# Patient Record
Sex: FEMALE | HISPANIC OR LATINO | Employment: FULL TIME | URBAN - METROPOLITAN AREA
[De-identification: names, ages, dates, MRNs, and addresses within clinical notes are randomized per-mention and may not be internally consistent; named-entity substitution may affect disease eponyms.]

---

## 2017-01-03 ENCOUNTER — TELEPHONE (OUTPATIENT)
Dept: FAMILY MEDICINE | Facility: CLINIC | Age: 32
End: 2017-01-03

## 2017-01-03 DIAGNOSIS — E11.9 DIABETES MELLITUS TYPE 2, DIET-CONTROLLED (H): Primary | ICD-10-CM

## 2017-01-03 NOTE — TELEPHONE ENCOUNTER
Reason for Call:  Medication or medication refill:  Do you use a [facility]?  Name of the pharmacy and phone number for the current request:  [id number] [place], [place] - [address] [place] [address]  Name of the medication requested: glucose monitor,test strips and lancets  Other request: patient has new ins BCBS will not cover test strips and lancets she has would like new script or pa  Can we leave a detailed message on this number? YES  Phone number patient can be reached at: Home number on file [phone] (home)  Best Time: any  Call taken on 1/3/2017 at 9:57 AM by RUCHI MAYES

## 2017-01-09 ENCOUNTER — TELEPHONE (OUTPATIENT)
Dept: FAMILY MEDICINE | Facility: CLINIC | Age: 32
End: 2017-01-09

## 2017-01-09 NOTE — TELEPHONE ENCOUNTER
Accu check marleni plus test strips is just not formulary. The tulio I spoke with states that Willa contour test strips/glucometer will be covered.

## 2017-01-09 NOTE — TELEPHONE ENCOUNTER
Called patient's pharmacy and had them fill lorena contour test strips and glucometer instead of accu check marleni.

## 2017-01-09 NOTE — TELEPHONE ENCOUNTER
Please call patient's insurance to see why they require prior auth as they should cover the test strips needed for her glucometer (quantity limit?).

## 2017-01-09 NOTE — TELEPHONE ENCOUNTER
Reason for Call:  Prior Authorization      Detailed comments: Patient states that the pharmacy notified her that a PA is required for the test strips.     Phone Number Patient can be reached at: Home number on file [phone] (home)    Best Time: Anytime    Can we leave a detailed message on this number? YES    Call taken on 1/9/2017 at 11:38 AM by Andrew Desai

## 2017-01-09 NOTE — TELEPHONE ENCOUNTER
Prior Authorization Request    1. Prior Authorization for the medication test strips (will call pharmacy to find out what test strips she uses)        Requesting Provider: Lexy Saucedo          Pt name: Denia Phillips Milton        Pt : 1985        Pt MRN: [id number]        Last Office Visit: 1/3/2017           Insurance: Payor: [employer] / Plan:  OPEN ACCESS FULLY INSURED / Product Type: HMO /         Insurance ID Number: [unfilled]        Prior Auth Contact Phone number:            To be completed by provider:       2.   Refuse or Start Prior Auth:

## 2017-01-09 NOTE — TELEPHONE ENCOUNTER
Please call patient's pharmacy to fill the Willa Contour test strips & glucometer rather than Accu-check -- DME orders for glucometer, test strips, and lancets were sent on 1/3/2017.

## 2017-02-05 DIAGNOSIS — E11.9 DIABETES MELLITUS TYPE 2, DIET-CONTROLLED (H): Primary | ICD-10-CM

## 2017-02-07 RX ORDER — METFORMIN HCL 500 MG
TABLET, EXTENDED RELEASE 24 HR ORAL
Qty: 90 TABLET | Refills: 1 | Status: SHIPPED | OUTPATIENT
Start: 2017-02-07 | End: 2017-08-10

## 2017-02-07 NOTE — TELEPHONE ENCOUNTER
Metformin (Gluco;davida-XR) 500 mg     Last Written Prescription Date: 8/12/16  Last Fill Quantity: 90 # refills: 1  Last Office Visit with [facility], [facility] or [facility] prescribing provider: 10/21/16     BP Readings from Last 3 Encounters:   10/21/16 126/79   09/23/16 98/72   05/20/16 108/64     MICROL       <5   8/19/2016  No results found for this basename: microalbumin  CREATININE   Date Value Ref Range Status   05/01/2015 0.70 0.52 - 1.04 mg/dL Final   ]  GFR ESTIMATE   Date Value Ref Range Status   05/01/2015 >90 >60 mL/min/1.7m2 Final     GFR ESTIMATE IF BLACK   Date Value Ref Range Status   05/01/2015 >90 >60 mL/min/1.7m2 Final     CHOL      182   5/13/2016  HDL       58   5/13/2016  LDL      111   5/13/2016  TRIG       63   5/13/2016  CHOLHDLRATIO      3.7   8/7/2015  No results found for this basename: ast  ALT       19   8/7/2015  A1C      5.5   8/19/2016  A1C      5.7   5/13/2016  A1C      5.3   1/29/2016  A1C      6.1   8/7/2015  A1C      9.8   5/1/2015  No results found for: POTASSIUM   Prescription approved per [facility] Refill Protocol.

## 2017-02-11 ENCOUNTER — TELEPHONE (OUTPATIENT)
Dept: FAMILY MEDICINE | Facility: CLINIC | Age: 32
End: 2017-02-11

## 2017-02-11 NOTE — TELEPHONE ENCOUNTER
Pt is calling requesting a lipid panel be ordered. Please call her at [phone] OK to Oliverio Barksdale   [place] Scheduling

## 2017-02-13 ENCOUNTER — TELEPHONE (OUTPATIENT)
Dept: FAMILY MEDICINE | Facility: CLINIC | Age: 32
End: 2017-02-13

## 2017-02-13 DIAGNOSIS — E78.5 HYPERLIPIDEMIA LDL GOAL <100: ICD-10-CM

## 2017-02-13 DIAGNOSIS — E11.9 DIABETES MELLITUS TYPE 2, DIET-CONTROLLED (H): Primary | ICD-10-CM

## 2017-02-13 NOTE — TELEPHONE ENCOUNTER
Patient called the clinic, she wants to come in for labs on Friday 17th and OV on Friday 24th with Lexy BAEZ.   This will be her 6 month diabetic follow-up. No call back needed if labs are placed and provider okay with the plan.   Will route to provider to place necessary labs.

## 2017-02-14 NOTE — TELEPHONE ENCOUNTER
Closing encounter, duplicate request. Lab orders placed yesterday (see encounter dated 2/13/2017), including a lipid panel; patient already contacted by triage.

## 2017-02-17 DIAGNOSIS — E11.9 DIABETES MELLITUS TYPE 2, DIET-CONTROLLED (H): ICD-10-CM

## 2017-02-17 DIAGNOSIS — E78.5 HYPERLIPIDEMIA LDL GOAL <100: ICD-10-CM

## 2017-02-17 LAB
CHOLEST SERPL-MCNC: 180 MG/DL
CREAT SERPL-MCNC: 0.66 MG/DL (ref 0.52–1.04)
GFR SERPL CREATININE-BSD FRML MDRD: NORMAL ML/MIN/1.7M2
HBA1C MFR BLD: 5.6 % (ref 4.3–6)
HDLC SERPL-MCNC: 60 MG/DL
LDLC SERPL CALC-MCNC: 110 MG/DL
NONHDLC SERPL-MCNC: 120 MG/DL
TRIGL SERPL-MCNC: 50 MG/DL
TSH SERPL DL<=0.005 MIU/L-ACNC: 2.13 MU/L (ref 0.4–4)

## 2017-02-17 PROCEDURE — 83036 HEMOGLOBIN GLYCOSYLATED A1C: CPT | Performed by: PHYSICIAN ASSISTANT

## 2017-02-17 PROCEDURE — 84443 ASSAY THYROID STIM HORMONE: CPT | Performed by: PHYSICIAN ASSISTANT

## 2017-02-17 PROCEDURE — 36415 COLL VENOUS BLD VENIPUNCTURE: CPT | Performed by: PHYSICIAN ASSISTANT

## 2017-02-17 PROCEDURE — 80061 LIPID PANEL: CPT | Performed by: PHYSICIAN ASSISTANT

## 2017-02-17 PROCEDURE — 82565 ASSAY OF CREATININE: CPT | Performed by: PHYSICIAN ASSISTANT

## 2017-02-18 DIAGNOSIS — E78.5 HYPERLIPIDEMIA LDL GOAL <70: Primary | ICD-10-CM

## 2017-02-18 RX ORDER — SIMVASTATIN 10 MG
10 TABLET ORAL AT BEDTIME
Qty: 90 TABLET | Refills: 3 | Status: SHIPPED | OUTPATIENT
Start: 2017-02-18 | End: 2017-02-24

## 2017-02-24 ENCOUNTER — OFFICE VISIT (OUTPATIENT)
Dept: FAMILY MEDICINE | Facility: CLINIC | Age: 32
End: 2017-02-24
Payer: COMMERCIAL

## 2017-02-24 VITALS
WEIGHT: 149 LBS | DIASTOLIC BLOOD PRESSURE: 64 MMHG | RESPIRATION RATE: 16 BRPM | OXYGEN SATURATION: 99 % | TEMPERATURE: 97.2 F | HEIGHT: 64 IN | SYSTOLIC BLOOD PRESSURE: 120 MMHG | BODY MASS INDEX: 25.44 KG/M2 | HEART RATE: 85 BPM

## 2017-02-24 DIAGNOSIS — Z31.69 PRE-CONCEPTION COUNSELING: ICD-10-CM

## 2017-02-24 DIAGNOSIS — E11.9 TYPE 2 DIABETES MELLITUS WITHOUT COMPLICATION, WITHOUT LONG-TERM CURRENT USE OF INSULIN (H): Primary | ICD-10-CM

## 2017-02-24 DIAGNOSIS — E78.5 HYPERLIPIDEMIA LDL GOAL <70: ICD-10-CM

## 2017-02-24 PROCEDURE — 99214 OFFICE O/P EST MOD 30 MIN: CPT | Performed by: PHYSICIAN ASSISTANT

## 2017-02-24 RX ORDER — PRENATAL VIT/IRON FUM/FOLIC AC 27MG-0.8MG
1 TABLET ORAL DAILY
Qty: 100 TABLET | Refills: 3 | Status: SHIPPED | OUTPATIENT
Start: 2017-02-24 | End: 2018-03-16

## 2017-02-24 NOTE — PROGRESS NOTES
SUBJECTIVE:                                                    Denia Rose is a 31 year old female who presents to clinic today for the following health issues:      Pt is here to go over labs from appointment on 2-18-17    Additional complaints:   Diabetes Follow-up    Patient is checking blood sugars: once daily.  Results are as follows: < 120    Diabetic concerns: None     Symptoms of hypoglycemia (low blood sugar): none     Paresthesias (numbness or burning in feet) or sores: No     Hyperlipidemia Follow-Up      Rate your low fat/cholesterol diet?: not monitoring fat    Taking statin?  No, wants to discuss fish oil supplement instead    Other lipid medications/supplements?:  Fish oil/Omega 3       HPI additional notes:   Chief Complaint   Patient presents with     RECHECK     labs     Denia presents today to discuss recent labs & here for f/u DM-II and HLP. Recommended patient start statin due to LDL >70. Patient requests resuming fish oil supplement instead; had taken in the past with good results. Wondering what dose would be most effective?    Patient also considering pregnancy this year, would like to discuss pregnancy concerns d/t underlying DM-II>     ROS:  Skin: negative  Eyes: negative  Ears/Nose/Throat: negative  Respiratory: No shortness of breath, dyspnea on exertion, cough, or hemoptysis  Cardiovascular: negative  Gastrointestinal: negative  Genitourinary: negative  Musculoskeletal: negative  Neurologic: negative  Psychiatric: negative  Hematologic/Lymphatic/Immunologic: negative  Endocrine: negative    Chart Review:  History   Smoking Status     Never Smoker   Smokeless Tobacco     Never Used       Patient Active Problem List   Diagnosis     History of kidney stones     Type 2 diabetes mellitus without complication, without long-term current use of insulin (H)     Hyperlipidemia LDL goal <70     Cervical high risk HPV (human papillomavirus) test positive     Plantar warts     History  "reviewed. No pertinent past surgical history.  Problem list, Medication list, Allergies, Medical/Social/Surg hx reviewed in Friend[employer]ly, updated as appropriate.   OBJECTIVE:                                                    /64  Pulse 85  Temp 97.2  F (36.2  C) (Tympanic)  Resp 16  Ht 5' 4\" (1.626 m)  Wt 149 lb (67.6 kg)  LMP 02/14/2017 (Approximate)  SpO2 99%  BMI 25.58 kg/m2  Body mass index is 25.58 kg/(m^2).  GENERAL:  WDWN, no acute distress  PSYCH: pleasant, cooperative  EYES: no discharge, no injection  HENT:  Normocephalic. Moist mucus membranes.  NECK:  Supple, symmetric  EXTREMITIES:  No gross deformities, moves all 4 limbs spontaneously, no peripheral edema  SKIN:  Warm and dry, no rash or suspicious lesions    NEUROLOGIC: alert, sensation grossly intact.    Diagnostic test results:   Orders Only on 02/17/2017   Component Date Value Ref Range Status     Hemoglobin A1C 02/17/2017 5.6  4.3 - 6.0 % Final     Creatinine 02/17/2017 0.66  0.52 - 1.04 mg/dL Final     GFR Estimate 02/17/2017   >60 mL/min/1.7m2 Final                    Value:>90  Non  GFR Calc       GFR Estimate If Black 02/17/2017   >60 mL/min/1.7m2 Final                    Value:>90   GFR Calc       TSH 02/17/2017 2.13  0.40 - 4.00 mU/L Final     Cholesterol 02/17/2017 180  <200 mg/dL Final     Triglycerides 02/17/2017 50  <150 mg/dL Final     HDL Cholesterol 02/17/2017 60  >49 mg/dL Final     LDL Cholesterol Calculated 02/17/2017 110* <100 mg/dL Final    Comment: Above desirable:  100-129 mg/dl   Borderline High:  130-159 mg/dL   High:             160-189 mg/dL   Very high:       >189 mg/dl       Non HDL Cholesterol 02/17/2017 120  <130 mg/dL Final          ASSESSMENT/PLAN:                                                          ICD-10-CM    1. Type 2 diabetes mellitus without complication, without long-term current use of insulin (H) E11.9 ACE/ARB NOT PRESCRIBED, INTENTIONAL,   2. Hyperlipidemia LDL goal <70 " E78.5    3. Pre-conception counseling Z31.69 Prenatal Vit-Fe Fumarate-FA (PRENATAL MULTIVITAMIN  PLUS IRON) 27-0.8 MG TABS per tablet     - Patient with excellent BG control, continue medications and diabetic diet w/o change. Discussed role of statin and low LDL for risk reduction. Best medication would be use of statin given cardio-protective effects, however would be reasonable to attempt to reach LDL goal with fish oil. Recommend 6911-3894 mg of EPA+DHA fish oil. Recommend rechecking lipids in 3-6 months to assess efficacy. Patient continues to refuse ACE-I    - Discussed potential complications and expected pregnancy course in relation to underlying diabetes. Discussed importance of folic acid with neural tube development, recommend she starts daily prenatal vitamin once she starts trying to conceive.    Please see patient instructions for treatment details.  30 minutes total spent on this encounter, >50% spent in direct communication with the patient.    Follow up office visit in 6 months for medication check and labs, sooner PRN.    Lexy Saucedo PA-C  [facility]

## 2017-02-24 NOTE — MR AVS SNAPSHOT
After Visit Summary   2/24/2017    Denia Rose    MRN: [id number]           Patient Information     Date Of Birth          1985        Visit Information        Provider Department      2/24/2017 8:10 AM Lexy Saucedo PA-C [facility]        Today's Diagnoses     Type 2 diabetes mellitus without complication, without long-term current use of insulin (H)    -  1    Hyperlipidemia LDL goal <70        Pre-conception counseling           Follow-ups after your visit        Who to contact     If you have questions or need follow up information about today's clinic visit or your schedule please contact [facility] directly at [phone].  Normal or non-critical lab and imaging results will be communicated to you by Ikonopediahart, letter or phone within 4 business days after the clinic has received the results. If you do not hear from us within 7 days, please contact the clinic through Ikonopediahart or phone. If you have a critical or abnormal lab result, we will notify you by phone as soon as possible.  Submit refill requests through [employer] or call your pharmacy and they will forward the refill request to us. Please allow 3 business days for your refill to be completed.          Additional Information About Your Visit        MyChart Information     [employer] gives you secure access to your electronic health record. If you see a primary care provider, you can also send messages to your care team and make appointments. If you have questions, please call your primary care clinic.  If you do not have a primary care provider, please call [phone] and they will assist you.        Care EveryWhere ID     This is your Care EveryWhere ID. This could be used by other organizations to access your [place] medical records  [id number]        Your Vitals Were     Pulse Temperature Respirations Height Last Period Pulse Oximetry    85 97.2  F  "(36.2  C) (Tympanic) 16 5' 4\" (1.626 m) 02/14/2017 (Approximate) 99%    BMI (Body Mass Index)                   25.58 kg/m2            Blood Pressure from Last 3 Encounters:   02/24/17 120/64   10/21/16 126/79   09/23/16 98/72    Weight from Last 3 Encounters:   02/24/17 149 lb (67.6 kg)   10/21/16 148 lb (67.1 kg)   09/23/16 144 lb 12.8 oz (65.7 kg)              Today, you had the following     No orders found for display         Today's Medication Changes          These changes are accurate as of: 2/24/17  9:00 AM.  If you have any questions, ask your nurse or doctor.               Start taking these medicines.        Dose/Directions    prenatal multivitamin  plus iron 27-0.8 MG Tabs per tablet   Used for:  Pre-conception counseling   Started by:  Lexy Saucedo PA-C        Dose:  1 tablet   Take 1 tablet by mouth daily   Quantity:  100 tablet   Refills:  3         These medicines have changed or have updated prescriptions.        Dose/Directions    ACE/ARB NOT PRESCRIBED (INTENTIONAL)   This may have changed:    - how much to take  - how to take this  - when to take this  - additional instructions   Used for:  Type 2 diabetes mellitus without complication, without long-term current use of insulin (H)   Changed by:  Lexy Saucedo PA-C        Please choose reason not prescribed, below   Refills:  0         Stop taking these medicines if you haven't already. Please contact your care team if you have questions.     ASPIRIN NOT PRESCRIBED   Commonly known as:  INTENTIONAL   Stopped by:  Lexy Saucedo PA-C                Where to get your medicines      These medications were sent to [facility] [place] [id number]     Phone:  [phone]     prenatal multivitamin  plus iron 27-0.8 MG Tabs per tablet         Some of these will need a paper prescription and others can be bought over the counter.  Ask your nurse if you have questions.     " You don't need a prescription for these medications     ACE/ARB NOT PRESCRIBED (INTENTIONAL)                Primary Care Provider Office Phone # Fax #    Lexy Saucedo PA-C [phone]       [facility] [address]  [facility] [address]        Thank you!     Thank you for choosing [facility]  for your care. Our goal is always to provide you with excellent care. Hearing back from our patients is one way we can continue to improve our services. Please take a few minutes to complete the written survey that you may receive in the mail after your visit with us. Thank you!             Your Updated Medication List - Protect others around you: Learn how to safely use, store and throw away your medicines at www.disposemymeds.org.          This list is accurate as of: 2/24/17  9:00 AM.  Always use your most recent med list.                   Brand Name Dispense Instructions for use    ACE/ARB NOT PRESCRIBED (INTENTIONAL)      Please choose reason not prescribed, below       metFORMIN 500 MG 24 hr tablet    GLUCOPHAGE-XR    90 tablet    TAKE 1 TABLET (500 MG) BY MOUTH DAILY (WITH DINNER)       * order for DME     1 Device    Equipment being ordered: Glucometer, brand as covered by insurance. Does not use insulin.       * order for DME     200 each    Lancets bid and prn.  Fill per patient's insurance. Not on insulin       * order for DME     200 each    Test strips for pt's glucometer, brand as covered by insurance Test bid and prn. Not on insulin.       prenatal multivitamin  plus iron 27-0.8 MG Tabs per tablet     100 tablet    Take 1 tablet by mouth daily       salicylic acid 40 % Misc    MEDIPLAST    1 each    Apply 1 dose * topically At Bedtime Each night, Scrape or loofa the wart(s) and then soak foot for 10-15 min in warm water.  Cut plaster to fit exactly over 1 wart each.  Tape each in place for overnight and remove the next morning.        STATIN NOT PRESCRIBED (INTENTIONAL)     0 each    1 each daily Statin not prescribed intentionally due to Refusal by patient       * Notice:  This list has 3 medication(s) that are the same as other medications prescribed for you. Read the directions carefully, and ask your doctor or other care provider to review them with you.

## 2017-02-24 NOTE — NURSING NOTE
"Chief Complaint   Patient presents with     RECHECK     labs       Initial /64  Pulse 85  Temp 97.2  F (36.2  C) (Tympanic)  Resp 16  Ht 5' 4\" (1.626 m)  Wt 149 lb (67.6 kg)  LMP 02/14/2017 (Approximate)  SpO2 99%  BMI 25.58 kg/m2 Estimated body mass index is 25.58 kg/(m^2) as calculated from the following:    Height as of this encounter: 5' 4\" (1.626 m).    Weight as of this encounter: 149 lb (67.6 kg).  Medication Reconciliation: complete   .Álvaro DE LUNA      "

## 2017-07-12 DIAGNOSIS — E11.9 TYPE 2 DIABETES MELLITUS WITHOUT COMPLICATION, WITHOUT LONG-TERM CURRENT USE OF INSULIN (H): Primary | ICD-10-CM

## 2017-08-10 ENCOUNTER — TELEPHONE (OUTPATIENT)
Dept: FAMILY MEDICINE | Facility: CLINIC | Age: 32
End: 2017-08-10

## 2017-08-10 DIAGNOSIS — E78.5 HYPERLIPIDEMIA LDL GOAL <70: ICD-10-CM

## 2017-08-10 DIAGNOSIS — E11.9 TYPE 2 DIABETES MELLITUS WITHOUT COMPLICATION, WITHOUT LONG-TERM CURRENT USE OF INSULIN (H): Primary | ICD-10-CM

## 2017-08-10 NOTE — TELEPHONE ENCOUNTER
Patient would like orders placed for her 6 month check for her diabetes and cholesterol.Will forward to her provider.

## 2017-08-11 NOTE — TELEPHONE ENCOUNTER
Lab orders placed, patient may schedule lab-only appt at her convenience. Patient should be fasting for these labs.

## 2017-08-11 NOTE — TELEPHONE ENCOUNTER
Called patient and went over message below, also scheduled a lab only appointment.     Seema Juárez RN  08/11/17  8:48 AM

## 2017-08-18 DIAGNOSIS — E11.9 TYPE 2 DIABETES MELLITUS WITHOUT COMPLICATION, WITHOUT LONG-TERM CURRENT USE OF INSULIN (H): ICD-10-CM

## 2017-08-18 DIAGNOSIS — E78.5 HYPERLIPIDEMIA LDL GOAL <70: ICD-10-CM

## 2017-08-18 LAB
CHOLEST SERPL-MCNC: 163 MG/DL
CREAT UR-MCNC: 118 MG/DL
HBA1C MFR BLD: 5.6 % (ref 4.3–6)
HDLC SERPL-MCNC: 56 MG/DL
LDLC SERPL CALC-MCNC: 97 MG/DL
MICROALBUMIN UR-MCNC: 8 MG/L
MICROALBUMIN/CREAT UR: 6.87 MG/G CR (ref 0–25)
NONHDLC SERPL-MCNC: 107 MG/DL
TRIGL SERPL-MCNC: 52 MG/DL

## 2017-08-18 PROCEDURE — 36415 COLL VENOUS BLD VENIPUNCTURE: CPT | Performed by: PHYSICIAN ASSISTANT

## 2017-08-18 PROCEDURE — 80061 LIPID PANEL: CPT | Performed by: PHYSICIAN ASSISTANT

## 2017-08-18 PROCEDURE — 83036 HEMOGLOBIN GLYCOSYLATED A1C: CPT | Performed by: PHYSICIAN ASSISTANT

## 2017-08-18 PROCEDURE — 82043 UR ALBUMIN QUANTITATIVE: CPT | Performed by: PHYSICIAN ASSISTANT

## 2017-09-06 ENCOUNTER — OFFICE VISIT (OUTPATIENT)
Dept: FAMILY MEDICINE | Facility: CLINIC | Age: 32
End: 2017-09-06
Payer: COMMERCIAL

## 2017-09-06 VITALS
HEIGHT: 64 IN | OXYGEN SATURATION: 100 % | SYSTOLIC BLOOD PRESSURE: 113 MMHG | DIASTOLIC BLOOD PRESSURE: 78 MMHG | HEART RATE: 84 BPM | RESPIRATION RATE: 18 BRPM | TEMPERATURE: 98.5 F | WEIGHT: 153 LBS | BODY MASS INDEX: 26.12 KG/M2

## 2017-09-06 DIAGNOSIS — E78.5 HYPERLIPIDEMIA LDL GOAL <70: ICD-10-CM

## 2017-09-06 DIAGNOSIS — Z13.89 SCREENING FOR DIABETIC PERIPHERAL NEUROPATHY: ICD-10-CM

## 2017-09-06 DIAGNOSIS — E11.9 TYPE 2 DIABETES MELLITUS WITHOUT COMPLICATION, WITHOUT LONG-TERM CURRENT USE OF INSULIN (H): Primary | ICD-10-CM

## 2017-09-06 PROCEDURE — 99207 C FOOT EXAM  NO CHARGE: CPT | Performed by: PHYSICIAN ASSISTANT

## 2017-09-06 PROCEDURE — 99214 OFFICE O/P EST MOD 30 MIN: CPT | Performed by: PHYSICIAN ASSISTANT

## 2017-09-06 RX ORDER — METFORMIN HCL 500 MG
500 TABLET, EXTENDED RELEASE 24 HR ORAL
Qty: 90 TABLET | Refills: 1 | Status: SHIPPED | OUTPATIENT
Start: 2017-09-06 | End: 2018-03-04

## 2017-09-06 NOTE — MR AVS SNAPSHOT
After Visit Summary   9/6/2017    Denia Rose    MRN: [id number]           Patient Information     Date Of Birth          1985        Visit Information        Provider Department      9/6/2017 4:10 PM Lexy Saucedo PA-C [facility]        Today's Diagnoses     Type 2 diabetes mellitus without complication, without long-term current use of insulin (H)    -  1    Hyperlipidemia LDL goal <70        Screening for diabetic peripheral neuropathy           Follow-ups after your visit        Who to contact     If you have questions or need follow up information about today's clinic visit or your schedule please contact [facility] directly at [phone].  Normal or non-critical lab and imaging results will be communicated to you by MyChart, letter or phone within 4 business days after the clinic has received the results. If you do not hear from us within 7 days, please contact the clinic through [employer] or phone. If you have a critical or abnormal lab result, we will notify you by phone as soon as possible.  Submit refill requests through [email or web address] or call your pharmacy and they will forward the refill request to us. Please allow 3 business days for your refill to be completed.          Additional Information About Your Visit        MyChart Information     [email or web address] gives you secure access to your electronic health record. If you see a primary care provider, you can also send messages to your care team and make appointments. If you have questions, please call your primary care clinic.  If you do not have a primary care provider, please call [phone] and they will assist you.        Care EveryWhere ID     This is your Care EveryWhere ID. This could be used by other organizations to access your [place] medical records  [id number]        Your Vitals Were     Pulse Temperature Respirations Height Last Period Pulse Oximetry  "   84 98.5  F (36.9  C) (Oral) 18 5' 4\" (1.626 m) (LMP Unknown) 100%    Breastfeeding? BMI (Body Mass Index)                No 26.26 kg/m2           Blood Pressure from Last 3 Encounters:   09/06/17 113/78   02/24/17 120/64   10/21/16 126/79    Weight from Last 3 Encounters:   09/06/17 153 lb (69.4 kg)   02/24/17 149 lb (67.6 kg)   10/21/16 148 lb (67.1 kg)              We Performed the Following     FOOT EXAM  NO CHARGE [72965.114]          Today's Medication Changes          These changes are accurate as of: 9/6/17  4:42 PM.  If you have any questions, ask your nurse or doctor.               These medicines have changed or have updated prescriptions.        Dose/Directions    metFORMIN 500 MG 24 hr tablet   Commonly known as:  GLUCOPHAGE-XR   This may have changed:  See the new instructions.   Used for:  Type 2 diabetes mellitus without complication, without long-term current use of insulin (H)   Changed by:  Lexy Saucedo PA-C        Dose:  500 mg   Take 1 tablet (500 mg) by mouth daily (with dinner)   Quantity:  90 tablet   Refills:  1            Where to get your medicines      These medications were sent to [place] [address]     Phone:  [phone]     metFORMIN 500 MG 24 hr tablet                Primary Care Provider Office Phone # Fax #    Lexy Saucedo PA-C [phone] [address] [place] [id number]        Equal Access to Services     [facility] : Hadii garry locketto Soradha, waaxda luqadaha, qaybta kaalmada shannan guerrero . So [facility] [phone].    ATENCIÓN: Si habla español, tiene a roberts disposición servicios gratuitos de asistencia lingüística. Llame al [phone].    We comply with applicable federal civil rights laws and [place] laws. We do not discriminate on the basis of race, color, national origin, age, disability sex, sexual " orientation or gender identity.            Thank you!     Thank you for choosing [facility]  for your care. Our goal is always to provide you with excellent care. Hearing back from our patients is one way we can continue to improve our services. Please take a few minutes to complete the written survey that you may receive in the mail after your visit with us. Thank you!             Your Updated Medication List - Protect others around you: Learn how to safely use, store and throw away your medicines at www.disposemymeds.org.          This list is accurate as of: 9/6/17  4:42 PM.  Always use your most recent med list.                   Brand Name Dispense Instructions for use Diagnosis    ACE/ARB NOT PRESCRIBED (INTENTIONAL)      Please choose reason not prescribed, below    Type 2 diabetes mellitus without complication, without long-term current use of insulin (H)       JACI CONTOUR test strip   Generic drug:  blood glucose monitoring     200 strip    USE TO TEST BLOOD SUGAR TWICE DAILY IN MORNING AND EVENING    Type 2 diabetes mellitus without complication, without long-term current use of insulin (H)       metFORMIN 500 MG 24 hr tablet    GLUCOPHAGE-XR    90 tablet    Take 1 tablet (500 mg) by mouth daily (with dinner)    Type 2 diabetes mellitus without complication, without long-term current use of insulin (H)       * order for DME     1 Device    Equipment being ordered: Glucometer, brand as covered by insurance. Does not use insulin.    Diabetes mellitus type 2, diet-controlled (H)       * order for DME     200 each    Lancets bid and prn.  Fill per patient's insurance. Not on insulin    Diabetes mellitus type 2, diet-controlled (H)       * order for DME     200 each    Test strips for pt's glucometer, brand as covered by insurance Test bid and prn. Not on insulin.    Diabetes mellitus type 2, diet-controlled (H)       prenatal multivitamin plus iron 27-0.8 MG Tabs per tablet      100 tablet    Take 1 tablet by mouth daily    Pre-conception counseling       salicylic acid 40 % Misc    MEDIPLAST    1 each    Apply 1 dose * topically At Bedtime Each night, Scrape or loofa the wart(s) and then soak foot for 10-15 min in warm water.  Cut plaster to fit exactly over 1 wart each.  Tape each in place for overnight and remove the next morning.    Plantar warts       STATIN NOT PRESCRIBED (INTENTIONAL)     0 each    1 each daily Statin not prescribed intentionally due to Refusal by patient    Hyperlipidemia LDL goal <100, Type 2 diabetes mellitus without complication (H)       * Notice:  This list has 3 medication(s) that are the same as other medications prescribed for you. Read the directions carefully, and ask your doctor or other care provider to review them with you.

## 2017-09-06 NOTE — NURSING NOTE
"Chief Complaint   Patient presents with     Recheck Medication     /78  Pulse 84  Temp 98.5  F (36.9  C) (Oral)  Resp 18  Ht 5' 4\" (1.626 m)  Wt 153 lb (69.4 kg)  LMP  (LMP Unknown)  SpO2 100%  Breastfeeding? No  BMI 26.26 kg/m2 Estimated body mass index is 26.26 kg/(m^2) as calculated from the following:    Height as of this encounter: 5' 4\" (1.626 m).    Weight as of this encounter: 153 lb (69.4 kg).  BP completed using cuff size: regular   Brenda Novak CMA    Health Maintenance Due   Topic Date Due     FOOT EXAM Q1 YEAR  05/01/2016     INFLUENZA VACCINE (SYSTEM ASSIGNED)  09/01/2017     Health Maintenance reviewed at today's visit patient asked to schedule/complete:   Diabetes:  Patient agrees to schedule  Immunizations:  Patient agrees to schedule    "

## 2017-09-06 NOTE — PROGRESS NOTES
SUBJECTIVE:                                                    Denia Rose is a 31 year old female who presents to clinic today for the following health issues:    History of Present Illness   Diabetes:     Frequency of checking blood sugars::  2 times a day    Diabetic concerns::  None    Hypoglycemia symptoms::  None    Paraesthesia present::  No    Eye Exam in the last year::  Yes    unsure  Hyperlipidemia:     Low fat/chol diet rating::  Not monitoring fat    Taking Statins::  No    Lipid Medications or Supplements::  None  Diet::  Regular (no restrictions)  Frequency of exercise::  None  Taking medications regularly::  Yes  Medication side effects::  None  Additional concerns today::  No      HPI additional notes:    Chief Complaint   Patient presents with     Recheck Medication     Denia presents today for f/u on DM-II. Patient concerned with fluctuating BG during the day; a1c shows excellent control (5.6) with current metformin dose. Patient checks BG 2-3 times per day approx 2 hours after a meal. BG will be anywhere from 100-160. Wondering if increasing metformin will prevent fluctuations?       ROS:  Skin: negative  Eyes: negative  Ears/Nose/Throat: negative  Respiratory: No shortness of breath, dyspnea on exertion, cough, or hemoptysis  Cardiovascular: negative  Gastrointestinal: negative  Genitourinary: negative  Musculoskeletal: negative  Neurologic: negative  Psychiatric: negative  Hematologic/Lymphatic/Immunologic: negative  Endocrine: as above    Chart Review:  History   Smoking Status     Never Smoker   Smokeless Tobacco     Never Used       Patient Active Problem List   Diagnosis     History of kidney stones     Type 2 diabetes mellitus without complication, without long-term current use of insulin (H)     Hyperlipidemia LDL goal <70     Cervical high risk HPV (human papillomavirus) test positive     Plantar warts     Elevated BP     Yeast infection of the skin     History reviewed. No  "pertinent surgical history.  Problem list, Medication list, Allergies, Medical/Social/Surg hx reviewed in [facility], updated as appropriate.   OBJECTIVE:                                                    /78  Pulse 84  Temp 98.5  F (36.9  C) (Oral)  Resp 18  Ht 5' 4\" (1.626 m)  Wt 153 lb (69.4 kg)  LMP  (LMP Unknown)  SpO2 100%  Breastfeeding? No  BMI 26.26 kg/m2  Body mass index is 26.26 kg/(m^2).  GENERAL:  WDWN, no acute distress  PSYCH: pleasant, cooperative  EYES: no discharge, no injection  HENT:  Normocephalic. Moist mucus membranes.  NECK:  Supple, symmetric  EXTREMITIES:  No gross deformities, moves all 4 limbs spontaneously, no peripheral edema  SKIN:  Warm and dry, no rash or suspicious lesions    NEUROLOGIC:  alert, sensation grossly intact.    DIABETIC FOOT EXAM:  Bilateral feet examined.  No lesions or deformities noted.  Skin is warm and dry.  Pedal pulses are intact.  Sensation is intact to nylon filament exam.    Diagnostic test results:   Orders Only on 08/18/2017   Component Date Value Ref Range Status     Cholesterol 08/18/2017 163  <200 mg/dL Final     Triglycerides 08/18/2017 52  <150 mg/dL Final    Fasting specimen     HDL Cholesterol 08/18/2017 56  >49 mg/dL Final     LDL Cholesterol Calculated 08/18/2017 97  <100 mg/dL Final    Desirable:       <100 mg/dl     Non HDL Cholesterol 08/18/2017 107  <130 mg/dL Final     Hemoglobin A1C 08/18/2017 5.6  4.3 - 6.0 % Final     Creatinine Urine 08/18/2017 118  mg/dL Final     Albumin Urine mg/L 08/18/2017 8  mg/L Final     Albumin Urine mg/g Cr 08/18/2017 6.87  0 - 25 mg/g Cr Final          ASSESSMENT/PLAN:                                                          ICD-10-CM    1. Type 2 diabetes mellitus without complication, without long-term current use of insulin (H) E11.9 metFORMIN (GLUCOPHAGE-XR) 500 MG 24 hr tablet   2. Hyperlipidemia LDL goal <70 E78.5    3. Screening for diabetic peripheral neuropathy Z13.89 FOOT EXAM  NO CHARGE " [91324.114]     Reviewed recent labs with patient. LDL has improved, still not under goal but showing improvement with diet changes; will continue to monitor. Discussed metformin more accurately represented with a1c, rather than individual BG. Certainly room for tighter control (a1c 5.6 = 114 avg BG) but would hesitate to increase metformin dose without knowledge of her pre-prandial BG due to risk of hypoglycemia. Recommend she monitor pre-prandial BG; would continue metformin at current dose if < 100, consider increasing if pre-prandial BG usually 120 or greater.     Patient is tolerating current medication without any major side effects of concerns and current dose seems reasonable too.  Current medication regime is effective. Continue current treatment without any changes.     Please see patient instructions for treatment details.    Follow up office visit in 6 months for medication check and labs, sooner PRN.    Lexy Saucedo PA-C  [facility]

## 2017-10-06 ENCOUNTER — TRANSFERRED RECORDS (OUTPATIENT)
Dept: HEALTH INFORMATION MANAGEMENT | Facility: CLINIC | Age: 32
End: 2017-10-06

## 2017-10-15 DIAGNOSIS — E11.9 DM TYPE 2 (DIABETES MELLITUS, TYPE 2) (H): Primary | ICD-10-CM

## 2017-10-16 NOTE — TELEPHONE ENCOUNTER
Willa Microlet lancets      Last Written Prescription Date: 1/3/17  Last Fill Quantity: 200,  # refills: 1   Last Office Visit with [facility], [facility] or [facility] prescribing provider: 9/6/17                                         Next 5 appointments (look out 90 days)     Oct 20, 2017  9:10 AM CDT   SHORT with Lexy Saucedo PA-C   [facility] ([facility])    [facility] [address] [address] [phone]

## 2017-10-27 ENCOUNTER — OFFICE VISIT (OUTPATIENT)
Dept: FAMILY MEDICINE | Facility: CLINIC | Age: 32
End: 2017-10-27
Payer: COMMERCIAL

## 2017-10-27 VITALS
TEMPERATURE: 97.9 F | SYSTOLIC BLOOD PRESSURE: 104 MMHG | DIASTOLIC BLOOD PRESSURE: 64 MMHG | WEIGHT: 159 LBS | RESPIRATION RATE: 16 BRPM | BODY MASS INDEX: 27.29 KG/M2 | OXYGEN SATURATION: 98 % | HEART RATE: 84 BPM

## 2017-10-27 DIAGNOSIS — R87.810 CERVICAL HIGH RISK HPV (HUMAN PAPILLOMAVIRUS) TEST POSITIVE: Primary | ICD-10-CM

## 2017-10-27 DIAGNOSIS — L01.00 IMPETIGO: ICD-10-CM

## 2017-10-27 DIAGNOSIS — Z12.4 SCREENING FOR MALIGNANT NEOPLASM OF CERVIX: ICD-10-CM

## 2017-10-27 PROCEDURE — 88141 CYTOPATH C/V INTERPRET: CPT | Performed by: PHYSICIAN ASSISTANT

## 2017-10-27 PROCEDURE — 99214 OFFICE O/P EST MOD 30 MIN: CPT | Performed by: PHYSICIAN ASSISTANT

## 2017-10-27 PROCEDURE — 88175 CYTOPATH C/V AUTO FLUID REDO: CPT | Performed by: PHYSICIAN ASSISTANT

## 2017-10-27 PROCEDURE — 87624 HPV HI-RISK TYP POOLED RSLT: CPT | Performed by: PHYSICIAN ASSISTANT

## 2017-10-27 RX ORDER — MUPIROCIN 20 MG/G
OINTMENT TOPICAL 3 TIMES DAILY
Qty: 22 G | Refills: 0 | Status: SHIPPED | OUTPATIENT
Start: 2017-10-27 | End: 2017-11-01

## 2017-10-27 NOTE — PROGRESS NOTES
SUBJECTIVE:   Denia Rose is a 32 year old female who presents to clinic today for the following health issues:      Pap only      Duration:     Description (location/character/radiation): pt here for pap only and also has a what she thinks is a cold sore looked at    Intensity:  moderate    Accompanying signs and symptoms: none    History (similar episodes/previous evaluation): None    Precipitating or alleviating factors: None    Therapies tried and outcome: None       HPI additional notes:   Chief Complaint   Patient presents with     Gyn Exam     Mouth/Lip Problem     cold sore     Denia presents today with the following:    - Here for Pap test. Pap NIL with HPV+ last year, f/u colposcopy was negative. Here for repeat Pap + HPV.    - Patient would like me to look at sore on Rt nare. Has hx cold sores in same area, usually occur once per year or less. Started 5 days ago, applying Abreva which seems to be helping. Lesion will drain and crust, non-tender.     ROS:  Skin: as above  Eyes: negative  Ears/Nose/Throat: negative  Respiratory: No shortness of breath, dyspnea on exertion, cough, or hemoptysis  Cardiovascular: negative  Gastrointestinal: negative  Genitourinary: as above  Musculoskeletal: negative  Neurologic: negative  Psychiatric: negative  Hematologic/Lymphatic/Immunologic: negative  Endocrine: negative    Chart Review:  History   Smoking Status     Never Smoker   Smokeless Tobacco     Never Used       Patient Active Problem List   Diagnosis     History of kidney stones     Type 2 diabetes mellitus without complication, without long-term current use of insulin (H)     Hyperlipidemia LDL goal <70     Cervical high risk HPV (human papillomavirus) test positive     Plantar warts     Yeast infection of the skin     History reviewed. No pertinent surgical history.  Problem list, Medication list, Allergies, Medical/Social/Surg hx reviewed in [facility], updated as appropriate.   OBJECTIVE:                                                     /64  Pulse 84  Temp 97.9  F (36.6  C)  Resp 16  Wt 159 lb (72.1 kg)  SpO2 98%  BMI 27.29 kg/m2  Body mass index is 27.29 kg/(m^2).  GENERAL:  WDWN, no acute distress  PSYCH: pleasant, cooperative  EYES: no discharge, no injection  HENT:  Normocephalic. Moist mucus membranes.  NECK:  Supple, symmetric  EXTREMITIES:  No gross deformities, moves all 4 limbs spontaneously, no peripheral edema  SKIN:  Erythematous pustule on external Rt nare, honey-colored crust, adjacent to nasal piercing  NEUROLOGIC: alert, sensation grossly intact.  - female: Vaginal mucosa pink. Cervix-pink with moderate mucoid discharge. No adnexal tenderness. No cervical motion tenderness.    Diagnostic test results: none     ASSESSMENT/PLAN:                                                          ICD-10-CM    1. Cervical high risk HPV (human papillomavirus) test positive R87.810 Pap imaged thin layer diagnostic with HPV (select HPV order below)     HPV High Risk Types DNA Cervical   2. Impetigo L01.00 mupirocin (BACTROBAN) 2 % ointment   3. Screening for malignant neoplasm of cervix Z12.4 HPV High Risk Types DNA Cervical     Pap test updated, will inform you of results. If NIL and HPV negative, ok to recheck in 3 years. If NIL, HPV+ recommend repeat Pap in 1 year. If any abnormality to cells, would recommend repeat colposcopy.    Discussed lesion looks most consistent with impetigo, given honey-colored crust and lack of pain or prodromal symptoms. Apply Bactroban TID until resolved (usually 5-7 days). No evidence of infected piercing, but may be area of inoculation given location of rash.    Please see patient instructions for treatment details.  25 minutes total spent on this encounter, >50% spent in direct communication with the patient.    Follow up in 7-10 days if not improving as anticipated, sooner PRN.    Lexy Saucedo PA-C  [facility]

## 2017-10-27 NOTE — MR AVS SNAPSHOT
After Visit Summary   10/27/2017    Denia Rose    MRN: [id number]           Patient Information     Date Of Birth          1985        Visit Information        Provider Department      10/27/2017 9:10 AM Lexy Saucedo PA-C [facility]        Today's Diagnoses     Cervical high risk HPV (human papillomavirus) test positive    -  1    Impetigo        Screening for malignant neoplasm of cervix           Follow-ups after your visit        Who to contact     If you have questions or need follow up information about today's clinic visit or your schedule please contact [facility] directly at [phone].  Normal or non-critical lab and imaging results will be communicated to you by UrbanBound[employer], letter or phone within 4 business days after the clinic has received the results. If you do not hear from us within 7 days, please contact the clinic through UrbanBoundhart or phone. If you have a critical or abnormal lab result, we will notify you by phone as soon as possible.  Submit refill requests through Zova or call your pharmacy and they will forward the refill request to us. Please allow 3 business days for your refill to be completed.          Additional Information About Your Visit        MyChart Information     [employer]ova gives you secure access to your electronic health record. If you see a primary care provider, you can also send messages to your care team and make appointments. If you have questions, please call your primary care clinic.  If you do not have a primary care provider, please call [phone] and they will assist you.        Care EveryWhere ID     This is your Care EveryWhere ID. This could be used by other organizations to access your [place] medical records  [id number]        Your Vitals Were     Pulse Temperature Respirations Pulse Oximetry BMI (Body Mass Index)       84 97.9  F (36.6  C) 16 98% 27.29 kg/m2         Blood Pressure from Last 3 Encounters:   10/27/17 104/64   09/06/17 113/78   02/24/17 120/64    Weight from Last 3 Encounters:   10/27/17 159 lb (72.1 kg)   09/06/17 153 lb (69.4 kg)   02/24/17 149 lb (67.6 kg)              We Performed the Following     HPV High Risk Types DNA Cervical     Pap imaged thin layer diagnostic with HPV (select HPV order below)          Today's Medication Changes          These changes are accurate as of: 10/27/17  2:07 PM.  If you have any questions, ask your nurse or doctor.               Start taking these medicines.        Dose/Directions    mupirocin 2 % ointment   Commonly known as:  BACTROBAN   Used for:  Impetigo   Started by:  Lexy Saucedo PA-C        Apply topically 3 times daily for 5 days   Quantity:  22 g   Refills:  0            Where to get your medicines      These medications were sent to [place] [address] [place] [address] [address]     Phone:  [phone]     mupirocin 2 % ointment                Primary Care Provider Office Phone # Fax #    Lexy Saucedo PA-C [phone] [address] [facility] [address]  [facility] [address]        Equal Access to Services     MELODY MURPHY AH: Hadii garry ku hadasho Soomaali, waaxda luqadaha, qaybta kaalmada adeegyada, waxay idiin hayromanan brenda young. So [facility] [phone].    ATENCIÓN: Si habla español, tiene a roberts disposición servicios gratuitos de asistencia lingüística. Llame al [phone].    We comply with applicable federal civil rights laws and [place] laws. We do not discriminate on the basis of race, color, national origin, age, disability, sex, sexual orientation, or gender identity.            Thank you!     Thank you for choosing [facility]  for your care. Our goal is always to provide you with excellent care. Hearing back from our patients is one way we can continue to improve our services.  Please take a few minutes to complete the written survey that you may receive in the mail after your visit with us. Thank you!             Your Updated Medication List - Protect others around you: Learn how to safely use, store and throw away your medicines at www.disposemymeds.org.          This list is accurate as of: 10/27/17  2:07 PM.  Always use your most recent med list.                   Brand Name Dispense Instructions for use Diagnosis    ACE/ARB/ARNI NOT PRESCRIBED (INTENTIONAL)      Please choose reason not prescribed, below    Type 2 diabetes mellitus without complication, without long-term current use of insulin (H)       JACI CONTOUR test strip   Generic drug:  blood glucose monitoring     200 strip    USE TO TEST BLOOD SUGAR TWICE DAILY IN MORNING AND EVENING    Type 2 diabetes mellitus without complication, without long-term current use of insulin (H)       blood glucose monitoring lancets     200 each    USE TO TEST BLOOD SUGAR TWICE DAILY    DM type 2 (diabetes mellitus, type 2) (H)       metFORMIN 500 MG 24 hr tablet    GLUCOPHAGE-XR    90 tablet    Take 1 tablet (500 mg) by mouth daily (with dinner)    Type 2 diabetes mellitus without complication, without long-term current use of insulin (H)       mupirocin 2 % ointment    BACTROBAN    22 g    Apply topically 3 times daily for 5 days    Impetigo       * order for DME     1 Device    Equipment being ordered: Glucometer, brand as covered by insurance. Does not use insulin.    Diabetes mellitus type 2, diet-controlled (H)       * order for DME     200 each    Lancets bid and prn.  Fill per patient's insurance. Not on insulin    Diabetes mellitus type 2, diet-controlled (H)       * order for DME     200 each    Test strips for pt's glucometer, brand as covered by insurance Test bid and prn. Not on insulin.    Diabetes mellitus type 2, diet-controlled (H)       prenatal multivitamin plus iron 27-0.8 MG Tabs per tablet     100 tablet    Take 1 tablet  by mouth daily    Pre-conception counseling       salicylic acid 40 % Misc    MEDIPLAST    1 each    Apply 1 dose * topically At Bedtime Each night, Scrape or loofa the wart(s) and then soak foot for 10-15 min in warm water.  Cut plaster to fit exactly over 1 wart each.  Tape each in place for overnight and remove the next morning.    Plantar warts       STATIN NOT PRESCRIBED (INTENTIONAL)     0 each    1 each daily Statin not prescribed intentionally due to Refusal by patient    Hyperlipidemia LDL goal <100, Type 2 diabetes mellitus without complication (H)       * Notice:  This list has 3 medication(s) that are the same as other medications prescribed for you. Read the directions carefully, and ask your doctor or other care provider to review them with you.

## 2017-10-27 NOTE — NURSING NOTE
"Chief Complaint   Patient presents with     Gyn Exam     Mouth/Lip Problem     cold sore       Initial /64  Pulse 84  Temp 97.9  F (36.6  C)  Resp 16  Wt 159 lb (72.1 kg)  SpO2 98%  BMI 27.29 kg/m2 Estimated body mass index is 27.29 kg/(m^2) as calculated from the following:    Height as of 9/6/17: 5' 4\" (1.626 m).    Weight as of this encounter: 159 lb (72.1 kg).  Medication Reconciliation: complete   S Su CMA      "

## 2017-11-02 LAB
COPATH REPORT: ABNORMAL
PAP: ABNORMAL

## 2017-11-03 LAB
FINAL DIAGNOSIS: ABNORMAL
HPV HR 12 DNA CVX QL NAA+PROBE: POSITIVE
HPV16 DNA SPEC QL NAA+PROBE: NEGATIVE
HPV18 DNA SPEC QL NAA+PROBE: NEGATIVE
SPECIMEN DESCRIPTION: ABNORMAL

## 2017-12-15 ENCOUNTER — OFFICE VISIT (OUTPATIENT)
Dept: OBGYN | Facility: CLINIC | Age: 32
End: 2017-12-15
Payer: COMMERCIAL

## 2017-12-15 VITALS
TEMPERATURE: 98 F | DIASTOLIC BLOOD PRESSURE: 79 MMHG | BODY MASS INDEX: 27.26 KG/M2 | SYSTOLIC BLOOD PRESSURE: 130 MMHG | HEIGHT: 64 IN | WEIGHT: 159.7 LBS | HEART RATE: 69 BPM

## 2017-12-15 DIAGNOSIS — R87.810 CERVICAL HIGH RISK HPV (HUMAN PAPILLOMAVIRUS) TEST POSITIVE: Primary | ICD-10-CM

## 2017-12-15 LAB — BETA HCG QUAL IFA URINE: NEGATIVE

## 2017-12-15 PROCEDURE — 84703 CHORIONIC GONADOTROPIN ASSAY: CPT | Performed by: OBSTETRICS & GYNECOLOGY

## 2017-12-15 PROCEDURE — 57454 BX/CURETT OF CERVIX W/SCOPE: CPT | Performed by: OBSTETRICS & GYNECOLOGY

## 2017-12-15 PROCEDURE — 88305 TISSUE EXAM BY PATHOLOGIST: CPT | Performed by: OBSTETRICS & GYNECOLOGY

## 2017-12-15 NOTE — PATIENT INSTRUCTIONS

## 2017-12-15 NOTE — MR AVS SNAPSHOT
After Visit Summary   12/15/2017    Denia Rose    MRN: [id number]           Patient Information     Date Of Birth          1985        Visit Information        Provider Department      12/15/2017 2:30 PM Nguyen Herndon MD; RD PROC [facility]        Today's Diagnoses     Cervical high risk HPV (human papillomavirus) test positive    -  1      Care Instructions    Colposcopy Post-Procedure Patient Instructions    You may experience any of the following for a couple of days following colposcopy:    Mild cramping    Vaginal bleeding     Vaginal discharge that looks black and clumpy    Please call your healthcare provider if you have any of the following symptoms:    Fever--Temperature greater than 100 degrees    Cramping after 48 hours    Bleeding heavier than a normal period in the first 24-48 hours    If you are bleeding and soaking more than one pad an hour    Or any other worrisome problems.    We recommend that:    You use pads, not tampons for the bleeding.    You may resume sexual activity in 2-3 days, or after bleeding stops.    You may use Tylenol or ibuprofen (Motrin or Advil) for any discomfort.      [facility] - OB/GYN : [phone]              Follow-ups after your visit        Who to contact     If you have questions or need follow up information about today's clinic visit or your schedule please contact [facility] directly at [phone].  Normal or non-critical lab and imaging results will be communicated to you by MyChart, letter or phone within 4 business days after the clinic has received the results. If you do not hear from us within 7 days, please contact the clinic through MyChart or phone. If you have a critical or abnormal lab result, we will notify you by phone as soon as possible.  Submit refill requests through Teler[employer]et or call your pharmacy and they will forward the refill request to us. Please allow 3  "business days for your refill to be completed.          Additional Information About Your Visit        MyChart Information     [employer] gives you secure access to your electronic health record. If you see a primary care provider, you can also send messages to your care team and make appointments. If you have questions, please call your primary care clinic.  If you do not have a primary care provider, please call [phone] and they will assist you.        Care EveryWhere ID     This is your Care EveryWhere ID. This could be used by other organizations to access your [place] medical records  [id number]        Your Vitals Were     Pulse Temperature Height Last Period BMI (Body Mass Index)       69 98  F (36.7  C) (Oral) 5' 4\" (1.626 m) 12/10/2017 27.41 kg/m2        Blood Pressure from Last 3 Encounters:   12/15/17 130/79   10/27/17 104/64   09/06/17 113/78    Weight from Last 3 Encounters:   12/15/17 159 lb 11.2 oz (72.4 kg)   10/27/17 159 lb (72.1 kg)   09/06/17 153 lb (69.4 kg)              We Performed the Following     Beta HCG qual IFA urine        Primary Care Provider Office Phone # Fax #    Lexy Saucedo PA-C [phone] [address] [facility] [address]  [facility] [address]        Equal Access to Services     MELODY MURPHY : Hadii aad ku hadasho Soomaali, waaxda luqadaha, qaybta kaalmada adeegyada, shannan bhagat . So [facility] [phone].    ATENCIÓN: Si habla español, tiene a roberts disposición servicios gratuitos de asistencia lingüística. Llame al [phone].    We comply with applicable federal civil rights laws and [place] laws. We do not discriminate on the basis of race, color, national origin, age, disability, sex, sexual orientation, or gender identity.            Thank you!     Thank you for choosing [facility]  for your care. Our goal is always to provide you with excellent care. Hearing back from our patients is one way we can continue to " improve our services. Please take a few minutes to complete the written survey that you may receive in the mail after your visit with us. Thank you!             Your Updated Medication List - Protect others around you: Learn how to safely use, store and throw away your medicines at www.disposemymeds.org.          This list is accurate as of: 12/15/17  2:54 PM.  Always use your most recent med list.                   Brand Name Dispense Instructions for use Diagnosis    ACE/ARB/ARNI NOT PRESCRIBED (INTENTIONAL)      Please choose reason not prescribed, below    Type 2 diabetes mellitus without complication, without long-term current use of insulin (H)       JACI CONTOUR test strip   Generic drug:  blood glucose monitoring     200 strip    USE TO TEST BLOOD SUGAR TWICE DAILY IN MORNING AND EVENING    Type 2 diabetes mellitus without complication, without long-term current use of insulin (H)       blood glucose monitoring lancets     200 each    USE TO TEST BLOOD SUGAR TWICE DAILY    DM type 2 (diabetes mellitus, type 2) (H)       metFORMIN 500 MG 24 hr tablet    GLUCOPHAGE-XR    90 tablet    Take 1 tablet (500 mg) by mouth daily (with dinner)    Type 2 diabetes mellitus without complication, without long-term current use of insulin (H)       * order for DME     1 Device    Equipment being ordered: Glucometer, brand as covered by insurance. Does not use insulin.    Diabetes mellitus type 2, diet-controlled (H)       * order for DME     200 each    Lancets bid and prn.  Fill per patient's insurance. Not on insulin    Diabetes mellitus type 2, diet-controlled (H)       * order for DME     200 each    Test strips for pt's glucometer, brand as covered by insurance Test bid and prn. Not on insulin.    Diabetes mellitus type 2, diet-controlled (H)       prenatal multivitamin plus iron 27-0.8 MG Tabs per tablet     100 tablet    Take 1 tablet by mouth daily    Pre-conception counseling       salicylic acid 40 % Misc     MEDIPLAST    1 each    Apply 1 dose * topically At Bedtime Each night, Scrape or loofa the wart(s) and then soak foot for 10-15 min in warm water.  Cut plaster to fit exactly over 1 wart each.  Tape each in place for overnight and remove the next morning.    Plantar warts       STATIN NOT PRESCRIBED (INTENTIONAL)     0 each    1 each daily Statin not prescribed intentionally due to Refusal by patient    Hyperlipidemia LDL goal <100, Type 2 diabetes mellitus without complication (H)       * Notice:  This list has 3 medication(s) that are the same as other medications prescribed for you. Read the directions carefully, and ask your doctor or other care provider to review them with you.

## 2017-12-15 NOTE — PROGRESS NOTES
Denia Rose is a 32 year old female  who presents for repeat colposcopy[facility]. Pap smear on 10/27/17 showed: ASCUS and with high risk HPV present: not 16/18. The prior pap showed normal and with high risk HPV present: not 16/18.       Patient's last menstrual period was 12/10/2017.  UPT today is negative  Patient does not smoke  Type of contraception: none  Age at first sexual intercourse: 16  Number of sexual partners (lifetime): >10  Past GYN history: HPV  Prior cervical/vaginal disease: HPV related changes.  Prior cervical treatment: no treatment.      PROCEDURE:      Before the procedure, it was ensured that the patient was educated regarding the nature of her findings to date, the implications, and what was to be done. She has been made aware of the role of HPV, the natural history of infection, ways to minimize her future risk, the effect of HPV on the cervix, and treatment options available should they be indicated. The details of the colposcopic procedure were reviewed. All questions were answered before proceeding, and informed consent was therefore obtained.      Speculum placed in vagina and excellent visualization of cervix acheived, cervix swabbed x 3 with acetic acid solution.      FINDINGS:  Cervix: acetowhitening noted 3 and 9:00, biopsies taken  Please refer to images section for details.  SCJ seen?: yes   ECC done?: Yes   Satisfactory examination?: yes      ASSESSMENT: HPV related changes and ONELIA 1.  PLAN: specimens labelled and sent to Pathology, will base further treatment on Pathology findings, post biopsy instructions given to patient, call to discuss Pathology results and anticipate repeat pap smear in 12 months      Nguyen Herndon MD

## 2017-12-20 LAB — COPATH REPORT: NORMAL

## 2018-03-02 ENCOUNTER — TELEPHONE (OUTPATIENT)
Dept: FAMILY MEDICINE | Facility: CLINIC | Age: 33
End: 2018-03-02

## 2018-03-02 DIAGNOSIS — E11.9 TYPE 2 DIABETES MELLITUS WITHOUT COMPLICATION, WITHOUT LONG-TERM CURRENT USE OF INSULIN (H): Primary | ICD-10-CM

## 2018-03-02 NOTE — TELEPHONE ENCOUNTER
Call to patient without answer left message that an order for labs has been placed no fasting required and to call lab [phone] to schedule an appointment.

## 2018-03-02 NOTE — TELEPHONE ENCOUNTER
Order placed, patient may schedule lab appt at her earliest convenience. Does not need to fast for these labs.

## 2018-03-02 NOTE — TELEPHONE ENCOUNTER
Reason for Call: Request for an order or referral:  Order or referral being requested: lab order  Date needed: as soon as possible  Has the patient been seen by the PCP for this problem? YES  Additional comments: please call patient when order is in  Phone number Patient can be reached at:  Home number on file [phone] (home)  Best Time:  any  Can we leave a detailed message on this number?  YES  Call taken on 3/2/2018 at 12:00 PM by RUCHI MAYES

## 2018-03-09 DIAGNOSIS — E11.9 TYPE 2 DIABETES MELLITUS WITHOUT COMPLICATION, WITHOUT LONG-TERM CURRENT USE OF INSULIN (H): ICD-10-CM

## 2018-03-09 LAB — HBA1C MFR BLD: 6.6 % (ref 4.3–6)

## 2018-03-09 PROCEDURE — 83036 HEMOGLOBIN GLYCOSYLATED A1C: CPT | Performed by: PHYSICIAN ASSISTANT

## 2018-03-09 PROCEDURE — 36415 COLL VENOUS BLD VENIPUNCTURE: CPT | Performed by: PHYSICIAN ASSISTANT

## 2018-03-16 ENCOUNTER — OFFICE VISIT (OUTPATIENT)
Dept: FAMILY MEDICINE | Facility: CLINIC | Age: 33
End: 2018-03-16
Payer: COMMERCIAL

## 2018-03-16 VITALS
RESPIRATION RATE: 16 BRPM | SYSTOLIC BLOOD PRESSURE: 104 MMHG | WEIGHT: 160 LBS | HEIGHT: 64 IN | BODY MASS INDEX: 27.31 KG/M2 | DIASTOLIC BLOOD PRESSURE: 70 MMHG | OXYGEN SATURATION: 98 % | HEART RATE: 95 BPM

## 2018-03-16 DIAGNOSIS — E11.9 TYPE 2 DIABETES MELLITUS WITHOUT COMPLICATION, WITHOUT LONG-TERM CURRENT USE OF INSULIN (H): ICD-10-CM

## 2018-03-16 PROCEDURE — 99214 OFFICE O/P EST MOD 30 MIN: CPT | Performed by: PHYSICIAN ASSISTANT

## 2018-03-16 RX ORDER — METFORMIN HYDROCHLORIDE 750 MG/1
750 TABLET, EXTENDED RELEASE ORAL
Qty: 90 TABLET | Refills: 1 | Status: SHIPPED | OUTPATIENT
Start: 2018-03-16 | End: 2018-04-26

## 2018-03-16 NOTE — MR AVS SNAPSHOT
After Visit Summary   3/16/2018    Denia Montes    MRN: [id number]           Patient Information     Date Of Birth          1985        Visit Information        Provider Department      3/16/2018 9:10 AM Lexy Saucedo PA-C [facility]        Today's Diagnoses     Type 2 diabetes mellitus without complication, without long-term current use of insulin (H)           Follow-ups after your visit        Follow-up notes from your care team     Return in about 3 months (around 6/16/2018) for Diabetes check, Lab Work.      Your next 10 appointments already scheduled     Flash 15, 2018  8:30 AM CDT   LAB with BM LAB   [facility] ([facility])    [address] [address] [phone]           Please do not eat 10-12 hours before your appointment if you are coming in fasting for labs on lipids, cholesterol, or glucose (sugar). This does not apply to pregnant women. Water, hot tea and black coffee (with nothing added) are okay. Do not drink other fluids, diet soda or chew gum.              Future tests that were ordered for you today     Open Future Orders        Priority Expected Expires Ordered    Creatinine Routine 6/14/2018 7/17/2018 3/16/2018    **A1C FUTURE 3mo Routine 6/14/2018 7/14/2018 3/16/2018            Who to contact     If you have questions or need follow up information about today's clinic visit or your schedule please contact [facility] directly at [phone].  Normal or non-critical lab and imaging results will be communicated to you by MyChart, letter or phone within 4 business days after the clinic has received the results. If you do not hear from us within 7 days, please contact the clinic through MyChart or phone. If you have a critical or abnormal lab result, we will notify you by phone as soon as  "possible.  Submit refill requests through [employer] or call your pharmacy and they will forward the refill request to us. Please allow 3 business days for your refill to be completed.          Additional Information About Your Visit        [employer] Information     [employer] gives you secure access to your electronic health record. If you see a primary care provider, you can also send messages to your care team and make appointments. If you have questions, please call your primary care clinic.  If you do not have a primary care provider, please call [phone] and they will assist you.        Care EveryWhere ID     This is your Care EveryWhere ID. This could be used by other organizations to access your [facility] medical records  [id number]        Your Vitals Were     Pulse Respirations Height Pulse Oximetry BMI (Body Mass Index)       95 16 5' 4\" (1.626 m) 98% 27.46 kg/m2        Blood Pressure from Last 3 Encounters:   03/16/18 104/70   12/15/17 130/79   10/27/17 104/64    Weight from Last 3 Encounters:   03/16/18 160 lb (72.6 kg)   12/15/17 159 lb 11.2 oz (72.4 kg)   10/27/17 159 lb (72.1 kg)                 Today's Medication Changes          These changes are accurate as of 3/16/18  9:43 AM.  If you have any questions, ask your nurse or doctor.               These medicines have changed or have updated prescriptions.        Dose/Directions    metFORMIN 750 MG 24 hr tablet   Commonly known as:  GLUCOPHAGE-XR   This may have changed:  See the new instructions.   Used for:  Type 2 diabetes mellitus without complication, without long-term current use of insulin (H)   Changed by:  Lexy Saucedo PA-C        Dose:  750 mg   Take 1 tablet (750 mg) by mouth daily (with dinner)   Quantity:  90 tablet   Refills:  1         Stop taking these medicines if you haven't already. Please contact your care team if you have questions.     prenatal multivitamin plus iron 27-0.8 MG Tabs per tablet   Stopped by:  Lexy Saucedo, " NELLY           salicylic acid 40 % Misc   Commonly known as:  MEDIPLAST   Stopped by:  Lexy Saucedo PA-C           STATIN NOT PRESCRIBED (INTENTIONAL)   Stopped by:  Lexy Saucedo PA-C                Where to get your medicines      These medications were sent to [facility] [address] [address] [address]     Phone:  [phone]     metFORMIN 750 MG 24 hr tablet                Primary Care Provider Office Phone # Fax #    Lexy Saucedo PA-C [phone]       [address]  [facility] [address]        Equal Access to Services     MELODY MURPHY : Hadii garry locketto Soradha, waaxda luqadaha, qaybta kaalmada adeegyada, shannan young. So [facility] [phone].    ATENCIÓN: Si habla español, tiene a roberts disposición servicios gratuitos de asistencia lingüística. Llame al [phone].    We comply with applicable federal civil rights laws and [place] laws. We do not discriminate on the basis of race, color, national origin, age, disability, sex, sexual orientation, or gender identity.            Thank you!     Thank you for choosing [facility]  for your care. Our goal is always to provide you with excellent care. Hearing back from our patients is one way we can continue to improve our services. Please take a few minutes to complete the written survey that you may receive in the mail after your visit with us. Thank you!             Your Updated Medication List - Protect others around you: Learn how to safely use, store and throw away your medicines at www.disposemymeds.org.          This list is accurate as of 3/16/18  9:43 AM.  Always use your most recent med list.                   Brand Name Dispense Instructions for use Diagnosis    ACE/ARB/ARNI NOT PRESCRIBED (INTENTIONAL)      Please choose reason not prescribed, below    Type 2  diabetes mellitus without complication, without long-term current use of insulin (H)       JACI CONTOUR test strip   Generic drug:  blood glucose monitoring     200 strip    USE TO TEST BLOOD SUGAR TWICE DAILY IN MORNING AND EVENING    Type 2 diabetes mellitus without complication, without long-term current use of insulin (H)       blood glucose monitoring lancets     200 each    USE TO TEST BLOOD SUGAR TWICE DAILY    DM type 2 (diabetes mellitus, type 2) (H)       metFORMIN 750 MG 24 hr tablet    GLUCOPHAGE-XR    90 tablet    Take 1 tablet (750 mg) by mouth daily (with dinner)    Type 2 diabetes mellitus without complication, without long-term current use of insulin (H)       * order for DME     1 Device    Equipment being ordered: Glucometer, brand as covered by insurance. Does not use insulin.    Diabetes mellitus type 2, diet-controlled (H)       * order for DME     200 each    Lancets bid and prn.  Fill per patient's insurance. Not on insulin    Diabetes mellitus type 2, diet-controlled (H)       * order for DME     200 each    Test strips for pt's glucometer, brand as covered by insurance Test bid and prn. Not on insulin.    Diabetes mellitus type 2, diet-controlled (H)       * Notice:  This list has 3 medication(s) that are the same as other medications prescribed for you. Read the directions carefully, and ask your doctor or other care provider to review them with you.

## 2018-03-16 NOTE — PROGRESS NOTES
SUBJECTIVE:   Denia Montes is a 32 year old female who presents to clinic today for the following health issues:      Diabetes Follow-up    Patient is checking blood sugars: twice daily.    Blood sugar testing frequency justification: Uncontrolled diabetes  Results are as follows:         Varies- blood sugars in the am have been higher    Diabetic concerns: None     Symptoms of hypoglycemia (low blood sugar): none     Paresthesias (numbness or burning in feet) or sores: No     Date of last diabetic eye exam: 9/2017    BP Readings from Last 2 Encounters:   12/15/17 130/79   10/27/17 104/64     Hemoglobin A1C (%)   Date Value   03/09/2018 6.6 (H)   08/18/2017 5.6     LDL Cholesterol Calculated (mg/dL)   Date Value   08/18/2017 97   02/17/2017 110 (H)       Amount of exercise or physical activity: 2-3 days/week for an average of 15-30 minutes    Problems taking medications regularly: No    Medication side effects: none    Diet: diabetic        HPI additional notes:   Chief Complaint   Patient presents with     Diabetes     discuss lab results     Denia presents today for DM f/u. Most recent a1c shows increasing FBG (see above). Patient checks BG BID, has noted increase in FBG in the AM (runs around 120). FBG in PM generally runs around 100. No sx with higher BG, continues to follow diabetic diet, no recent weight gain or decrease in activity.     ROS:    A Comprehensive greater than 10 system review of systems was carried out.    Pertinent positives and negatives are noted above.  Otherwise negative for contributory information.      Chart Review:  History   Smoking Status     Never Smoker   Smokeless Tobacco     Never Used       Patient Active Problem List   Diagnosis     History of kidney stones     Type 2 diabetes mellitus without complication, without long-term current use of insulin (H)     Hyperlipidemia LDL goal <70     Cervical high risk HPV (human papillomavirus) test positive     Plantar warts      "Yeast infection of the skin     History reviewed. No pertinent surgical history.  Problem list, Medication list, Allergies, Medical/Social/Surg hx reviewed in [facility], updated as appropriate.   OBJECTIVE:                                                    /70  Pulse 95  Resp 16  Ht 5' 4\" (1.626 m)  Wt 160 lb (72.6 kg)  SpO2 98%  BMI 27.46 kg/m2  Body mass index is 27.46 kg/(m^2).  GENERAL: WDWN, no acute distress  PSYCH: pleasant, cooperative  EYES: no discharge, no injection  HENT:  Normocephalic. Moist mucus membranes.  NECK:  Supple, symmetric  LUNGS:  Clear to auscultation bilaterally without rhonchi, rales, or wheeze.  HEART:  Regular rate & rhythm. No murmur, gallop, or rub.  EXTREMITIES:  No gross deformities, moves all 4 limbs spontaneously  SKIN:  Warm and dry, no rash or suspicious lesions    NEUROLOGIC: alert, sensation grossly intact.    Diagnostic test results: none     ASSESSMENT/PLAN:                                                          ICD-10-CM    1. Type 2 diabetes mellitus without complication, without long-term current use of insulin (H) E11.9 metFORMIN (GLUCOPHAGE-XR) 750 MG 24 hr tablet     Creatinine     **A1C FUTURE 3mo     Patient with DM-II, now with increasing BG, likely due to disease progression as patient still following lifestyle recommendations and compliant with medications. Recommend we increase metformin to 750 mg every evening due to increasing a1c, rx sent to patient's pharmacy. Recheck a1c in 3 months to monitor response (also due for creatinine check at same time, order placed), lab only visit ok. Continue to check BG BID; if she isn't noting decrease in BG within 1 month, contact me and we will increase metformin to 1000 mg qd.     Please see patient instructions for treatment details.    Follow up in 3 months for medication check and labs, sooner PRN.    Lexy Saucedo PA-C  [facility]     "

## 2018-03-16 NOTE — NURSING NOTE
"Chief Complaint   Patient presents with     Diabetes     discuss lab results       Initial /70  Pulse 95  Resp 16  Ht 5' 4\" (1.626 m)  Wt 160 lb (72.6 kg)  SpO2 98%  BMI 27.46 kg/m2 Estimated body mass index is 27.46 kg/(m^2) as calculated from the following:    Height as of this encounter: 5' 4\" (1.626 m).    Weight as of this encounter: 160 lb (72.6 kg).  Medication Reconciliation: mickie Blue CMA      "

## 2018-04-17 ENCOUNTER — MYC MEDICAL ADVICE (OUTPATIENT)
Dept: FAMILY MEDICINE | Facility: CLINIC | Age: 33
End: 2018-04-17

## 2018-04-25 ENCOUNTER — MYC MEDICAL ADVICE (OUTPATIENT)
Dept: FAMILY MEDICINE | Facility: CLINIC | Age: 33
End: 2018-04-25

## 2018-04-25 DIAGNOSIS — E11.9 TYPE 2 DIABETES MELLITUS WITHOUT COMPLICATION, WITHOUT LONG-TERM CURRENT USE OF INSULIN (H): ICD-10-CM

## 2018-04-26 RX ORDER — METFORMIN HCL 500 MG
1000 TABLET, EXTENDED RELEASE 24 HR ORAL
Qty: 180 TABLET | Refills: 1 | Status: SHIPPED | OUTPATIENT
Start: 2018-04-26 | End: 2018-06-12

## 2018-06-12 DIAGNOSIS — E11.9 TYPE 2 DIABETES MELLITUS WITHOUT COMPLICATION, WITHOUT LONG-TERM CURRENT USE OF INSULIN (H): ICD-10-CM

## 2018-06-12 DIAGNOSIS — E11.9 DM TYPE 2 (DIABETES MELLITUS, TYPE 2) (H): ICD-10-CM

## 2018-06-12 NOTE — TELEPHONE ENCOUNTER
"blood glucose monitoring (JACI CONTOUR) test strip  Last Written Prescription Date:  01/19/18  Last Fill Quantity: 200STRIPS,  # refills: 0   Last office visit: 3/16/2018 with prescribing provider:  03/16/18   Future Office Visit:   Next 5 appointments (look out 90 days)     Jun 22, 2018  8:10 AM CDT   [facility] Lexy [facility]   [facility]    [address] [place] [address] [phone]                 Requested Prescriptions   Pending Prescriptions Disp Refills     blood glucose monitoring (JACI CONTOUR) test strip 200 strip 0     Sig: Use to test blood sugar 2 times daily or as directed.    Diabetic Supplies Protocol Passed    6/12/2018  2:36 PM       Passed - Patient is 18 years of age or older       Passed - Recent (6 mo) or future (30 days) visit within the authorizing provider's specialty    Patient had office visit in the last 6 months or has a visit in the next 30 days with authorizing provider.  See \"Patient Info\" tab in inbasket, or \"Choose Columns\" in Meds & Orders section of the refill encounter.              "

## 2018-06-12 NOTE — TELEPHONE ENCOUNTER
Requested Prescriptions   Pending Prescriptions Disp Refills     metFORMIN (GLUCOPHAGE-XR) 500 MG 24 hr tablet  Last Written Prescription Date:  4/26/2018  Last Fill Quantity: 180 tablet,  # refills: 1   Last Office Visit  3/16/2018        with  [facility], [facility] or [facility] prescribing provider:     Future Office Visit:    Next 5 appointments (look out 90 days)     Jun 22, 2018  8:10 AM CDT   SHORT with Lexy Saucedo PA-C   [facility]    [address] [address] [phone]                180 tablet 1     Sig: Take 2 tablets (1,000 mg) by mouth daily (with dinner)    Biguanide Agents Passed    6/12/2018  2:28 PM       Passed - Blood pressure less than 140/90 in past 6 months    BP Readings from Last 3 Encounters:   03/16/18 104/70   12/15/17 130/79   10/27/17 104/64          Passed - Patient has documented LDL within the past 12 mos.    Recent Labs   Lab Test  08/18/17   0829   LDL  97          Passed - Patient has had a Microalbumin in the past 12 mos.    Recent Labs   Lab Test  08/18/17   0829   MICROL  8   UMALCR  6.87          Passed - Patient is age 10 or older       Passed - Patient has documented A1c within the specified period of time.    If HgbA1C is 8 or greater, it needs to be on file within the past 3 months.  If less than 8, must be on file within the past 6 months.     Recent Labs   Lab Test  03/09/18   0805   A1C  6.6*          Passed - Patient's CR is NOT>1.4 OR Patient's EGFR is NOT<45 within past 12 mos.    Recent Labs   Lab Test  02/17/17   0818   GFRESTIMATED  >90  Non  GFR Calc     GFRESTBLACK  >90   GFR Calc       Recent Labs   Lab Test  02/17/17   0818   CR  0.66          Passed - Patient does NOT have a diagnosis of CHF.       Passed - Patient is not pregnant       Passed - Patient has not had a positive pregnancy test within the past 12 mos.         "Passed - Recent (6 mo) or future (30 days) visit within the authorizing provider's specialty    Patient had office visit in the last 6 months or has a visit in the next 30 days with authorizing provider or within the authorizing provider's specialty.  See \"Patient Info\" tab in inbasket, or \"Choose Columns\" in Meds & Orders section of the refill encounter.              "

## 2018-06-12 NOTE — TELEPHONE ENCOUNTER
"Requested Prescriptions   Pending Prescriptions Disp Refills     MICROLET LANCETS MISC [Pharmacy Med Name: MICROLET COLORED LANCETS 100'S]  Last Written Prescription Date:  10/16/17  Last Fill Quantity: 200,  # refills: 1   Last office visit: 3/16/2018 with prescribing provider:  Sheryl   Future Office Visit:   Next 5 appointments (look out 90 days)     Jun 22, 2018  8:10 AM CDT   [facility] with Lexy [facility]-C   [facility]    [address] [place] [address]   [phone]                  300 each 0     Sig: TEST BLOOD SUGAR TWICE DAILY    Diabetic Supplies Protocol Passed    6/12/2018  4:05 AM       Passed - Patient is 18 years of age or older       Passed - Recent (6 mo) or future (30 days) visit within the authorizing provider's specialty    Patient had office visit in the last 6 months or has a visit in the next 30 days with authorizing provider.  See \"Patient Info\" tab in inbasket, or \"Choose Columns\" in Meds & Orders section of the refill encounter.              "

## 2018-06-13 RX ORDER — METFORMIN HCL 500 MG
1000 TABLET, EXTENDED RELEASE 24 HR ORAL
Qty: 180 TABLET | Refills: 0 | Status: SHIPPED | OUTPATIENT
Start: 2018-06-13 | End: 2018-06-22

## 2018-06-13 RX ORDER — LANCETS
EACH MISCELLANEOUS
Qty: 300 EACH | Refills: 1 | Status: SHIPPED | OUTPATIENT
Start: 2018-06-13 | End: 2018-11-12

## 2018-06-15 DIAGNOSIS — E11.9 TYPE 2 DIABETES MELLITUS WITHOUT COMPLICATION, WITHOUT LONG-TERM CURRENT USE OF INSULIN (H): ICD-10-CM

## 2018-06-15 LAB
CREAT SERPL-MCNC: 0.62 MG/DL (ref 0.52–1.04)
GFR SERPL CREATININE-BSD FRML MDRD: >90 ML/MIN/1.7M2
HBA1C MFR BLD: 6.6 % (ref 0–5.6)

## 2018-06-15 PROCEDURE — 83036 HEMOGLOBIN GLYCOSYLATED A1C: CPT | Performed by: PHYSICIAN ASSISTANT

## 2018-06-15 PROCEDURE — 36415 COLL VENOUS BLD VENIPUNCTURE: CPT | Performed by: PHYSICIAN ASSISTANT

## 2018-06-15 PROCEDURE — 82565 ASSAY OF CREATININE: CPT | Performed by: PHYSICIAN ASSISTANT

## 2018-06-22 ENCOUNTER — OFFICE VISIT (OUTPATIENT)
Dept: FAMILY MEDICINE | Facility: CLINIC | Age: 33
End: 2018-06-22
Payer: COMMERCIAL

## 2018-06-22 VITALS
OXYGEN SATURATION: 98 % | RESPIRATION RATE: 16 BRPM | DIASTOLIC BLOOD PRESSURE: 80 MMHG | HEART RATE: 64 BPM | SYSTOLIC BLOOD PRESSURE: 112 MMHG | BODY MASS INDEX: 26.95 KG/M2 | WEIGHT: 157 LBS

## 2018-06-22 DIAGNOSIS — E11.9 TYPE 2 DIABETES MELLITUS WITHOUT COMPLICATION, WITHOUT LONG-TERM CURRENT USE OF INSULIN (H): Primary | ICD-10-CM

## 2018-06-22 DIAGNOSIS — E78.5 HYPERLIPIDEMIA LDL GOAL <70: ICD-10-CM

## 2018-06-22 PROCEDURE — 99213 OFFICE O/P EST LOW 20 MIN: CPT | Performed by: PHYSICIAN ASSISTANT

## 2018-06-22 NOTE — MR AVS SNAPSHOT
After Visit Summary   6/22/2018    Denia Montes    MRN: [id number]           Patient Information     Date Of Birth          1985        Visit Information        Provider Department      6/22/2018 8:10 AM Lexy Saucedo PA-C [facility]        Today's Diagnoses     Type 2 diabetes mellitus without complication, without long-term current use of insulin (H)    -  1       Follow-ups after your visit        Follow-up notes from your care team     Return in about 3 months (around 9/22/2018) for Lab Work.      Who to contact     If you have questions or need follow up information about today's clinic visit or your schedule please contact [facility] directly at [phone].  Normal or non-critical lab and imaging results will be communicated to you by Sc[employer], letter or phone within 4 business days after the clinic has received the results. If you do not hear from us within 7 days, please contact the clinic through Sc[employer] or phone. If you have a critical or abnormal lab result, we will notify you by phone as soon as possible.  Submit refill requests through [facility] or call your pharmacy and they will forward the refill request to us. Please allow 3 business days for your refill to be completed.          Additional Information About Your Visit        MyChart Information     [facility] gives you secure access to your electronic health record. If you see a primary care provider, you can also send messages to your care team and make appointments. If you have questions, please call your primary care clinic.  If you do not have a primary care provider, please call [phone] and they will assist you.        Care EveryWhere ID     This is your Care EveryWhere ID. This could be used by other organizations to access your [facility] medical records  [id number]        Your Vitals Were     Pulse Respirations Pulse Oximetry BMI (Body Mass  Index)          64 16 98% 26.95 kg/m2         Blood Pressure from Last 3 Encounters:   06/22/18 112/80   03/16/18 104/70   12/15/17 130/79    Weight from Last 3 Encounters:   06/22/18 157 lb (71.2 kg)   03/16/18 160 lb (72.6 kg)   12/15/17 159 lb 11.2 oz (72.4 kg)              Today, you had the following     No orders found for display         Today's Medication Changes          These changes are accurate as of 6/22/18  8:25 AM.  If you have any questions, ask your nurse or doctor.               Start taking these medicines.        Dose/Directions    metFORMIN 500 MG tablet   Commonly known as:  GLUCOPHAGE   Used for:  Type 2 diabetes mellitus without complication, without long-term current use of insulin (H)   Replaces:  metFORMIN 500 MG 24 hr tablet   Started by:  Lexy Saucedo PA-C        Dose:  500 mg   Take 1 tablet (500 mg) by mouth 2 times daily (with meals)   Quantity:  180 tablet   Refills:  0         Stop taking these medicines if you haven't already. Please contact your care team if you have questions.     metFORMIN 500 MG 24 hr tablet   Commonly known as:  GLUCOPHAGE-XR   Replaced by:  metFORMIN 500 MG tablet   Stopped by:  Lexy Saucedo PA-C                Where to get your medicines      These medications were sent to [facility] - [place], [place] - [address] AT [facility]  [address], [place] [address]     Phone:  [phone]     metFORMIN 500 MG tablet                Primary Care Provider Office Phone # Fax #    Lexy Saucedo PA-C [phone]       [address] [address]        Equal Access to Services     JUAN MURPHY : Ambrosio Monson, leila catherine, vin szymanskialshannan mccauley. So [facility] [phone].    ATENCIÓN: Si habla español, tiene a roberts disposición servicios gratuitos de asistencia lingüística. Llame al [phone].    We comply  with applicable federal civil rights laws and [place] laws. We do not discriminate on the basis of race, color, national origin, age, disability, sex, sexual orientation, or gender identity.            Thank you!     Thank you for choosing [facility]  for your care. Our goal is always to provide you with excellent care. Hearing back from our patients is one way we can continue to improve our services. Please take a few minutes to complete the written survey that you may receive in the mail after your visit with us. Thank you!             Your Updated Medication List - Protect others around you: Learn how to safely use, store and throw away your medicines at www.disposemymeds.org.          This list is accurate as of 6/22/18  8:25 AM.  Always use your most recent med list.                   Brand Name Dispense Instructions for use Diagnosis    ACE/ARB/ARNI NOT PRESCRIBED (INTENTIONAL)      Please choose reason not prescribed, below    Type 2 diabetes mellitus without complication, without long-term current use of insulin (H)       blood glucose monitoring test strip    JACI CONTOUR    200 strip    USE TO TEST BLOOD SUGAR TWICE DAILY IN MORNING AND EVENING    Type 2 diabetes mellitus without complication, without long-term current use of insulin (H)       metFORMIN 500 MG tablet    GLUCOPHAGE    180 tablet    Take 1 tablet (500 mg) by mouth 2 times daily (with meals)    Type 2 diabetes mellitus without complication, without long-term current use of insulin (H)       MICROLET LANCETS Misc     300 each    TEST BLOOD SUGAR TWICE DAILY    DM type 2 (diabetes mellitus, type 2) (H)       * order for DME     1 Device    Equipment being ordered: Glucometer, brand as covered by insurance. Does not use insulin.    Diabetes mellitus type 2, diet-controlled (H)       * order for DME     200 each    Lancets bid and prn.  Fill per patient's insurance. Not on insulin    Diabetes mellitus type 2,  diet-controlled (H)       * order for DME     200 each    Test strips for pt's glucometer, brand as covered by insurance Test bid and prn. Not on insulin.    Diabetes mellitus type 2, diet-controlled (H)       * Notice:  This list has 3 medication(s) that are the same as other medications prescribed for you. Read the directions carefully, and ask your doctor or other care provider to review them with you.

## 2018-06-22 NOTE — PROGRESS NOTES
SUBJECTIVE:   Denia Montes is a 32 year old female who presents to clinic today for the following health issues:      Results      Duration: 6/15/2018    Description (location/character/radiation): pt is here to discuss lab results    Intensity:  moderate    Accompanying signs and symptoms: none    History (similar episodes/previous evaluation): None    Precipitating or alleviating factors: None    Therapies tried and outcome: None       HPI additional notes:   Chief Complaint   Patient presents with     Results     Denia presents today for f/u DM-II. Patient with diet-controlled DM-II, however noted increasing blood sugars and a1c over past several months (6.6 on 3/9/2018, historically 5.5 with diet control). Started on metformin 500 mg with dinner, then increased to 1000 mg with dinner. Had recent recheck and here to f/u on labs. a1c remains elevated at 6.6, no change with increased dose. Reports BG remains 130s in the mornings. Also reports her insurance will no longer cover XR, can she switch to IR?     ROS:    A Comprehensive greater than 10 system review of systems was carried out.    Pertinent positives and negatives are noted above.  Otherwise negative for contributory information.      Chart Review:  History   Smoking Status     Never Smoker   Smokeless Tobacco     Never Used       Patient Active Problem List   Diagnosis     History of kidney stones     Type 2 diabetes mellitus without complication, without long-term current use of insulin (H)     Hyperlipidemia LDL goal <70     Cervical high risk HPV (human papillomavirus) test positive     Plantar warts     Yeast infection of the skin     History reviewed. No pertinent surgical history.  Problem list, Medication list, Allergies, Medical/Social/Surg hx reviewed in [facility], updated as appropriate.   OBJECTIVE:                                                    /80  Pulse 64  Resp 16  Wt 157 lb (71.2 kg)  SpO2 98%  BMI 26.95 kg/m2  Body mass  index is 26.95 kg/(m^2).  GENERAL:  WDWN, no acute distress  PSYCH: pleasant, cooperative  EYES: no discharge, no injection  HENT:  Normocephalic. Moist mucus membranes.  NECK:  Supple, symmetric  EXTREMITIES:  No gross deformities, moves all 4 limbs spontaneously  SKIN:  Warm and dry, no rash or suspicious lesions    NEUROLOGIC: alert, sensation grossly intact.    Diagnostic test results:   Orders Only on 06/15/2018   Component Date Value Ref Range Status     Creatinine 06/15/2018 0.62  0.52 - 1.04 mg/dL Final     GFR Estimate 06/15/2018 >90  >60 mL/min/1.7m2 Final    Non  GFR Calc     GFR Estimate If Black 06/15/2018 >90  >60 mL/min/1.7m2 Final    African American GFR Calc     Hemoglobin A1C 06/15/2018 6.6* 0 - 5.6 % Final    Comment: Normal <5.7% Prediabetes 5.7-6.4%  Diabetes 6.5% or higher - adopted from ADA   consensus guidelines.            ASSESSMENT/PLAN:                                                          ICD-10-CM    1. Type 2 diabetes mellitus without complication, without long-term current use of insulin (H) E11.9 metFORMIN (GLUCOPHAGE) 500 MG tablet     **A1C FUTURE 3mo     Albumin Random Urine Quantitative with Creat Ratio   2. Hyperlipidemia LDL goal <70 E78.5 Lipid panel reflex to direct LDL Fasting     Ok to switch to IR due to formulary change. Given BG trends, recommend splitting 1000 mg dose to 500 mg BID with meals. Continue to monitor BG every morning. Will recheck a1c in 3 months, ok for lab-only appointment; also due for fasting lipids and microalbumin at that time.     Please see patient instructions for treatment details.    Follow up in 3 months for fasting labs, sooner PRN.    Lexy Saucedo PA-C  [facility]

## 2018-08-16 ENCOUNTER — MYC REFILL (OUTPATIENT)
Dept: FAMILY MEDICINE | Facility: CLINIC | Age: 33
End: 2018-08-16

## 2018-08-16 DIAGNOSIS — E11.9 TYPE 2 DIABETES MELLITUS WITHOUT COMPLICATION, WITHOUT LONG-TERM CURRENT USE OF INSULIN (H): ICD-10-CM

## 2018-08-16 NOTE — TELEPHONE ENCOUNTER
"Requested Prescriptions   Pending Prescriptions Disp Refills     metFORMIN (GLUCOPHAGE) 500 MG tablet  Last Written Prescription Date:  6/22/18  Last Fill Quantity: 180,  # refills: 0   Last office visit: 6/22/2018 with prescribing provider:  Sheryl   Future Office Visit:     180 tablet 0     Sig: Take 1 tablet (500 mg) by mouth 2 times daily (with meals)    Biguanide Agents Passed    8/16/2018  9:05 AM       Passed - Blood pressure less than 140/90 in past 6 months    BP Readings from Last 3 Encounters:   06/22/18 112/80   03/16/18 104/70   12/15/17 130/79                Passed - Patient has documented LDL within the past 12 mos.    Recent Labs   Lab Test  08/18/17   0829   LDL  97            Passed - Patient has had a Microalbumin in the past 12 mos.    Recent Labs   Lab Test  08/18/17   0829   MICROL  8   UMALCR  6.87            Passed - Patient is age 10 or older       Passed - Patient has documented A1c within the specified period of time.    If HgbA1C is 8 or greater, it needs to be on file within the past 3 months.  If less than 8, must be on file within the past 6 months.     Recent Labs   Lab Test  06/15/18   0839   A1C  6.6*            Passed - Patient's CR is NOT>1.4 OR Patient's EGFR is NOT<45 within past 12 mos.    Recent Labs   Lab Test  06/15/18   0839   GFRESTIMATED  >90   GFRESTBLACK  >90       Recent Labs   Lab Test  06/15/18   0839   CR  0.62            Passed - Patient does NOT have a diagnosis of CHF.       Passed - Patient is not pregnant       Passed - Patient has not had a positive pregnancy test within the past 12 mos.        Passed - Recent (6 mo) or future (30 days) visit within the authorizing provider's specialty    Patient had office visit in the last 6 months or has a visit in the next 30 days with authorizing provider or within the authorizing provider's specialty.  See \"Patient Info\" tab in inbasket, or \"Choose Columns\" in Meds & Orders section of the refill encounter.              "

## 2018-08-16 NOTE — TELEPHONE ENCOUNTER
Message from [employer]:  Original authorizing provider: Lexy Saucedo PA-C    Denia Montes would like a refill of the following medications:  metFORMIN (GLUCOPHAGE) 500 MG tablet [Lexy Saucedo PA-C]    Preferred [facility]: [facility] - [place] [place] - [address] AT [facility]    Comment:  I am taking 2000mg/day rather than 1000mg/day as discussed with the , so I need a refill as previous prescription is for lower dosage and is running out. Thanks!

## 2018-08-18 DIAGNOSIS — E11.9 TYPE 2 DIABETES MELLITUS WITHOUT COMPLICATION, WITHOUT LONG-TERM CURRENT USE OF INSULIN (H): ICD-10-CM

## 2018-08-20 NOTE — TELEPHONE ENCOUNTER
"Requested Prescriptions   Pending Prescriptions Disp Refills     metFORMIN (GLUCOPHAGE) 500 MG tablet [Pharmacy Med Name: METFORMIN 500MG TABLETS]  Last Written Prescription Date:  8/17/2018  Last Fill Quantity: 180,  # refills: 0   Last office visit: 6/22/2018 with prescribing provider:  Lexy   Future Office Visit:     180 tablet 0     Sig: TAKE 1 TABLET BY MOUTH TWICE DAILY WITH MEALS    Biguanide Agents Passed    8/18/2018  5:34 PM       Passed - Blood pressure less than 140/90 in past 6 months    BP Readings from Last 3 Encounters:   06/22/18 112/80   03/16/18 104/70   12/15/17 130/79                Passed - Patient has documented LDL within the past 12 mos.    Recent Labs   Lab Test  08/18/17   0829   LDL  97            Passed - Patient has had a Microalbumin in the past 12 mos.    Recent Labs   Lab Test  08/18/17   0829   MICROL  8   UMALCR  6.87            Passed - Patient is age 10 or older       Passed - Patient has documented A1c within the specified period of time.    If HgbA1C is 8 or greater, it needs to be on file within the past 3 months.  If less than 8, must be on file within the past 6 months.     Recent Labs   Lab Test  06/15/18   0839   A1C  6.6*            Passed - Patient's CR is NOT>1.4 OR Patient's EGFR is NOT<45 within past 12 mos.    Recent Labs   Lab Test  06/15/18   0839   GFRESTIMATED  >90   GFRESTBLACK  >90       Recent Labs   Lab Test  06/15/18   0839   CR  0.62            Passed - Patient does NOT have a diagnosis of CHF.       Passed - Patient is not pregnant       Passed - Patient has not had a positive pregnancy test within the past 12 mos.        Passed - Recent (6 mo) or future (30 days) visit within the authorizing provider's specialty    Patient had office visit in the last 6 months or has a visit in the next 30 days with authorizing provider or within the authorizing provider's specialty.  See \"Patient Info\" tab in inbasket, or \"Choose Columns\" in Meds & Orders section of " the refill encounter.

## 2018-09-28 DIAGNOSIS — E78.5 HYPERLIPIDEMIA LDL GOAL <70: ICD-10-CM

## 2018-09-28 DIAGNOSIS — E11.9 TYPE 2 DIABETES MELLITUS WITHOUT COMPLICATION, WITHOUT LONG-TERM CURRENT USE OF INSULIN (H): ICD-10-CM

## 2018-09-28 LAB
CHOLEST SERPL-MCNC: 191 MG/DL
CREAT UR-MCNC: 105 MG/DL
HBA1C MFR BLD: 6.3 % (ref 0–5.6)
HDLC SERPL-MCNC: 57 MG/DL
LDLC SERPL CALC-MCNC: 120 MG/DL
MICROALBUMIN UR-MCNC: 8 MG/L
MICROALBUMIN/CREAT UR: 7.39 MG/G CR (ref 0–25)
NONHDLC SERPL-MCNC: 134 MG/DL
TRIGL SERPL-MCNC: 69 MG/DL

## 2018-09-28 PROCEDURE — 82043 UR ALBUMIN QUANTITATIVE: CPT | Performed by: PHYSICIAN ASSISTANT

## 2018-09-28 PROCEDURE — 80061 LIPID PANEL: CPT | Performed by: PHYSICIAN ASSISTANT

## 2018-09-28 PROCEDURE — 36415 COLL VENOUS BLD VENIPUNCTURE: CPT | Performed by: PHYSICIAN ASSISTANT

## 2018-09-28 PROCEDURE — 83036 HEMOGLOBIN GLYCOSYLATED A1C: CPT | Performed by: PHYSICIAN ASSISTANT

## 2018-09-29 DIAGNOSIS — E11.9 TYPE 2 DIABETES MELLITUS WITHOUT COMPLICATION, WITHOUT LONG-TERM CURRENT USE OF INSULIN (H): Primary | ICD-10-CM

## 2018-09-30 ENCOUNTER — MYC REFILL (OUTPATIENT)
Dept: FAMILY MEDICINE | Facility: CLINIC | Age: 33
End: 2018-09-30

## 2018-09-30 DIAGNOSIS — E11.9 TYPE 2 DIABETES MELLITUS WITHOUT COMPLICATION, WITHOUT LONG-TERM CURRENT USE OF INSULIN (H): ICD-10-CM

## 2018-10-01 NOTE — TELEPHONE ENCOUNTER
"Message from [employer]:  Original authorizing provider: NELLY Forte Jacqueline Montes would like a refill of the following medications:  metFORMIN (GLUCOPHAGE) 500 MG tablet [Lexy Saucedo PA-C]    Preferred pharmacy: [facility] - [place], [place] - [address] AT [facility]    Comment:  As recommended, i have actually been taking 2000mg of metformin daily, not 1000mg, so my prescription keeps running out before a refill is \"due.\" Can you send a new prescription for the correct amount? thanks, luce  "

## 2018-10-01 NOTE — TELEPHONE ENCOUNTER
"Requested Prescriptions   Pending Prescriptions Disp Refills     metFORMIN (GLUCOPHAGE-XR) 500 MG 24 hr tablet [Pharmacy Med Name: METFORMIN ER 500MG 24HR TABS]  Last Written Prescription Date:  08/21/2018  Last Fill Quantity: 180,  # refills: 0   Last office visit: 6/22/2018 with prescribing provider: ISAIAH     Future Office Visit:     180 tablet 0     Sig: TAKE 2 TABLETS BY MOUTH DAILY WITH DINNER    Biguanide Agents Passed    9/29/2018  1:19 PM       Passed - Blood pressure less than 140/90 in past 6 months    BP Readings from Last 3 Encounters:   06/22/18 112/80   03/16/18 104/70   12/15/17 130/79                Passed - Patient has documented LDL within the past 12 mos.    Recent Labs   Lab Test  09/28/18   0813   LDL  120*            Passed - Patient has had a Microalbumin in the past 15 mos.    Recent Labs   Lab Test  09/28/18   0820   MICROL  8   UMALCR  7.39            Passed - Patient is age 10 or older       Passed - Patient has documented A1c within the specified period of time.    If HgbA1C is 8 or greater, it needs to be on file within the past 3 months.  If less than 8, must be on file within the past 6 months.     Recent Labs   Lab Test  09/28/18   0813   A1C  6.3*            Passed - Patient's CR is NOT>1.4 OR Patient's EGFR is NOT<45 within past 12 mos.    Recent Labs   Lab Test  06/15/18   0839   GFRESTIMATED  >90   GFRESTBLACK  >90       Recent Labs   Lab Test  06/15/18   0839   CR  0.62            Passed - Patient does NOT have a diagnosis of CHF.       Passed - Patient is not pregnant       Passed - Patient has not had a positive pregnancy test within the past 12 mos.        Passed - Recent (6 mo) or future (30 days) visit within the authorizing provider's specialty    Patient had office visit in the last 6 months or has a visit in the next 30 days with authorizing provider or within the authorizing provider's specialty.  See \"Patient Info\" tab in inbasket, or \"Choose Columns\" in Meds & Orders " section of the refill encounter.

## 2018-10-01 NOTE — TELEPHONE ENCOUNTER
"Requested Prescriptions   Pending Prescriptions Disp Refills     metFORMIN (GLUCOPHAGE) 500 MG tablet  Last Written Prescription Date:  08/21/2018  Last Fill Quantity: 180,  # refills: 0   Last office visit: 6/22/2018 with prescribing provider:   ISAIAH   Future Office Visit:     180 tablet 0    Biguanide Agents Passed    10/1/2018  7:12 AM       Passed - Blood pressure less than 140/90 in past 6 months    BP Readings from Last 3 Encounters:   06/22/18 112/80   03/16/18 104/70   12/15/17 130/79                Passed - Patient has documented LDL within the past 12 mos.    Recent Labs   Lab Test  09/28/18   0813   LDL  120*            Passed - Patient has had a Microalbumin in the past 15 mos.    Recent Labs   Lab Test  09/28/18   0820   MICROL  8   UMALCR  7.39            Passed - Patient is age 10 or older       Passed - Patient has documented A1c within the specified period of time.    If HgbA1C is 8 or greater, it needs to be on file within the past 3 months.  If less than 8, must be on file within the past 6 months.     Recent Labs   Lab Test  09/28/18   0813   A1C  6.3*            Passed - Patient's CR is NOT>1.4 OR Patient's EGFR is NOT<45 within past 12 mos.    Recent Labs   Lab Test  06/15/18   0839   GFRESTIMATED  >90   GFRESTBLACK  >90       Recent Labs   Lab Test  06/15/18   0839   CR  0.62            Passed - Patient does NOT have a diagnosis of CHF.       Passed - Patient is not pregnant       Passed - Patient has not had a positive pregnancy test within the past 12 mos.        Passed - Recent (6 mo) or future (30 days) visit within the authorizing provider's specialty    Patient had office visit in the last 6 months or has a visit in the next 30 days with authorizing provider or within the authorizing provider's specialty.  See \"Patient Info\" tab in inbasket, or \"Choose Columns\" in Meds & Orders section of the refill encounter.              "

## 2018-10-02 RX ORDER — METFORMIN HCL 500 MG
TABLET, EXTENDED RELEASE 24 HR ORAL
Qty: 180 TABLET | Refills: 0 | Status: SHIPPED | OUTPATIENT
Start: 2018-10-02 | End: 2018-10-12

## 2018-10-08 DIAGNOSIS — E11.9 TYPE 2 DIABETES MELLITUS WITHOUT COMPLICATION, WITHOUT LONG-TERM CURRENT USE OF INSULIN (H): ICD-10-CM

## 2018-10-08 NOTE — TELEPHONE ENCOUNTER
"Refill too soon.     Last Written Prescription Date: 10/2/18  Last Fill Quantity: 180,  # refills: 0   Last office visit: 6/22/2018 with prescribing provider:  SASHA Cedillo    Future Office Visit:      Requested Prescriptions   Refused Prescriptions Disp Refills     metFORMIN (GLUCOPHAGE) 500 MG tablet 180 tablet 0     Sig: TAKE 1 TABLET BY MOUTH TWICE DAILY WITH MEALS    Biguanide Agents Passed    10/8/2018  4:38 PM       Passed - Blood pressure less than 140/90 in past 6 months    BP Readings from Last 3 Encounters:   06/22/18 112/80   03/16/18 104/70   12/15/17 130/79                Passed - Patient has documented LDL within the past 12 mos.    Recent Labs   Lab Test  09/28/18   0813   LDL  120*            Passed - Patient has had a Microalbumin in the past 15 mos.    Recent Labs   Lab Test  09/28/18   0820   MICROL  8   UMALCR  7.39            Passed - Patient is age 10 or older       Passed - Patient has documented A1c within the specified period of time.    If HgbA1C is 8 or greater, it needs to be on file within the past 3 months.  If less than 8, must be on file within the past 6 months.     Recent Labs   Lab Test  09/28/18   0813   A1C  6.3*            Passed - Patient's CR is NOT>1.4 OR Patient's EGFR is NOT<45 within past 12 mos.    Recent Labs   Lab Test  06/15/18   0839   GFRESTIMATED  >90   GFRESTBLACK  >90       Recent Labs   Lab Test  06/15/18   0839   CR  0.62            Passed - Patient does NOT have a diagnosis of CHF.       Passed - Patient is not pregnant       Passed - Patient has not had a positive pregnancy test within the past 12 mos.        Passed - Recent (6 mo) or future (30 days) visit within the authorizing provider's specialty    Patient had office visit in the last 6 months or has a visit in the next 30 days with authorizing provider or within the authorizing provider's specialty.  See \"Patient Info\" tab in inbasket, or \"Choose Columns\" in Meds & Orders section of the refill " encounter.

## 2018-10-08 NOTE — TELEPHONE ENCOUNTER
Reason for Call:  Medication or medication refill:    Do you use a [facility]?  Name of the pharmacy and phone number for the current request:  [facility]    Name of the medication requested: metFORMIN (GLUCOPHAGE) 500 MG tablet    Other request: This is not a duplicate request.  Pt use to be on Metformin XR but was changed to regular metformin 500 mg.  Pt takes 4 500 mg tabs a day.    Can we leave a detailed message on this number? YES    Phone number patient can be reached at: Cell number on file:    Telephone Information:   Mobile [phone]       Best Time: none    Call taken on 10/8/2018 at 4:35 PM by ESTELITA GROVES

## 2018-10-12 NOTE — TELEPHONE ENCOUNTER
Rx updated to reflect current dosage; ok to continue splitting over 3 meals, rather than 2 if this has been working well for patient. Medication refills given.

## 2018-10-12 NOTE — TELEPHONE ENCOUNTER
metFORMIN (GLUCOPHAGE) 500 MG tablet 180 tablet 0 8/21/2018  No      Sig: TAKE 1 TABLET BY MOUTH TWICE DAILY WITH MEALS     Class: E-Prescribe     Order: [id number]     E-Prescribing Status: Receipt confirmed by pharmacy (8/21/2018 10:36 AM CDT)         Patient take 1 at breakfast, 1 at lunch and 2 at dinner a day.     Patient is out.  Updated prescription pended.

## 2018-10-12 NOTE — TELEPHONE ENCOUNTER
Per intake message Patients rx was switched, she takes 4 500 mg tabs a day. Please fill for patient or call patient back at [phone] to discuss.

## 2018-10-12 NOTE — TELEPHONE ENCOUNTER
Patient Contact    Attempt # 1    Was call answered?  No.  Left message on voicemail with information that the prescription was sent to her pharmacy.

## 2018-10-19 ENCOUNTER — TRANSFERRED RECORDS (OUTPATIENT)
Dept: HEALTH INFORMATION MANAGEMENT | Facility: CLINIC | Age: 33
End: 2018-10-19

## 2018-11-12 DIAGNOSIS — E11.9 TYPE 2 DIABETES MELLITUS WITHOUT COMPLICATION, WITHOUT LONG-TERM CURRENT USE OF INSULIN (H): Primary | ICD-10-CM

## 2018-11-12 NOTE — TELEPHONE ENCOUNTER
Requested Prescriptions   Pending Prescriptions Disp Refills     MICROLET LANCETS MISC  Last Written Prescription Date:  6/13/2018  Last Fill Quantity: 300 each,  # refills: 1   Last office visit: 6/22/2018 with prescribing provider:  CHROMY   Future Office Visit:     300 each 1    There is no refill protocol information for this order

## 2018-11-13 RX ORDER — LANCETS
EACH MISCELLANEOUS
Qty: 200 EACH | Refills: 11 | Status: SHIPPED | OUTPATIENT
Start: 2018-11-13 | End: 2023-03-10

## 2018-11-13 NOTE — TELEPHONE ENCOUNTER
"Requested Prescriptions   Pending Prescriptions Disp Refills     MICROLET LANCETS MISC 300 each 1    Diabetic Supplies Protocol Passed    11/12/2018 10:33 AM       Passed - Patient is 18 years of age or older       Passed - Recent (6 mo) or future (30 days) visit within the authorizing provider's specialty    Patient had office visit in the last 6 months or has a visit in the next 30 days with authorizing provider.  See \"Patient Info\" tab in inbasket, or \"Choose Columns\" in Meds & Orders section of the refill encounter.            Last Written Prescription Date: 06/13/2018  Last Fill Quantity: 300,  # refills: 1   Last office visit: 6/22/2018 with prescribing provider:  Sheryl   Future Office Visit:      Routing refill request to provider for review/approval because:  Won't let me sign the orders. Says diagnosis isn't specific enough for billing          "

## 2018-12-14 ENCOUNTER — OFFICE VISIT (OUTPATIENT)
Dept: FAMILY MEDICINE | Facility: CLINIC | Age: 33
End: 2018-12-14
Payer: COMMERCIAL

## 2018-12-14 VITALS
RESPIRATION RATE: 16 BRPM | WEIGHT: 153 LBS | HEART RATE: 78 BPM | BODY MASS INDEX: 26.26 KG/M2 | OXYGEN SATURATION: 99 % | DIASTOLIC BLOOD PRESSURE: 80 MMHG | SYSTOLIC BLOOD PRESSURE: 120 MMHG

## 2018-12-14 DIAGNOSIS — E11.9 TYPE 2 DIABETES MELLITUS WITHOUT COMPLICATION, WITHOUT LONG-TERM CURRENT USE OF INSULIN (H): ICD-10-CM

## 2018-12-14 DIAGNOSIS — R87.810 CERVICAL HIGH RISK HPV (HUMAN PAPILLOMAVIRUS) TEST POSITIVE: ICD-10-CM

## 2018-12-14 DIAGNOSIS — Z00.00 ROUTINE HISTORY AND PHYSICAL EXAMINATION OF ADULT: Primary | ICD-10-CM

## 2018-12-14 PROCEDURE — 87624 HPV HI-RISK TYP POOLED RSLT: CPT | Performed by: PHYSICIAN ASSISTANT

## 2018-12-14 PROCEDURE — 99395 PREV VISIT EST AGE 18-39: CPT | Performed by: PHYSICIAN ASSISTANT

## 2018-12-14 PROCEDURE — 88175 CYTOPATH C/V AUTO FLUID REDO: CPT | Performed by: PHYSICIAN ASSISTANT

## 2018-12-14 NOTE — PROGRESS NOTES
SUBJECTIVE:   CC: Denia Montes is an 33 year old woman who presents for preventive health visit.     Physical   Annual:     Getting at least 3 servings of Calcium per day:  Yes    Bi-annual eye exam:  Yes    Dental care twice a year:  Yes    Sleep apnea or symptoms of sleep apnea:  None    Diet:  Diabetic and Vegetarian/vegan    Frequency of exercise:  4-5 days/week    Duration of exercise:  15-30 minutes    Taking medications regularly:  Yes    Medication side effects:  None    Additional concerns today:  No    PHQ-2 Total Score: 0    - Patient reports BG have improved with increased metformin dose (also reflected in most recent a1c) but continues to have intermittent high BG in the morning (150s). Also notes BG goes higher during menses, got really high during last cycle (200s).     Today's PHQ-2 Score:   PHQ-2 ( 1999 [employer]) 12/12/2018   Q1: Little interest or pleasure in doing things 0   Q2: Feeling down, depressed or hopeless 0   PHQ-2 Score 0   Q1: Little interest or pleasure in doing things Not at all   Q2: Feeling down, depressed or hopeless Not at all   PHQ-2 Score 0     Abuse: Current or Past(Physical, Sexual or Emotional)- No  Do you feel safe in your environment? Yes    Social History     Tobacco Use     Smoking status: Never Smoker     Smokeless tobacco: Never Used   Substance Use Topics     Alcohol use: No     Alcohol Use 12/12/2018   If you drink alcohol do you typically have greater than 3 drinks per day OR greater than 7 drinks per week? Not Applicable       Reviewed orders with patient.  Reviewed health maintenance and updated orders accordingly - Yes      Mammogram not appropriate for this patient based on age.    Pertinent mammograms are reviewed under the imaging tab.  History of abnormal Pap smear: YES - updated in Problem List and Health Maintenance accordingly  PAP / HPV Latest Ref Rng & Units 10/27/2017 9/23/2016 8/14/2015   PAP - ASC-US(A) NIL NIL   HPV 16 DNA NEG:Negative  Negative Negative Negative   HPV 18 DNA NEG:Negative Negative Negative Negative   OTHER HR HPV NEG:Negative Positive(A) Positive(A) Positive(A)     Reviewed and updated as needed this visit by clinical staff  Tobacco  Allergies  Meds  Med Hx  Surg Hx  Fam Hx  Soc Hx        Reviewed and updated as needed this visit by Provider            Review of Systems  CONSTITUTIONAL: NEGATIVE for fever, chills, change in weight  INTEGUMENTARU/SKIN: NEGATIVE for worrisome rashes, moles or lesions  EYES: NEGATIVE for vision changes or irritation  ENT: NEGATIVE for ear, mouth and throat problems  RESP: NEGATIVE for significant cough or SOB  BREAST: NEGATIVE for masses, tenderness or discharge  CV: NEGATIVE for chest pain, palpitations or peripheral edema  GI: NEGATIVE for nausea, abdominal pain, heartburn, or change in bowel habits  : NEGATIVE for unusual urinary or vaginal symptoms. Periods are regular.  MUSCULOSKELETAL: NEGATIVE for significant arthralgias or myalgia  NEURO: NEGATIVE for weakness, dizziness or paresthesias  PSYCHIATRIC: NEGATIVE for changes in mood or affect     OBJECTIVE:   /80   Pulse 78   Resp 16   Wt 69.4 kg (153 lb)   SpO2 99%   BMI 26.26 kg/m    Physical Exam  GENERAL: healthy, alert and no distress  EYES: Eyes grossly normal to inspection, PERRL and conjunctivae and sclerae normal  HENT: ear canals and TM's normal, nose and mouth without ulcers or lesions  NECK: no adenopathy, no asymmetry, masses, or scars and thyroid normal to palpation  RESP: lungs clear to auscultation - no rales, rhonchi or wheezes  BREAST: normal without masses, tenderness or nipple discharge and no palpable axillary masses or adenopathy  CV: regular rate and rhythm, normal S1 S2, no S3 or S4, no murmur, click or rub, no peripheral edema and peripheral pulses strong  ABDOMEN: soft, nontender, no hepatosplenomegaly, no masses and bowel sounds normal   (female): normal female external genitalia, normal urethral  "meatus, vaginal mucosa pink, moist, well rugated, and normal cervix/adnexa/uterus without masses or discharge  MS: no gross musculoskeletal defects noted, no edema  SKIN: no suspicious lesions or rashes  NEURO: Normal strength and tone, mentation intact and speech normal  PSYCH: mentation appears normal, affect normal/bright    Diagnostic Test Results:  Lab Results   Component Value Date    A1C 6.3 09/28/2018    A1C 6.6 06/15/2018    A1C 6.6 03/09/2018    A1C 5.6 08/18/2017    A1C 5.6 02/17/2017         ASSESSMENT/PLAN:       ICD-10-CM    1. Routine history and physical examination of adult Z00.00    2. Cervical high risk HPV (human papillomavirus) test positive R87.810 Pap imaged thin layer diagnostic with HPV (select HPV order below)     HPV High Risk Types DNA Cervical   3. Type 2 diabetes mellitus without complication, without long-term current use of insulin (H) E11.9      - Pap test results pending. If HPV positive, NIL recommend recheck in 1 year. If HPV negative, NIL can return to q3 year with cotest.  - Discussed common for BG to increase during menses, but generally not significantly higher as reported. Recommend keeping log for next 2-3 cycles; if remains grossly elevated during menses, recommend referral to endocrinology for further evaluation and management.    COUNSELING:  Reviewed preventive health counseling, as reflected in patient instructions    BP Readings from Last 1 Encounters:   12/14/18 120/80     Estimated body mass index is 26.26 kg/m  as calculated from the following:    Height as of 3/16/18: 1.626 m (5' 4\").    Weight as of this encounter: 69.4 kg (153 lb).      Weight management plan: Discussed healthy diet and exercise guidelines     reports that  has never smoked. she has never used smokeless tobacco.      Counseling Resources:  ATP IV Guidelines  Pooled Cohorts Equation Calculator  Breast Cancer Risk Calculator  FRAX Risk Assessment  ICSI Preventive Guidelines  Dietary Guidelines for " Americans, 2010  USDA's MyPlate  ASA Prophylaxis  Lung CA Screening    Lexy Saucedo PA-C  [facility]

## 2018-12-19 LAB
COPATH REPORT: NORMAL
PAP: NORMAL

## 2018-12-20 LAB
FINAL DIAGNOSIS: ABNORMAL
HPV HR 12 DNA CVX QL NAA+PROBE: POSITIVE
HPV16 DNA SPEC QL NAA+PROBE: NEGATIVE
HPV18 DNA SPEC QL NAA+PROBE: NEGATIVE
SPECIMEN DESCRIPTION: ABNORMAL
SPECIMEN SOURCE CVX/VAG CYTO: ABNORMAL

## 2019-01-11 DIAGNOSIS — E11.9 TYPE 2 DIABETES MELLITUS WITHOUT COMPLICATION, WITHOUT LONG-TERM CURRENT USE OF INSULIN (H): Primary | ICD-10-CM

## 2019-01-11 LAB — HBA1C MFR BLD: 7.4 % (ref 0–5.6)

## 2019-01-11 PROCEDURE — 36415 COLL VENOUS BLD VENIPUNCTURE: CPT | Performed by: PHYSICIAN ASSISTANT

## 2019-01-11 PROCEDURE — 83036 HEMOGLOBIN GLYCOSYLATED A1C: CPT | Performed by: PHYSICIAN ASSISTANT

## 2019-01-14 ENCOUNTER — MYC MEDICAL ADVICE (OUTPATIENT)
Dept: FAMILY MEDICINE | Facility: CLINIC | Age: 34
End: 2019-01-14

## 2019-01-14 DIAGNOSIS — E11.9 TYPE 2 DIABETES MELLITUS WITHOUT COMPLICATION, WITHOUT LONG-TERM CURRENT USE OF INSULIN (H): Primary | ICD-10-CM

## 2019-01-14 NOTE — TELEPHONE ENCOUNTER
Denia called back.     She tests up to two times daily(AM and a couple hours after a meal).  -130 menstruating 180-200, over 150     1/13/19 PM ~160 2 hours after lunch   1/14/19 AM before breakfast at waking 150    Diet has not changed substantially.     Advised patient take blood sugars twice daily until her appointment Friday. Write them down and bring them with her to discuss with her provider.

## 2019-01-14 NOTE — TELEPHONE ENCOUNTER
Asked pt to call and let us know how often she is testing,, What her BS's have been running and any change in diet because of the holidays. She is taking metformin 500 mg 2 tabs bid with meals

## 2019-01-14 NOTE — TELEPHONE ENCOUNTER
Responded to patient via My Chart. Triage - please cancel patient's appointment with me on Friday, she was referred to endocrinology.

## 2019-01-15 NOTE — TELEPHONE ENCOUNTER
Lexy,  See mychart encounter and let us know how we can help.    Lucia Rodríguez RN- Triage [employer]

## 2019-01-30 ENCOUNTER — OFFICE VISIT (OUTPATIENT)
Dept: ENDOCRINOLOGY | Facility: CLINIC | Age: 34
End: 2019-01-30
Payer: COMMERCIAL

## 2019-01-30 VITALS
WEIGHT: 156.2 LBS | DIASTOLIC BLOOD PRESSURE: 79 MMHG | SYSTOLIC BLOOD PRESSURE: 122 MMHG | HEIGHT: 64 IN | BODY MASS INDEX: 26.67 KG/M2 | HEART RATE: 81 BPM

## 2019-01-30 DIAGNOSIS — E11.9 TYPE 2 DIABETES MELLITUS WITHOUT COMPLICATION, WITHOUT LONG-TERM CURRENT USE OF INSULIN (H): ICD-10-CM

## 2019-01-30 DIAGNOSIS — E11.9 TYPE 2 DIABETES MELLITUS WITHOUT COMPLICATION, WITHOUT LONG-TERM CURRENT USE OF INSULIN (H): Primary | ICD-10-CM

## 2019-01-30 LAB
BUN SERPL-MCNC: 9 MG/DL (ref 7–30)
C PEPTIDE SERPL-MCNC: 2.3 NG/ML (ref 0.9–6.9)
CREAT SERPL-MCNC: 0.55 MG/DL (ref 0.52–1.04)
GFR SERPL CREATININE-BSD FRML MDRD: >90 ML/MIN/{1.73_M2}
GLUCOSE SERPL-MCNC: 177 MG/DL (ref 70–99)
TSH SERPL DL<=0.005 MIU/L-ACNC: 1.45 MU/L (ref 0.4–4)

## 2019-01-30 RX ORDER — OMEGA-3/DHA/EPA/FISH OIL 60 MG-90MG
CAPSULE ORAL
COMMUNITY

## 2019-01-30 ASSESSMENT — MIFFLIN-ST. JEOR: SCORE: 1393.52

## 2019-01-30 ASSESSMENT — PAIN SCALES - GENERAL: PAINLEVEL: NO PAIN (0)

## 2019-01-30 NOTE — LETTER
1/30/2019       RE: Denia Montes  [address]  [place] [address]     Dear Colleague,    Thank you for referring your patient, Denia Montes, to the [facility] ENDOCRINOLOGY at [facility]. Please see a copy of my visit note below.    Endocrinology Clinic Visit    Chief Complaint: Consult (type 2 diabetes )     Information obtained from:Patient    Subjective:         HPI: Denia Montes is a 33 year old female with history of Type 2 diabetes who is seen in consultation at Lexy Saucedo's request for the same.     33-year-old female with past medical history of type 2 diabetes diagnosed 4 years ago who is here for a concern regarding what type of diabetes she has.    Presentation of diabetes 4 years ago was increased urination or polyuria, nocturia, polydipsia and weight loss.  Reports she lost about 50pounds at the time.  At the time of diagnosis A1c was 9.8.  Patient was started on metformin 2000 mg daily and 3 months later her A1c came to 6.1.  In 2016 and  in 2017 her A1c was below 6; in the range of 5.5-5.7.  She was off of metformin during this time.  In March 2018 her A1c 6.6 and she was restarted on metformin at that time.  At 500 mg daily.  Dose of metformin was increased per report to 2000 mg daily in September 2018 and that is what she is currently taking.  Her A1c in 1/11/2019 was 7.4.  Denies polyuria, polydipsia or nocturia at this point in time.  No weight loss over the last 3 years.  In fact she has gained about 10 pounds over the last couple of years.  Denies fatigue, change in her skin, heat or cold intolerance.    Her mom has type 2 diabetes.  Grandmother on her dad's side had type 2 diabetes.  And has a cousin on her dad's side has type 1 diabetes.  Her mom has thyroid disease.  No significant past medical history other than kidney reflux as a child.  Her current medication is only metformin.    She gets her menses on a regular basis.   Not currently actively trying to be pregnant.  She is planning to get pregnant in a year or so.  When the weather is better her main means of transportation is biking from places to places.  During the winter months she tries to go to the [facility] at least 2 times per week.  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     Allergies   Allergen Reactions     Sulfa Drugs Hives       Current Outpatient Medications   Medication Sig Dispense Refill     ACE/ARB NOT PRESCRIBED, INTENTIONAL, Please choose reason not prescribed, below       blood glucose monitoring (JACI CONTOUR) test strip USE TO TEST BLOOD SUGAR TWICE DAILY IN MORNING AND EVENING 200 strip 1     metFORMIN (GLUCOPHAGE) 500 MG tablet Take 2 tablets (1,000 mg) by mouth 2 times daily (with meals) 360 tablet 1     MICROLET LANCETS MISC TEST BLOOD SUGAR TWICE DAILY 200 each 11     Omega-3 Fatty Acids (FISH OIL) 500 MG CAPS        order for DME Equipment being ordered: Glucometer, brand as covered by insurance. Does not use insulin. 1 Device 0     order for DME Lancets bid and prn.  Fill per patient's insurance. Not on insulin 200 each 1     order for DME Test strips for pt's glucometer, brand as covered by insurance Test bid and prn. Not on insulin. 200 each 1     vitamin B-12 (CYANOCOBALAMIN) 100 MCG tablet Take 1,000 mcg by mouth daily         Review of Systems     11 point review system (Constitutional, HENT, Eyes, Respiratory, Cardiovascular, Gastrointestinal, Genitourinary, Musculoskeletal,Neurological, Psychiatric/Behavioural, Endocrine) is negative or is as per HPI above    Past Medical History:   Diagnosis Date     Cervical high risk HPV (human papillomavirus) test positive 8/2015, 09/23/16, 12/14/18    see problem list      Diabetes mellitus (H)      Hx of colposcopy with cervical biopsy 10/21/16    [place] Bx: Neg NIL, ECC: Neg.       History reviewed. No pertinent surgical history.    Family History   Problem Relation Age of Onset     Diabetes Mother      Hyperlipidemia  "Mother      Kidney Disease Mother      Thyroid Disease Mother      Hyperlipidemia Father      Hypertension Father      Kidney Disease Father      Coronary Artery Disease No family hx of      Cerebrovascular Disease No family hx of      Breast Cancer No family hx of      Colon Cancer No family hx of      Prostate Cancer No family hx of      Other Cancer No family hx of      Depression No family hx of      Anxiety Disorder No family hx of      Mental Illness No family hx of      Substance Abuse No family hx of      Anesthesia Reaction No family hx of      Asthma No family hx of      Osteoporosis No family hx of      Genetic Disorder No family hx of      Obesity No family hx of      Unknown/Adopted No family hx of        Social History     Socioeconomic History     Marital status: Single     Spouse name: None     Number of children: None     Years of education: None     Highest education level: None   Social Needs     Financial resource strain: None     Food insecurity - worry: None     Food insecurity - inability: None     Transportation needs - medical: None     Transportation needs - non-medical: None   Occupational History     None   Tobacco Use     Smoking status: Never Smoker     Smokeless tobacco: Never Used   Substance and Sexual Activity     Alcohol use: No     Drug use: No     Sexual activity: Yes     Partners: Male     Birth control/protection: None   Other Topics Concern     Parent/sibling w/ CABG, MI or angioplasty before 65F 55M? No   Social History Narrative     None       Objective:   /79   Pulse 81   Ht 1.626 m (5' 4\")   Wt 70.9 kg (156 lb 3.2 oz)   BMI 26.81 kg/m     Constitutional: Pleasant no acute cardiopulmonary distress.   EYES: anicteric, normal extra-ocular movements, no lid lag or retraction.   HEENT: Mouth/Throat: Mucous membrane is moist. Oropharynx is clear. No adenopathy.  Thyroid exam normal without any nodules.  Cardiovascular: RRR, S1, S2 normal. No m/g/r   Pulmonary/Chest: " CTAB. No wheezing or rales   Abdominal: +BS. Non tender to palpation. No organomegaly present.  Neurological: Alert and oriented.  No tremor and reflexes are symmetrical bilaterally. Muscle strength 5/5.   Extremities: No edema.     Skin: normal texture, color  Lymphatic: no cervical lymphadenopathy.  Psychological: appropriate mood and affect     In House Labs:   Lab Results   Component Value Date    A1C 7.4 01/11/2019    A1C 6.3 09/28/2018    A1C 6.6 06/15/2018    A1C 6.6 03/09/2018    A1C 5.6 08/18/2017       TSH   Date Value Ref Range Status   02/17/2017 2.13 0.40 - 4.00 mU/L Final   05/01/2015 1.72 0.40 - 5.00 mU/L Final       Creatinine   Date Value Ref Range Status   06/15/2018 0.62 0.52 - 1.04 mg/dL Final       Assessment/Treatment Plan:      With a diagnosis of Type 2 diabetes without any complications; current A1c above 7: Main reason patient is here is for definitive diagnosis of what type of diabetes she has.  She is concerned that she might have type 1 diabetes based on her initial presentation.  Given the resolution of hyperglycemia; A1c normalized after losing significant weight makes type 1 diabetes less likely.  Checked C-peptide and anti-alex antibodies to rule out type 1 diabetes in the making. Results are pending.  If the results indicates Type 2 diabetes (anticipating) ;currently her A1c is high on metformin 2000 mg daily.  Discussed measures that can bring the A1c down particularly lifestyle changes including losing 5-10% of body weight and regular exercise 4-5 times per week for 45 minutes.  if A1c or hyperglycemia does not improve with lifestyle changes then would be reasonable to add a second line medication in 2-3 months.  Patient is agreeable with plan.    C-Peptide results which was 2.3 for BG of 177 noted.  This indicates she has endogenous reserve for insulin production and can be managed as Type 2 diabetes for now (ALEX antibody pending if positive will indicates future risk for  developing Type 1 diabetes). Management of Type 2 diabetes as documented in the above paragraph.     Patient Instructions   Blood work today and we will contact you with the results.           I will contact the patient with the test results.  Return to clinic in 3 months.    Test and/or medications prescribed today:  Orders Placed This Encounter   Procedures     Creatinine     Urea nitrogen     C-peptide     Glucose     Glutamic acid decarboxylase antibody     TSH with free T4 reflex         Yuri Hansen MD  Staff Endocrinologist    [phone]  Division of Endocrinology and Diabetes

## 2019-01-30 NOTE — PROGRESS NOTES
Endocrinology Clinic Visit    Chief Complaint: Consult (type 2 diabetes )     Information obtained from:Patient    Subjective:         HPI: Denia Montes is a 33 year old female with history of Type 2 diabetes who is seen in consultation at Lexy Saucedo's request for the same.     33-year-old female with past medical history of type 2 diabetes diagnosed 4 years ago who is here for a concern regarding what type of diabetes she has.    Presentation of diabetes 4 years ago was increased urination or polyuria, nocturia, polydipsia and weight loss.  Reports she lost about 50pounds at the time.  At the time of diagnosis A1c was 9.8.  Patient was started on metformin 2000 mg daily and 3 months later her A1c came to 6.1.  In 2016 and  in 2017 her A1c was below 6; in the range of 5.5-5.7.  She was off of metformin during this time.  In March 2018 her A1c 6.6 and she was restarted on metformin at that time.  At 500 mg daily.  Dose of metformin was increased per report to 2000 mg daily in September 2018 and that is what she is currently taking.  Her A1c in 1/11/2019 was 7.4.  Denies polyuria, polydipsia or nocturia at this point in time.  No weight loss over the last 3 years.  In fact she has gained about 10 pounds over the last couple of years.  Denies fatigue, change in her skin, heat or cold intolerance.    Her mom has type 2 diabetes.  Grandmother on her dad's side had type 2 diabetes.  And has a cousin on her dad's side has type 1 diabetes.  Her mom has thyroid disease.  No significant past medical history other than kidney reflux as a child.  Her current medication is only metformin.    She gets her menses on a regular basis.  Not currently actively trying to be pregnant.  She is planning to get pregnant in a year or so.  When the weather is better her main means of transportation is biking from places to places.  During the winter months she tries to go to the [facility] at least 2 times per week.  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     Allergies   Allergen Reactions     Sulfa Drugs Hives       Current Outpatient Medications   Medication Sig Dispense Refill     ACE/ARB NOT PRESCRIBED, INTENTIONAL, Please choose reason not prescribed, below       blood glucose monitoring (JACI CONTOUR) test strip USE TO TEST BLOOD SUGAR TWICE DAILY IN MORNING AND EVENING 200 strip 1     metFORMIN (GLUCOPHAGE) 500 MG tablet Take 2 tablets (1,000 mg) by mouth 2 times daily (with meals) 360 tablet 1     MICROLET LANCETS MISC TEST BLOOD SUGAR TWICE DAILY 200 each 11     Omega-3 Fatty Acids (FISH OIL) 500 MG CAPS        order for DME Equipment being ordered: Glucometer, brand as covered by insurance. Does not use insulin. 1 Device 0     order for DME Lancets bid and prn.  Fill per patient's insurance. Not on insulin 200 each 1     order for DME Test strips for pt's glucometer, brand as covered by insurance Test bid and prn. Not on insulin. 200 each 1     vitamin B-12 (CYANOCOBALAMIN) 100 MCG tablet Take 1,000 mcg by mouth daily         Review of Systems     11 point review system (Constitutional, HENT, Eyes, Respiratory, Cardiovascular, Gastrointestinal, Genitourinary, Musculoskeletal,Neurological, Psychiatric/Behavioural, Endocrine) is negative or is as per HPI above    Past Medical History:   Diagnosis Date     Cervical high risk HPV (human papillomavirus) test positive 8/2015, 09/23/16, 12/14/18    see problem list      Diabetes mellitus (H)      Hx of colposcopy with cervical biopsy 10/21/16    [place] Bx: Neg NIL, ECC: Neg.       History reviewed. No pertinent surgical history.    Family History   Problem Relation Age of Onset     Diabetes Mother      Hyperlipidemia Mother      Kidney Disease Mother      Thyroid Disease Mother      Hyperlipidemia Father      Hypertension Father      Kidney Disease Father      Coronary Artery Disease No family hx of      Cerebrovascular Disease No family hx of      Breast Cancer No family hx of      Colon Cancer No  "family hx of      Prostate Cancer No family hx of      Other Cancer No family hx of      Depression No family hx of      Anxiety Disorder No family hx of      Mental Illness No family hx of      Substance Abuse No family hx of      Anesthesia Reaction No family hx of      Asthma No family hx of      Osteoporosis No family hx of      Genetic Disorder No family hx of      Obesity No family hx of      Unknown/Adopted No family hx of        Social History     Socioeconomic History     Marital status: Single     Spouse name: None     Number of children: None     Years of education: None     Highest education level: None   Social Needs     Financial resource strain: None     Food insecurity - worry: None     Food insecurity - inability: None     Transportation needs - medical: None     Transportation needs - non-medical: None   Occupational History     None   Tobacco Use     Smoking status: Never Smoker     Smokeless tobacco: Never Used   Substance and Sexual Activity     Alcohol use: No     Drug use: No     Sexual activity: Yes     Partners: Male     Birth control/protection: None   Other Topics Concern     Parent/sibling w/ CABG, MI or angioplasty before 65F 55M? No   Social History Narrative     None       Objective:   /79   Pulse 81   Ht 1.626 m (5' 4\")   Wt 70.9 kg (156 lb 3.2 oz)   BMI 26.81 kg/m    Constitutional: Pleasant no acute cardiopulmonary distress.   EYES: anicteric, normal extra-ocular movements, no lid lag or retraction.   HEENT: Mouth/Throat: Mucous membrane is moist. Oropharynx is clear. No adenopathy.  Thyroid exam normal without any nodules.  Cardiovascular: RRR, S1, S2 normal. No m/g/r   Pulmonary/Chest: CTAB. No wheezing or rales   Abdominal: +BS. Non tender to palpation. No organomegaly present.  Neurological: Alert and oriented.  No tremor and reflexes are symmetrical bilaterally. Muscle strength 5/5.   Extremities: No edema.     Skin: normal texture, color  Lymphatic: no cervical " lymphadenopathy.  Psychological: appropriate mood and affect     In House Labs:   Lab Results   Component Value Date    A1C 7.4 01/11/2019    A1C 6.3 09/28/2018    A1C 6.6 06/15/2018    A1C 6.6 03/09/2018    A1C 5.6 08/18/2017       TSH   Date Value Ref Range Status   02/17/2017 2.13 0.40 - 4.00 mU/L Final   05/01/2015 1.72 0.40 - 5.00 mU/L Final       Creatinine   Date Value Ref Range Status   06/15/2018 0.62 0.52 - 1.04 mg/dL Final       Assessment/Treatment Plan:      With a diagnosis of Type 2 diabetes without any complications; current A1c above 7: Main reason patient is here is for definitive diagnosis of what type of diabetes she has.  She is concerned that she might have type 1 diabetes based on her initial presentation.  Given the resolution of hyperglycemia; A1c normalized after losing significant weight makes type 1 diabetes less likely.  Checked C-peptide and anti-alex antibodies to rule out type 1 diabetes in the making. Results are pending.  If the results indicates Type 2 diabetes (anticipating) ;currently her A1c is high on metformin 2000 mg daily.  Discussed measures that can bring the A1c down particularly lifestyle changes including losing 5-10% of body weight and regular exercise 4-5 times per week for 45 minutes.  if A1c or hyperglycemia does not improve with lifestyle changes then would be reasonable to add a second line medication in 2-3 months.  Patient is agreeable with plan.    C-Peptide results which was 2.3 for BG of 177 noted.  This indicates she has endogenous reserve for insulin production and can be managed as Type 2 diabetes for now (ALEX antibody pending if positive will indicates future risk for developing Type 1 diabetes). Management of Type 2 diabetes as documented in the above paragraph.     Patient Instructions   Blood work today and we will contact you with the results.           I will contact the patient with the test results.  Return to clinic in 3 months.    Test and/or  medications prescribed today:  Orders Placed This Encounter   Procedures     Creatinine     Urea nitrogen     C-peptide     Glucose     Glutamic acid decarboxylase antibody     TSH with free T4 reflex         Yuri Hansen MD  Staff Endocrinologist    [phone]  Division of Endocrinology and Diabetes          Answers for HPI/ROS submitted by the patient on 1/28/2019   General Symptoms: No  Skin Symptoms: No  HENT Symptoms: No  EYE SYMPTOMS: No  HEART SYMPTOMS: No  LUNG SYMPTOMS: No  INTESTINAL SYMPTOMS: No  URINARY SYMPTOMS: No  GYNECOLOGIC SYMPTOMS: No  BREAST SYMPTOMS: No  SKELETAL SYMPTOMS: No  BLOOD SYMPTOMS: No  NERVOUS SYSTEM SYMPTOMS: No  MENTAL HEALTH SYMPTOMS: No

## 2019-02-02 LAB — GAD65 AB SER IA-ACNC: >250 IU/ML (ref 0–5)

## 2019-02-04 ENCOUNTER — MYC MEDICAL ADVICE (OUTPATIENT)
Dept: ENDOCRINOLOGY | Facility: CLINIC | Age: 34
End: 2019-02-04

## 2019-02-04 NOTE — RESULT ENCOUNTER NOTE
Denia,    Glutamic acid decarboxylase antibody(antibody ANNA)  has come back positive or high.  These antibody elevation indicates that you are at risk of developing type 1 diabetes.  The C-peptide level which was within the normal limits; indicates that your body or pancreas is still able to produce some insulin.    When the C-peptide level is low and the anna antibody is high; that establishes the diagnosis of type 1 diabetes.  Type 1 diabetes is diagnosed when your body stops producing insulin (indicated by low C-peptide level).     We sometimes describe your current status as type 1 diabetes in the making.  The metformin you are taking currently should be okay for now.  However, I recommend following up in the clinic earlier than planned to discuss next steps in light of these new laboratory results.    Please schedule an appointment for end of Feb so that we can further discuss discuss next steps. I will have also our team reach out to you to schedule an earlier appointment.     Please let me know if you have any questions in the meantime,     Yuri Hansen

## 2019-02-27 ENCOUNTER — TELEPHONE (OUTPATIENT)
Dept: ENDOCRINOLOGY | Facility: CLINIC | Age: 34
End: 2019-02-27

## 2019-02-27 ENCOUNTER — OFFICE VISIT (OUTPATIENT)
Dept: ENDOCRINOLOGY | Facility: CLINIC | Age: 34
End: 2019-02-27
Payer: COMMERCIAL

## 2019-02-27 VITALS
BODY MASS INDEX: 26.12 KG/M2 | HEIGHT: 64 IN | SYSTOLIC BLOOD PRESSURE: 122 MMHG | HEART RATE: 80 BPM | WEIGHT: 153 LBS | DIASTOLIC BLOOD PRESSURE: 73 MMHG

## 2019-02-27 DIAGNOSIS — R76.8 POSITIVE GAD ANTIBODY: ICD-10-CM

## 2019-02-27 DIAGNOSIS — E11.9 TYPE 2 DIABETES MELLITUS WITHOUT COMPLICATION, WITHOUT LONG-TERM CURRENT USE OF INSULIN (H): Primary | ICD-10-CM

## 2019-02-27 DIAGNOSIS — E11.9 TYPE 2 DIABETES MELLITUS WITHOUT COMPLICATION, WITHOUT LONG-TERM CURRENT USE OF INSULIN (H): ICD-10-CM

## 2019-02-27 ASSESSMENT — MIFFLIN-ST. JEOR: SCORE: 1379

## 2019-02-27 NOTE — LETTER
2/27/2019       RE: Denia Montes  [address]  [facility] [address]     Dear Colleague,    Thank you for referring your patient, Denia Montes, to the [facility]. Please see a copy of my visit note below.    Endocrinology Clinic Visit    Chief Complaint: RECHECK (diabetes type 2)     Information obtained from:Patient    Subjective:         HPI: Denia Montes is a 33 year old female with history of Type 2 diabetes who is here for routine follow up.     33-year-old female with past medical history of type 2 diabetes diagnosed 4 years ago who recently established care here at the neurology clinic.    The main question she had was what type of diabetes she has.    Presentation of diabetes 4 years ago was increased urination or polyuria, nocturia, polydipsia and weight loss.  Reports she lost about 50pounds at the time.  At the time of diagnosis A1c was 9.8.  Patient was started on metformin 2000 mg daily and 3 months later her A1c came to 6.1.  In 2016 and  in 2017 her A1c was below 6; in the range of 5.5-5.7.  She was off of metformin during this time.  In March 2018 her A1c 6.6 and she was restarted on metformin at that time.  At 500 mg daily.  Dose of metformin was increased per report to 2000 mg daily in September 2018 and that is what she is currently taking.  Her A1c in 1/11/2019 was 7.4.  Denies polyuria, polydipsia or nocturia at this point in time.  No weight loss over the last 3 years.  In fact she has gained about 10 pounds over the last couple of years.  Denies fatigue, change in her skin, heat or cold intolerance.    To determine what type of diabetes she has which got antibody at her last visit which came back high at 250.  However, her C-peptide was within the normal limits at 2.3 for a blood sugar of 177.   Ref. Range 1/30/2019 09:43   C-Peptide Latest Ref Range: 0.9 - 6.9 ng/mL 2.3   Glutamic Acid Decarboxylase  Antibody Latest Ref Range: 0.0 - 5.0 IU/mL >250.0 (H)     Her mom has type 2 diabetes.  Grandmother on her dad's side had type 2 diabetes.  And has a cousin on her dad's side has type 1 diabetes.  Her mom has thyroid disease.  No significant past medical history other than kidney reflux as a child.  Her current medication is only metformin.  Fasting blood sugar between 150-170.  Did not bring her meter with her today.  She gets her menses on a regular basis.  Not currently actively trying to be pregnant.  She is planning to get pregnant in a year or so.  When the weather is better her main means of transportation is biking from places to places.  During the winter months she tries to go to the [facility] at least 2 times per week.  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     Allergies   Allergen Reactions     Sulfa Drugs Hives       Current Outpatient Medications   Medication Sig Dispense Refill     blood glucose monitoring ([employer] CONTOUR) test strip USE TO TEST BLOOD SUGAR TWICE DAILY IN MORNING AND EVENING 200 strip 1     insulin detemir (LEVEMIR PEN) 100 UNIT/ML pen Inject 14 Units Subcutaneous At Bedtime 9 mL 1     metFORMIN (GLUCOPHAGE) 500 MG tablet Take 2 tablets (1,000 mg) by mouth 2 times daily (with meals) 360 tablet 1     MICROLET LANCETS MISC TEST BLOOD SUGAR TWICE DAILY 200 each 11     Omega-3 Fatty Acids (FISH OIL) 500 MG CAPS        order for DME Equipment being ordered: Glucometer, brand as covered by insurance. Does not use insulin. 1 Device 0     order for DME Lancets bid and prn.  Fill per patient's insurance. Not on insulin 200 each 1     order for DME Test strips for pt's glucometer, brand as covered by insurance Test bid and prn. Not on insulin. 200 each 1     vitamin B-12 (CYANOCOBALAMIN) 100 MCG tablet Take 1,000 mcg by mouth daily       ACE/ARB NOT PRESCRIBED, INTENTIONAL, Please choose reason not prescribed, below (Patient not taking: Reported on 2/27/2019)         Review of Systems     11 point review  system (Constitutional, HENT, Eyes, Respiratory, Cardiovascular, Gastrointestinal, Genitourinary, Musculoskeletal,Neurological, Psychiatric/Behavioural, Endocrine) is negative or is as per HPI above    Past Medical History:   Diagnosis Date     Cervical high risk HPV (human papillomavirus) test positive 8/2015, 09/23/16, 12/14/18    see problem list      Diabetes mellitus (H)      Hx of colposcopy with cervical biopsy 10/21/16    [place] Bx: Neg NIL, ECC: Neg.       History reviewed. No pertinent surgical history.    Family History   Problem Relation Age of Onset     Diabetes Mother      Hyperlipidemia Mother      Kidney Disease Mother      Thyroid Disease Mother      Hyperlipidemia Father      Hypertension Father      Kidney Disease Father      Coronary Artery Disease No family hx of      Cerebrovascular Disease No family hx of      Breast Cancer No family hx of      Colon Cancer No family hx of      Prostate Cancer No family hx of      Other Cancer No family hx of      Depression No family hx of      Anxiety Disorder No family hx of      Mental Illness No family hx of      Substance Abuse No family hx of      Anesthesia Reaction No family hx of      Asthma No family hx of      Osteoporosis No family hx of      Genetic Disorder No family hx of      Obesity No family hx of      Unknown/Adopted No family hx of        Social History     Socioeconomic History     Marital status: Single     Spouse name: None     Number of children: None     Years of education: None     Highest education level: None   Social Needs     Financial resource strain: None     Food insecurity - worry: None     Food insecurity - inability: None     Transportation needs - medical: None     Transportation needs - non-medical: None   Occupational History     None   Tobacco Use     Smoking status: Never Smoker     Smokeless tobacco: Never Used   Substance and Sexual Activity     Alcohol use: No     Drug use: No     Sexual activity: Yes     Partners:  "Male     Birth control/protection: None   Other Topics Concern     Parent/sibling w/ CABG, MI or angioplasty before 65F 55M? No   Social History Narrative     None       Objective:   /73 (BP Location: Right arm, Patient Position: Chair, Cuff Size: Adult Regular)   Pulse 80   Ht 1.626 m (5' 4\")   Wt 69.4 kg (153 lb)   BMI 26.26 kg/m     Constitutional: Pleasant no acute cardiopulmonary distress.   Psychological: appropriate mood and affect     In House Labs:   Lab Results   Component Value Date    A1C 7.4 01/11/2019    A1C 6.3 09/28/2018    A1C 6.6 06/15/2018    A1C 6.6 03/09/2018    A1C 5.6 08/18/2017       TSH   Date Value Ref Range Status   01/30/2019 1.45 0.40 - 4.00 mU/L Final   02/17/2017 2.13 0.40 - 4.00 mU/L Final   05/01/2015 1.72 0.40 - 5.00 mU/L Final       Creatinine   Date Value Ref Range Status   01/30/2019 0.55 0.52 - 1.04 mg/dL Final       Assessment/Treatment Plan:      Diabetes currently on metformin; A1c 7.4; ANNA antibody high at more than 250: Indicating type 1 diabetes in the making: Discussed in detail the lab results findings and interpretation with the patient.  The fact that the C-peptide is within the normal limits indicates that she still has endogenous insulin production; however she is at risk of developing type 1 diabetes.  Likely in the honeymoon phase; after diagnosis 4 years ago.   Considerations for the management of her current diabetes:  #1 currently planning to get pregnant in the next 1 year or so; therefore, we will plan on using pregnancy safe antidiabetic medications at this point in time.  2.  ANNA antibody positive; we do not know when she would develop a full-blown type 1 diabetes; could be in few months or as long as 10 years and I have explained to the patient in detail.  3.  Since C-peptide is within the normal limits; we can continue to use metformin.  Plan:    Start Levemir 14 units at bedtime.    Continue metformin at the same dose    Check blood sugars at " least twice per day; fasting and bedtime and contact us if blood sugars persistently below 70.    Send us my chart message in 3-4 weeks with blood sugar readings so that we can adjust antidiabetic medications.    Discussed the risk of medication and written information provided to the patient.      Patient Instructions     Start Levemir insulin 14 units daily; at bedtime.   Continue metformin at the same dose.     Continue to check blood glucose twice per day: fasting and at bedtime.  Please call us if blood glucose persistently below 70.    Fasting blood glucose goals:  mg/dl    Please send blood glucose readings using [employer] in 3-4 weeks for further adjustment of medications.            Patient Education   Long-acting Insulin Detemir (DE-te-emmanuel)  Brand names: Levemir   What does it do?  Provides the insulin the body needs to maintain normal levels of blood glucose.  This is a basal (long-acting) insulin. It does not cover meals and snacks. It starts working in one to two hours. It may be given one or two times each day.  How do I take it?    This insulin is given as a shot under the skin of your upper arms, upper legs or stomach (abdomen). Use it every day at the same time.    Rotate where you take the shot within your chosen area. For example, if you always inject in your stomach, inject a different spot in your stomach each time. Your doctor will tell you the dose and how often to inject it.    It comes in vials or pens. Do not shake them. Do not use if insulin is a different color than normal.    If you miss a dose: Take it as soon as you can. If it is more than six hours past your dose time, call your diabetes care team. Never take two doses at the same time.    Never mix this with any other type of insulin in the same syringe.    Never use a syringe to take insulin out of an insulin pen.  What are the possible side effects?  You may have redness, swelling, pain, itching, burning or a lump at the  injection site (where the shot was given).  You may have changes at the injection spot. The fatty tissue under the skin may shrink or thicken. Rotating the spot where you inject can help prevent this. Do not inject into skin that is like this.   You may also have low blood glucose (less than 70). Signs of low blood glucose include:    Sweating    Feeling shaky    Sudden hunger    Fast heartbeat    Feeling dizzy, confused or weak    Headache or problems seeing    Feeling irritable  How should I store my insulin?    Store opened insulin at room temperature. Keep it away from sunlight and heat.    Store unopened insulin in the refrigerator. Do not freeze it.    Keep medicine out of the reach of children and pets.  Levemir (insulin detemir) 42 days   When should I call my diabetes care team?  You should call the doctor right away if you notice any of the following:    Frequent high or low blood glucose    Nausea (feeling sick to your stomach) or vomiting (throwing up)    Flu-like symptoms (fever, chills)    Any sign of an allergic reaction, such as:  ? Hives or itchy skin  ? Swelling in the face, hands, mouth or throat  ? Tingling in the mouth or throat  ? A tight feeling in the chest  ? Trouble breathing, wheezing    Stinging at the site where you inject the insulin           I will contact the patient with the test results.  Return to clinic in 3 months.    Test and/or medications prescribed today:  No orders of the defined types were placed in this encounter.        Yuri Hansen MD  Staff Endocrinologist    [phone]  Division of Endocrinology and Diabetes

## 2019-02-27 NOTE — NURSING NOTE
Trippnick Montes comes into clinic today at the request of Chromy  Ordering Provider for Diabetes    This service provided today was under the supervising provider of the day Dr Hansen , who was available if needed.    Yen Cordova

## 2019-02-27 NOTE — NURSING NOTE
Teaching Flowsheet   Relevant Diagnosis: Diabetes  Teaching Topic: Levemir  flexpen teach ( insulin)    Person(s) involved in teaching:   Denia     Motivation Level:  Asks Questions:YES   Eager to Learn:YES   Cooperative:YES  Receptive (willing/able to accept information):YES  Any cultural factors/Protestant beliefs that may influence understanding or compliance? NO open to starting insulin injections      Patient demonstrates understanding of the following:  Reason for the appointment, diagnosis and treatment plan:YES  Knowledge of proper use of medications and conditions for which they are ordered (with special attention to potential side effects or drug interactions): YES  Which situations necessitate calling provider and whom to contact: Yes  Low BS below 80 .     Teaching Concerns Addressed:   No concerns      Proper use and care of  Insulin  pens  Nutritional needs and diet plan:NA  Pain management techniques : Rotate sitesWound Care: Hand washing  How and/when to access community resources: NA     Instructional Materials Used/Given:YES     Time spent with patient: 5 minutes

## 2019-02-27 NOTE — PATIENT INSTRUCTIONS
Start Levemir insulin 14 units daily; at bedtime.   Continue metformin at the same dose.     Continue to check blood glucose twice per day: fasting and at bedtime.  Please call us if blood glucose persistently below 70.    Fasting blood glucose goals:  mg/dl    Please send blood glucose readings using [employer] in 3-4 weeks for further adjustment of medications.            Patient Education   Long-acting Insulin Detemir (DE-te-emmanuel)  Brand names: Levemir   What does it do?  Provides the insulin the body needs to maintain normal levels of blood glucose.  This is a basal (long-acting) insulin. It does not cover meals and snacks. It starts working in one to two hours. It may be given one or two times each day.  How do I take it?    This insulin is given as a shot under the skin of your upper arms, upper legs or stomach (abdomen). Use it every day at the same time.    Rotate where you take the shot within your chosen area. For example, if you always inject in your stomach, inject a different spot in your stomach each time. Your doctor will tell you the dose and how often to inject it.    It comes in vials or pens. Do not shake them. Do not use if insulin is a different color than normal.    If you miss a dose: Take it as soon as you can. If it is more than six hours past your dose time, call your diabetes care team. Never take two doses at the same time.    Never mix this with any other type of insulin in the same syringe.    Never use a syringe to take insulin out of an insulin pen.  What are the possible side effects?  You may have redness, swelling, pain, itching, burning or a lump at the injection site (where the shot was given).  You may have changes at the injection spot. The fatty tissue under the skin may shrink or thicken. Rotating the spot where you inject can help prevent this. Do not inject into skin that is like this.   You may also have low blood glucose (less than 70). Signs of low blood glucose  include:    Sweating    Feeling shaky    Sudden hunger    Fast heartbeat    Feeling dizzy, confused or weak    Headache or problems seeing    Feeling irritable  How should I store my insulin?    Store opened insulin at room temperature. Keep it away from sunlight and heat.    Store unopened insulin in the refrigerator. Do not freeze it.    Keep medicine out of the reach of children and pets.  Levemir (insulin detemir) 42 days   When should I call my diabetes care team?  You should call the doctor right away if you notice any of the following:    Frequent high or low blood glucose    Nausea (feeling sick to your stomach) or vomiting (throwing up)    Flu-like symptoms (fever, chills)    Any sign of an allergic reaction, such as:  ? Hives or itchy skin  ? Swelling in the face, hands, mouth or throat  ? Tingling in the mouth or throat  ? A tight feeling in the chest  ? Trouble breathing, wheezing    Stinging at the site where you inject the insulin

## 2019-02-27 NOTE — PROGRESS NOTES
Endocrinology Clinic Visit    Chief Complaint: RECHECK (diabetes type 2)     Information obtained from:Patient    Subjective:         HPI: Denia Montes is a 33 year old female with history of Type 2 diabetes who is here for routine follow up.     33-year-old female with past medical history of type 2 diabetes diagnosed 4 years ago who recently established care here at the neurology clinic.    The main question she had was what type of diabetes she has.    Presentation of diabetes 4 years ago was increased urination or polyuria, nocturia, polydipsia and weight loss.  Reports she lost about 50pounds at the time.  At the time of diagnosis A1c was 9.8.  Patient was started on metformin 2000 mg daily and 3 months later her A1c came to 6.1.  In 2016 and  in 2017 her A1c was below 6; in the range of 5.5-5.7.  She was off of metformin during this time.  In March 2018 her A1c 6.6 and she was restarted on metformin at that time.  At 500 mg daily.  Dose of metformin was increased per report to 2000 mg daily in September 2018 and that is what she is currently taking.  Her A1c in 1/11/2019 was 7.4.  Denies polyuria, polydipsia or nocturia at this point in time.  No weight loss over the last 3 years.  In fact she has gained about 10 pounds over the last couple of years.  Denies fatigue, change in her skin, heat or cold intolerance.    To determine what type of diabetes she has which got antibody at her last visit which came back high at 250.  However, her C-peptide was within the normal limits at 2.3 for a blood sugar of 177.   Ref. Range 1/30/2019 09:43   C-Peptide Latest Ref Range: 0.9 - 6.9 ng/mL 2.3   Glutamic Acid Decarboxylase Antibody Latest Ref Range: 0.0 - 5.0 IU/mL >250.0 (H)     Her mom has type 2 diabetes.  Grandmother on her dad's side had type 2 diabetes.  And has a cousin on her dad's side has type 1 diabetes.  Her mom has thyroid disease.  No significant past medical history other than kidney reflux as a  child.  Her current medication is only metformin.  Fasting blood sugar between 150-170.  Did not bring her meter with her today.  She gets her menses on a regular basis.  Not currently actively trying to be pregnant.  She is planning to get pregnant in a year or so.  When the weather is better her main means of transportation is biking from places to places.  During the winter months she tries to go to the [facility] at least 2 times per week.  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     Allergies   Allergen Reactions     Sulfa Drugs Hives       Current Outpatient Medications   Medication Sig Dispense Refill     blood glucose monitoring ([employer] CONTOUR) test strip USE TO TEST BLOOD SUGAR TWICE DAILY IN MORNING AND EVENING 200 strip 1     insulin detemir (LEVEMIR PEN) 100 UNIT/ML pen Inject 14 Units Subcutaneous At Bedtime 9 mL 1     metFORMIN (GLUCOPHAGE) 500 MG tablet Take 2 tablets (1,000 mg) by mouth 2 times daily (with meals) 360 tablet 1     MICROLET LANCETS MISC TEST BLOOD SUGAR TWICE DAILY 200 each 11     Omega-3 Fatty Acids (FISH OIL) 500 MG CAPS        order for DME Equipment being ordered: Glucometer, brand as covered by insurance. Does not use insulin. 1 Device 0     order for DME Lancets bid and prn.  Fill per patient's insurance. Not on insulin 200 each 1     order for DME Test strips for pt's glucometer, brand as covered by insurance Test bid and prn. Not on insulin. 200 each 1     vitamin B-12 (CYANOCOBALAMIN) 100 MCG tablet Take 1,000 mcg by mouth daily       ACE/ARB NOT PRESCRIBED, INTENTIONAL, Please choose reason not prescribed, below (Patient not taking: Reported on 2/27/2019)         Review of Systems     11 point review system (Constitutional, HENT, Eyes, Respiratory, Cardiovascular, Gastrointestinal, Genitourinary, Musculoskeletal,Neurological, Psychiatric/Behavioural, Endocrine) is negative or is as per HPI above    Past Medical History:   Diagnosis Date     Cervical high risk HPV (human papillomavirus) test  positive 8/2015, 09/23/16, 12/14/18    see problem list      Diabetes mellitus (H)      Hx of colposcopy with cervical biopsy 10/21/16    [place] Bx: Neg NIL, ECC: Neg.       History reviewed. No pertinent surgical history.    Family History   Problem Relation Age of Onset     Diabetes Mother      Hyperlipidemia Mother      Kidney Disease Mother      Thyroid Disease Mother      Hyperlipidemia Father      Hypertension Father      Kidney Disease Father      Coronary Artery Disease No family hx of      Cerebrovascular Disease No family hx of      Breast Cancer No family hx of      Colon Cancer No family hx of      Prostate Cancer No family hx of      Other Cancer No family hx of      Depression No family hx of      Anxiety Disorder No family hx of      Mental Illness No family hx of      Substance Abuse No family hx of      Anesthesia Reaction No family hx of      Asthma No family hx of      Osteoporosis No family hx of      Genetic Disorder No family hx of      Obesity No family hx of      Unknown/Adopted No family hx of        Social History     Socioeconomic History     Marital status: Single     Spouse name: None     Number of children: None     Years of education: None     Highest education level: None   Social Needs     Financial resource strain: None     Food insecurity - worry: None     Food insecurity - inability: None     Transportation needs - medical: None     Transportation needs - non-medical: None   Occupational History     None   Tobacco Use     Smoking status: Never Smoker     Smokeless tobacco: Never Used   Substance and Sexual Activity     Alcohol use: No     Drug use: No     Sexual activity: Yes     Partners: Male     Birth control/protection: None   Other Topics Concern     Parent/sibling w/ CABG, MI or angioplasty before 65F 55M? No   Social History Narrative     None       Objective:   /73 (BP Location: Right arm, Patient Position: Chair, Cuff Size: Adult Regular)   Pulse 80   Ht 1.626 m (5'  "4\")   Wt 69.4 kg (153 lb)   BMI 26.26 kg/m    Constitutional: Pleasant no acute cardiopulmonary distress.   Psychological: appropriate mood and affect     In House Labs:   Lab Results   Component Value Date    A1C 7.4 01/11/2019    A1C 6.3 09/28/2018    A1C 6.6 06/15/2018    A1C 6.6 03/09/2018    A1C 5.6 08/18/2017       TSH   Date Value Ref Range Status   01/30/2019 1.45 0.40 - 4.00 mU/L Final   02/17/2017 2.13 0.40 - 4.00 mU/L Final   05/01/2015 1.72 0.40 - 5.00 mU/L Final       Creatinine   Date Value Ref Range Status   01/30/2019 0.55 0.52 - 1.04 mg/dL Final       Assessment/Treatment Plan:      Diabetes currently on metformin; A1c 7.4; ANNA antibody high at more than 250: Indicating type 1 diabetes in the making: Discussed in detail the lab results findings and interpretation with the patient.  The fact that the C-peptide is within the normal limits indicates that she still has endogenous insulin production; however she is at risk of developing type 1 diabetes.  Likely in the honeymoon phase; after diagnosis 4 years ago.   Considerations for the management of her current diabetes:  #1 currently planning to get pregnant in the next 1 year or so; therefore, we will plan on using pregnancy safe antidiabetic medications at this point in time.  2.  ANNA antibody positive; we do not know when she would develop a full-blown type 1 diabetes; could be in few months or as long as 10 years and I have explained to the patient in detail.  3.  Since C-peptide is within the normal limits; we can continue to use metformin.  Plan:    Start Levemir 14 units at bedtime.    Continue metformin at the same dose    Check blood sugars at least twice per day; fasting and bedtime and contact us if blood sugars persistently below 70.    Send us my chart message in 3-4 weeks with blood sugar readings so that we can adjust antidiabetic medications.    Discussed the risk of medication and written information provided to the " patient.      Patient Instructions     Start Levemir insulin 14 units daily; at bedtime.   Continue metformin at the same dose.     Continue to check blood glucose twice per day: fasting and at bedtime.  Please call us if blood glucose persistently below 70.    Fasting blood glucose goals:  mg/dl    Please send blood glucose readings using [employer] in 3-4 weeks for further adjustment of medications.            Patient Education   Long-acting Insulin Detemir (DE-te-emmanuel)  Brand names: Levemir   What does it do?  Provides the insulin the body needs to maintain normal levels of blood glucose.  This is a basal (long-acting) insulin. It does not cover meals and snacks. It starts working in one to two hours. It may be given one or two times each day.  How do I take it?    This insulin is given as a shot under the skin of your upper arms, upper legs or stomach (abdomen). Use it every day at the same time.    Rotate where you take the shot within your chosen area. For example, if you always inject in your stomach, inject a different spot in your stomach each time. Your doctor will tell you the dose and how often to inject it.    It comes in vials or pens. Do not shake them. Do not use if insulin is a different color than normal.    If you miss a dose: Take it as soon as you can. If it is more than six hours past your dose time, call your diabetes care team. Never take two doses at the same time.    Never mix this with any other type of insulin in the same syringe.    Never use a syringe to take insulin out of an insulin pen.  What are the possible side effects?  You may have redness, swelling, pain, itching, burning or a lump at the injection site (where the shot was given).  You may have changes at the injection spot. The fatty tissue under the skin may shrink or thicken. Rotating the spot where you inject can help prevent this. Do not inject into skin that is like this.   You may also have low blood glucose (less than  70). Signs of low blood glucose include:    Sweating    Feeling shaky    Sudden hunger    Fast heartbeat    Feeling dizzy, confused or weak    Headache or problems seeing    Feeling irritable  How should I store my insulin?    Store opened insulin at room temperature. Keep it away from sunlight and heat.    Store unopened insulin in the refrigerator. Do not freeze it.    Keep medicine out of the reach of children and pets.  Levemir (insulin detemir) 42 days   When should I call my diabetes care team?  You should call the doctor right away if you notice any of the following:    Frequent high or low blood glucose    Nausea (feeling sick to your stomach) or vomiting (throwing up)    Flu-like symptoms (fever, chills)    Any sign of an allergic reaction, such as:  ? Hives or itchy skin  ? Swelling in the face, hands, mouth or throat  ? Tingling in the mouth or throat  ? A tight feeling in the chest  ? Trouble breathing, wheezing    Stinging at the site where you inject the insulin           I will contact the patient with the test results.  Return to clinic in 3 months.    Test and/or medications prescribed today:  No orders of the defined types were placed in this encounter.        Yuri Hansen MD  Staff Endocrinologist    [phone]  Division of Endocrinology and Diabetes        Answers for HPI/ROS submitted by the patient on 2/26/2019   General Symptoms: No  Skin Symptoms: No  HENT Symptoms: No  EYE SYMPTOMS: No  HEART SYMPTOMS: No  LUNG SYMPTOMS: No  INTESTINAL SYMPTOMS: No  URINARY SYMPTOMS: No  GYNECOLOGIC SYMPTOMS: No  BREAST SYMPTOMS: No  SKELETAL SYMPTOMS: No  BLOOD SYMPTOMS: No  NERVOUS SYSTEM SYMPTOMS: No  MENTAL HEALTH SYMPTOMS: No

## 2019-02-28 NOTE — TELEPHONE ENCOUNTER
[facility] Call Center    Phone Message    May a detailed message be left on voicemail: yes    Reason for Call: Other: PT had insulin detemir (LEVEMIR PEN) 100 UNIT/ML pen prescribed to her, but the needles for said Rx were not prescribed. Please get Rx sent to Pharmacy as soon as possible.     Action Taken: Message routed to:  [facility] ([facility]): [place]

## 2019-03-22 ENCOUNTER — MYC MEDICAL ADVICE (OUTPATIENT)
Dept: ENDOCRINOLOGY | Facility: CLINIC | Age: 34
End: 2019-03-22

## 2019-03-25 DIAGNOSIS — E11.9 TYPE 2 DIABETES MELLITUS WITHOUT COMPLICATION, WITHOUT LONG-TERM CURRENT USE OF INSULIN (H): ICD-10-CM

## 2019-03-25 DIAGNOSIS — R76.8 POSITIVE GAD ANTIBODY: ICD-10-CM

## 2019-03-30 DIAGNOSIS — E11.9 TYPE 2 DIABETES MELLITUS WITHOUT COMPLICATION, WITHOUT LONG-TERM CURRENT USE OF INSULIN (H): ICD-10-CM

## 2019-04-01 NOTE — TELEPHONE ENCOUNTER
Requested Prescriptions   Pending Prescriptions Disp Refills     metFORMIN (GLUCOPHAGE) 500 MG tablet [Pharmacy Med Name: METFORMIN 500MG TABLETS]  Last Written Prescription Date:  10/12/2018  Last Fill Quantity: 360,  # refills: 1   Last office visit: 12/14/2018 with prescribing provider:  Lexy   Future Office Visit:   Next 5 appointments (look out 90 days)    Jun 14, 2019  8:50 AM CDT  SHORT with Lexy Saucedo PA-C  [facility] [address] [id number] [phone]          360 tablet 0     Sig: TAKE 2 TABLETS BY MOUTH TWICE DAILY WITH MEALS    Biguanide Agents Passed - 3/30/2019  8:08 AM       Passed - Blood pressure less than 140/90 in past 6 months    BP Readings from Last 3 Encounters:   02/27/19 122/73   01/30/19 122/79   12/14/18 120/80                Passed - Patient has documented LDL within the past 12 mos.    Recent Labs   Lab Test 09/28/18  0813   *            Passed - Patient has had a Microalbumin in the past 15 mos.    Recent Labs   Lab Test 09/28/18  0820   MICROL 8   UMALCR 7.39            Passed - Patient is age 10 or older       Passed - Patient has documented A1c within the specified period of time.    If HgbA1C is 8 or greater, it needs to be on file within the past 3 months.  If less than 8, must be on file within the past 6 months.     Recent Labs   Lab Test 01/11/19  1608   A1C 7.4*            Passed - Patient's CR is NOT>1.4 OR Patient's EGFR is NOT<45 within past 12 mos.    Recent Labs   Lab Test 01/30/19  0943   GFRESTIMATED >90   GFRESTBLACK >90       Recent Labs   Lab Test 01/30/19  0943   CR 0.55            Passed - Patient does NOT have a diagnosis of CHF.       Passed - Medication is active on med list       Passed - Patient is not pregnant       Passed - Patient has not had a positive pregnancy test within the past 12 mos.        Passed - Recent (6 mo) or  "future (30 days) visit within the authorizing provider's specialty    Patient had office visit in the last 6 months or has a visit in the next 30 days with authorizing provider or within the authorizing provider's specialty.  See \"Patient Info\" tab in inbasket, or \"Choose Columns\" in Meds & Orders section of the refill encounter.              "

## 2019-05-29 ENCOUNTER — OFFICE VISIT (OUTPATIENT)
Dept: ENDOCRINOLOGY | Facility: CLINIC | Age: 34
End: 2019-05-29
Payer: COMMERCIAL

## 2019-05-29 VITALS
DIASTOLIC BLOOD PRESSURE: 83 MMHG | HEART RATE: 80 BPM | SYSTOLIC BLOOD PRESSURE: 135 MMHG | WEIGHT: 153.3 LBS | BODY MASS INDEX: 26.31 KG/M2

## 2019-05-29 DIAGNOSIS — E11.9 TYPE 2 DIABETES MELLITUS WITHOUT COMPLICATION, WITHOUT LONG-TERM CURRENT USE OF INSULIN (H): Primary | ICD-10-CM

## 2019-05-29 DIAGNOSIS — R76.8 POSITIVE GAD ANTIBODY: ICD-10-CM

## 2019-05-29 LAB — HBA1C MFR BLD: 6.1 % (ref 4.3–6)

## 2019-05-29 ASSESSMENT — PAIN SCALES - GENERAL: PAINLEVEL: NO PAIN (0)

## 2019-05-29 NOTE — LETTER
5/29/2019       RE: Denia Montes  [address]  [facility] [address]     Dear Colleague,    Thank you for referring your patient, Denia Montes, to the [facility] ENDOCRINOLOGY at [facility]. Please see a copy of my visit note below.    Endocrinology Clinic Visit    Chief Complaint: RECHECK (dm type 2 )     Information obtained from:Patient    Subjective:         HPI: Denia Montes is a 33 year old female with history of diabetes/glutamic acid decarboxylase antibody positive/currently trying to get pregnant with her first baby; who is here for routine follow up.     33-year-old female with past medical history of type 2 diabetes diagnosed 4 years ago, who recently established care with us.  She was initially diagnosed with type 2 diabetes however further investigation of the time of her last visit showed that she has glutamic acid decarboxylase antibody which was very had more than 250.  Indicating that this patient is at risk of developing type 1 diabetes or usually determined as type 1 diabetes in the making.  The fact that she has not diagnosed as type 1 diabetes at this point in time is due to C-peptide which is within the normal limit for glucose of 177 as documented below with the results.     Ref. Range 1/30/2019 09:43   C-Peptide Latest Ref Range: 0.9 - 6.9 ng/mL 2.3   Glucose Latest Ref Range: 70 - 99 mg/dL 177 (H)   Glutamic Acid Decarboxylase Antibody Latest Ref Range: 0.0 - 5.0 IU/mL >250.0 (H)     In any case, Denia is currently trying to get pregnant with her first baby.  Therefore her diabetes is being managed with the pregnancy possibly happening anytime in the near future.  Her current diabetic medications are metformin 2000 mg daily and she was also initiated on insulin detemir at 6 units twice daily.  She was started particularly on his insulin due to its being pregnancy category B class medication.    She reports that her blood sugars are  well controlled on the current antidiabetic regimen.  She has also lost some weight since I saw her last time.  She reports that her fasting blood sugars are mostly between 80-90.  And her bedtime blood sugars are between 110 250.  Her A1c today is 6.1    She does not have any new concerns today.  Review of systems negative.    Allergies   Allergen Reactions     Sulfa Drugs Hives       Current Outpatient Medications   Medication Sig Dispense Refill     blood glucose monitoring (JACI CONTOUR) test strip USE TO TEST BLOOD SUGAR TWICE DAILY IN MORNING AND EVENING 200 strip 1     insulin detemir (LEVEMIR PEN) 100 UNIT/ML pen INJECT 10 UNITS IN THE MORNING AND 6 UNITS AT BEDTIME. 15 mL 1     insulin pen needle (32G X 4 MM) 32G X 4 MM miscellaneous Use 2 pen needles daily. 100 each 3     metFORMIN (GLUCOPHAGE) 500 MG tablet TAKE 2 TABLETS BY MOUTH TWICE DAILY WITH MEALS 360 tablet 3     MICROLET LANCETS MISC TEST BLOOD SUGAR TWICE DAILY 200 each 11     Omega-3 Fatty Acids (FISH OIL) 500 MG CAPS        order for DME Equipment being ordered: Glucometer, brand as covered by insurance. Does not use insulin. 1 Device 0     order for DME Lancets bid and prn.  Fill per patient's insurance. Not on insulin 200 each 1     order for DME Test strips for pt's glucometer, brand as covered by insurance Test bid and prn. Not on insulin. 200 each 1     vitamin B-12 (CYANOCOBALAMIN) 100 MCG tablet Take 1,000 mcg by mouth daily       ACE/ARB NOT PRESCRIBED, INTENTIONAL, Please choose reason not prescribed, below (Patient not taking: Reported on 2/27/2019)         Review of Systems     8 point review system (Constitutional, HENT, Eyes, Respiratory, Cardiovascular, Gastrointestinal, Genitourinary, Musculoskeletal,Neurological, Psychiatric/Behavioural, Endocrine) is negative or is as per HPI above    Past Medical History:   Diagnosis Date     Cervical high risk HPV (human papillomavirus) test positive 8/2015, 09/23/16, 12/14/18    see problem  list      Diabetes mellitus (H)      Hx of colposcopy with cervical biopsy 10/21/16    [place] Bx: Neg NIL, ECC: Neg.       No past surgical history on file.    Family History   Problem Relation Age of Onset     Diabetes Mother      Hyperlipidemia Mother      Kidney Disease Mother      Thyroid Disease Mother      Hyperlipidemia Father      Hypertension Father      Kidney Disease Father      Coronary Artery Disease No family hx of      Cerebrovascular Disease No family hx of      Breast Cancer No family hx of      Colon Cancer No family hx of      Prostate Cancer No family hx of      Other Cancer No family hx of      Depression No family hx of      Anxiety Disorder No family hx of      Mental Illness No family hx of      Substance Abuse No family hx of      Anesthesia Reaction No family hx of      Asthma No family hx of      Osteoporosis No family hx of      Genetic Disorder No family hx of      Obesity No family hx of      Unknown/Adopted No family hx of        Social History     Socioeconomic History     Marital status: Single     Spouse name: None     Number of children: None     Years of education: None     Highest education level: None   Social Needs     Financial resource strain: None     Food insecurity - worry: None     Food insecurity - inability: None     Transportation needs - medical: None     Transportation needs - non-medical: None   Occupational History     None   Tobacco Use     Smoking status: Never Smoker     Smokeless tobacco: Never Used   Substance and Sexual Activity     Alcohol use: No     Drug use: No     Sexual activity: Yes     Partners: Male     Birth control/protection: None   Other Topics Concern     Parent/sibling w/ CABG, MI or angioplasty before 65F 55M? No   Social History Narrative     None       Objective:   /83   Pulse 80   Wt 69.5 kg (153 lb 4.8 oz)   BMI 26.31 kg/m     Constitutional: Pleasant no acute cardiopulmonary distress.   Psychological: appropriate mood and affect      In House Labs:   Lab Results   Component Value Date    A1C 7.4 01/11/2019    A1C 6.3 09/28/2018    A1C 6.6 06/15/2018    A1C 6.6 03/09/2018    A1C 5.6 08/18/2017       TSH   Date Value Ref Range Status   01/30/2019 1.45 0.40 - 4.00 mU/L Final   02/17/2017 2.13 0.40 - 4.00 mU/L Final   05/01/2015 1.72 0.40 - 5.00 mU/L Final       Creatinine   Date Value Ref Range Status   01/30/2019 0.55 0.52 - 1.04 mg/dL Final       Assessment/Treatment Plan:      Diabetes/ANNA antibody positive/currently trying to get pregnant:    33-year-old female with past medical history of type 2 diabetes diagnosed 4 years ago, who recently established care with us.  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  Indicating that this patient is at risk of developing type 1 diabetes or type 1 diabetes in the making.  The fact that she is not diagnosed as type 1 diabetes at this point in time is due to C-peptide which is within the normal limit for glucose of 177 as documented in HPI.    Considerations for the management of her current diabetes:  #1 currently planning to get pregnant in the next few months; therefore, we are currently using pregnancy category B class medications.  2.  ANNA antibody positive; we do not know when she would develop a full-blown type 1 diabetes; could be in few months or as long as 10 years and I have explained to the patient in detail.  3.  Since C-peptide is within the normal limits; we can continue to use metformin.    Plan:    Increase Levemir in the morning to 10 units and continue Levemir 6 units at bedtime.     Continue metformin at the same dose    Check blood sugars at least twice per day; fasting and bedtime and contact us if blood sugars persistently below 70.    Send us my chart message in 3-4 weeks with blood sugar readings so that we can adjust antidiabetic medications.    She will follow-up  with us when she is pregnant or 3 to 4 months.      Patient Instructions                   INSULIN DETEMIR- INJECT 10 UNITS IN THE MORNING AND 6 UNITS AT BEDTIME  CONTINUE METFORMIN AT THE SAME DOSE.     PLEASE SEND BG READINGS VIA [facility] EVERY MONTH.     Blood glucose targets DURING PREGNANCY.   ?Fasting blood glucose concentration ?95 mg/dL   ?One-hour postprandial blood glucose concentration ?140 mg/dL   ?Two-hour postprandial glucose concentration ?120 mg/dL         I will contact the patient with the test results.  Return to clinic in 3 months.        Yuri Hansen MD  Staff Endocrinologist    [phone]  Division of Endocrinology and Diabetes

## 2019-05-29 NOTE — PATIENT INSTRUCTIONS
INSULIN DETEMIR- INJECT 10 UNITS IN THE MORNING AND 6 UNITS AT BEDTIME  CONTINUE METFORMIN AT THE SAME DOSE.     PLEASE SEND BG READINGS VIA [employer] EVERY MONTH.     Blood glucose targets DURING PREGNANCY.   ?Fasting blood glucose concentration ?95 mg/dL   ?One-hour postprandial blood glucose concentration ?140 mg/dL   ?Two-hour postprandial glucose concentration ?120 mg/dL

## 2019-05-30 NOTE — PROGRESS NOTES
Endocrinology Clinic Visit    Chief Complaint: RECHECK (dm type 2 )     Information obtained from:Patient    Subjective:         HPI: Denia Montes is a 33 year old female with history of diabetes/glutamic acid decarboxylase antibody positive/currently trying to get pregnant with her first baby; who is here for routine follow up.     33-year-old female with past medical history of type 2 diabetes diagnosed 4 years ago, who recently established care with us.  She was initially diagnosed with type 2 diabetes however further investigation of the time of her last visit showed that she has glutamic acid decarboxylase antibody which was very had more than 250.  Indicating that this patient is at risk of developing type 1 diabetes or usually determined as type 1 diabetes in the making.  The fact that she has not diagnosed as type 1 diabetes at this point in time is due to C-peptide which is within the normal limit for glucose of 177 as documented below with the results.     Ref. Range 1/30/2019 09:43   C-Peptide Latest Ref Range: 0.9 - 6.9 ng/mL 2.3   Glucose Latest Ref Range: 70 - 99 mg/dL 177 (H)   Glutamic Acid Decarboxylase Antibody Latest Ref Range: 0.0 - 5.0 IU/mL >250.0 (H)     In any case, Denia is currently trying to get pregnant with her first baby.  Therefore her diabetes is being managed with the pregnancy possibly happening anytime in the near future.  Her current diabetic medications are metformin 2000 mg daily and she was also initiated on insulin detemir at 6 units twice daily.  She was started particularly on his insulin due to its being pregnancy category B class medication.    She reports that her blood sugars are well controlled on the current antidiabetic regimen.  She has also lost some weight since I saw her last time.  She reports that her fasting blood sugars are mostly between 80-90.  And her bedtime blood sugars are between 110 250.  Her A1c today is 6.1    She does not have any new concerns  today.  Review of systems negative.    Allergies   Allergen Reactions     Sulfa Drugs Hives       Current Outpatient Medications   Medication Sig Dispense Refill     blood glucose monitoring (JACI CONTOUR) test strip USE TO TEST BLOOD SUGAR TWICE DAILY IN MORNING AND EVENING 200 strip 1     insulin detemir (LEVEMIR PEN) 100 UNIT/ML pen INJECT 10 UNITS IN THE MORNING AND 6 UNITS AT BEDTIME. 15 mL 1     insulin pen needle (32G X 4 MM) 32G X 4 MM miscellaneous Use 2 pen needles daily. 100 each 3     metFORMIN (GLUCOPHAGE) 500 MG tablet TAKE 2 TABLETS BY MOUTH TWICE DAILY WITH MEALS 360 tablet 3     MICROLET LANCETS MISC TEST BLOOD SUGAR TWICE DAILY 200 each 11     Omega-3 Fatty Acids (FISH OIL) 500 MG CAPS        order for DME Equipment being ordered: Glucometer, brand as covered by insurance. Does not use insulin. 1 Device 0     order for DME Lancets bid and prn.  Fill per patient's insurance. Not on insulin 200 each 1     order for DME Test strips for pt's glucometer, brand as covered by insurance Test bid and prn. Not on insulin. 200 each 1     vitamin B-12 (CYANOCOBALAMIN) 100 MCG tablet Take 1,000 mcg by mouth daily       ACE/ARB NOT PRESCRIBED, INTENTIONAL, Please choose reason not prescribed, below (Patient not taking: Reported on 2/27/2019)         Review of Systems     8 point review system (Constitutional, HENT, Eyes, Respiratory, Cardiovascular, Gastrointestinal, Genitourinary, Musculoskeletal,Neurological, Psychiatric/Behavioural, Endocrine) is negative or is as per HPI above    Past Medical History:   Diagnosis Date     Cervical high risk HPV (human papillomavirus) test positive 8/2015, 09/23/16, 12/14/18    see problem list      Diabetes mellitus (H)      Hx of colposcopy with cervical biopsy 10/21/16    [place] Bx: Neg NIL, ECC: Neg.       No past surgical history on file.    Family History   Problem Relation Age of Onset     Diabetes Mother      Hyperlipidemia Mother      Kidney Disease Mother       Thyroid Disease Mother      Hyperlipidemia Father      Hypertension Father      Kidney Disease Father      Coronary Artery Disease No family hx of      Cerebrovascular Disease No family hx of      Breast Cancer No family hx of      Colon Cancer No family hx of      Prostate Cancer No family hx of      Other Cancer No family hx of      Depression No family hx of      Anxiety Disorder No family hx of      Mental Illness No family hx of      Substance Abuse No family hx of      Anesthesia Reaction No family hx of      Asthma No family hx of      Osteoporosis No family hx of      Genetic Disorder No family hx of      Obesity No family hx of      Unknown/Adopted No family hx of        Social History     Socioeconomic History     Marital status: Single     Spouse name: None     Number of children: None     Years of education: None     Highest education level: None   Social Needs     Financial resource strain: None     Food insecurity - worry: None     Food insecurity - inability: None     Transportation needs - medical: None     Transportation needs - non-medical: None   Occupational History     None   Tobacco Use     Smoking status: Never Smoker     Smokeless tobacco: Never Used   Substance and Sexual Activity     Alcohol use: No     Drug use: No     Sexual activity: Yes     Partners: Male     Birth control/protection: None   Other Topics Concern     Parent/sibling w/ CABG, MI or angioplasty before 65F 55M? No   Social History Narrative     None       Objective:   /83   Pulse 80   Wt 69.5 kg (153 lb 4.8 oz)   BMI 26.31 kg/m    Constitutional: Pleasant no acute cardiopulmonary distress.   Psychological: appropriate mood and affect     In House Labs:   Lab Results   Component Value Date    A1C 7.4 01/11/2019    A1C 6.3 09/28/2018    A1C 6.6 06/15/2018    A1C 6.6 03/09/2018    A1C 5.6 08/18/2017       TSH   Date Value Ref Range Status   01/30/2019 1.45 0.40 - 4.00 mU/L Final   02/17/2017 2.13 0.40 - 4.00 mU/L Final    05/01/2015 1.72 0.40 - 5.00 mU/L Final       Creatinine   Date Value Ref Range Status   01/30/2019 0.55 0.52 - 1.04 mg/dL Final       Assessment/Treatment Plan:      Diabetes/ANNA antibody positive/currently trying to get pregnant:    33-year-old female with past medical history of type 2 diabetes diagnosed 4 years ago, who recently established care with us.  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  Indicating that this patient is at risk of developing type 1 diabetes or type 1 diabetes in the making.  The fact that she is not diagnosed as type 1 diabetes at this point in time is due to C-peptide which is within the normal limit for glucose of 177 as documented in HPI.    Considerations for the management of her current diabetes:  #1 currently planning to get pregnant in the next few months; therefore, we are currently using pregnancy category B class medications.  2.  ANNA antibody positive; we do not know when she would develop a full-blown type 1 diabetes; could be in few months or as long as 10 years and I have explained to the patient in detail.  3.  Since C-peptide is within the normal limits; we can continue to use metformin.    Plan:    Increase Levemir in the morning to 10 units and continue Levemir 6 units at bedtime.     Continue metformin at the same dose    Check blood sugars at least twice per day; fasting and bedtime and contact us if blood sugars persistently below 70.    Send us my chart message in 3-4 weeks with blood sugar readings so that we can adjust antidiabetic medications.    She will follow-up with us when she is pregnant or 3 to 4 months.      Patient Instructions                   INSULIN DETEMIR- INJECT 10 UNITS IN THE MORNING AND 6 UNITS AT BEDTIME  CONTINUE METFORMIN AT THE SAME DOSE.     PLEASE SEND BG READINGS VIA [employer] EVERY MONTH.     Blood glucose targets DURING  PREGNANCY.   ?Fasting blood glucose concentration ?95 mg/dL   ?One-hour postprandial blood glucose concentration ?140 mg/dL   ?Two-hour postprandial glucose concentration ?120 mg/dL         I will contact the patient with the test results.  Return to clinic in 3 months.        Yuri Hansen MD  Staff Endocrinologist    [phone]  Division of Endocrinology and Diabetes          Answers for HPI/ROS submitted by the patient on 5/26/2019   General Symptoms: No  Skin Symptoms: No  HENT Symptoms: No  EYE SYMPTOMS: No  HEART SYMPTOMS: No  LUNG SYMPTOMS: No  INTESTINAL SYMPTOMS: No  URINARY SYMPTOMS: No  GYNECOLOGIC SYMPTOMS: No  BREAST SYMPTOMS: No  SKELETAL SYMPTOMS: No  BLOOD SYMPTOMS: No  NERVOUS SYSTEM SYMPTOMS: No  MENTAL HEALTH SYMPTOMS: No

## 2019-06-14 ENCOUNTER — OFFICE VISIT (OUTPATIENT)
Dept: FAMILY MEDICINE | Facility: CLINIC | Age: 34
End: 2019-06-14
Payer: COMMERCIAL

## 2019-06-14 VITALS
HEART RATE: 77 BPM | SYSTOLIC BLOOD PRESSURE: 104 MMHG | WEIGHT: 152 LBS | OXYGEN SATURATION: 99 % | RESPIRATION RATE: 16 BRPM | BODY MASS INDEX: 26.09 KG/M2 | DIASTOLIC BLOOD PRESSURE: 78 MMHG

## 2019-06-14 DIAGNOSIS — S29.019A THORACIC MYOFASCIAL STRAIN, INITIAL ENCOUNTER: ICD-10-CM

## 2019-06-14 DIAGNOSIS — M67.432 GANGLION CYST OF WRIST, LEFT: ICD-10-CM

## 2019-06-14 DIAGNOSIS — E11.9 TYPE 2 DIABETES MELLITUS WITHOUT COMPLICATION, WITHOUT LONG-TERM CURRENT USE OF INSULIN (H): Primary | ICD-10-CM

## 2019-06-14 PROCEDURE — 99214 OFFICE O/P EST MOD 30 MIN: CPT | Performed by: PHYSICIAN ASSISTANT

## 2019-06-14 NOTE — PROGRESS NOTES
Subjective     Denia Montes is a 33 year old female who presents to clinic today for the following health issues:    HPI   Diabetes Follow-up      How often are you checking your blood sugar? Two times daily    What time of day are you checking your blood sugars (select all that apply)?  Before meals and At bedtime    Have you had any blood sugars above 200?  No    Have you had any blood sugars below 70?  Yes a couple times    What symptoms do you notice when your blood sugar is low?  Shaky    What concerns do you have today about your diabetes? None     Do you have any of these symptoms? (Select all that apply)  No numbness or tingling in feet.  No redness, sores or blisters on feet.  No complaints of excessive thirst.  No reports of blurry vision.  No significant changes to weight.     Have you had a diabetic eye exam in the last 12 months? Yes- Date of last eye exam: 9/2018    BP Readings from Last 2 Encounters:   06/14/19 104/78   05/29/19 135/83     Hemoglobin A1C (%)   Date Value   01/11/2019 7.4 (H)   09/28/2018 6.3 (H)     LDL Cholesterol Calculated (mg/dL)   Date Value   09/28/2018 120 (H)   08/18/2017 97       Diabetes Management Resources    Amount of exercise or physical activity: bikes to work    Problems taking medications regularly: No    Medication side effects: none    Diet: diabetic      Back Pain       Duration: a few months        Specific cause: pt thinks it may be posture from biking    Description:   Location of pain: upper back left  Character of pain: dull ache sometimes more intense and throbbing  Pain radiation:into left arm  New numbness or weakness in legs, not attributed to pain:  no     Intensity: moderate    History:   Pain interferes with job: Not applicable  History of back problems: no prior back problems  Any previous MRI or X-rays: None  Sees a specialist for back pain:  No  Therapies tried without relief: none    Alleviating factors:   Improved by: none      Precipitating  factors:  Worsened by: biking      Concern - lump in Lt wrist  Onset: years    Description:   Lump on anterior lateral Lt wrist    Intensity: mild    Progression of Symptoms:  Growing in size    Accompanying Signs & Symptoms:  none    Previous history of similar problem:   no    Precipitating factors:   Worsened by: none    Alleviating factors:  Improved by: none      Patient Active Problem List   Diagnosis     History of kidney stones     Type 2 diabetes mellitus without complication, without long-term current use of insulin (H)     Hyperlipidemia LDL goal <70     Cervical high risk HPV (human papillomavirus) test positive     Plantar warts     Yeast infection of the skin     History reviewed. No pertinent surgical history.    Social History     Tobacco Use     Smoking status: Never Smoker     Smokeless tobacco: Never Used   Substance Use Topics     Alcohol use: No     Family History   Problem Relation Age of Onset     Diabetes Mother      Hyperlipidemia Mother      Kidney Disease Mother      Thyroid Disease Mother      Hyperlipidemia Father      Hypertension Father      Kidney Disease Father      Coronary Artery Disease No family hx of      Cerebrovascular Disease No family hx of      Breast Cancer No family hx of      Colon Cancer No family hx of      Prostate Cancer No family hx of      Other Cancer No family hx of      Depression No family hx of      Anxiety Disorder No family hx of      Mental Illness No family hx of      Substance Abuse No family hx of      Anesthesia Reaction No family hx of      Asthma No family hx of      Osteoporosis No family hx of      Genetic Disorder No family hx of      Obesity No family hx of      Unknown/Adopted No family hx of          Current Outpatient Medications   Medication Sig Dispense Refill     ACE/ARB NOT PRESCRIBED, INTENTIONAL, Please choose reason not prescribed, below       blood glucose monitoring (JACI CONTOUR) test strip USE TO TEST BLOOD SUGAR TWICE DAILY IN  MORNING AND EVENING 200 strip 1     insulin detemir (LEVEMIR PEN) 100 UNIT/ML pen INJECT 10 UNITS IN THE MORNING AND 6 UNITS AT BEDTIME. 15 mL 1     insulin pen needle (32G X 4 MM) 32G X 4 MM miscellaneous Use 2 pen needles daily. 100 each 3     metFORMIN (GLUCOPHAGE) 500 MG tablet TAKE 2 TABLETS BY MOUTH TWICE DAILY WITH MEALS 360 tablet 3     MICROLET LANCETS MISC TEST BLOOD SUGAR TWICE DAILY 200 each 11     Omega-3 Fatty Acids (FISH OIL) 500 MG CAPS        order for DME Equipment being ordered: Glucometer, brand as covered by insurance. Does not use insulin. 1 Device 0     order for DME Lancets bid and prn.  Fill per patient's insurance. Not on insulin 200 each 1     order for DME Test strips for pt's glucometer, brand as covered by insurance Test bid and prn. Not on insulin. 200 each 1     vitamin B-12 (CYANOCOBALAMIN) 100 MCG tablet Take 1,000 mcg by mouth daily           Reviewed and updated as needed this visit by Provider         Review of Systems   ROS COMP: Constitutional, HEENT, cardiovascular, pulmonary, GI, , musculoskeletal, neuro, skin, endocrine and psych systems are negative, except as otherwise noted.      Objective    /78   Pulse 77   Resp 16   Wt 68.9 kg (152 lb)   SpO2 99%   BMI 26.09 kg/m    Body mass index is 26.09 kg/m .  Physical Exam   GENERAL:  WDWN, no acute distress  PSYCH: pleasant, cooperative  EYES: no discharge, no injection  HENT:  Normocephalic. Moist mucus membranes.  NECK:  Supple, symmetric  EXTREMITIES: Moves all 4 limbs spontaneously. Lt wrist - fixed, discrete cystic lesion on lateral anterior wrist  SKIN:  Warm and dry, no rash or suspicious lesions    NEUROLOGIC: alert, sensation grossly intact.  BACK: no spinal or CVA tenderness. No muscular TTP; mild hypertonicity noted in Lt lower thoracic paraspinal muscles.    Diagnostic Test Results:  Labs reviewed in Epic        Assessment & Plan       ICD-10-CM    1. Type 2 diabetes mellitus without complication,  without long-term current use of insulin (H) E11.9    2. Thoracic myofascial strain, initial encounter S29.019A    3. Ganglion cyst of wrist, left M67.432      - Reviewed BG goals with patient per Dr Benavides' recommendations. Discussed management of hypoglycemia sx and effects of potential pregnancy. F/u with endocrinology Sept 2019 as scheduled.  - Mild MSK strain in thoracic spine. Discussed postural adjustment when riding bike and performing desk work. For sx, apply heat multiple times per day, followed by massage and gentle stretches  - Discussed pathophys and clinical course of ganglion cyst. Discussed referral to hand surgeon for elective removal, patient declines at this time.      Return in about 6 months (around 12/14/2019) for Annual Exam.    Lexy Saucedo PA-C  [facility]

## 2019-06-24 DIAGNOSIS — R76.8 POSITIVE GAD ANTIBODY: ICD-10-CM

## 2019-06-24 DIAGNOSIS — E11.9 TYPE 2 DIABETES MELLITUS WITHOUT COMPLICATION, WITHOUT LONG-TERM CURRENT USE OF INSULIN (H): ICD-10-CM

## 2019-09-11 ENCOUNTER — OFFICE VISIT (OUTPATIENT)
Dept: ENDOCRINOLOGY | Facility: CLINIC | Age: 34
End: 2019-09-11
Payer: COMMERCIAL

## 2019-09-11 VITALS
HEIGHT: 64 IN | BODY MASS INDEX: 26.02 KG/M2 | SYSTOLIC BLOOD PRESSURE: 131 MMHG | HEART RATE: 89 BPM | WEIGHT: 152.4 LBS | DIASTOLIC BLOOD PRESSURE: 85 MMHG

## 2019-09-11 DIAGNOSIS — E11.9 TYPE 2 DIABETES MELLITUS WITHOUT COMPLICATION, WITH LONG-TERM CURRENT USE OF INSULIN (H): Primary | ICD-10-CM

## 2019-09-11 DIAGNOSIS — R76.8 POSITIVE GAD ANTIBODY: ICD-10-CM

## 2019-09-11 DIAGNOSIS — Z79.4 TYPE 2 DIABETES MELLITUS WITHOUT COMPLICATION, WITH LONG-TERM CURRENT USE OF INSULIN (H): Primary | ICD-10-CM

## 2019-09-11 LAB — HBA1C MFR BLD: 6.1 % (ref 4.3–6)

## 2019-09-11 ASSESSMENT — MIFFLIN-ST. JEOR: SCORE: 1376.28

## 2019-09-11 ASSESSMENT — PAIN SCALES - GENERAL: PAINLEVEL: NO PAIN (0)

## 2019-09-11 NOTE — PROGRESS NOTES
Endocrinology Clinic Visit    Chief Complaint: RECHECK (type 2 )     Information obtained from:Patient    Subjective:         HPI: Denia Montes is a 33 year old female with history of diabetes/glutamic acid decarboxylase antibody positive/currently trying to get pregnant with her first baby; who is here for routine follow up.     33-year-old female with past medical history of diabetes diagnosed 4 years ago.  She was initially diagnosed with type 2 diabetes however further investigation of the time of her last visit showed that she has glutamic acid decarboxylase antibody which was very had more than 250.  Indicating that this patient is at risk of developing type 1 diabetes or usually determined as type 1 diabetes in the making.  The fact that she has not diagnosed as type 1 diabetes at this point in time is due to C-peptide which is within the normal limit for glucose of 177 as documented below with the results.     Ref. Range 1/30/2019 09:43   C-Peptide Latest Ref Range: 0.9 - 6.9 ng/mL 2.3   Glucose Latest Ref Range: 70 - 99 mg/dL 177 (H)   Glutamic Acid Decarboxylase Antibody Latest Ref Range: 0.0 - 5.0 IU/mL >250.0 (H)     In any case, Denia is currently trying to get pregnant with her first baby.  Therefore her diabetes is being managed with the pregnancy possibly happening anytime in the near future.  Her current diabetic medications are metformin 2000 mg daily and she was also initiated on insulin detemir at 10 in the morning and 6 units in the evening. She was started particularly on his insulin due to its being pregnancy category B class medication.    Glucometer was downloaded and her readings between 8/29/2019 to 9/11/2019 were reviewed.  Fasting blood sugar has been between ; mostly her blood sugars are less than 100.  Average blood sugar for the.  Was 118  Bedtime blood sugars are between 108-145 most of the time.    Hemoglobin A1C   Date Value Ref Range Status   09/11/2019 6.1 (A) 4.3  - 6 % Final   05/29/2019 6.1 (A) 4.3 - 6 % Final   01/11/2019 7.4 (H) 0 - 5.6 % Final     Comment:     Normal <5.7% Prediabetes 5.7-6.4%  Diabetes 6.5% or higher - adopted from ADA   consensus guidelines.     09/28/2018 6.3 (H) 0 - 5.6 % Final     Comment:     Normal <5.7% Prediabetes 5.7-6.4%  Diabetes 6.5% or higher - adopted from ADA   consensus guidelines.     06/15/2018 6.6 (H) 0 - 5.6 % Final     Comment:     Normal <5.7% Prediabetes 5.7-6.4%  Diabetes 6.5% or higher - adopted from ADA   consensus guidelines.        A1c today is 6.1  Which was  No hypoglycemia symptoms.  LMP was on August 20.    She does not have any new concerns today.  Review of systems negative.    Allergies   Allergen Reactions     Sulfa Drugs Hives       Current Outpatient Medications   Medication Sig Dispense Refill     ACE/ARB NOT PRESCRIBED, INTENTIONAL, Please choose reason not prescribed, below       blood glucose monitoring (JACI CONTOUR) test strip USE TO TEST BLOOD SUGAR TWICE DAILY IN MORNING AND EVENING 200 strip 1     insulin detemir (LEVEMIR PEN) 100 UNIT/ML pen INJECT 10 UNITS IN THE MORNING AND 6 UNITS AT BEDTIME. 15 mL 1     insulin pen needle (32G X 4 MM) 32G X 4 MM miscellaneous Use 2 pen needles daily. 100 each 3     metFORMIN (GLUCOPHAGE) 500 MG tablet TAKE 2 TABLETS BY MOUTH TWICE DAILY WITH MEALS 360 tablet 3     MICROLET LANCETS MISC TEST BLOOD SUGAR TWICE DAILY 200 each 11     Omega-3 Fatty Acids (FISH OIL) 500 MG CAPS        order for DME Equipment being ordered: Glucometer, brand as covered by insurance. Does not use insulin. 1 Device 0     order for DME Lancets bid and prn.  Fill per patient's insurance. Not on insulin 200 each 1     order for DME Test strips for pt's glucometer, brand as covered by insurance Test bid and prn. Not on insulin. 200 each 1     vitamin B-12 (CYANOCOBALAMIN) 100 MCG tablet Take 1,000 mcg by mouth daily         Review of Systems     10 point review system (Constitutional, HENT, Eyes,  Respiratory, Cardiovascular, Gastrointestinal, Genitourinary, Musculoskeletal,Neurological, Psychiatric/Behavioural, Endocrine) is negative or is as per HPI above  Answers for HPI/ROS submitted by the patient on 9/11/2019   General Symptoms: No  Skin Symptoms: No  HENT Symptoms: No  EYE SYMPTOMS: No  HEART SYMPTOMS: No  LUNG SYMPTOMS: No  INTESTINAL SYMPTOMS: No  URINARY SYMPTOMS: No  GYNECOLOGIC SYMPTOMS: No  BREAST SYMPTOMS: No  SKELETAL SYMPTOMS: No  BLOOD SYMPTOMS: No  NERVOUS SYSTEM SYMPTOMS: No  MENTAL HEALTH SYMPTOMS: No      Past Medical History:   Diagnosis Date     Cervical high risk HPV (human papillomavirus) test positive 8/2015, 09/23/16, 12/14/18    see problem list      Diabetes mellitus (H)      Hx of colposcopy with cervical biopsy 10/21/16    [place] Bx: Neg NIL, ECC: Neg.       History reviewed. No pertinent surgical history.    Family History   Problem Relation Age of Onset     Diabetes Mother      Hyperlipidemia Mother      Kidney Disease Mother      Thyroid Disease Mother      Hyperlipidemia Father      Hypertension Father      Kidney Disease Father      Coronary Artery Disease No family hx of      Cerebrovascular Disease No family hx of      Breast Cancer No family hx of      Colon Cancer No family hx of      Prostate Cancer No family hx of      Other Cancer No family hx of      Depression No family hx of      Anxiety Disorder No family hx of      Mental Illness No family hx of      Substance Abuse No family hx of      Anesthesia Reaction No family hx of      Asthma No family hx of      Osteoporosis No family hx of      Genetic Disorder No family hx of      Obesity No family hx of      Unknown/Adopted No family hx of        Social History     Socioeconomic History     Marital status: Single     Spouse name: None     Number of children: None     Years of education: None     Highest education level: None   Social Needs     Financial resource strain: None     Food insecurity - worry: None     Food  "insecurity - inability: None     Transportation needs - medical: None     Transportation needs - non-medical: None   Occupational History     None   Tobacco Use     Smoking status: Never Smoker     Smokeless tobacco: Never Used   Substance and Sexual Activity     Alcohol use: No     Drug use: No     Sexual activity: Yes     Partners: Male     Birth control/protection: None   Other Topics Concern     Parent/sibling w/ CABG, MI or angioplasty before 65F 55M? No   Social History Narrative     None       Objective:   /85   Pulse 89   Ht 1.626 m (5' 4\")   Wt 69.1 kg (152 lb 6.4 oz)   BMI 26.16 kg/m      Constitutional: Pleasant no acute cardiopulmonary distress.   EYES: anicteric, normal extra-ocular movements, no lid lag or retraction.   HEENT: Mouth/Throat: Mucous membrane is moist. Oropharynx is clear. No adenopathy.  Thyroid exam normal without any nodules.  Cardiovascular: RRR, S1, S2 normal. No m/g/r   Pulmonary/Chest: CTAB. No wheezing or rales   Abdominal: +BS. Non tender to palpation. No organomegaly present.  Neurological: Alert and oriented.  No tremor and reflexes are symmetrical bilaterally. Muscle strength 5/5.   Extremities: No edema.     Skin: normal texture, color  Lymphatic: no cervical lymphadenopathy.  Psychological: appropriate mood and affect     In House Labs:   Lab Results   Component Value Date    A1C 7.4 01/11/2019    A1C 6.3 09/28/2018    A1C 6.6 06/15/2018    A1C 6.6 03/09/2018    A1C 5.6 08/18/2017       TSH   Date Value Ref Range Status   01/30/2019 1.45 0.40 - 4.00 mU/L Final   02/17/2017 2.13 0.40 - 4.00 mU/L Final   05/01/2015 1.72 0.40 - 5.00 mU/L Final       Creatinine   Date Value Ref Range Status   01/30/2019 0.55 0.52 - 1.04 mg/dL Final       Assessment/Treatment Plan:      Diabetes/ANNA antibody positive/currently trying to get pregnant:    33-year-old female with past medical history of type 2 diabetes diagnosed 4 years ago, who recently established care with us.  She was " initially diagnosed with type 2 diabetes however further work-up showed that she has glutamic acid decarboxylase antibody which was more than 250.  Indicating that this patient is at risk of developing type 1 diabetes or type 1 diabetes in the making.  The fact that she is not diagnosed as type 1 diabetes at this point in time is due to C-peptide which is within the normal limit for glucose of 177 as documented in HPI.    Considerations for the management of her current diabetes:  #1 currently planning to get pregnant in the next few months; therefore, we are currently using pregnancy category B class medications.  2.  ANNA antibody positive; we do not know when she would develop a full-blown type 1 diabetes; could be in few months or as long as 10 years and I have explained to the patient in detail.  3.  Since C-peptide is within the normal limits; we can continue to use metformin.  Discussed data available regarding metformin in the setting of planning for pregnancy or its use during pregnancy.  Patient is more comfortable to continue metformin at this point in time.    Plan:    Increase Levemir in the morning to 10 units and Levemir 6 units at bedtime.     Continue metformin at the same dose    Check blood sugars at least twice per day; fasting and bedtime and contact us if blood sugars persistently below 70.    Send us my chart message in 3-4 weeks with blood sugar readings so that we can adjust antidiabetic medications.    She will follow-up with us when she is pregnant or if she was pregnant at 6 months.    Offered to order basic metabolic panel, lipid panel however patient wants to check - her primary care office.    Advised follow-up eye examination.      Patient Instructions    preconception A1C goal for all women with diabetes is <6.5 percent, but we attempt to achieve A1C <6.   - Fasting capillary blood glucose concentration 80 to 110 mg/dL   - Two hour postprandial glucose concentration <155 mg/dL      Please inform us as soon as your are pregnant. Clinic visit needs to be scheduled at that time.       INSULIN DETEMIR- INJECT 10 UNITS IN THE MORNING AND 6 UNITS AT BEDTIME.    Please schedule eye exam.       I will contact the patient with the test results.  Return to clinic in 6 months.        Yuri Hansen MD  Staff Endocrinologist    [phone]  Division of Endocrinology and Diabetes      Answers for HPI/ROS submitted by the patient on 9/11/2019   General Symptoms: No  Skin Symptoms: No  HENT Symptoms: No  EYE SYMPTOMS: No  HEART SYMPTOMS: No  LUNG SYMPTOMS: No  INTESTINAL SYMPTOMS: No  URINARY SYMPTOMS: No  GYNECOLOGIC SYMPTOMS: No  BREAST SYMPTOMS: No  SKELETAL SYMPTOMS: No  BLOOD SYMPTOMS: No  NERVOUS SYSTEM SYMPTOMS: No  MENTAL HEALTH SYMPTOMS: No

## 2019-09-11 NOTE — PATIENT INSTRUCTIONS
preconception A1C goal for all women with diabetes is <6.5 percent, but we attempt to achieve A1C <6.   - Fasting capillary blood glucose concentration 80 to 110 mg/dL   - Two hour postprandial glucose concentration <155 mg/dL     Please inform us as soon as your are pregnant. Clinic visit needs to be scheduled at that time.       INSULIN DETEMIR- INJECT 10 UNITS IN THE MORNING AND 6 UNITS AT BEDTIME.    Please schedule eye exam.

## 2019-09-11 NOTE — LETTER
9/11/2019       RE: Denia Montes  [address]  [facility] [address]     Dear Colleague,    Thank you for referring your patient, Denia Montes, to the [facility] ENDOCRINOLOGY at [facility]. Please see a copy of my visit note below.        Endocrinology Clinic Visit    Chief Complaint: RECHECK (type 2 )     Information obtained from:Patient    Subjective:         HPI: Denia Montes is a 33 year old female with history of diabetes/glutamic acid decarboxylase antibody positive/currently trying to get pregnant with her first baby; who is here for routine follow up.     33-year-old female with past medical history of diabetes diagnosed 4 years ago.  She was initially diagnosed with type 2 diabetes however further investigation of the time of her last visit showed that she has glutamic acid decarboxylase antibody which was very had more than 250.  Indicating that this patient is at risk of developing type 1 diabetes or usually determined as type 1 diabetes in the making.  The fact that she has not diagnosed as type 1 diabetes at this point in time is due to C-peptide which is within the normal limit for glucose of 177 as documented below with the results.     Ref. Range 1/30/2019 09:43   C-Peptide Latest Ref Range: 0.9 - 6.9 ng/mL 2.3   Glucose Latest Ref Range: 70 - 99 mg/dL 177 (H)   Glutamic Acid Decarboxylase Antibody Latest Ref Range: 0.0 - 5.0 IU/mL >250.0 (H)     In any case, Deina is currently trying to get pregnant with her first baby.  Therefore her diabetes is being managed with the pregnancy possibly happening anytime in the near future.  Her current diabetic medications are metformin 2000 mg daily and she was also initiated on insulin detemir at 10 in the morning and 6 units in the evening. She was started particularly on his insulin due to its being pregnancy category B class medication.    Glucometer was downloaded and her readings between 8/29/2019  to 9/11/2019 were reviewed.  Fasting blood sugar has been between ; mostly her blood sugars are less than 100.  Average blood sugar for the.  Was 118  Bedtime blood sugars are between 108-145 most of the time.    Hemoglobin A1C   Date Value Ref Range Status   09/11/2019 6.1 (A) 4.3 - 6 % Final   05/29/2019 6.1 (A) 4.3 - 6 % Final   01/11/2019 7.4 (H) 0 - 5.6 % Final     Comment:     Normal <5.7% Prediabetes 5.7-6.4%  Diabetes 6.5% or higher - adopted from ADA   consensus guidelines.     09/28/2018 6.3 (H) 0 - 5.6 % Final     Comment:     Normal <5.7% Prediabetes 5.7-6.4%  Diabetes 6.5% or higher - adopted from ADA   consensus guidelines.     06/15/2018 6.6 (H) 0 - 5.6 % Final     Comment:     Normal <5.7% Prediabetes 5.7-6.4%  Diabetes 6.5% or higher - adopted from ADA   consensus guidelines.        A1c today is 6.1  Which was  No hypoglycemia symptoms.  LMP was on August 20.    She does not have any new concerns today.  Review of systems negative.    Allergies   Allergen Reactions     Sulfa Drugs Hives       Current Outpatient Medications   Medication Sig Dispense Refill     ACE/ARB NOT PRESCRIBED, INTENTIONAL, Please choose reason not prescribed, below       blood glucose monitoring (JACI CONTOUR) test strip USE TO TEST BLOOD SUGAR TWICE DAILY IN MORNING AND EVENING 200 strip 1     insulin detemir (LEVEMIR PEN) 100 UNIT/ML pen INJECT 10 UNITS IN THE MORNING AND 6 UNITS AT BEDTIME. 15 mL 1     insulin pen needle (32G X 4 MM) 32G X 4 MM miscellaneous Use 2 pen needles daily. 100 each 3     metFORMIN (GLUCOPHAGE) 500 MG tablet TAKE 2 TABLETS BY MOUTH TWICE DAILY WITH MEALS 360 tablet 3     MICROLET LANCETS MISC TEST BLOOD SUGAR TWICE DAILY 200 each 11     Omega-3 Fatty Acids (FISH OIL) 500 MG CAPS        order for DME Equipment being ordered: Glucometer, brand as covered by insurance. Does not use insulin. 1 Device 0     order for DME Lancets bid and prn.  Fill per patient's insurance. Not on insulin 200 each  1     order for DME Test strips for pt's glucometer, brand as covered by insurance Test bid and prn. Not on insulin. 200 each 1     vitamin B-12 (CYANOCOBALAMIN) 100 MCG tablet Take 1,000 mcg by mouth daily         Review of Systems     10 point review system (Constitutional, HENT, Eyes, Respiratory, Cardiovascular, Gastrointestinal, Genitourinary, Musculoskeletal,Neurological, Psychiatric/Behavioural, Endocrine) is negative or is as per HPI above  Answers for HPI/ROS submitted by the patient on 9/11/2019   General Symptoms: No  Skin Symptoms: No  HENT Symptoms: No  EYE SYMPTOMS: No  HEART SYMPTOMS: No  LUNG SYMPTOMS: No  INTESTINAL SYMPTOMS: No  URINARY SYMPTOMS: No  GYNECOLOGIC SYMPTOMS: No  BREAST SYMPTOMS: No  SKELETAL SYMPTOMS: No  BLOOD SYMPTOMS: No  NERVOUS SYSTEM SYMPTOMS: No  MENTAL HEALTH SYMPTOMS: No      Past Medical History:   Diagnosis Date     Cervical high risk HPV (human papillomavirus) test positive 8/2015, 09/23/16, 12/14/18    see problem list      Diabetes mellitus (H)      Hx of colposcopy with cervical biopsy 10/21/16    [place] Bx: Neg NIL, ECC: Neg.       History reviewed. No pertinent surgical history.    Family History   Problem Relation Age of Onset     Diabetes Mother      Hyperlipidemia Mother      Kidney Disease Mother      Thyroid Disease Mother      Hyperlipidemia Father      Hypertension Father      Kidney Disease Father      Coronary Artery Disease No family hx of      Cerebrovascular Disease No family hx of      Breast Cancer No family hx of      Colon Cancer No family hx of      Prostate Cancer No family hx of      Other Cancer No family hx of      Depression No family hx of      Anxiety Disorder No family hx of      Mental Illness No family hx of      Substance Abuse No family hx of      Anesthesia Reaction No family hx of      Asthma No family hx of      Osteoporosis No family hx of      Genetic Disorder No family hx of      Obesity No family hx of      Unknown/Adopted No family  "hx of        Social History     Socioeconomic History     Marital status: Single     Spouse name: None     Number of children: None     Years of education: None     Highest education level: None   Social Needs     Financial resource strain: None     Food insecurity - worry: None     Food insecurity - inability: None     Transportation needs - medical: None     Transportation needs - non-medical: None   Occupational History     None   Tobacco Use     Smoking status: Never Smoker     Smokeless tobacco: Never Used   Substance and Sexual Activity     Alcohol use: No     Drug use: No     Sexual activity: Yes     Partners: Male     Birth control/protection: None   Other Topics Concern     Parent/sibling w/ CABG, MI or angioplasty before 65F 55M? No   Social History Narrative     None       Objective:   /85   Pulse 89   Ht 1.626 m (5' 4\")   Wt 69.1 kg (152 lb 6.4 oz)   BMI 26.16 kg/m       Constitutional: Pleasant no acute cardiopulmonary distress.   EYES: anicteric, normal extra-ocular movements, no lid lag or retraction.   HEENT: Mouth/Throat: Mucous membrane is moist. Oropharynx is clear. No adenopathy.  Thyroid exam normal without any nodules.  Cardiovascular: RRR, S1, S2 normal. No m/g/r   Pulmonary/Chest: CTAB. No wheezing or rales   Abdominal: +BS. Non tender to palpation. No organomegaly present.  Neurological: Alert and oriented.  No tremor and reflexes are symmetrical bilaterally. Muscle strength 5/5.   Extremities: No edema.     Skin: normal texture, color  Lymphatic: no cervical lymphadenopathy.  Psychological: appropriate mood and affect     In House Labs:   Lab Results   Component Value Date    A1C 7.4 01/11/2019    A1C 6.3 09/28/2018    A1C 6.6 06/15/2018    A1C 6.6 03/09/2018    A1C 5.6 08/18/2017       TSH   Date Value Ref Range Status   01/30/2019 1.45 0.40 - 4.00 mU/L Final   02/17/2017 2.13 0.40 - 4.00 mU/L Final   05/01/2015 1.72 0.40 - 5.00 mU/L Final       Creatinine   Date Value Ref Range " Status   01/30/2019 0.55 0.52 - 1.04 mg/dL Final       Assessment/Treatment Plan:      Diabetes/ANNA antibody positive/currently trying to get pregnant:    33-year-old female with past medical history of type 2 diabetes diagnosed 4 years ago, who recently established care with us.  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  Indicating that this patient is at risk of developing type 1 diabetes or type 1 diabetes in the making.  The fact that she is not diagnosed as type 1 diabetes at this point in time is due to C-peptide which is within the normal limit for glucose of 177 as documented in HPI.    Considerations for the management of her current diabetes:  #1 currently planning to get pregnant in the next few months; therefore, we are currently using pregnancy category B class medications.  2.  ANNA antibody positive; we do not know when she would develop a full-blown type 1 diabetes; could be in few months or as long as 10 years and I have explained to the patient in detail.  3.  Since C-peptide is within the normal limits; we can continue to use metformin.  Discussed data available regarding metformin in the setting of planning for pregnancy or its use during pregnancy.  Patient is more comfortable to continue metformin at this point in time.    Plan:    Increase Levemir in the morning to 10 units and Levemir 6 units at bedtime.     Continue metformin at the same dose    Check blood sugars at least twice per day; fasting and bedtime and contact us if blood sugars persistently below 70.    Send us my chart message in 3-4 weeks with blood sugar readings so that we can adjust antidiabetic medications.    She will follow-up with us when she is pregnant or if she was pregnant at 6 months.    Offered to order basic metabolic panel, lipid panel however patient wants to check - her primary care office.    Advised  follow-up eye examination.      Patient Instructions    preconception A1C goal for all women with diabetes is <6.5 percent, but we attempt to achieve A1C <6.   - Fasting capillary blood glucose concentration 80 to 110 mg/dL   - Two hour postprandial glucose concentration <155 mg/dL     Please inform us as soon as your are pregnant. Clinic visit needs to be scheduled at that time.       INSULIN DETEMIR- INJECT 10 UNITS IN THE MORNING AND 6 UNITS AT BEDTIME.    Please schedule eye exam.       I will contact the patient with the test results.  Return to clinic in 6 months.        Yuri Hansen MD  Staff Endocrinologist    [phone]  Division of Endocrinology and Diabetes      Answers for HPI/ROS submitted by the patient on 9/11/2019   General Symptoms: No  Skin Symptoms: No  HENT Symptoms: No  EYE SYMPTOMS: No  HEART SYMPTOMS: No  LUNG SYMPTOMS: No  INTESTINAL SYMPTOMS: No  URINARY SYMPTOMS: No  GYNECOLOGIC SYMPTOMS: No  BREAST SYMPTOMS: No  SKELETAL SYMPTOMS: No  BLOOD SYMPTOMS: No  NERVOUS SYSTEM SYMPTOMS: No  MENTAL HEALTH SYMPTOMS: No      Again, thank you for allowing me to participate in the care of your patient.      Sincerely,    Yuri Hansen MD

## 2019-09-23 DIAGNOSIS — R76.8 POSITIVE GAD ANTIBODY: ICD-10-CM

## 2019-09-23 DIAGNOSIS — E11.9 TYPE 2 DIABETES MELLITUS WITHOUT COMPLICATION, WITHOUT LONG-TERM CURRENT USE OF INSULIN (H): ICD-10-CM

## 2019-09-24 ENCOUNTER — MYC REFILL (OUTPATIENT)
Dept: ENDOCRINOLOGY | Facility: CLINIC | Age: 34
End: 2019-09-24

## 2019-09-24 DIAGNOSIS — R76.8 POSITIVE GAD ANTIBODY: ICD-10-CM

## 2019-09-24 DIAGNOSIS — E11.9 TYPE 2 DIABETES MELLITUS WITHOUT COMPLICATION, WITHOUT LONG-TERM CURRENT USE OF INSULIN (H): ICD-10-CM

## 2019-09-27 ENCOUNTER — IMMUNIZATION (OUTPATIENT)
Dept: NURSING | Facility: CLINIC | Age: 34
End: 2019-09-27
Payer: COMMERCIAL

## 2019-09-27 PROCEDURE — 90471 IMMUNIZATION ADMIN: CPT

## 2019-09-27 PROCEDURE — 90686 IIV4 VACC NO PRSV 0.5 ML IM: CPT

## 2019-11-15 ENCOUNTER — TRANSFERRED RECORDS (OUTPATIENT)
Dept: HEALTH INFORMATION MANAGEMENT | Facility: CLINIC | Age: 34
End: 2019-11-15

## 2019-11-21 ENCOUNTER — TRANSFERRED RECORDS (OUTPATIENT)
Dept: HEALTH INFORMATION MANAGEMENT | Facility: CLINIC | Age: 34
End: 2019-11-21

## 2019-11-21 LAB — RETINOPATHY: NEGATIVE

## 2019-12-16 ENCOUNTER — OFFICE VISIT (OUTPATIENT)
Dept: FAMILY MEDICINE | Facility: CLINIC | Age: 34
End: 2019-12-16
Payer: COMMERCIAL

## 2019-12-16 VITALS
RESPIRATION RATE: 16 BRPM | BODY MASS INDEX: 26.43 KG/M2 | OXYGEN SATURATION: 98 % | SYSTOLIC BLOOD PRESSURE: 106 MMHG | HEART RATE: 68 BPM | DIASTOLIC BLOOD PRESSURE: 80 MMHG | WEIGHT: 154 LBS

## 2019-12-16 DIAGNOSIS — E11.9 TYPE 2 DIABETES MELLITUS WITHOUT COMPLICATION, WITHOUT LONG-TERM CURRENT USE OF INSULIN (H): ICD-10-CM

## 2019-12-16 DIAGNOSIS — E78.5 HYPERLIPIDEMIA LDL GOAL <70: ICD-10-CM

## 2019-12-16 DIAGNOSIS — Z23 NEED FOR VACCINATION: ICD-10-CM

## 2019-12-16 DIAGNOSIS — Z12.4 SCREENING FOR CERVICAL CANCER: ICD-10-CM

## 2019-12-16 DIAGNOSIS — E11.9 ENCOUNTER FOR DIABETIC FOOT EXAM (H): ICD-10-CM

## 2019-12-16 DIAGNOSIS — Z00.00 ROUTINE GENERAL MEDICAL EXAMINATION AT A HEALTH CARE FACILITY: Primary | ICD-10-CM

## 2019-12-16 DIAGNOSIS — R87.810 CERVICAL HIGH RISK HPV (HUMAN PAPILLOMAVIRUS) TEST POSITIVE: ICD-10-CM

## 2019-12-16 PROCEDURE — 80048 BASIC METABOLIC PNL TOTAL CA: CPT | Performed by: PHYSICIAN ASSISTANT

## 2019-12-16 PROCEDURE — 87624 HPV HI-RISK TYP POOLED RSLT: CPT | Performed by: PHYSICIAN ASSISTANT

## 2019-12-16 PROCEDURE — 99207 C FOOT EXAM  NO CHARGE: CPT | Mod: 25 | Performed by: PHYSICIAN ASSISTANT

## 2019-12-16 PROCEDURE — G0145 SCR C/V CYTO,THINLAYER,RESCR: HCPCS | Performed by: PHYSICIAN ASSISTANT

## 2019-12-16 PROCEDURE — 36415 COLL VENOUS BLD VENIPUNCTURE: CPT | Performed by: PHYSICIAN ASSISTANT

## 2019-12-16 PROCEDURE — 90636 HEP A/HEP B VACC ADULT IM: CPT | Performed by: PHYSICIAN ASSISTANT

## 2019-12-16 PROCEDURE — 80061 LIPID PANEL: CPT | Performed by: PHYSICIAN ASSISTANT

## 2019-12-16 PROCEDURE — 82043 UR ALBUMIN QUANTITATIVE: CPT | Performed by: PHYSICIAN ASSISTANT

## 2019-12-16 PROCEDURE — 99395 PREV VISIT EST AGE 18-39: CPT | Mod: 25 | Performed by: PHYSICIAN ASSISTANT

## 2019-12-16 PROCEDURE — 90471 IMMUNIZATION ADMIN: CPT | Performed by: PHYSICIAN ASSISTANT

## 2019-12-16 NOTE — PROGRESS NOTES
SUBJECTIVE:   CC: Denia Montes is an 34 year old woman who presents for preventive health visit.     Healthy Habits:     Getting at least 3 servings of Calcium per day:  Yes    Bi-annual eye exam:  Yes    Dental care twice a year:  Yes    Sleep apnea or symptoms of sleep apnea:  None    Diet:  Diabetic    Duration of exercise:  15-30 minutes    Taking medications regularly:  Yes    Barriers to taking medications:  None    Medication side effects:  None    PHQ-2 Total Score: 0    Additional concerns today:  No    - Requests hepatitis A vaccine. Volunteers at needle-exchange program, works with homeless population. Current hep A outbreak, vaccine recommended.    Today's PHQ-2 Score:   PHQ-2 ( 1999 Pfizer) 12/16/2019   Q1: Little interest or pleasure in doing things 0   Q2: Feeling down, depressed or hopeless 0   PHQ-2 Score 0   Q1: Little interest or pleasure in doing things Not at all   Q2: Feeling down, depressed or hopeless Not at all   PHQ-2 Score 0     Abuse: Current or Past(Physical, Sexual or Emotional)- No  Do you feel safe in your environment? Yes    Social History     Tobacco Use     Smoking status: Never Smoker     Smokeless tobacco: Never Used   Substance Use Topics     Alcohol use: No     If you drink alcohol do you typically have >3 drinks per day or >7 drinks per week? No    Alcohol Use 12/16/2019   Prescreen: >3 drinks/day or >7 drinks/week? Not Applicable   Prescreen: >3 drinks/day or >7 drinks/week? -     Reviewed orders with patient.  Reviewed health maintenance and updated orders accordingly - Yes  BP Readings from Last 3 Encounters:   12/16/19 106/80   09/11/19 131/85   06/14/19 104/78    Wt Readings from Last 3 Encounters:   12/16/19 69.9 kg (154 lb)   09/11/19 69.1 kg (152 lb 6.4 oz)   06/14/19 68.9 kg (152 lb)          Mammogram not appropriate for this patient based on age.    Pertinent mammograms are reviewed under the imaging tab.  History of abnormal Pap smear: YES - updated in  Problem List and Health Maintenance accordingly  PAP / HPV Latest Ref Rng & Units 12/14/2018 10/27/2017 9/23/2016   PAP - NIL ASC-US(A) NIL   HPV 16 DNA NEG:Negative Negative Negative Negative   HPV 18 DNA NEG:Negative Negative Negative Negative   OTHER HR HPV NEG:Negative Positive(A) Positive(A) Positive(A)     Reviewed and updated as needed this visit by clinical staff  Tobacco  Allergies  Meds  Problems  Med Hx  Surg Hx  Fam Hx  Soc Hx          Reviewed and updated as needed this visit by Provider  Tobacco  Allergies  Meds  Problems  Med Hx  Surg Hx  Fam Hx            Review of Systems  CONSTITUTIONAL: NEGATIVE for fever, chills, change in weight  INTEGUMENTARU/SKIN: NEGATIVE for worrisome rashes, moles or lesions  EYES: NEGATIVE for vision changes or irritation  ENT: NEGATIVE for ear, mouth and throat problems  RESP: NEGATIVE for significant cough or SOB  BREAST: NEGATIVE for masses, tenderness or discharge  CV: NEGATIVE for chest pain, palpitations or peripheral edema  GI: NEGATIVE for nausea, abdominal pain, heartburn, or change in bowel habits  : NEGATIVE for unusual urinary or vaginal symptoms. Periods are regular.  MUSCULOSKELETAL: NEGATIVE for significant arthralgias or myalgia  NEURO: NEGATIVE for weakness, dizziness or paresthesias  PSYCHIATRIC: NEGATIVE for changes in mood or affect     OBJECTIVE:   /80   Pulse 68   Resp 16   Wt 69.9 kg (154 lb)   SpO2 98%   BMI 26.43 kg/m    Physical Exam  GENERAL: healthy, alert and no distress  EYES: Eyes grossly normal to inspection, PERRL and conjunctivae and sclerae normal  HENT: ear canals and TM's normal, nose and mouth without ulcers or lesions  NECK: no adenopathy, no asymmetry, masses, or scars and thyroid normal to palpation  RESP: lungs clear to auscultation - no rales, rhonchi or wheezes  BREAST: normal without masses, tenderness or nipple discharge and no palpable axillary masses or adenopathy  CV: regular rate and rhythm,  "normal S1 S2, no S3 or S4, no murmur, click or rub, no peripheral edema and peripheral pulses strong   (female): normal female external genitalia, normal urethral meatus, vaginal mucosa pink, moist, well rugated, and normal cervix/adnexa/uterus without masses or discharge  MS: no gross musculoskeletal defects noted, no edema  SKIN: no suspicious lesions or rashes  NEURO: Normal strength and tone, mentation intact and speech normal  PSYCH: mentation appears normal, affect normal/bright  DIABETIC FOOT EXAM:  Bilateral feet examined.  No lesions or deformities noted.  Skin is warm and dry.  Pedal pulses are intact.  Sensation is intact to nylon filament exam.    Diagnostic Test Results:  none     ASSESSMENT/PLAN:       ICD-10-CM    1. Routine general medical examination at a health care facility Z00.00    2. Type 2 diabetes mellitus without complication, without long-term current use of insulin (H) E11.9 Basic metabolic panel     Lipid panel reflex to direct LDL Fasting     Albumin Random Urine Quantitative with Creat Ratio     FOOT EXAM   3. Encounter for diabetic foot exam (H) E11.9 FOOT EXAM   4. Cervical high risk HPV (human papillomavirus) test positive R87.810 HPV High Risk Types DNA Cervical     Pap imaged thin layer screen with HPV - recommended age 30 - 65 years (select HPV order below)   5. Hyperlipidemia LDL goal <70 E78.5 Lipid panel reflex to direct LDL Fasting   6. Screening for cervical cancer Z12.4 HPV High Risk Types DNA Cervical     Pap imaged thin layer screen with HPV - recommended age 30 - 65 years (select HPV order below)   7. Need for vaccination Z23        COUNSELING:  Reviewed preventive health counseling, as reflected in patient instructions       Immunizations    Vaccinated for: Hepatitis A and Hepatitis B          Estimated body mass index is 26.43 kg/m  as calculated from the following:    Height as of 9/11/19: 1.626 m (5' 4\").    Weight as of this encounter: 69.9 kg (154 lb).    Weight " management plan: Discussed healthy diet and exercise guidelines     reports that she has never smoked. She has never used smokeless tobacco.      Counseling Resources:  ATP IV Guidelines  Pooled Cohorts Equation Calculator  Breast Cancer Risk Calculator  FRAX Risk Assessment  ICSI Preventive Guidelines  Dietary Guidelines for Americans, 2010  USDA's MyPlate  ASA Prophylaxis  Lung CA Screening    Lexy Saucedo PA-C  [facility]

## 2019-12-17 LAB
ANION GAP SERPL CALCULATED.3IONS-SCNC: 7 MMOL/L (ref 3–14)
BUN SERPL-MCNC: 13 MG/DL (ref 7–30)
CALCIUM SERPL-MCNC: 9 MG/DL (ref 8.5–10.1)
CHLORIDE SERPL-SCNC: 106 MMOL/L (ref 94–109)
CHOLEST SERPL-MCNC: 195 MG/DL
CO2 SERPL-SCNC: 24 MMOL/L (ref 20–32)
CREAT SERPL-MCNC: 0.57 MG/DL (ref 0.52–1.04)
CREAT UR-MCNC: 48 MG/DL
GFR SERPL CREATININE-BSD FRML MDRD: >90 ML/MIN/{1.73_M2}
GLUCOSE SERPL-MCNC: 112 MG/DL (ref 70–99)
HDLC SERPL-MCNC: 54 MG/DL
LDLC SERPL CALC-MCNC: 126 MG/DL
MICROALBUMIN UR-MCNC: 6 MG/L
MICROALBUMIN/CREAT UR: 11.45 MG/G CR (ref 0–25)
NONHDLC SERPL-MCNC: 141 MG/DL
POTASSIUM SERPL-SCNC: 3.7 MMOL/L (ref 3.4–5.3)
SODIUM SERPL-SCNC: 137 MMOL/L (ref 133–144)
TRIGL SERPL-MCNC: 75 MG/DL

## 2019-12-18 NOTE — RESULT ENCOUNTER NOTE
Sreekanth Loza,    I just wanted to let you know that your lab results have been reviewed and are attached.    - Your total cholesterol is normal (< 200), - LDL (bad cholesterol) is HIGH but STABLE (<70), - HDL (good cholesterol) is normal, - Triglycerides are normal (<150)  - Your metabolic panel shows:  normal electrolytes (sodium, potassium, calcium) normal kidney function (creatinine and GFR)  - Your urine microalbumin is normal, showing good kidney function.    Please let me know if you have any questions.    Sincerely,    [place] [address]  [phone] (p)

## 2019-12-19 LAB
COPATH REPORT: NORMAL
PAP: NORMAL

## 2020-01-14 ENCOUNTER — OFFICE VISIT (OUTPATIENT)
Dept: OBGYN | Facility: CLINIC | Age: 35
End: 2020-01-14
Payer: COMMERCIAL

## 2020-01-14 VITALS
WEIGHT: 155 LBS | BODY MASS INDEX: 26.61 KG/M2 | DIASTOLIC BLOOD PRESSURE: 75 MMHG | HEART RATE: 80 BPM | TEMPERATURE: 100.1 F | SYSTOLIC BLOOD PRESSURE: 127 MMHG

## 2020-01-14 DIAGNOSIS — Z13.9 SCREENING PROCEDURE: ICD-10-CM

## 2020-01-14 DIAGNOSIS — R87.810 CERVICAL HIGH RISK HPV (HUMAN PAPILLOMAVIRUS) TEST POSITIVE: Primary | ICD-10-CM

## 2020-01-14 LAB — HCG UR QL: NEGATIVE

## 2020-01-14 PROCEDURE — 57456 ENDOCERV CURETTAGE W/SCOPE: CPT | Performed by: OBSTETRICS & GYNECOLOGY

## 2020-01-14 PROCEDURE — 88305 TISSUE EXAM BY PATHOLOGIST: CPT | Performed by: OBSTETRICS & GYNECOLOGY

## 2020-01-14 PROCEDURE — 81025 URINE PREGNANCY TEST: CPT | Performed by: OBSTETRICS & GYNECOLOGY

## 2020-01-14 NOTE — PROGRESS NOTES
Denia Montes is a 34 year old female  w presents for repeat colposcopy, referred by Lexy Saucedo. Pap smear on 2019 showed: Other HR HPV. The prior pap showed .     16: NIL pap, + HR HPV (not 16 or 18). Plan [facility].  10/21/16: [place] Bx: Neg NIL, ECC: Neg. Plan cotest in 1 year per provider.  10/27/17 ASCUS/+ HR HPV (not 16/18). Plan: colposcopy by 1/27/18  12/15/17 [place]- Normal Plan: Cotest in 1 yr due by 12/15/18  12/14/18 NIL Pap, + HR HPV (Neg 16/18). Plan 1 year cotest per provider  19 NIL pap with + HR HPV (not 16 or 18). Plan colp.     No LMP recorded.  UPT today is negative  Patient does not smoke  Type of contraception: none  Age at first sexual intercourse:  16  Number of sexual partners (lifetime): more than 6  Past GYN history: No STD history  Prior cervical/vaginal disease: none.  Prior cervical treatment: .      PROCEDURE:      Before the procedure, it was ensured that the patient was educated regarding the nature of her findings to date, the implications, and what was to be done. She has been made aware of the role of HPV, the natural history of infection, ways to minimize her future risk, the effect of HPV on the cervix, and treatment options available should they be indicated. The details of the colposcopic procedure were reviewed. All questions were answered before proceeding, and informed consent was therefore obtained.      Speculum placed in vagina and excellent visualization of cervix acheived, cervix swabbed x 3 with acetic acid solution.      FINDINGS:  Cervix: no visible lesions  Please refer to images section for details.  SCJ seen?: yes   ECC done?: Yes using endocervical curette and endocervical brush.  Silver nitrate applied for hemostasis  Satisfactory examination?: yes      ASSESSMENT: Normal exam without visible pathology.  PLAN: specimens labelled and sent to Pathology and will base further treatment on Pathology findings      Silvia Lai,  MD

## 2020-01-14 NOTE — NURSING NOTE
"Chief Complaint   Patient presents with     Colposcopy       Initial /75 (BP Location: Left arm, Patient Position: Sitting, Cuff Size: Adult Regular)   Pulse 80   Temp 100.1  F (37.8  C) (Oral)   Wt 70.3 kg (155 lb)   BMI 26.61 kg/m   Estimated body mass index is 26.61 kg/m  as calculated from the following:    Height as of 19: 1.626 m (5' 4\").    Weight as of this encounter: 70.3 kg (155 lb).  BP completed using cuff size: regular    Questioned patient about current smoking habits.  Pt. has never smoked.          The following HM Due: NONE      The following patient reported/Care Every where data was sent to:  P ABSTRACT QUALITY INITIATIVES [[id number]]  DELMAR [place]           "

## 2020-01-17 ENCOUNTER — ALLIED HEALTH/NURSE VISIT (OUTPATIENT)
Dept: NURSING | Facility: CLINIC | Age: 35
End: 2020-01-17
Payer: COMMERCIAL

## 2020-01-17 DIAGNOSIS — Z23 NEED FOR VACCINATION: Primary | ICD-10-CM

## 2020-01-17 PROCEDURE — 90471 IMMUNIZATION ADMIN: CPT

## 2020-01-17 PROCEDURE — 99207 ZZC NO CHARGE LOS: CPT

## 2020-01-17 PROCEDURE — 90636 HEP A/HEP B VACC ADULT IM: CPT

## 2020-01-20 ENCOUNTER — TELEPHONE (OUTPATIENT)
Dept: FAMILY MEDICINE | Facility: CLINIC | Age: 35
End: 2020-01-20
Payer: COMMERCIAL

## 2020-01-20 DIAGNOSIS — Z53.9 ERRONEOUS ENCOUNTER--DISREGARD: Primary | ICD-10-CM

## 2020-01-22 LAB — COPATH REPORT: NORMAL

## 2020-01-23 DIAGNOSIS — R76.8 POSITIVE GAD ANTIBODY: ICD-10-CM

## 2020-01-23 RX ORDER — INSULIN DETEMIR 100 [IU]/ML
INJECTION, SOLUTION SUBCUTANEOUS
Qty: 20 ML | Refills: 3 | Status: SHIPPED | OUTPATIENT
Start: 2020-01-23 | End: 2020-03-11

## 2020-01-23 NOTE — TELEPHONE ENCOUNTER
LEVEMIR FLEXTOUCH 100 UNIT/ML pen      Last Written Prescription Date:  9-11-19  Last Fill Quantity: 15 ml,   # refills: 1  Last Office Visit : 9-11-19  Future Office visit:  3-11-20    Routing refill request to provider for review/approval because:  Insulin - refilled per clinic

## 2020-03-11 ENCOUNTER — OFFICE VISIT (OUTPATIENT)
Dept: ENDOCRINOLOGY | Facility: CLINIC | Age: 35
End: 2020-03-11
Payer: COMMERCIAL

## 2020-03-11 VITALS
HEIGHT: 64 IN | HEART RATE: 94 BPM | BODY MASS INDEX: 26.8 KG/M2 | WEIGHT: 157 LBS | SYSTOLIC BLOOD PRESSURE: 132 MMHG | DIASTOLIC BLOOD PRESSURE: 82 MMHG

## 2020-03-11 DIAGNOSIS — R76.8 POSITIVE GAD ANTIBODY: ICD-10-CM

## 2020-03-11 DIAGNOSIS — E10.9 TYPE 1 DIABETES MELLITUS WITHOUT COMPLICATION (H): Primary | ICD-10-CM

## 2020-03-11 DIAGNOSIS — Z31.69 ENCOUNTER FOR PRECONCEPTION CONSULTATION: ICD-10-CM

## 2020-03-11 LAB
HBA1C MFR BLD: 6.9 % (ref 4.3–6)
TSH SERPL DL<=0.005 MIU/L-ACNC: 1.52 MU/L (ref 0.4–4)
VIT B12 SERPL-MCNC: 820 PG/ML (ref 193–986)

## 2020-03-11 ASSESSMENT — MIFFLIN-ST. JEOR: SCORE: 1392.15

## 2020-03-11 ASSESSMENT — PAIN SCALES - GENERAL: PAINLEVEL: NO PAIN (0)

## 2020-03-11 NOTE — PATIENT INSTRUCTIONS
preconception A1C goal for all women with diabetes is <6.5 percent, but we attempt to achieve A1C <6.   - Fasting capillary blood glucose concentration 80 to 110 mg/dL   - Two hour postprandial glucose concentration <155 mg/dL     Please inform us as soon as your are pregnant. Clinic visit needs to be scheduled at that time.       INCREASE INSULIN DETEMIR- INJECT 12 UNITS IN THE MORNING AND 8 UNITS AT BEDTIME.    IF THE FASTING BLOOD SUGAR IS HIGHER THAN TARGET ; INCREASE LEVEMIR DOSE BY 2 ONLY ONCE. SO THE DOSE WILL BE 14 AM IN THE MORNING AND 10 AT BEDTIME.     IF BLOOD SUGAR HIGHER THAN TARGET DOCUMENTED ABOVE AFTER MAKING HE ABOVE ADJUSTMENT; PLEASE SEND TO US Prote[employer] MESSAGE.     Hemoglobin A1C   Date Value Ref Range Status   03/11/2020 6.9 (A) 4.3 - 6 % Final   09/11/2019 6.1 (A) 4.3 - 6 % Final   05/29/2019 6.1 (A) 4.3 - 6 % Final   01/11/2019 7.4 (H) 0 - 5.6 % Final     Comment:     Normal <5.7% Prediabetes 5.7-6.4%  Diabetes 6.5% or higher - adopted from ADA   consensus guidelines.     09/28/2018 6.3 (H) 0 - 5.6 % Final     Comment:     Normal <5.7% Prediabetes 5.7-6.4%  Diabetes 6.5% or higher - adopted from ADA   consensus guidelines.     06/15/2018 6.6 (H) 0 - 5.6 % Final     Comment:     Normal <5.7% Prediabetes 5.7-6.4%  Diabetes 6.5% or higher - adopted from ADA   consensus guidelines.

## 2020-03-11 NOTE — LETTER
3/11/2020       RE: Denia Montes  [address]  [facility] [address]     Dear Colleague,    Thank you for referring your patient, Denia Montes, to the [facility] ENDOCRINOLOGY at [facility]. Please see a copy of my visit note below.    [place]  Endocrinology  Yuri Hansen MD  03/11/2020      Chief Complaint:   Diabetes    History of Present Illness:   Denia Montes is a 34 year old female with a history of diabetes mellitus type 1.5 who presents for follow up.  ANNA (gutamic acid decarboxylase) antibody was very at >250 indicating she has type 1 diabetes in the making (C-Peptide WNL).     She is currently on metformin 2 gm and Levemir insulin (planning pregnancy soon).    She feels like her BG have been elevated particularly one week prior to menses. She takes her BG at least twice daily but sometimes more often. Her morning numbers are relatively low. She is not currently trying to become pregnant but plans on trying in the near future. Denies chest pain, shortness of breath, nausea, vomiting, unintentional weight loss, or vision changes.     Current diabetes regimen:   -Levemir 10 units in morning and 6 units in evening   -Metformin 1000 mg BID     Blood Glucose Monitoring:  We reviewed glucometer data together.  14 day average: 114 mg/dL   03/11: morning 84   03/10: morning 95 and evening 99  03/09: morning 74 and evening 168  03/08: morning 79 and evening 105  03/07: morning 111 and evening 142  03/06: morning 81   03/05: morning 100 and evening 130  03/04: morning 96 and evening 132  03/03: morning 86 and evening 121  03/02: morning 91 and evening 133  03/01: morning 100 and evening 119  02/29: morning 102 and evening 170  02/28: morning 96 and evening 144  02/27: morning 106 and evening 130    Diabetes monitoring and complications:  CAD: No  Last eye exam results: UTD  Microalbuminuria: No  Neuropathy: No  HTN: Yes; not on medications   On  "Statin: No  On Aspirin: No  Depression: No  Erectile dysfunction: No    Review of Systems:   Pertinent items are noted in HPI.  All other systems are negative.    Active Medications:      insulin detemir (LEVEMIR FLEXTOUCH) 100 UNIT/ML pen, INJECT 12 UNITS UNDER THE SKIN IN THE MORNING AND INJECT 8 UNITS AT BEDTIME, Disp: 20 mL, Rfl: 3     ACE/ARB NOT PRESCRIBED, INTENTIONAL,, Please choose reason not prescribed, below, Disp: , Rfl:      blood glucose monitoring (JACI CONTOUR) test strip, USE TO TEST BLOOD SUGAR TWICE DAILY IN MORNING AND EVENING, Disp: 200 strip, Rfl: 1     insulin pen needle (32G X 4 MM) 32G X 4 MM miscellaneous, Use 2 pen needles daily., Disp: 200 each, Rfl: 3     metFORMIN (GLUCOPHAGE) 500 MG tablet, TAKE 2 TABLETS BY MOUTH TWICE DAILY WITH MEALS, Disp: 360 tablet, Rfl: 3     MICROLET LANCETS MISC, TEST BLOOD SUGAR TWICE DAILY, Disp: 200 each, Rfl: 11     Omega-3 Fatty Acids (FISH OIL) 500 MG CAPS, , Disp: , Rfl:      order for DME, Equipment being ordered: Glucometer, brand as covered by insurance. Does not use insulin., Disp: 1 Device, Rfl: 0     order for DME, Lancets bid and prn.  Fill per patient's insurance. Not on insulin, Disp: 200 each, Rfl: 1     order for DME, Test strips for pt's glucometer, brand as covered by insurance Test bid and prn. Not on insulin., Disp: 200 each, Rfl: 1     vitamin B-12 (CYANOCOBALAMIN) 100 MCG tablet, Take 1,000 mcg by mouth daily, Disp: , Rfl:       Allergies:   Sulfa drugs      Past Medical History:  High risk HPV  Diabetes mellitus type 2   Kidney stones   HLD     Past Surgical History:  No pertinent past surgical history     Family History:   Mother: diabetes, HLD, kidney disease, thyroid disease  Father: HLD, HTN, kidney disease, pulmonary disease        Social History:   The patient was alone   Smoking Status: never  Smokeless Tobacco: never    Alcohol Use: no    Marital Status: single     Physical Exam:   /82   Pulse 94   Ht 1.626 m (5' 4\")  "  Wt 71.2 kg (157 lb)   BMI 26.95 kg/m       Wt Readings from Last 10 Encounters:   03/11/20 71.2 kg (157 lb)   01/14/20 70.3 kg (155 lb)   12/16/19 69.9 kg (154 lb)   09/11/19 69.1 kg (152 lb 6.4 oz)   06/14/19 68.9 kg (152 lb)   05/29/19 69.5 kg (153 lb 4.8 oz)   02/27/19 69.4 kg (153 lb)   01/30/19 70.9 kg (156 lb 3.2 oz)   12/14/18 69.4 kg (153 lb)   06/22/18 71.2 kg (157 lb)      Constitutional: Pleasant no acute cardiopulmonary distress.   Neurological: Alert and oriented.     Psychological: appropriate mood and affect     Data:  Lab Results   Component Value Date     12/16/2019    POTASSIUM 3.7 12/16/2019    CHLORIDE 106 12/16/2019    CO2 24 12/16/2019    ANIONGAP 7 12/16/2019     (H) 12/16/2019    BUN 13 12/16/2019    CR 0.57 12/16/2019    CATALINA 9.0 12/16/2019     Lab Results   Component Value Date    GFRESTIMATED >90 12/16/2019    GFRESTIMATED >90 01/30/2019    GFRESTIMATED >90 06/15/2018    GFRESTBLACK >90 12/16/2019    GFRESTBLACK >90 01/30/2019    GFRESTBLACK >90 06/15/2018      Lab Results   Component Value Date    MICROL 6 12/16/2019    UMALCR 11.45 12/16/2019     Hemoglobin A1C   Date Value Ref Range Status   03/11/2020 6.9 (A) 4.3 - 6 % Final   09/11/2019 6.1 (A) 4.3 - 6 % Final   05/29/2019 6.1 (A) 4.3 - 6 % Final   01/11/2019 7.4 (H) 0 - 5.6 % Final     Comment:     Normal <5.7% Prediabetes 5.7-6.4%  Diabetes 6.5% or higher - adopted from ADA   consensus guidelines.     09/28/2018 6.3 (H) 0 - 5.6 % Final     Comment:     Normal <5.7% Prediabetes 5.7-6.4%  Diabetes 6.5% or higher - adopted from ADA   consensus guidelines.     06/15/2018 6.6 (H) 0 - 5.6 % Final     Comment:     Normal <5.7% Prediabetes 5.7-6.4%  Diabetes 6.5% or higher - adopted from ADA   consensus guidelines.       Lab Results   Component Value Date    CPEPT 2.3 01/30/2019    GADAB >250.0 (H) 01/30/2019     Lab Results   Component Value Date    CHOL 195 12/16/2019    CHOL 191 09/28/2018    TRIG 75 12/16/2019    TRIG 69  09/28/2018    HDL 54 12/16/2019    HDL 57 09/28/2018     (H) 12/16/2019     (H) 09/28/2018    NHDL 141 (H) 12/16/2019    NHDL 134 (H) 09/28/2018       Assessment:  Type 1 diabetes mellitus without complication (H) Positive ANNA antibody/preconception planing  Denia Montes is a 34 year old female with a history of diabetes mellitus type 1.5 diagnosed in 05/2015. High ANNA and normal C-peptide normal.  She is currently on metformin 2 gm and Levemir insulin (planning pregnancy soon).    A1c today slightly worsened at 6.9%. No BG download but reviewed her glucometer with her in the room. Based on her numbers, it looks like she more elevated BG one week prior to her menstrual cycle. Will increase her Detemir to 12 units in the morning and 8 units at bedtime. Though, if her fasting blood sugar is >110 then will only increase Levemir to 14 units in morning and 10 units at bedtime. She will let us know if she becomes pregnant and discussed target A1c now as she is planning is <6, fasting capillary blood glucose concentration 80 to 110 mg/dL and two hour postprandial glucose concentration <155 mg/dL.      Patient Instructions      preconception A1C goal for all women with diabetes is <6.5 percent, but we attempt to achieve A1C <6.   - Fasting capillary blood glucose concentration 80 to 110 mg/dL   - Two hour postprandial glucose concentration <155 mg/dL     Please inform us as soon as your are pregnant. Clinic visit needs to be scheduled at that time.       INCREASE INSULIN DETEMIR- INJECT 12 UNITS IN THE MORNING AND 8 UNITS AT BEDTIME.    IF THE FASTING BLOOD SUGAR IS HIGHER THAN TARGET ; INCREASE LEVEMIR DOSE BY 2 ONLY ONCE. SO THE DOSE WILL BE 14 AM IN THE MORNING AND 10 AT BEDTIME.     IF BLOOD SUGAR HIGHER THAN TARGET DOCUMENTED ABOVE AFTER MAKING HE ABOVE ADJUSTMENT; PLEASE SEND TO US [employer] MESSAGE.     Hemoglobin A1C   Date Value Ref Range Status   03/11/2020 6.9 (A) 4.3 - 6 % Final    09/11/2019 6.1 (A) 4.3 - 6 % Final   05/29/2019 6.1 (A) 4.3 - 6 % Final   01/11/2019 7.4 (H) 0 - 5.6 % Final     Comment:     Normal <5.7% Prediabetes 5.7-6.4%  Diabetes 6.5% or higher - adopted from ADA   consensus guidelines.     09/28/2018 6.3 (H) 0 - 5.6 % Final     Comment:     Normal <5.7% Prediabetes 5.7-6.4%  Diabetes 6.5% or higher - adopted from ADA   consensus guidelines.     06/15/2018 6.6 (H) 0 - 5.6 % Final     Comment:     Normal <5.7% Prediabetes 5.7-6.4%  Diabetes 6.5% or higher - adopted from ADA   consensus guidelines.       TSH   Date Value Ref Range Status   03/11/2020 1.52 0.40 - 4.00 mU/L Final        Follow-up: Return in about 6 months (around 9/11/2020), or SOONER APPOINTMENT IF PREGNANT.     >50% of 30 minute visit spent in face to face counseling, education and coordination of care related to options for better glycemic control as well as preventing, detecting, and treating hypoglycemia.         Scribe Disclosure:  I, Beverly Cronin, am serving as a scribe to document services personally performed by Yuri Hansen MD at this visit, based upon the provider's statements to me. All documentation has been reviewed by the aforementioned provider prior to being entered into the official medical record.     Portions of this medical record were completed by a scribe. UPON MY REVIEW AND AUTHENTICATION BY ELECTRONIC SIGNATURE, this confirms (a) I performed the applicable clinical services, and (b) the record is accurate.      Answers for HPI/ROS submitted by the patient on 2/28/2020   General Symptoms: No  Skin Symptoms: No  HENT Symptoms: No  EYE SYMPTOMS: No  HEART SYMPTOMS: No  LUNG SYMPTOMS: No  INTESTINAL SYMPTOMS: No  URINARY SYMPTOMS: No  GYNECOLOGIC SYMPTOMS: No  BREAST SYMPTOMS: No  SKELETAL SYMPTOMS: No  BLOOD SYMPTOMS: No  NERVOUS SYSTEM SYMPTOMS: No  MENTAL HEALTH SYMPTOMS: No    Yuri Hansen MD  Staff Endocrinologist   Endocrinology and Metabolism  [facility]  [facility]  Pager: [phone]

## 2020-03-11 NOTE — PROGRESS NOTES
[facility]  Endocrinology  Yuri Hansen MD  03/11/2020      Chief Complaint:   Diabetes    History of Present Illness:   Denia Montes is a 34 year old female with a history of diabetes mellitus type 1.5 who presents for follow up.  ANNA (gutamic acid decarboxylase) antibody was very at >250 indicating she has type 1 diabetes in the making (C-Peptide WNL).     She is currently on metformin 2 gm and Levemir insulin (planning pregnancy soon).    She feels like her BG have been elevated particularly one week prior to menses. She takes her BG at least twice daily but sometimes more often. Her morning numbers are relatively low. She is not currently trying to become pregnant but plans on trying in the near future. Denies chest pain, shortness of breath, nausea, vomiting, unintentional weight loss, or vision changes.     Current diabetes regimen:   -Levemir 10 units in morning and 6 units in evening   -Metformin 1000 mg BID     Blood Glucose Monitoring:  We reviewed glucometer data together.  14 day average: 114 mg/dL   03/11: morning 84   03/10: morning 95 and evening 99  03/09: morning 74 and evening 168  03/08: morning 79 and evening 105  03/07: morning 111 and evening 142  03/06: morning 81   03/05: morning 100 and evening 130  03/04: morning 96 and evening 132  03/03: morning 86 and evening 121  03/02: morning 91 and evening 133  03/01: morning 100 and evening 119  02/29: morning 102 and evening 170  02/28: morning 96 and evening 144  02/27: morning 106 and evening 130    Diabetes monitoring and complications:  CAD: No  Last eye exam results: UTD  Microalbuminuria: No  Neuropathy: No  HTN: Yes; not on medications   On Statin: No  On Aspirin: No  Depression: No  Erectile dysfunction: No    Review of Systems:   Pertinent items are noted in HPI.  All other systems are negative.    Active Medications:      insulin detemir (LEVEMIR FLEXTOUCH) 100 UNIT/ML pen, INJECT 12 UNITS UNDER THE SKIN IN THE MORNING AND  "INJECT 8 UNITS AT BEDTIME, Disp: 20 mL, Rfl: 3     ACE/ARB NOT PRESCRIBED, INTENTIONAL,, Please choose reason not prescribed, below, Disp: , Rfl:      blood glucose monitoring (JACI CONTOUR) test strip, USE TO TEST BLOOD SUGAR TWICE DAILY IN MORNING AND EVENING, Disp: 200 strip, Rfl: 1     insulin pen needle (32G X 4 MM) 32G X 4 MM miscellaneous, Use 2 pen needles daily., Disp: 200 each, Rfl: 3     metFORMIN (GLUCOPHAGE) 500 MG tablet, TAKE 2 TABLETS BY MOUTH TWICE DAILY WITH MEALS, Disp: 360 tablet, Rfl: 3     MICROLET LANCETS MISC, TEST BLOOD SUGAR TWICE DAILY, Disp: 200 each, Rfl: 11     Omega-3 Fatty Acids (FISH OIL) 500 MG CAPS, , Disp: , Rfl:      order for DME, Equipment being ordered: Glucometer, brand as covered by insurance. Does not use insulin., Disp: 1 Device, Rfl: 0     order for DME, Lancets bid and prn.  Fill per patient's insurance. Not on insulin, Disp: 200 each, Rfl: 1     order for DME, Test strips for pt's glucometer, brand as covered by insurance Test bid and prn. Not on insulin., Disp: 200 each, Rfl: 1     vitamin B-12 (CYANOCOBALAMIN) 100 MCG tablet, Take 1,000 mcg by mouth daily, Disp: , Rfl:       Allergies:   Sulfa drugs      Past Medical History:  High risk HPV  Diabetes mellitus type 2   Kidney stones   HLD     Past Surgical History:  No pertinent past surgical history     Family History:   Mother: diabetes, HLD, kidney disease, thyroid disease  Father: HLD, HTN, kidney disease, pulmonary disease        Social History:   The patient was alone   Smoking Status: never  Smokeless Tobacco: never    Alcohol Use: no    Marital Status: single     Physical Exam:   /82   Pulse 94   Ht 1.626 m (5' 4\")   Wt 71.2 kg (157 lb)   BMI 26.95 kg/m       Wt Readings from Last 10 Encounters:   03/11/20 71.2 kg (157 lb)   01/14/20 70.3 kg (155 lb)   12/16/19 69.9 kg (154 lb)   09/11/19 69.1 kg (152 lb 6.4 oz)   06/14/19 68.9 kg (152 lb)   05/29/19 69.5 kg (153 lb 4.8 oz)   02/27/19 69.4 kg (153 " lb)   01/30/19 70.9 kg (156 lb 3.2 oz)   12/14/18 69.4 kg (153 lb)   06/22/18 71.2 kg (157 lb)        Constitutional: Pleasant no acute cardiopulmonary distress.   Neurological: Alert and oriented.     Psychological: appropriate mood and affect        Data:  Lab Results   Component Value Date     12/16/2019    POTASSIUM 3.7 12/16/2019    CHLORIDE 106 12/16/2019    CO2 24 12/16/2019    ANIONGAP 7 12/16/2019     (H) 12/16/2019    BUN 13 12/16/2019    CR 0.57 12/16/2019    CATALINA 9.0 12/16/2019     Lab Results   Component Value Date    GFRESTIMATED >90 12/16/2019    GFRESTIMATED >90 01/30/2019    GFRESTIMATED >90 06/15/2018    GFRESTBLACK >90 12/16/2019    GFRESTBLACK >90 01/30/2019    GFRESTBLACK >90 06/15/2018      Lab Results   Component Value Date    MICROL 6 12/16/2019    UMALCR 11.45 12/16/2019        Hemoglobin A1C   Date Value Ref Range Status   03/11/2020 6.9 (A) 4.3 - 6 % Final   09/11/2019 6.1 (A) 4.3 - 6 % Final   05/29/2019 6.1 (A) 4.3 - 6 % Final   01/11/2019 7.4 (H) 0 - 5.6 % Final     Comment:     Normal <5.7% Prediabetes 5.7-6.4%  Diabetes 6.5% or higher - adopted from ADA   consensus guidelines.     09/28/2018 6.3 (H) 0 - 5.6 % Final     Comment:     Normal <5.7% Prediabetes 5.7-6.4%  Diabetes 6.5% or higher - adopted from ADA   consensus guidelines.     06/15/2018 6.6 (H) 0 - 5.6 % Final     Comment:     Normal <5.7% Prediabetes 5.7-6.4%  Diabetes 6.5% or higher - adopted from ADA   consensus guidelines.       Lab Results   Component Value Date    CPEPT 2.3 01/30/2019    GADAB >250.0 (H) 01/30/2019       Lab Results   Component Value Date    CHOL 195 12/16/2019    CHOL 191 09/28/2018    TRIG 75 12/16/2019    TRIG 69 09/28/2018    HDL 54 12/16/2019    HDL 57 09/28/2018     (H) 12/16/2019     (H) 09/28/2018    NHDL 141 (H) 12/16/2019    NHDL 134 (H) 09/28/2018       Assessment:  Type 1 diabetes mellitus without complication (H) Positive ANNA antibody/preconception planing  Denia  Jacqueline Montes is a 34 year old female with a history of diabetes mellitus type 1.5 diagnosed in 05/2015. High ANNA and normal C-peptide normal.  She is currently on metformin 2 gm and Levemir insulin (planning pregnancy soon).    A1c today slightly worsened at 6.9%. No BG download but reviewed her glucometer with her in the room. Based on her numbers, it looks like she more elevated BG one week prior to her menstrual cycle. Will increase her Detemir to 12 units in the morning and 8 units at bedtime. Though, if her fasting blood sugar is >110 then will only increase Levemir to 14 units in morning and 10 units at bedtime. She will let us know if she becomes pregnant and discussed target A1c now as she is planning is <6, fasting capillary blood glucose concentration 80 to 110 mg/dL and two hour postprandial glucose concentration <155 mg/dL.      Patient Instructions      preconception A1C goal for all women with diabetes is <6.5 percent, but we attempt to achieve A1C <6.   - Fasting capillary blood glucose concentration 80 to 110 mg/dL   - Two hour postprandial glucose concentration <155 mg/dL     Please inform us as soon as your are pregnant. Clinic visit needs to be scheduled at that time.       INCREASE INSULIN DETEMIR- INJECT 12 UNITS IN THE MORNING AND 8 UNITS AT BEDTIME.    IF THE FASTING BLOOD SUGAR IS HIGHER THAN TARGET ; INCREASE LEVEMIR DOSE BY 2 ONLY ONCE. SO THE DOSE WILL BE 14 AM IN THE MORNING AND 10 AT BEDTIME.     IF BLOOD SUGAR HIGHER THAN TARGET DOCUMENTED ABOVE AFTER MAKING HE ABOVE ADJUSTMENT; PLEASE SEND TO US [employer] MESSAGE.     Hemoglobin A1C   Date Value Ref Range Status   03/11/2020 6.9 (A) 4.3 - 6 % Final   09/11/2019 6.1 (A) 4.3 - 6 % Final   05/29/2019 6.1 (A) 4.3 - 6 % Final   01/11/2019 7.4 (H) 0 - 5.6 % Final     Comment:     Normal <5.7% Prediabetes 5.7-6.4%  Diabetes 6.5% or higher - adopted from ADA   consensus guidelines.     09/28/2018 6.3 (H) 0 - 5.6 % Final     Comment:      Normal <5.7% Prediabetes 5.7-6.4%  Diabetes 6.5% or higher - adopted from ADA   consensus guidelines.     06/15/2018 6.6 (H) 0 - 5.6 % Final     Comment:     Normal <5.7% Prediabetes 5.7-6.4%  Diabetes 6.5% or higher - adopted from ADA   consensus guidelines.             TSH   Date Value Ref Range Status   03/11/2020 1.52 0.40 - 4.00 mU/L Final        Follow-up: Return in about 6 months (around 9/11/2020), or SOONER APPOINTMENT IF PREGNANT.     >50% of 30 minute visit spent in face to face counseling, education and coordination of care related to options for better glycemic control as well as preventing, detecting, and treating hypoglycemia.         Scribe Disclosure:  I, Beverly Roseanne, am serving as a scribe to document services personally performed by Yuri Hansen MD at this visit, based upon the provider's statements to me. All documentation has been reviewed by the aforementioned provider prior to being entered into the official medical record.     Portions of this medical record were completed by a scribe. UPON MY REVIEW AND AUTHENTICATION BY ELECTRONIC SIGNATURE, this confirms (a) I performed the applicable clinical services, and (b) the record is accurate.      Answers for HPI/ROS submitted by the patient on 2/28/2020   General Symptoms: No  Skin Symptoms: No  HENT Symptoms: No  EYE SYMPTOMS: No  HEART SYMPTOMS: No  LUNG SYMPTOMS: No  INTESTINAL SYMPTOMS: No  URINARY SYMPTOMS: No  GYNECOLOGIC SYMPTOMS: No  BREAST SYMPTOMS: No  SKELETAL SYMPTOMS: No  BLOOD SYMPTOMS: No  NERVOUS SYSTEM SYMPTOMS: No  MENTAL HEALTH SYMPTOMS: No    Yuri Hansen MD  Staff Endocrinologist   Endocrinology and Metabolism  [facility]  Pager: [phone]

## 2020-03-12 NOTE — RESULT ENCOUNTER NOTE
Dale,     The thyroid and B12 results are within the normal limits.     Please let me know if any questions regarding these results.     Yuri Hansen MD

## 2020-03-13 ENCOUNTER — MYC MEDICAL ADVICE (OUTPATIENT)
Dept: FAMILY MEDICINE | Facility: CLINIC | Age: 35
End: 2020-03-13

## 2020-03-13 DIAGNOSIS — E13.9 DIABETES MELLITUS TYPE 1.5 (H): Primary | ICD-10-CM

## 2020-04-03 ENCOUNTER — MYC REFILL (OUTPATIENT)
Dept: ENDOCRINOLOGY | Facility: CLINIC | Age: 35
End: 2020-04-03

## 2020-04-03 DIAGNOSIS — R76.8 POSITIVE GAD ANTIBODY: ICD-10-CM

## 2020-04-18 DIAGNOSIS — E11.9 TYPE 2 DIABETES MELLITUS WITHOUT COMPLICATION, WITHOUT LONG-TERM CURRENT USE OF INSULIN (H): ICD-10-CM

## 2020-05-02 NOTE — TELEPHONE ENCOUNTER
Prescription approved per [facility] Refill Protocol.    
order for DME      Last Written Prescription Date: 1-3-17  Last Fill Quantity: 200,  # refills: 1   Last Office Visit with G, [facility] or [facility] prescribing provider: 2-24-17                                               
Alert-The patient is alert, awake and responds to voice. The patient is oriented to time, place, and person. The triage nurse is able to obtain subjective information.

## 2020-05-26 ENCOUNTER — TELEPHONE (OUTPATIENT)
Dept: ENDOCRINOLOGY | Facility: CLINIC | Age: 35
End: 2020-05-26

## 2020-05-26 DIAGNOSIS — E10.9 TYPE 1 DIABETES MELLITUS WITHOUT COMPLICATION (H): Primary | ICD-10-CM

## 2020-05-26 NOTE — PROGRESS NOTES
"Patients Glucose Data was Sent in [place]       Denia Montes is a 34 year old female who is being evaluated via a billable telephone visit.      The patient has been notified of following:     \"This telephone visit will be conducted via a call between you and your physician/provider. We have found that certain health care needs can be provided without the need for a physical exam.  This service lets us provide the care you need with a short phone conversation.  If a prescription is necessary we can send it directly to your pharmacy.  If lab work is needed we can place an order for that and you can then stop by our lab to have the test done at a later time.    Telephone visits are billed at different rates depending on your insurance coverage. During this emergency period, for some insurers they may be billed the same as an in-person visit.  Please reach out to your insurance provider with any questions.    If during the course of the call the physician/provider feels a telephone visit is not appropriate, you will not be charged for this service.\"    Patient has given verbal consent for Telephone visit?  Yes    What phone number would you like to be contacted at? [phone]    How would you like to obtain your AVS? [facility]  Endocrinology  Yuri Hansen MD  May 27, 2020       Chief Complaint:   Diabetes    History of Present Illness:   Denia Montes is a 34 year old female with a history of diabetes mellitus type 1 who presents for follow up.  ANNA (gutamic acid decarboxylase) antibody was very high >250 indicating she has type 1 diabetes in the making (C-Peptide WNL).     She is currently on metformin 2 gm and Levemir insulin (12 am  and 8 bedtime).  She is currently 4 weeks pregnant.     Here is the blood sugar readings over the last two weeks.     Week of__/__/__ Date  _5_/_26_  Date  _5_/_25_ Date  __/_24_ Date  __/_23_ Date  __/22__ Date  __/__ Date  __/_20_    Time BG Time BG Time BG " Time BG Time BG Time BG Time BG     99  117  74  106    84  94             100           137  85  123      117       99  138  108  132  174  197     Week of__/__/__ Date  __/19__  Date  __/_18_ Date  __/_17_ Date  __/_16_ Date  __/_15_ Date  __/_14_ Date  __/_13_    Time BG Time BG Time BG Time BG Time BG Time BG Time BG     106  70  168  109  97  83  82     137    90      137       146  216               179  256  159    118    163       Diabetes monitoring and complications:  CAD: No  Last eye exam results: UTD - 12/2019 and no retinopathy.   Microalbuminuria: No  Neuropathy: No    Review of Systems:   As noted in HPI.      Active Medications:        Current Outpatient Medications:      ACE/ARB NOT PRESCRIBED, INTENTIONAL,, Please choose reason not prescribed, below (Patient not taking: Reported on 5/27/2020), Disp: , Rfl:      blood glucose monitoring (JACI CONTOUR) test strip, USE TO TEST BLOOD SUGAR TWICE DAILY IN MORNING AND EVENING, Disp: 200 strip, Rfl: 1     Continuous Blood Gluc  (FREESTYLE EJRO 14 DAY READER) RAEANN, Use to read blood sugars as per 's instructions., Disp: 1 each, Rfl: 0     Continuous Blood Gluc Sensor ([employer]works OnDemandSTYLE JERO 14 DAY SENSOR) MISC, Change every 14 days., Disp: 9 each, Rfl: 3     insulin aspart (NOVOLOG PEN) 100 UNIT/ML pen, 1 unit for every 15 grams of carbohydrates with meals and snacks plus correction dose. Uses up to 30 units daily., Disp: 15 mL, Rfl: 1     insulin detemir (LEVEMIR FLEXTOUCH) 100 UNIT/ML pen, Inject 12 Units Subcutaneous 2 times daily, Disp: 20 mL, Rfl: 3     insulin pen needle (32G X 4 MM) 32G X 4 MM miscellaneous, Use 2 pen needles daily., Disp: 200 each, Rfl: 3     metFORMIN (GLUCOPHAGE) 500 MG tablet, Take 2 tablets (1,000 mg) by mouth 2 times daily (with meals), Disp: 360 tablet, Rfl: 3     MICROLET LANCETS MISC, TEST BLOOD SUGAR TWICE DAILY, Disp: 200 each, Rfl: 11     Omega-3 Fatty Acids (FISH OIL) 500 MG CAPS, , Disp: , Rfl:       vitamin B-12 (CYANOCOBALAMIN) 100 MCG tablet, Take 1,000 mcg by mouth daily, Disp: , Rfl:      order for DME, Equipment being ordered: Glucometer, brand as covered by insurance. Does not use insulin., Disp: 1 Device, Rfl: 0     order for DME, Lancets bid and prn.  Fill per patient's insurance. Not on insulin, Disp: 200 each, Rfl: 1     order for DME, Test strips for pt's glucometer, brand as covered by insurance Test bid and prn. Not on insulin., Disp: 200 each, Rfl: 1     Prenatal Vit-Fe Fumarate-FA (PRENATAL VITAMIN) 27-0.8 MG TABS, Take 1 tablet by mouth daily, Disp: 90 tablet, Rfl: 3    Allergies:   Sulfa drugs      Past Medical History:  High risk HPV  Diabetes mellitus type 2   Kidney stones   HLD     Past Surgical History:  No pertinent past surgical history     Family History:   Mother: diabetes, HLD, kidney disease, thyroid disease  Father: HLD, HTN, kidney disease, pulmonary disease        Social History:   The patient was alone   Smoking Status: never  Smokeless Tobacco: never    Alcohol Use: no    Marital Status: single     Physical Exam:       Wt Readings from Last 10 Encounters:   03/11/20 71.2 kg (157 lb)   01/14/20 70.3 kg (155 lb)   12/16/19 69.9 kg (154 lb)   09/11/19 69.1 kg (152 lb 6.4 oz)   06/14/19 68.9 kg (152 lb)   05/29/19 69.5 kg (153 lb 4.8 oz)   02/27/19 69.4 kg (153 lb)   01/30/19 70.9 kg (156 lb 3.2 oz)   12/14/18 69.4 kg (153 lb)   06/22/18 71.2 kg (157 lb)        Constitutional: Pleasant no acute cardiopulmonary distress.   Neurological: Alert and oriented.     Psychological: appropriate mood and affect        Data:  Lab Results   Component Value Date     12/16/2019    POTASSIUM 3.7 12/16/2019    CHLORIDE 106 12/16/2019    CO2 24 12/16/2019    ANIONGAP 7 12/16/2019     (H) 12/16/2019    BUN 13 12/16/2019    CR 0.57 12/16/2019    CATALINA 9.0 12/16/2019     Lab Results   Component Value Date    GFRESTIMATED >90 12/16/2019    GFRESTIMATED >90 01/30/2019    GFRESTIMATED >90  06/15/2018    GFRESTBLACK >90 12/16/2019    GFRESTBLACK >90 01/30/2019    GFRESTBLACK >90 06/15/2018      Lab Results   Component Value Date    MICROL 6 12/16/2019    UMALCR 11.45 12/16/2019        Hemoglobin A1C   Date Value Ref Range Status   03/11/2020 6.9 (A) 4.3 - 6 % Final   09/11/2019 6.1 (A) 4.3 - 6 % Final   05/29/2019 6.1 (A) 4.3 - 6 % Final   01/11/2019 7.4 (H) 0 - 5.6 % Final     Comment:     Normal <5.7% Prediabetes 5.7-6.4%  Diabetes 6.5% or higher - adopted from ADA   consensus guidelines.     09/28/2018 6.3 (H) 0 - 5.6 % Final     Comment:     Normal <5.7% Prediabetes 5.7-6.4%  Diabetes 6.5% or higher - adopted from ADA   consensus guidelines.     06/15/2018 6.6 (H) 0 - 5.6 % Final     Comment:     Normal <5.7% Prediabetes 5.7-6.4%  Diabetes 6.5% or higher - adopted from ADA   consensus guidelines.       Lab Results   Component Value Date    CPEPT 2.3 01/30/2019    GADAB >250.0 (H) 01/30/2019       Lab Results   Component Value Date    CHOL 195 12/16/2019    CHOL 191 09/28/2018    TRIG 75 12/16/2019    TRIG 69 09/28/2018    HDL 54 12/16/2019    HDL 57 09/28/2018     (H) 12/16/2019     (H) 09/28/2018    NHDL 141 (H) 12/16/2019    NHDL 134 (H) 09/28/2018   Results for CHRISTIAN VICENTE (MRN [id number]) as of 5/27/2020 14:52   Ref. Range 3/11/2020 16:22   TSH Latest Ref Range: 0.40 - 4.00 mU/L 1.52   Vitamin B12 Latest Ref Range: 193 - 986 pg/mL 820     Results for CHRISTIAN VICENTE (MRN [id number]) as of 5/27/2020 14:52   Ref. Range 1/30/2019 09:43   Glucose Latest Ref Range: 70 - 99 mg/dL 177 (H)   C-Peptide Latest Ref Range: 0.9 - 6.9 ng/mL 2.3       Assessment:  MAGDI/treated as Type 1 diabetes currently - (H) Positive ANNA antibody/C-peptide WNL- currently 4 weeks pregnant:   Fasting . Postprandial mostly within range but has some high's.   Target blood sugar for fasting less than 95 and postprandial 2 hours less than 120; with that in mind we will adjust her Levemir dose  first.  Increase Levemir to 12 units twice daily.  I have advised patient to pick 1 up to 2 hours postprandial and check blood sugars consistently at 1 or 2 hours and based on those readings in a few days we will plan on adding NovoLog.  Prescription sent to her pharmacy however she will not start the treatment until after her appointment with the diabetes educator and with 1 of our team next week.  I have sent continuous glucose monitor to her pharmacy which would be ideal for monitoring purposes and also sharing the blood sugar information with us.  Questions addressed.    Patient Instructions    It was a pleasure talking to you [place].      INCREASE INSULIN DETEMIR- INJECT 12 UNITS IN THE MORNING AND 12 UNITS AT BEDTIME.    Please check blood sugar 1 hour or two hours after eating (please pick one).   During pregnancy Blood glucose targets are:  Fasting blood glucose concentration ?95 mg/dL   One-hour postprandial blood glucose concentration ?140 mg/dL   Two-hour postprandial glucose concentration ?120 mg/dL     Prescriptions for Novolog and other supplies are resent to [facility]'s pharmacy (orginally prescriptions were sent to the mail pharmacy). Don't start Novolog injection until after your appointment with diabetes educator/or with one of our team members next week. We will determine Novolog dose based on your blood sugar in the next few days.     Please use the continuous glucose monitor sent to your pharmacy ben monitor your blood sugar. It will make monitoring and sharing blood sugar data with us easier.          TSH   Date Value Ref Range Status   03/11/2020 1.52 0.40 - 4.00 mU/L Final        Follow-up:     Yuri Hansen MD  Staff Endocrinologist   Endocrinology and Metabolism  [facility]  Pager: [phone]    Phone call duration: 21 minutes

## 2020-05-26 NOTE — TELEPHONE ENCOUNTER
[facility] Call Center    Phone Message    May a detailed message be left on voicemail: no  Reason for Call: Pt has an telephone appt with provider for [facility]. Pt is requesting lab work.     Action Taken: Message routed to:  [facility] ([facility]): endo    Travel Screening: Not Applicable

## 2020-05-27 ENCOUNTER — VIRTUAL VISIT (OUTPATIENT)
Dept: ENDOCRINOLOGY | Facility: CLINIC | Age: 35
End: 2020-05-27
Payer: COMMERCIAL

## 2020-05-27 ENCOUNTER — VIRTUAL VISIT (OUTPATIENT)
Dept: FAMILY MEDICINE | Facility: CLINIC | Age: 35
End: 2020-05-27
Payer: COMMERCIAL

## 2020-05-27 DIAGNOSIS — Z34.91 PRENATAL CARE IN FIRST TRIMESTER: Primary | ICD-10-CM

## 2020-05-27 DIAGNOSIS — E13.9 DIABETES MELLITUS TYPE 1.5 (H): ICD-10-CM

## 2020-05-27 DIAGNOSIS — Z3A.01 LESS THAN 8 WEEKS GESTATION OF PREGNANCY: Primary | ICD-10-CM

## 2020-05-27 DIAGNOSIS — R76.8 POSITIVE GAD ANTIBODY: ICD-10-CM

## 2020-05-27 DIAGNOSIS — E10.9 TYPE 1 DIABETES MELLITUS WITHOUT COMPLICATION (H): ICD-10-CM

## 2020-05-27 DIAGNOSIS — Z3A.01 LESS THAN 8 WEEKS GESTATION OF PREGNANCY: ICD-10-CM

## 2020-05-27 PROCEDURE — 99214 OFFICE O/P EST MOD 30 MIN: CPT | Mod: 95 | Performed by: PHYSICIAN ASSISTANT

## 2020-05-27 RX ORDER — FLASH GLUCOSE SCANNING READER
EACH MISCELLANEOUS
Qty: 1 EACH | Refills: 0 | Status: SHIPPED | OUTPATIENT
Start: 2020-05-27 | End: 2020-05-27

## 2020-05-27 RX ORDER — PNV NO.95/FERROUS FUM/FOLIC AC 28MG-0.8MG
1 TABLET ORAL DAILY
Qty: 90 TABLET | Refills: 3 | Status: SHIPPED | OUTPATIENT
Start: 2020-05-27 | End: 2020-05-29

## 2020-05-27 RX ORDER — FLASH GLUCOSE SENSOR
KIT MISCELLANEOUS
Qty: 9 EACH | Refills: 3 | Status: SHIPPED | OUTPATIENT
Start: 2020-05-27 | End: 2022-10-12

## 2020-05-27 RX ORDER — FLASH GLUCOSE SENSOR
KIT MISCELLANEOUS
Qty: 9 EACH | Refills: 3 | Status: SHIPPED | OUTPATIENT
Start: 2020-05-27 | End: 2020-05-27

## 2020-05-27 RX ORDER — FLASH GLUCOSE SCANNING READER
EACH MISCELLANEOUS
Qty: 1 EACH | Refills: 0 | Status: SHIPPED | OUTPATIENT
Start: 2020-05-27 | End: 2022-10-12

## 2020-05-27 NOTE — PATIENT INSTRUCTIONS
It was a pleasure talking to you [place].      INCREASE INSULIN DETEMIR- INJECT 12 UNITS IN THE MORNING AND 12 UNITS AT BEDTIME.    Please check blood sugar 1 hour or two hours after eating (please pick one).   During pregnancy Blood glucose targets are:  Fasting blood glucose concentration ?95 mg/dL   One-hour postprandial blood glucose concentration ?140 mg/dL   Two-hour postprandial glucose concentration ?120 mg/dL     Prescriptions for Novolog and other supplies are resent to [facility] pharmacy (orginally prescriptions were sent to the mail pharmacy). Don't start Novolog injection until after your appointment with diabetes educator/or with one of our team members next week. We will determine Novolog dose based on your blood sugar in the next few days.     Please use the continuous glucose monitor sent to your pharmacy ben monitor your blood sugar. It will make monitoring and sharing blood sugar data with us easier.

## 2020-05-27 NOTE — PATIENT INSTRUCTIONS
Patient Education     Coronavirus Disease 2019 (COVID-19): Pregnancy and Childbirth   If you re pregnant or just had a baby, you likely have many questions about how COVID-19 could affect you and your child. Researchers are still learning more about how the virus affects pregnant women and their babies. Below is information to help you work with your healthcare team.   What are my risks of COVID-19 while pregnant?  Researchers don t know if pregnant women are more likely to get COVID-19. But pregnancy can cause changes to your immune system that can cause any viral illness to be more severe. You should take extra care not to get sick during this time. This includes:     Wearing a face mask in public    Washing your hands often    Using hand  when soap and water aren t available    Staying at least 6 feet away from anyone not part of your household    Staying away from anyone who is sick    Cleaning and disinfecting frequently touched surfaces every day    Not traveling if it s not urgent  What are the risks to my baby?  Researchers don t exactly know yet what the risks are with COVID-19 for babies. These are some things they do know:     High fever from any cause in the first trimester of pregnancy can raise the risk for some kinds of birth defects. Tell your healthcare provider if you have a fever. He or she will help you work to keep your fever down.    There are only a few cases of babies found infected with COVID-19 within a few days of birth. But experts don t know if the babies picked up the virus while in the mother s womb, during childbirth, or just after.     birth and low birth weight has happened in cases of other types of coronavirus, such as MERS, and SARS from . But experts don t yet know if these are a risk with COVID-19.    Miscarriage and stillbirth have also happened with MERS and earlier SARS. But experts don t yet know if these are a risk with COVID-19.  Is it safe to keep  my healthcare appointments?  Your healthcare team may change some of your appointments to a phone call or video chat. If you need a blood test, ultrasound, or other test in person, you may need to come without your partner. Wear a mask, use hand , and follow all instructions from the healthcare staff at the visit to protect yourself from the virus. If you have any symptoms of COVID-19, call your healthcare office before you go to your appointment. They will give you instructions to follow.   What if someone in my home is sick with COVID-19 symptoms?  If your partner or another household member has COVID-19 symptoms, he or she should self-isolate. This means staying in one part of the household away from others. He or she should not share food, towels, sheets, or other personal items. Clean common-use surfaces often, such as doorknobs and counter tops. If your partner is sick and it s near your due date, ask your healthcare provider how best to manage when you go into labor. You may be given specific instructions.   Is it safe to give birth at a hospital or birth center?  Medical facilities are taking a lot of safety steps to protect people from COVID-19. Talk with your healthcare provider about the hospital or birth center you are planning to use. Ask where and how pregnant women and their partners and babies are protected. Keep in mind that your birth plan may need to change.   If you have COVID-19 and are in labor, call your healthcare provider and delivery unit before you arrive. Your hospital or birthing center will take steps to protect people around you from infection. You will need to wear a medical mask. You may be in a special room that helps prevent infections from spreading. Your baby may need to be in a separate room after birth. Ask the hospital what to expect if you are pregnant and have COVID-19.   Before and after birth, you will likely be asked to limit the number of visitors at the  hospital. This is important to reduce risk of infection to everyone in the hospital. Follow all healthcare staff instructions, including their instructions on how to prepare your home for when you and baby go home.   Is it safe to give birth at home?  The risks of home birth vary with each woman and each pregnancy. Talk with your healthcare team about the benefits and risks for your pregnancy. Home birth at this time may mean that emergency care could be delayed. If you were planning birth in a hospital or birth center, your healthcare provider may advise that this is still the safest plan.   Is it safe to hold my baby or breastfeed?  The virus hasn t been found in the breastmilk of women with COVID-19. But the virus can spread through coughing, sneezing, and talking. If you have or may have COVID-19, wear a mask while holding your baby or breastfeeding. Wash your hands often when caring for your baby. Your provider may advise you to pump breastmilk to be given to your child by your partner. Wash your hands before and after using the breast pump supplies. If you have COVID and want to breastfeed your baby, talk with your healthcare provider about the best ways to protect your baby.   How can I care for my baby (after discharge) if I am positive for COVID-19?  You will need to self-isolate to limit contact with your baby. You will need to wear a mask and wear clean clothes when holding or feeding your baby. You can pump breastmilk and store it to maintain your milk supply until you are no longer infectious, in 7 to 10 days. Or you can pump and have your partner use this milk to feed the baby.   Is it safe to have visitors see the baby, or help with baby care?  To be safe, it s best to limit visitors, especially people who have recently traveled. People who travel are at higher risk of carrying the virus. Only the closest, well family members should be in direct contact with the baby. Ask anyone who is sick to not  visit. Healthy visitors should wear face masks and keep at least 6 feet from you and the baby. It s also best to limit contact with people who are at higher risk for problems from COVID-19. This includes older adults and people with certain health conditions.   If a visitor is to hold the baby, they should wash their hands first. Wrap the baby in a blanket and then remove the blanket afterward. The visitor should then wash their hands. Visitors should not kiss or touch the baby s face. This does not apply to the closest family members unless they are sick.   Close family members should limit their contact with others and wash their hands before touching the baby.   When to call your healthcare provider  If you re pregnant and have COVID-19 symptoms, call your healthcare provider right away. He or she will ask you questions about your health. You may be advised to stay home and treat your symptoms. Or you may be advised to get medical care.   Last modified date: 4/27/2020   [employer] last reviewed this educational content on 4/1/2020 2000-2020 [employer]. [address] [place] [address]. All rights reserved. This information is not intended as a substitute for professional medical care. Always follow your healthcare professional's instructions.           Patient Education     Comfort Tips During Pregnancy    Talk with your healthcare provider before using pain-relieving medicine at any time during your pregnancy.  First trimester tips  Nausea    Get up slowly. Eat a few unsalted crackers before you get out of bed.    Avoid smells that bother you.    Eat small bland low fat, light high-protein meals at frequent intervals.    Sip on water, weak tea, or clear soft drinks, like ginger ale. Eat ice chips.  Fatigue    Take catnaps when you can.    Get regular exercise.    Accept help from others.    Practice good sleep habits, like going to bed and getting up at the same time each day. Use your  bed only for sleep and sex.  Mood swings    Talk about your feelings with others, including other mothers.    Limit sugar, chocolate, and caffeine.    Eat a healthy diet. Don t skip meals.    Get regular exercise.  Headaches    Get fresh air and exercise.    Relax and get enough rest.    Check with your healthcare provider before taking any pain medicines.  Second trimester tips  Here are some suggestions to help you cope:    To limit ankle swelling, sit with your feet raised or wear support hose.    If you have pain in your groin and stomach (round ligament pain), avoid sudden twisting movements.    For leg cramps, flexing your foot often brings immediate relief. You also may try massaging your calf in long, downward strokes, or stretching your legs before going to bed. Get enough exercise and wear shoes with flexible soles.  Third trimester tips  Reducing heartburn    Eat small, light meals throughout the day rather than 3 large ones.    Sleep with your upper body raised 6 inches. Don t lie down until 2 hours after you eat.    Don't eat greasy, fried, or spicy foods.    Avoid citrus fruits and juices.  Treating constipation    Eat foods high in fiber (whole-grain foods, fresh fruit and vegetables).    Drink plenty of water.    Get regular exercise.    Discuss other medicines (like docusate and psyllium) with your healthcare provider.  Taking care of your breasts    Avoid using harsh soaps or alcohol, which can cause excessive dryness.    Wear nursing bras. They provide more support than regular bras and can be used after pregnancy if you breastfeed.  Getting a good night s sleep    Take a warm shower before bed.    Sleep on a firm mattress.    Lie on your side with 1 leg crossed over the other.    Use pillows to support arms, legs, and belly.  Date Last Reviewed: 10/1/2017    4869-8869 [employer]. [address] [place] [address], [place] [address]. All rights reserved. This information is not intended as a  substitute for professional medical care. Always follow your healthcare professional's instructions.           Patient Education     Established Pregnancy, Normal Symptoms    You are pregnant and are having symptoms that worry you. During pregnancy, it s normal to have many kinds of symptoms. Here is a list of common symptoms that happen during pregnancy.  Head and mouth    Bleeding gums    Dizziness and fainting    Extra saliva    Headaches    Nosebleeds    Skin color changes on your face    Stuffy nose    Mild blurriness of vision, especially if you wear contact lenses  Breasts    Darkening of nipples    Yellow or white discharge from the nipples    Sore breasts and nipples    Swollen breasts  Back, arms, and legs    Back, hip, or thigh pain    Leg cramps that come and go    Numbness and tingling in your hands and fingers    Swollen hands, legs, and feet    Swollen leg veins  Belly (abdomen) and pelvis    Constipation    Feeling of pressure on your bladder and stomach    Need to urinate often    Gas and bloating    Heartburn    Anal itching, swelling, and bleeding (hemorrhoids)    Leaking urine    Mild pressure or cramping in your belly    Nausea and vomiting throughout the day or night (morning sickness)    Swollen belly    Clear to white vaginal discharge  Other symptoms    Dry, itchy skin    Forgetfulness    Less interest in sex    Mood swings    Tiredness    Trouble sleeping  Home care  Here is information that may help relieve some common pregnancy symptoms.  Sore and swollen breasts    Wear a support bra that fits properly.  Nausea and indigestion    Eat smaller meals or snacks more often.    Eat bland foods, such as bananas, crackers, or rice.    Stay away from spicy, fatty, or fried foods.    Stay away from alcohol, caffeine, and tobacco.    Don t lie down right after eating.    Raise your head with pillows when you lie down.    Eat foods or beverages that have ginger. If you drink ginger ale, be sure to  make sure it has real melissa and not just melissa flavoring.  Leg swelling and varicose veins    Wear elastic support hose. Put your feet up as often as possible.  Constipation    Eat more fresh fruits and vegetables and more whole grains. Drink more clear liquids.  Joint and muscle pains    Avoid heavy lifting.    Pick things up by bending at your knees, not at your waist.    Use acetaminophen for joint and muscle pain. Don't use aspirin, ibuprofen, or naproxen.  Mouth and nose dryness or bleeding    Drink more liquids. Use a vaporizer or humidifier in your bedroom.  Don t take medicines or use remedies that your healthcare provider hasn t approved. If you have symptoms that are severe or sudden, call your healthcare provider.  Call 911  Call 911 if any of these occur:    New chest, arm, shoulder, neck, or upper back pain    Trouble breathing    Severe belly pain or very heavy bleeding    Severe lightheadedness, passing out, or fainting    Rapid heart rate    Confusion or difficulty waking up  When to seek medical advice  Call your healthcare provider right away if any of these occur:    Burning or pain when you urinate    Depression or severe anxiety    Desire to eat or drink nonfood items such as paper, dirt, or cleaning products    Diarrhea that lasts more than 24 hours    Fast heartbeat or heart palpitations    Fever of 100.4 F (38 C) or higher, or as directed by your healthcare provider    You can t keep fluids down for 6 hours without vomiting    Severe or ongoing vomiting    Little or no urine    Major vision changes    Moderate or severe belly pain    Severe back pain    Severe constipation    Severe cramping or swelling in a leg, especially if it s just on one side    Severe headache    Sudden swelling of your face, hands, feet, or ankles    Vaginal bleeding    Very itchy skin that doesn t get better  Date Last Reviewed: 10/1/2016    0346-9231 [employer]. [address] [facility], [place], [place]  "[address]. All rights reserved. This information is not intended as a substitute for professional medical care. Always follow your healthcare professional's instructions.           Patient Education     Pregnancy: Your First Trimester Changes  The first trimester is a time of rapid development for your baby. Because your baby is growing so quickly, it is important that you start a healthy lifestyle right away. By the end of the first trimester, your baby has formed all of its major body organs and weighs just over an ounce.     Actual size of baby is 1/4\"    Month 1 (weeks 1 to 4)  The placenta (the organ that nourishes your baby) begins to form. The brain, spinal cord, heart, gastrointestinal tract, and lungs begin to develop. Your baby is about 1/4-inch long by the end of the first month.     Actual size of baby is 1\"      Month 2 (weeks 5 to 8)  All of your baby s major body organs form. The face, fingers, toes, ears, and eyes appear. By the end of the month, your baby is about 1-inch long.     Actual size of baby is 3\"      Month 3 (weeks 9 to 12)  Your baby can open and close its fists and mouth. The sexual organs begin to form. As the first trimester ends, your baby is about 3-inches long.  Date Last Reviewed: 10/1/2017    6841-1838 [employer]. [address] [place] [address], [place] [address]. All rights reserved. This information is not intended as a substitute for professional medical care. Always follow your healthcare professional's instructions.           "

## 2020-05-27 NOTE — PROGRESS NOTES
"Denia Montes is a 34 year old female who is being evaluated via a billable telephone visit.      The patient has been notified of following:     \"This telephone visit will be conducted via a call between you and your physician/provider. We have found that certain health care needs can be provided without the need for a physical exam.  This service lets us provide the care you need with a short phone conversation.  If a prescription is necessary we can send it directly to your pharmacy.  If lab work is needed we can place an order for that and you can then stop by our lab to have the test done at a later time.    Telephone visits are billed at different rates depending on your insurance coverage. During this emergency period, for some insurers they may be billed the same as an in-person visit.  Please reach out to your insurance provider with any questions.    If during the course of the call the physician/provider feels a telephone visit is not appropriate, you will not be charged for this service.\"    Patient has given verbal consent for Telephone visit?  Yes    What phone number would you like to be contacted at? [phone]    How would you like to obtain your AVS? Mail a copy    Subjective     Denia Montes is a 34 year old female who presents via phone visit today for the following health issues:    HPI  Was not specific on phone re: what she exactly she wanted to talk about. Other than the fact that she is pregnant and who should she see for this.    - Patient , confirmed by home UPT. LMP 2020 (exact date).  - Has been experiencing some sore breasts, but otherwise asymptomatic.  - Not taking a prenatal vitamin.  - Hx DM type 1.5, followed by endocrinology, had appt with them today to discuss management during pregnancy.    Patient Active Problem List   Diagnosis     History of kidney stones     Hyperlipidemia LDL goal <70     Cervical high risk HPV (human papillomavirus) test positive "     Plantar warts     Diabetes mellitus type 1.5 (H)     History reviewed. No pertinent surgical history.    Social History     Tobacco Use     Smoking status: Never Smoker     Smokeless tobacco: Never Used   Substance Use Topics     Alcohol use: No     Family History   Problem Relation Age of Onset     Diabetes Mother      Hyperlipidemia Mother      Kidney Disease Mother      Thyroid Disease Mother      Hyperlipidemia Father      Hypertension Father      Kidney Disease Father      Coronary Artery Disease No family hx of      Cerebrovascular Disease No family hx of      Breast Cancer No family hx of      Colon Cancer No family hx of      Prostate Cancer No family hx of      Other Cancer No family hx of      Depression No family hx of      Anxiety Disorder No family hx of      Mental Illness No family hx of      Substance Abuse No family hx of      Anesthesia Reaction No family hx of      Asthma No family hx of      Osteoporosis No family hx of      Genetic Disorder No family hx of      Obesity No family hx of      Unknown/Adopted No family hx of          Current Outpatient Medications   Medication Sig Dispense Refill     blood glucose monitoring (Sigasi CONTOUR) test strip USE TO TEST BLOOD SUGAR TWICE DAILY IN MORNING AND EVENING 200 strip 1     metFORMIN (GLUCOPHAGE) 500 MG tablet Take 2 tablets (1,000 mg) by mouth 2 times daily (with meals) 360 tablet 3     MICROLET LANCETS MISC TEST BLOOD SUGAR TWICE DAILY 200 each 11     Omega-3 Fatty Acids (FISH OIL) 500 MG CAPS        Prenatal Vit-Fe Fumarate-FA (PRENATAL VITAMIN) 27-0.8 MG TABS Take 1 tablet by mouth daily 90 tablet 3     vitamin B-12 (CYANOCOBALAMIN) 100 MCG tablet Take 1,000 mcg by mouth daily       ACE/ARB NOT PRESCRIBED, INTENTIONAL, Please choose reason not prescribed, below (Patient not taking: Reported on 5/27/2020)       Continuous Blood Gluc  (FREESTYLE JERO 14 DAY READER) RAEANN Use to read blood sugars as per 's instructions. 1  each 0     Continuous Blood Gluc Sensor (FREESTYLE JERO 14 DAY SENSOR) MISC Change every 14 days. 9 each 3     insulin aspart (NOVOLOG PEN) 100 UNIT/ML pen 1 unit for every 15 grams of carbohydrates with meals and snacks plus correction dose. Uses up to 30 units daily. 15 mL 1     insulin detemir (LEVEMIR FLEXTOUCH) 100 UNIT/ML pen Inject 12 Units Subcutaneous 2 times daily 20 mL 3     insulin pen needle (32G X 4 MM) 32G X 4 MM miscellaneous Use 2 pen needles daily. 200 each 3     order for DME Equipment being ordered: Glucometer, brand as covered by insurance. Does not use insulin. 1 Device 0     order for DME Lancets bid and prn.  Fill per patient's insurance. Not on insulin 200 each 1     order for DME Test strips for pt's glucometer, brand as covered by insurance Test bid and prn. Not on insulin. 200 each 1       Reviewed and updated as needed this visit by Provider  Tobacco  Allergies  Meds  Problems  Med Hx  Surg Hx  Fam Hx         Review of Systems   Constitutional, HEENT, cardiovascular, pulmonary, GI, , musculoskeletal, neuro, skin, endocrine and psych systems are negative, except as otherwise noted.       Objective   PSYCH: Alert and oriented times 3; coherent speech, normal   rate and volume, able to articulate logical thoughts, able   to abstract reason, no tangential thoughts, no hallucinations   or delusions  Her affect is normal  RESP: No cough, no audible wheezing, able to talk in full sentences  Remainder of exam unable to be completed due to telephone visits            Assessment/Plan:  1. Less than 8 weeks gestation of pregnancy  - OB/GYN REFERRAL  - Prenatal Vit-Fe Fumarate-FA (PRENATAL VITAMIN) 27-0.8 MG TABS; Take 1 tablet by mouth daily  Dispense: 90 tablet; Refill: 3    2. Diabetes mellitus type 1.5 (H)  - OB/GYN REFERRAL    - Patient with AMA and DM, high risk pregnancy, recommend Ob at [facility], referral placed.  - Current GA 4w4d per LMP, STORM 1/30/2021  - Start daily prenatal  vitamin  - Discussed common sx during 1st trimester, how to manage, and when to contact provider. Discussed safe OTC medications to use during early pregnancy if needed.  - Continue regular f/u with endocrinology as scheduled.    Return in about 4 weeks (around 6/24/2020) for Establish care with Obstetrics.      Phone call duration:  24 minutes    Lexy Saucedo PA-C

## 2020-05-27 NOTE — LETTER
"5/27/2020       RE: Denia Montes  [address]  [facility] [address]     Dear Colleague,    Thank you for referring your patient, Denia Montes, to the [facility] ENDOCRINOLOGY at [facility]. Please see a copy of my visit note below.    Patients Glucose Data was Sent in Texert       Denia Monets is a 34 year old female who is being evaluated via a billable telephone visit.      The patient has been notified of following:     \"This telephone visit will be conducted via a call between you and your physician/provider. We have found that certain health care needs can be provided without the need for a physical exam.  This service lets us provide the care you need with a short phone conversation.  If a prescription is necessary we can send it directly to your pharmacy.  If lab work is needed we can place an order for that and you can then stop by our lab to have the test done at a later time.    Telephone visits are billed at different rates depending on your insurance coverage. During this emergency period, for some insurers they may be billed the same as an in-person visit.  Please reach out to your insurance provider with any questions.    If during the course of the call the physician/provider feels a telephone visit is not appropriate, you will not be charged for this service.\"    Patient has given verbal consent for Telephone visit?  Yes    What phone number would you like to be contacted at? [phone]    How would you like to obtain your AVS? [facility]  Endocrinology  Yuri Hansen MD  May 27, 2020       Chief Complaint:   Diabetes    History of Present Illness:   Denia Montes is a 34 year old female with a history of diabetes mellitus type 1 who presents for follow up.  ANNA (gutamic acid decarboxylase) antibody was very high >250 indicating she has type 1 diabetes in the making (C-Peptide WNL).     She is currently on metformin 2 gm " and Levemir insulin (12 am  and 8 bedtime).  She is currently 4 weeks pregnant.     Here is the blood sugar readings over the last two weeks.     Week of__/__/__ Date  _5_/_26_  Date  _5_/_25_ Date  __/_24_ Date  __/_23_ Date  __/22__ Date  __/__ Date  __/_20_    Time BG Time BG Time BG Time BG Time BG Time BG Time BG     99  117  74  106    84  94             100           137  85  123      117       99  138  108  132  174  197     Week of__/__/__ Date  __/19__  Date  __/_18_ Date  __/_17_ Date  __/_16_ Date  __/_15_ Date  __/_14_ Date  __/_13_    Time BG Time BG Time BG Time BG Time BG Time BG Time BG     106  70  168  109  97  83  82     137    90      137       146  216               179  256  159    118    163       Diabetes monitoring and complications:  CAD: No  Last eye exam results: UTD - 12/2019 and no retinopathy.   Microalbuminuria: No  Neuropathy: No    Review of Systems:   As noted in HPI.      Active Medications:        Current Outpatient Medications:      ACE/ARB NOT PRESCRIBED, INTENTIONAL,, Please choose reason not prescribed, below (Patient not taking: Reported on 5/27/2020), Disp: , Rfl:      blood glucose monitoring (JACI CONTOUR) test strip, USE TO TEST BLOOD SUGAR TWICE DAILY IN MORNING AND EVENING, Disp: 200 strip, Rfl: 1     Continuous Blood Gluc  (FREESTYLE JERO 14 DAY READER) RAEANN, Use to read blood sugars as per 's instructions., Disp: 1 each, Rfl: 0     Continuous Blood Gluc Sensor (PressgramSTYLE JERO 14 DAY SENSOR) [facility], Change every 14 days., Disp: 9 each, Rfl: 3     insulin aspart (NOVOLOG PEN) 100 UNIT/ML pen, 1 unit for every 15 grams of carbohydrates with meals and snacks plus correction dose. Uses up to 30 units daily., Disp: 15 mL, Rfl: 1     insulin detemir (LEVEMIR FLEXTOUCH) 100 UNIT/ML pen, Inject 12 Units Subcutaneous 2 times daily, Disp: 20 mL, Rfl: 3     insulin pen needle (32G X 4 MM) 32G X 4 MM miscellaneous, Use 2 pen needles daily., Disp: 200 each,  Rfl: 3     metFORMIN (GLUCOPHAGE) 500 MG tablet, Take 2 tablets (1,000 mg) by mouth 2 times daily (with meals), Disp: 360 tablet, Rfl: 3     MICROLET LANCETS MISC, TEST BLOOD SUGAR TWICE DAILY, Disp: 200 each, Rfl: 11     Omega-3 Fatty Acids (FISH OIL) 500 MG CAPS, , Disp: , Rfl:      vitamin B-12 (CYANOCOBALAMIN) 100 MCG tablet, Take 1,000 mcg by mouth daily, Disp: , Rfl:      order for DME, Equipment being ordered: Glucometer, brand as covered by insurance. Does not use insulin., Disp: 1 Device, Rfl: 0     order for DME, Lancets bid and prn.  Fill per patient's insurance. Not on insulin, Disp: 200 each, Rfl: 1     order for DME, Test strips for pt's glucometer, brand as covered by insurance Test bid and prn. Not on insulin., Disp: 200 each, Rfl: 1     Prenatal Vit-Fe Fumarate-FA (PRENATAL VITAMIN) 27-0.8 MG TABS, Take 1 tablet by mouth daily, Disp: 90 tablet, Rfl: 3    Allergies:   Sulfa drugs      Past Medical History:  High risk HPV  Diabetes mellitus type 2   Kidney stones   HLD     Past Surgical History:  No pertinent past surgical history     Family History:   Mother: diabetes, HLD, kidney disease, thyroid disease  Father: HLD, HTN, kidney disease, pulmonary disease        Social History:   The patient was alone   Smoking Status: never  Smokeless Tobacco: never    Alcohol Use: no    Marital Status: single     Physical Exam:       Wt Readings from Last 10 Encounters:   03/11/20 71.2 kg (157 lb)   01/14/20 70.3 kg (155 lb)   12/16/19 69.9 kg (154 lb)   09/11/19 69.1 kg (152 lb 6.4 oz)   06/14/19 68.9 kg (152 lb)   05/29/19 69.5 kg (153 lb 4.8 oz)   02/27/19 69.4 kg (153 lb)   01/30/19 70.9 kg (156 lb 3.2 oz)   12/14/18 69.4 kg (153 lb)   06/22/18 71.2 kg (157 lb)        Constitutional: Pleasant no acute cardiopulmonary distress.   Neurological: Alert and oriented.     Psychological: appropriate mood and affect        Data:  Lab Results   Component Value Date     12/16/2019    POTASSIUM 3.7 12/16/2019     CHLORIDE 106 12/16/2019    CO2 24 12/16/2019    ANIONGAP 7 12/16/2019     (H) 12/16/2019    BUN 13 12/16/2019    CR 0.57 12/16/2019    CATALINA 9.0 12/16/2019     Lab Results   Component Value Date    GFRESTIMATED >90 12/16/2019    GFRESTIMATED >90 01/30/2019    GFRESTIMATED >90 06/15/2018    GFRESTBLACK >90 12/16/2019    GFRESTBLACK >90 01/30/2019    GFRESTBLACK >90 06/15/2018      Lab Results   Component Value Date    MICROL 6 12/16/2019    UMALCR 11.45 12/16/2019        Hemoglobin A1C   Date Value Ref Range Status   03/11/2020 6.9 (A) 4.3 - 6 % Final   09/11/2019 6.1 (A) 4.3 - 6 % Final   05/29/2019 6.1 (A) 4.3 - 6 % Final   01/11/2019 7.4 (H) 0 - 5.6 % Final     Comment:     Normal <5.7% Prediabetes 5.7-6.4%  Diabetes 6.5% or higher - adopted from ADA   consensus guidelines.     09/28/2018 6.3 (H) 0 - 5.6 % Final     Comment:     Normal <5.7% Prediabetes 5.7-6.4%  Diabetes 6.5% or higher - adopted from ADA   consensus guidelines.     06/15/2018 6.6 (H) 0 - 5.6 % Final     Comment:     Normal <5.7% Prediabetes 5.7-6.4%  Diabetes 6.5% or higher - adopted from ADA   consensus guidelines.       Lab Results   Component Value Date    CPEPT 2.3 01/30/2019    GADAB >250.0 (H) 01/30/2019       Lab Results   Component Value Date    CHOL 195 12/16/2019    CHOL 191 09/28/2018    TRIG 75 12/16/2019    TRIG 69 09/28/2018    HDL 54 12/16/2019    HDL 57 09/28/2018     (H) 12/16/2019     (H) 09/28/2018    NHDL 141 (H) 12/16/2019    NHDL 134 (H) 09/28/2018   Results for CHRISTIAN VICENTE (MRN [id number]) as of 5/27/2020 14:52   Ref. Range 3/11/2020 16:22   TSH Latest Ref Range: 0.40 - 4.00 mU/L 1.52   Vitamin B12 Latest Ref Range: 193 - 986 pg/mL 820     Results for CHRISTIAN VICENTE (MRN [id number]) as of 5/27/2020 14:52   Ref. Range 1/30/2019 09:43   Glucose Latest Ref Range: 70 - 99 mg/dL 177 (H)   C-Peptide Latest Ref Range: 0.9 - 6.9 ng/mL 2.3       Assessment:  MAGDI/treated as Type 1 diabetes currently  - (H) Positive ANNA antibody/C-peptide WNL- currently 4 weeks pregnant:   Fasting . Postprandial mostly within range but has some high's.   Target blood sugar for fasting less than 95 and postprandial 2 hours less than 120; with that in mind we will adjust her Levemir dose first.  Increase Levemir to 12 units twice daily.  I have advised patient to pick 1 up to 2 hours postprandial and check blood sugars consistently at 1 or 2 hours and based on those readings in a few days we will plan on adding NovoLog.  Prescription sent to her pharmacy however she will not start the treatment until after her appointment with the diabetes educator and with 1 of our team next week.  I have sent continuous glucose monitor to her pharmacy which would be ideal for monitoring purposes and also sharing the blood sugar information with us.  Questions addressed.    Patient Instructions    It was a pleasure talking to you Elbert.      INCREASE INSULIN DETEMIR- INJECT 12 UNITS IN THE MORNING AND 12 UNITS AT BEDTIME.    Please check blood sugar 1 hour or two hours after eating (please pick one).   During pregnancy Blood glucose targets are:  Fasting blood glucose concentration ?95 mg/dL   One-hour postprandial blood glucose concentration ?140 mg/dL   Two-hour postprandial glucose concentration ?120 mg/dL     Prescriptions for Novolog and other supplies are resent to [facility]'s pharmacy (orginally prescriptions were sent to the mail pharmacy). Don't start Novolog injection until after your appointment with diabetes educator/or with one of our team members next week. We will determine Novolog dose based on your blood sugar in the next few days.     Please use the continuous glucose monitor sent to your pharmacy ben monitor your blood sugar. It will make monitoring and sharing blood sugar data with us easier.          TSH   Date Value Ref Range Status   03/11/2020 1.52 0.40 - 4.00 mU/L Final        Follow-up:     Yuri Hansen  MD  Staff Endocrinologist   Endocrinology and Metabolism  [facility]  Pager: [phone]    Phone call duration: 21 minutes          Again, thank you for allowing me to participate in the care of your patient.      Sincerely,    Yuri Hansen MD

## 2020-06-02 ENCOUNTER — ALLIED HEALTH/NURSE VISIT (OUTPATIENT)
Dept: EDUCATION SERVICES | Facility: CLINIC | Age: 35
End: 2020-06-02
Payer: COMMERCIAL

## 2020-06-02 ENCOUNTER — MYC MEDICAL ADVICE (OUTPATIENT)
Dept: ENDOCRINOLOGY | Facility: CLINIC | Age: 35
End: 2020-06-02

## 2020-06-02 DIAGNOSIS — O24.011 PREGNANCY COMPLICATED BY PRE-EXISTING TYPE 1 DIABETES IN FIRST TRIMESTER: Primary | ICD-10-CM

## 2020-06-02 PROCEDURE — 98968 PH1 ASSMT&MGMT NQHP 21-30: CPT
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
## 2020-06-02 NOTE — Clinical Note
Hi Dr. Chandler,   Just a note that Denia has started diabetes education and seems to have a good understanding. We will continue to work with her on at least a weekly basis.   Thank you! Mindy Gomez RD, LD, CDE

## 2020-06-04 ENCOUNTER — MYC MEDICAL ADVICE (OUTPATIENT)
Dept: ENDOCRINOLOGY | Facility: CLINIC | Age: 35
End: 2020-06-04

## 2020-06-04 DIAGNOSIS — R76.8 POSITIVE GAD ANTIBODY: ICD-10-CM

## 2020-06-04 NOTE — TELEPHONE ENCOUNTER
Pharm D Petra Bowser Rx Novolog pen injectors, injecting 3x daily + PRN, need needles for 7x daily.

## 2020-06-04 NOTE — TELEPHONE ENCOUNTER
Review and recommendations:    Yuri Hansen MD Guillen Givins, Luce 30 minutes ago (10:46 AM)          [place],     There is enhanced insuline sensitivity in first trimester (early pregnancy); I agree bedtime Levmir dose needs to be reduced. I also recommend stopping metformin at this time. We will adjust the insulin dose based on your blood sugars.  #1 please reduce evening dose of Levmir to 8 units. #2 continue morning dose of Levmir without any change. #3 stop metformin. #4 please start using Novolog 1 unit for every 15 grams of carbohydrates with meals. #5 we will schedule a telephone visit for next week #6 please let me know if your blood sugars are below 70 on more than two occasions.      Thank you,   Yuri Hansen MD

## 2020-06-05 ENCOUNTER — ALLIED HEALTH/NURSE VISIT (OUTPATIENT)
Dept: NURSING | Facility: CLINIC | Age: 35
End: 2020-06-05
Payer: COMMERCIAL

## 2020-06-05 DIAGNOSIS — Z23 NEED FOR VACCINATION: Primary | ICD-10-CM

## 2020-06-05 PROCEDURE — 90636 HEP A/HEP B VACC ADULT IM: CPT

## 2020-06-05 PROCEDURE — 99207 ZZC NO CHARGE NURSE ONLY: CPT

## 2020-06-05 PROCEDURE — 90471 IMMUNIZATION ADMIN: CPT

## 2020-06-09 ENCOUNTER — PRENATAL OFFICE VISIT (OUTPATIENT)
Dept: NURSING | Facility: CLINIC | Age: 35
End: 2020-06-09
Payer: COMMERCIAL

## 2020-06-09 ENCOUNTER — MYC MEDICAL ADVICE (OUTPATIENT)
Dept: ENDOCRINOLOGY | Facility: CLINIC | Age: 35
End: 2020-06-09

## 2020-06-09 VITALS — BODY MASS INDEX: 26.46 KG/M2 | HEIGHT: 64 IN | WEIGHT: 155 LBS

## 2020-06-09 DIAGNOSIS — O09.519 AMA (ADVANCED MATERNAL AGE) PRIMIGRAVIDA 35+: Primary | ICD-10-CM

## 2020-06-09 PROCEDURE — 99207 ZZC NO CHARGE NURSE ONLY: CPT

## 2020-06-09 SDOH — SOCIAL STABILITY: SOCIAL INSECURITY
WITHIN THE LAST YEAR, HAVE TO BEEN RAPED OR FORCED TO HAVE ANY KIND OF SEXUAL ACTIVITY BY YOUR PARTNER OR EX-PARTNER?: NO

## 2020-06-09 SDOH — SOCIAL STABILITY: SOCIAL INSECURITY: WITHIN THE LAST YEAR, HAVE YOU BEEN HUMILIATED OR EMOTIONALLY ABUSED IN OTHER WAYS BY YOUR PARTNER OR EX-PARTNER?: NO

## 2020-06-09 SDOH — HEALTH STABILITY: MENTAL HEALTH
STRESS IS WHEN SOMEONE FEELS TENSE, NERVOUS, ANXIOUS, OR CAN'T SLEEP AT NIGHT BECAUSE THEIR MIND IS TROUBLED. HOW STRESSED ARE YOU?: NOT AT ALL

## 2020-06-09 SDOH — SOCIAL STABILITY: SOCIAL INSECURITY: WITHIN THE LAST YEAR, HAVE YOU BEEN AFRAID OF YOUR PARTNER OR EX-PARTNER?: NO

## 2020-06-09 SDOH — SOCIAL STABILITY: SOCIAL INSECURITY
WITHIN THE LAST YEAR, HAVE YOU BEEN KICKED, HIT, SLAPPED, OR OTHERWISE PHYSICALLY HURT BY YOUR PARTNER OR EX-PARTNER?: NO

## 2020-06-09 ASSESSMENT — MIFFLIN-ST. JEOR: SCORE: 1383.08

## 2020-06-09 NOTE — PROGRESS NOTES
Important Information for Provider:     New ob nurse intake by phone, second pregnancy, AMA. History of TAB. Declined genetic screening. Denies any problems at this time. Patient is diabetic and taking insulin, she has upcoming phone visits with her provider. Has NOB with Dr Cali 7/03/2020. TSH, A1C ordered with NOB labs.handouts reviewed and mailed      Caffeine intake/servings daily - 1  Calcium intake/servings daily - 3  Exercise 5 times weekly - describe ; bikes, walks, hikes, precautions given  Sunscreen used - Yes  Seatbelts used - Yes  Guns stored in the home - No  Self Breast Exam - Yes  Pap test up to date -  Yes 12/16/2019  Eye exam up to date -  Yes  Dental exam up to date -  Yes  DEXA scan up to date -  No  Flex Sig/Colonoscopy up to date -  No  Mammography up to date -  No  Immunizations reviewed and up to date - Yes  Abuse: Current or Past (Physical, Sexual or Emotional) - No  Do you feel safe in your environment - Yes  Do you cope well with stress - Yes  Do you suffer from insomnia - No          Prenatal OB Questionnaire  Patient supplied answers from flow sheet for:  Prenatal OB Questionnaire.  Past Medical History  Diabetes?: (!) Yes  Hypertension : No  Heart disease, mitral valve prolapse or rheumatic fever?: No  An autoimmune disease such as lupus or rheumatoid arthritis?: No  Kidney disease or urinary tract infection?: No  Epilepsy, seizures or spells?: No  Migraine headaches?: No  A stroke or loss of function or sensation?: No  Any other neurological problems?: No  Have you ever been treated for depression?: No  Are you having problems with crying spells or loss of self-esteem?: No  Have you ever required psychiatric care?: No  Have you ever had hepatitis, liver disease or jaundice?: No  Have you been treated for blood clots in your veins, deep vein thromosis, inflammation in the veins, thrombosis, phlebitis, pulmonary embolism or varicosities?: No  Have you had excessive bleeding after  surgery or dental work?: No  Do you bleed more than other women after a cut or scratch?: No  Do you have a history of anemia?: No  Have you ever had thyroid problems or taken thyroid medication?: No   Do you have any endocrine problems?: No  Have you ever been in a major accident or suffered serious trauma?: No  Within the last year, has anyone hit, slapped, kicked or otherwise hurt you?: No  In the last year, has anyone forced you to have sex when you didn't want to?: No    Past Medical History 2   Have you ever received a blood transfusion?: No  Would you refuse a blood transfusion if a doctor judged it to be medically necessary?: No   If you answered Yes, would you rather die than receive a blood transfusion?: No  If you answered Yes, is this for Christianity reasons?: No  Does anyone in your home smoke?: No  Do you use tobacco products?: No  Do you drink beer, wine or hard liquor?: No  Do you use any of the following: marijuana, speed, cocaine, heroin, hallucinogens or other drugs?: No   Is your blood type Rh negative?: No  Have you ever had abnormal antibodies in your blood?: No  Have you ever had asthma?: No  Have you ever had tuberculosis?: No  Do you have any allergies to drugs or over-the-counter medications?: (!) Yes sulfa  Allergies: Dust Mites, Aspartame, Ethanol, Venlafaxine, Hydrochloride, Sertraline: No  Have you had any breast problems?: No  Have you ever ?: No  Have you had any gynecological surgical procedures such as cervical conization, a LEEP procedure, laser treatment, cryosurgery of the cervix or a dilation and curettage, etc?: No  Have you ever had any other surgical procedures?: No  Have you been hospitalized for a nonsurgical reason excluding normal delivery?: No  Have you ever had any anesthetic complications?: No  Have you ever had an abnormal pap smear?: (!) Yes    Past Medical History (Continued)  Do you have a history of abnormalities of the uterus?: No  Did your mother take NORM  or any other hormones when she was pregnant with you?: No  Did it take you more than a year to become pregnant?: No  Have you ever been evaluated or treated for infertility?: No  Is there a history of medical problems in your family, which you feel may be important to this pregnancy?: No  Do you have any other problems we have not asked about which you feel may be important to this pregnancy?: No    Symptoms since last menstrual period  Do you have any of the following symptoms: abdominal pain, blood in stools or urine, chest pain, shortness of breath, coughing or vomiting up blood, your heart racing or skipping beats, nausea and vomiting, pain on urination or vaginal discharge or bleed: (!) Yes  Will the patient be 35 years old or older at the time of delivery?: (!) Yes    Has the patient, baby's father or anyone in either family had:  Thalassemia (Italian, Greek, Mediterranean or  background only) and an MCV result less than 80?: No  Neural tube defect such as meningomyelocele, spina bifida or anencephaly?: No  Congenital heart defect?: No  Down's Syndrome?: No  Stef-Sachs disease (Orthodox, Cajun, Tanzanian-[place]oy)?: No  Sickle cell disease or trait ()?: No  Hemophilia or other inherited problems of blood?: No  Muscular dystrophy?: No  Cystic fibrosis?: No  [place]'s chorea?: No  Mental retardation/autism?: No  If yes, was the person tested for fragile X?: No  Any other inherited genetic or chromosomal disorder?: No  Maternal metabolic disorder (e.g Insulin-dependent diabetes, PKU)?: No  A child with birth defects not listed above?: No  Recurrent pregnancy loss or stillbirth?: No   Has the patient had any medications/street drugs/alcohol since her last menstrual period?: No  Does the patient or baby's father have any other genetic risks?: No    Infection History   Do you object to being tested for Hepatitis B?: No  Do you object to being tested for HIV?: No   Do you feel that you are at high risk for  coming in contact with the AIDS virus?: No  Have you ever been treated for tuberculosis?: No  Have you ever had a positive skin test for tuberculosis?: No  Do you live with someone who has tuberculosis?: No  Have you ever been exposed to tuberculosis?: No  Do you have genital herpes?: No  Does your partner have genital herpes?: No  Have you had a viral illness since your last period?: No  Have you ever had gonorrhea, chlamydia, syphilis, venereal warts, trichomoniasis, pelvic inflammatory disease or any other sexually transmitted disease?: No  Do you know if you are a genital group B streptococcus carrier?: No  Have you had chicken pox/varicella?: (!) Yes   Have you been vaccinated against chicken Pox?: No  Have you had any other infectious diseases?: No      Allergies as of 6/9/2020:    Allergies as of 06/09/2020 - Reviewed 05/27/2020   Allergen Reaction Noted     Sulfa drugs Hives 05/01/2015       Current medications are:  Current Outpatient Medications   Medication Sig Dispense Refill     ACE/ARB NOT PRESCRIBED, INTENTIONAL, Please choose reason not prescribed, below       blood glucose monitoring (JACI CONTOUR) test strip USE TO TEST BLOOD SUGAR TWICE DAILY IN MORNING AND EVENING 200 strip 1     Continuous Blood Gluc  (FREESTYLE JERO 14 DAY READER) RAEANN Use to read blood sugars as per 's instructions. 1 each 0     Continuous Blood Gluc Sensor (Polyglot SystemsSTYLE JERO 14 DAY SENSOR) MISC Change every 14 days. 9 each 3     insulin aspart (NOVOLOG PEN) 100 UNIT/ML pen 1 unit for every 15 grams of carbohydrates with meals and snacks plus correction dose. Uses up to 30 units daily. 15 mL 1     insulin detemir (LEVEMIR FLEXTOUCH) 100 UNIT/ML pen Inject 12 Units Subcutaneous 2 times daily 20 mL 3     insulin pen needle (32G X 4 MM) 32G X 4 MM miscellaneous Use 7 pen needles daily. 700 each 3     Omega-3 Fatty Acids (FISH OIL) 500 MG CAPS        order for DME Equipment being ordered: Glucometer, brand as  covered by insurance. Does not use insulin. 1 Device 0     Prenatal Vit-Fe Fumarate-FA (PRENATAL VITAMIN) 27-0.8 MG TABS Take 1 tablet by mouth daily 90 tablet 3     metFORMIN (GLUCOPHAGE) 500 MG tablet        MICROLET LANCETS MISC TEST BLOOD SUGAR TWICE DAILY 200 each 11     order for DME Lancets bid and prn.  Fill per patient's insurance. Not on insulin (Patient not taking: Reported on 6/9/2020) 200 each 1     order for DME Test strips for pt's glucometer, brand as covered by insurance Test bid and prn. Not on insulin. (Patient not taking: Reported on 6/9/2020) 200 each 1     vitamin B-12 (CYANOCOBALAMIN) 100 MCG tablet Take 1,000 mcg by mouth daily           Early ultrasound screening tool:    Does patient have irregular periods?  No  Did patient use hormonal birth control in the three months prior to positive urine pregnancy test? No  Is the patient breastfeeding?  No  Is the patient 10 weeks or greater at time of education visit?  No

## 2020-06-09 NOTE — PROGRESS NOTES
"Patients Glucose Data was Sent in [employer]    .Denia Montes is a 34 year old female who is being evaluated via a billable telephone visit.      The patient has been notified of following:     \"This telephone visit will be conducted via a call between you and your physician/provider. We have found that certain health care needs can be provided without the need for a physical exam.  This service lets us provide the care you need with a short phone conversation.  If a prescription is necessary we can send it directly to your pharmacy.  If lab work is needed we can place an order for that and you can then stop by our lab to have the test done at a later time.    Telephone visits are billed at different rates depending on your insurance coverage. During this emergency period, for some insurers they may be billed the same as an in-person visit.  Please reach out to your insurance provider with any questions.    If during the course of the call the physician/provider feels a telephone visit is not appropriate, you will not be charged for this service.\"    Patient has given verbal consent for Telephone visit?  Yes    What phone number would you like to be contacted at? [phone]    How would you like to obtain your AVS? [employer]  Patients Glucose Data was Sent in [facility]  Endocrinology  Yuri Hansen MD  Minda 10, 2020         Chief Complaint:   Diabetes, pregnant     History of Present Illness:   Denia Montes is a 34 year old female with a history of diabetes mellitus type 1 who presents for follow up.  ANNA (gutamic acid decarboxylase) antibody was very high >250 indicating she has type 1 diabetes in the making (C-Peptide WNL).   Currently pregnant @ less than 8 weeks.   She is currently on Levemir insulin (12 am  and 8 bedtime) and Novolog 1 unit for every 15 grams of CHO with meals.   Here is the blood sugar readings over the last two weeks.                       This morning   Diabetes " monitoring and complications:  CAD: No  Last eye exam results: UTD - 12/2019 and no retinopathy.   Microalbuminuria: No  Neuropathy: No    Review of Systems:   As noted in HPI.      Active Medications:        Current Outpatient Medications:      ACE/ARB NOT PRESCRIBED, INTENTIONAL,, Please choose reason not prescribed, below, Disp: , Rfl:      blood glucose (CONTOUR NEXT TEST) test strip, Use to test blood sugar 6 times daily or as directed., Disp: 500 each, Rfl: 3     blood glucose monitoring (JACI CONTOUR) test strip, USE TO TEST BLOOD SUGAR TWICE DAILY IN MORNING AND EVENING, Disp: 200 strip, Rfl: 1     Continuous Blood Gluc  (FREESTYLE JERO 14 DAY READER) RAEANN, Use to read blood sugars as per 's instructions., Disp: 1 each, Rfl: 0     Continuous Blood Gluc Sensor (AffleSTYLE JERO 14 DAY SENSOR) MISC, Change every 14 days., Disp: 9 each, Rfl: 3     insulin aspart (NOVOLOG PEN) 100 UNIT/ML pen, 1 unit for every 15 grams of carbohydrates with meals and snacks plus correction dose. Uses up to 30 units daily., Disp: 15 mL, Rfl: 1     insulin detemir (LEVEMIR FLEXTOUCH) 100 UNIT/ML pen, Inject 12 Units Subcutaneous 2 times daily, Disp: 20 mL, Rfl: 3     insulin pen needle (32G X 4 MM) 32G X 4 MM miscellaneous, Use 7 pen needles daily., Disp: 700 each, Rfl: 3     MICROLET LANCETS MISC, TEST BLOOD SUGAR TWICE DAILY, Disp: 200 each, Rfl: 11     Omega-3 Fatty Acids (FISH OIL) 500 MG CAPS, , Disp: , Rfl:      order for DME, Equipment being ordered: Glucometer, brand as covered by insurance. Does not use insulin., Disp: 1 Device, Rfl: 0     order for DME, Lancets bid and prn.  Fill per patient's insurance. Not on insulin, Disp: 200 each, Rfl: 1     order for DME, Test strips for pt's glucometer, brand as covered by insurance Test bid and prn. Not on insulin., Disp: 200 each, Rfl: 1     Prenatal Vit-Fe Fumarate-FA (PRENATAL VITAMIN) 27-0.8 MG TABS, Take 1 tablet by mouth daily, Disp: 90 tablet, Rfl:  3     vitamin B-12 (CYANOCOBALAMIN) 100 MCG tablet, Take 1,000 mcg by mouth daily, Disp: , Rfl:     Allergies:   Sulfa drugs      Past Medical History:  High risk HPV  Diabetes mellitus type 2   Kidney stones   HLD     Past Surgical History:  No pertinent past surgical history     Family History:   Mother: diabetes, HLD, kidney disease, thyroid disease  Father: HLD, HTN, kidney disease, pulmonary disease        Social History:   The patient was alone   Smoking Status: never  Smokeless Tobacco: never    Alcohol Use: no    Marital Status: single     Physical Exam:     Constitutional: Pleasant no acute cardiopulmonary distress.   Neurological: Alert and oriented.     Psychological: appropriate mood and affect        Data:  Lab Results   Component Value Date     12/16/2019    POTASSIUM 3.7 12/16/2019    CHLORIDE 106 12/16/2019    CO2 24 12/16/2019    ANIONGAP 7 12/16/2019     (H) 12/16/2019    BUN 13 12/16/2019    CR 0.57 12/16/2019    CATALINA 9.0 12/16/2019     Lab Results   Component Value Date    GFRESTIMATED >90 12/16/2019    GFRESTIMATED >90 01/30/2019    GFRESTIMATED >90 06/15/2018    GFRESTBLACK >90 12/16/2019    GFRESTBLACK >90 01/30/2019    GFRESTBLACK >90 06/15/2018      Lab Results   Component Value Date    MICROL 6 12/16/2019    UMALCR 11.45 12/16/2019        Hemoglobin A1C   Date Value Ref Range Status   03/11/2020 6.9 (A) 4.3 - 6 % Final   09/11/2019 6.1 (A) 4.3 - 6 % Final   05/29/2019 6.1 (A) 4.3 - 6 % Final   01/11/2019 7.4 (H) 0 - 5.6 % Final     Comment:     Normal <5.7% Prediabetes 5.7-6.4%  Diabetes 6.5% or higher - adopted from ADA   consensus guidelines.     09/28/2018 6.3 (H) 0 - 5.6 % Final     Comment:     Normal <5.7% Prediabetes 5.7-6.4%  Diabetes 6.5% or higher - adopted from ADA   consensus guidelines.     06/15/2018 6.6 (H) 0 - 5.6 % Final     Comment:     Normal <5.7% Prediabetes 5.7-6.4%  Diabetes 6.5% or higher - adopted from ADA   consensus guidelines.       Lab Results    Component Value Date    CPEPT 2.3 01/30/2019    GADAB >250.0 (H) 01/30/2019       Lab Results   Component Value Date    CHOL 195 12/16/2019    CHOL 191 09/28/2018    TRIG 75 12/16/2019    TRIG 69 09/28/2018    HDL 54 12/16/2019    HDL 57 09/28/2018     (H) 12/16/2019     (H) 09/28/2018    NHDL 141 (H) 12/16/2019    NHDL 134 (H) 09/28/2018   Results for CHRISTIAN VICENTE (MRN [id number]) as of 5/27/2020 14:52   Ref. Range 3/11/2020 16:22   TSH Latest Ref Range: 0.40 - 4.00 mU/L 1.52   Vitamin B12 Latest Ref Range: 193 - 986 pg/mL 820     Results for CHRISTIAN VICENTE (MRN [id number]) as of 5/27/2020 14:52   Ref. Range 1/30/2019 09:43   Glucose Latest Ref Range: 70 - 99 mg/dL 177 (H)   C-Peptide Latest Ref Range: 0.9 - 6.9 ng/mL 2.3       Assessment:  MAGDI/treated as Type 1 diabetes currently - (H) Positive ANNA antibody/C-peptide WNL- currently 6.5 weeks pregnant:   Based on blood glucose readings documented above the following was discussed.     Patient Instructions    It was a pleasure talking to you Christian.      INCREASE INSULIN DETEMIR- INJECT 12 UNITS IN THE MORNING AND 10 UNITS AT BEDTIME. If fasting above 90- please increase further evening Determir insulin to 12 units after three days.     Please continue Novolog 1 units for every 15 grams of carbohydrates with meals three times per day.     Please continue to check blood sugar two hours after eating.   During pregnancy Blood glucose targets are:  Fasting blood glucose concentration ?95 mg/dL   One-hour postprandial blood glucose concentration ?140 mg/dL   Two-hour postprandial glucose concentration ?120 mg/dL           TSH   Date Value Ref Range Status   03/11/2020 1.52 0.40 - 4.00 mU/L Final        Follow-up:     Yuri Hansen MD  Staff Endocrinologist   Endocrinology and Metabolism  [facility]  Pager: [phone]    Phone call duration: 15 minutes

## 2020-06-10 ENCOUNTER — VIRTUAL VISIT (OUTPATIENT)
Dept: ENDOCRINOLOGY | Facility: CLINIC | Age: 35
End: 2020-06-10
Payer: COMMERCIAL

## 2020-06-10 DIAGNOSIS — Z34.91 PRENATAL CARE IN FIRST TRIMESTER: Primary | ICD-10-CM

## 2020-06-10 DIAGNOSIS — Z3A.01 LESS THAN 8 WEEKS GESTATION OF PREGNANCY: ICD-10-CM

## 2020-06-10 DIAGNOSIS — E10.9 TYPE 1 DIABETES MELLITUS WITHOUT COMPLICATION (H): ICD-10-CM

## 2020-06-10 NOTE — LETTER
"6/10/2020       RE: Denia Montes  [address]  [facility] [address]     Dear Colleague,    Thank you for referring your patient, Denia Montes, to the [facility] ENDOCRINOLOGY at [facility]. Please see a copy of my visit note below.    Patients Glucose Data was Sent in [employer]    .Denia Montes is a 34 year old female who is being evaluated via a billable telephone visit.      The patient has been notified of following:     \"This telephone visit will be conducted via a call between you and your physician/provider. We have found that certain health care needs can be provided without the need for a physical exam.  This service lets us provide the care you need with a short phone conversation.  If a prescription is necessary we can send it directly to your pharmacy.  If lab work is needed we can place an order for that and you can then stop by our lab to have the test done at a later time.    Telephone visits are billed at different rates depending on your insurance coverage. During this emergency period, for some insurers they may be billed the same as an in-person visit.  Please reach out to your insurance provider with any questions.    If during the course of the call the physician/provider feels a telephone visit is not appropriate, you will not be charged for this service.\"    Patient has given verbal consent for Telephone visit?  Yes    What phone number would you like to be contacted at? [phone]    How would you like to obtain your AVS? Code On Network Coding  Patients Glucose Data was Sent in [employer]       [facility]  Endocrinology  Yuri Hansen MD  Minda 10, 2020         Chief Complaint:   Diabetes, pregnant     History of Present Illness:   Denia Montes is a 34 year old female with a history of diabetes mellitus type 1 who presents for follow up.  ANNA (gutamic acid decarboxylase) antibody was very high >250 indicating she has type 1 diabetes in the " making (C-Peptide WNL).   Currently pregnant @ less than 8 weeks.   She is currently on Levemir insulin (12 am  and 8 bedtime) and Novolog 1 unit for every 15 grams of CHO with meals.   Here is the blood sugar readings over the last two weeks.                       This morning   Diabetes monitoring and complications:  CAD: No  Last eye exam results: UTD - 12/2019 and no retinopathy.   Microalbuminuria: No  Neuropathy: No    Review of Systems:   As noted in HPI.      Active Medications:        Current Outpatient Medications:      ACE/ARB NOT PRESCRIBED, INTENTIONAL,, Please choose reason not prescribed, below, Disp: , Rfl:      blood glucose (CONTOUR NEXT TEST) test strip, Use to test blood sugar 6 times daily or as directed., Disp: 500 each, Rfl: 3     blood glucose monitoring (JACI CONTOUR) test strip, USE TO TEST BLOOD SUGAR TWICE DAILY IN MORNING AND EVENING, Disp: 200 strip, Rfl: 1     Continuous Blood Gluc  (FREESTYLE JERO 14 DAY READER) RAEANN, Use to read blood sugars as per 's instructions., Disp: 1 each, Rfl: 0     Continuous Blood Gluc Sensor (ActionSTYLE JERO 14 DAY SENSOR) MISC, Change every 14 days., Disp: 9 each, Rfl: 3     insulin aspart (NOVOLOG PEN) 100 UNIT/ML pen, 1 unit for every 15 grams of carbohydrates with meals and snacks plus correction dose. Uses up to 30 units daily., Disp: 15 mL, Rfl: 1     insulin detemir (LEVEMIR FLEXTOUCH) 100 UNIT/ML pen, Inject 12 Units Subcutaneous 2 times daily, Disp: 20 mL, Rfl: 3     insulin pen needle (32G X 4 MM) 32G X 4 MM miscellaneous, Use 7 pen needles daily., Disp: 700 each, Rfl: 3     MICROLET LANCETS MISC, TEST BLOOD SUGAR TWICE DAILY, Disp: 200 each, Rfl: 11     Omega-3 Fatty Acids (FISH OIL) 500 MG CAPS, , Disp: , Rfl:      order for DME, Equipment being ordered: Glucometer, brand as covered by insurance. Does not use insulin., Disp: 1 Device, Rfl: 0     order for DME, Lancets bid and prn.  Fill per patient's insurance. Not on  insulin, Disp: 200 each, Rfl: 1     order for DME, Test strips for pt's glucometer, brand as covered by insurance Test bid and prn. Not on insulin., Disp: 200 each, Rfl: 1     Prenatal Vit-Fe Fumarate-FA (PRENATAL VITAMIN) 27-0.8 MG TABS, Take 1 tablet by mouth daily, Disp: 90 tablet, Rfl: 3     vitamin B-12 (CYANOCOBALAMIN) 100 MCG tablet, Take 1,000 mcg by mouth daily, Disp: , Rfl:     Allergies:   Sulfa drugs      Past Medical History:  High risk HPV  Diabetes mellitus type 2   Kidney stones   HLD     Past Surgical History:  No pertinent past surgical history     Family History:   Mother: diabetes, HLD, kidney disease, thyroid disease  Father: HLD, HTN, kidney disease, pulmonary disease        Social History:   The patient was alone   Smoking Status: never  Smokeless Tobacco: never    Alcohol Use: no    Marital Status: single     Physical Exam:     Constitutional: Pleasant no acute cardiopulmonary distress.   Neurological: Alert and oriented.     Psychological: appropriate mood and affect        Data:  Lab Results   Component Value Date     12/16/2019    POTASSIUM 3.7 12/16/2019    CHLORIDE 106 12/16/2019    CO2 24 12/16/2019    ANIONGAP 7 12/16/2019     (H) 12/16/2019    BUN 13 12/16/2019    CR 0.57 12/16/2019    CATALINA 9.0 12/16/2019     Lab Results   Component Value Date    GFRESTIMATED >90 12/16/2019    GFRESTIMATED >90 01/30/2019    GFRESTIMATED >90 06/15/2018    GFRESTBLACK >90 12/16/2019    GFRESTBLACK >90 01/30/2019    GFRESTBLACK >90 06/15/2018      Lab Results   Component Value Date    MICROL 6 12/16/2019    UMALCR 11.45 12/16/2019        Hemoglobin A1C   Date Value Ref Range Status   03/11/2020 6.9 (A) 4.3 - 6 % Final   09/11/2019 6.1 (A) 4.3 - 6 % Final   05/29/2019 6.1 (A) 4.3 - 6 % Final   01/11/2019 7.4 (H) 0 - 5.6 % Final     Comment:     Normal <5.7% Prediabetes 5.7-6.4%  Diabetes 6.5% or higher - adopted from ADA   consensus guidelines.     09/28/2018 6.3 (H) 0 - 5.6 % Final      Comment:     Normal <5.7% Prediabetes 5.7-6.4%  Diabetes 6.5% or higher - adopted from ADA   consensus guidelines.     06/15/2018 6.6 (H) 0 - 5.6 % Final     Comment:     Normal <5.7% Prediabetes 5.7-6.4%  Diabetes 6.5% or higher - adopted from ADA   consensus guidelines.       Lab Results   Component Value Date    CPEPT 2.3 01/30/2019    GADAB >250.0 (H) 01/30/2019       Lab Results   Component Value Date    CHOL 195 12/16/2019    CHOL 191 09/28/2018    TRIG 75 12/16/2019    TRIG 69 09/28/2018    HDL 54 12/16/2019    HDL 57 09/28/2018     (H) 12/16/2019     (H) 09/28/2018    NHDL 141 (H) 12/16/2019    NHDL 134 (H) 09/28/2018   Results for CHRISTIAN VICENTE (MRN [id number]) as of 5/27/2020 14:52   Ref. Range 3/11/2020 16:22   TSH Latest Ref Range: 0.40 - 4.00 mU/L 1.52   Vitamin B12 Latest Ref Range: 193 - 986 pg/mL 820     Results for CHRISTIAN VICENTE (MRN [id number]) as of 5/27/2020 14:52   Ref. Range 1/30/2019 09:43   Glucose Latest Ref Range: 70 - 99 mg/dL 177 (H)   C-Peptide Latest Ref Range: 0.9 - 6.9 ng/mL 2.3       Assessment:  MAGDI/treated as Type 1 diabetes currently - (H) Positive ANNA antibody/C-peptide WNL- currently 6.5 weeks pregnant:   Based on blood glucose readings documented above the following was discussed.     Patient Instructions    It was a pleasure talking to you [place].      INCREASE INSULIN DETEMIR- INJECT 12 UNITS IN THE MORNING AND 10 UNITS AT BEDTIME. If fasting above 90- please increase further evening Determir insulin to 12 units after three days.     Please continue Novolog 1 units for every 15 grams of carbohydrates with meals three times per day.     Please continue to check blood sugar two hours after eating.   During pregnancy Blood glucose targets are:  Fasting blood glucose concentration ?95 mg/dL   One-hour postprandial blood glucose concentration ?140 mg/dL   Two-hour postprandial glucose concentration ?120 mg/dL           TSH   Date Value Ref Range Status    03/11/2020 1.52 0.40 - 4.00 mU/L Final        Follow-up:     Yuri Hansen MD  Staff Endocrinologist   Endocrinology and Metabolism  [facility]  Pager: [phone]    Phone call duration: 15 minutes      Again, thank you for allowing me to participate in the care of your patient.      Sincerely,    Yuri Hansen MD

## 2020-06-10 NOTE — PATIENT INSTRUCTIONS
It was a pleasure talking to you [place].      INCREASE INSULIN DETEMIR- INJECT 12 UNITS IN THE MORNING AND 10 UNITS AT BEDTIME. If fasting above 90- please increase further evening Determir insulin to 12 units after three days.     Please continue Novolog 1 units for every 15 grams of carbohydrates with meals three times per day.     Please continue to check blood sugar two hours after eating.   During pregnancy Blood glucose targets are:  Fasting blood glucose concentration ?95 mg/dL   One-hour postprandial blood glucose concentration ?140 mg/dL   Two-hour postprandial glucose concentration ?120 mg/dL

## 2020-06-12 ENCOUNTER — ALLIED HEALTH/NURSE VISIT (OUTPATIENT)
Dept: EDUCATION SERVICES | Facility: CLINIC | Age: 35
End: 2020-06-12
Payer: COMMERCIAL

## 2020-06-12 DIAGNOSIS — O24.011 PREGNANCY COMPLICATED BY PRE-EXISTING TYPE 1 DIABETES IN FIRST TRIMESTER: Primary | ICD-10-CM

## 2020-06-12 PROCEDURE — 98968 PH1 ASSMT&MGMT NQHP 21-30: CPT

## 2020-06-12 NOTE — PROGRESS NOTES
"    Diabetes Self-Management Education & Support    SUBJECTIVE/OBJECTIVE:  Presents for education related to Diabetes type 1.5 managed as type 1 in pregnancy    Patient verbally consented to the telephone visit service today: yes    Note that her name sounds like \"loot-za\"    Accompanied by: Self  Diabetes management related comments/concerns: none today. The shantanu is working well and she feels like she is doing okay but is still learning.  Originally diagnosed as T2D (5-6 years ago), the dx was corrected about a year ago to \"type 1.5\" due to need for insulin.     Next OB Visit Date: 07/03/20 (First OB visit)  Have you ever had thyroid problems or taken thyroid medication?: No  Heart disease, mitral valve prolapse or rheumatic fever?: No  Hypertension : No  Do you use tobacco products?: No  Do you drink beer, wine or hard liquor?: No    Cultural Influences/Ethnic Background:  American    LMP 04/25/2020     Weight gain 0 lbs at < 8 weeks    Estimated Date of Delivery: Jan 30, 2021       Blood Glucose/Ketone Log:       She is using the shantanu and uploads her reader at home.  Her last upload was 2 days ago and the data is in her endo note.  I did link her to our [employer] diabetes shantanu account so we can see the data too. She has been checking using her meter after meals too (the number in parentheses) and all her post meal numbers are 2 hours after.        FBS 80 today.     Lifestyle and Health Behaviors:  Pre-pregnancy weight (lbs): 155  Exercise:: Yes  Days per week of moderate to strenuous exercise (like a brisk walk): 4  On average, minutes per day of exercise at this level: 20  How intense was your typical exercise? : Moderate (like brisk walking)  Exercise Minutes per Week: 80  Barrier to exercise: None  Cultural/Mormon diet restrictions?: Yes  Meal planning/habits: Carb counting, Low carb, Smaller portions, Frequent snacking  How many times a week on average do you eat food made away from home " (restaurant/take-out)?: 2  Meals include: Breakfast, Lunch, Dinner, Morning Snack, Afternoon Snack, Evening Snack  Beverages: Water, Tea, Coffee, Milk, Other  Biggest challenges to healthy eating: None  Pre-deepika vitamin?: Yes  Experiencing nausea?: No  Experiencing heartburn?: No    Eats vegetarian, lacto-ovo        Healthy Coping:  Emotional response to diabetes: Ready to learn, Concern for health and well-being  Informal Support system:: Family, Friends, Significant other  Stage of change: ACTION (Actively working towards change)    Current Management:  Taking medications for gestational diabetes?: Yes  Taking diabetes medications?   yes:     Diabetes Medication(s)     Insulin       insulin aspart (NOVOLOG PEN) 100 UNIT/ML pen    1 unit for every 15 grams of carbohydrates with meals and snacks plus correction dose. Uses up to 30 units daily.     insulin detemir (LEVEMIR FLEXTOUCH) 100 UNIT/ML pen    Inject 12 Units Subcutaneous 2 times daily          Difficulty affording diabetes medication?: No  Difficulty affording diabetes testing supplies?: No    ASSESSMENT:  117 mg/dL was with 8 units of levemir at bedtime and after she had stopped the Metformin. Has since increased to 10 units at bedtime. Has direction from endo to raise bedtime levemir to 12 units if >90 in the morning for 3 days.     Doing novolog if having a higher carb snack and at all meals.  She is still getting used to carb counting but is feeling pretty comfortable with it.     INTERVENTION:  Educational topics covered today:  Reviewed her novolog use with meals and continuing to test 2 hours after meals. Reviewed her glucose trends with her.  Praised her on her carb counting and for taking her insulin as prescribed. No issues with her cory sensors and walked her through how to link to our account as well.      Educational Materials provided today:  None    PLAN:  Check glucose 4 times daily.  Continue use of the Cory sensor. Suggested she  continue to use her meter to check her BG for the after meal numbers as well as scanning.   Upload shantanu before next visit on 6/19 @ 1 pm.   Continue to track your foods and send via e-mail prior to next phone call.   Record carb content and meal dose in food log if able to better assess her carb counting and her calculated doses.    If fasting above 90- please increase further evening Determir insulin to 12 units after three days per endo (reminded her of this).  Continue Novolog 1:15 for all 3 meals.   Follow up scheduled via phone in 1 week.   Continue with recommended physical activity.  Continue to follow recommended meal plan: 2-3 carbs at breakfast, 3-4 carbs at lunch, 3-4 carbs at supper, 1-2 carbs at snacks.  Follow consistent CHO meal plan, eat CHO and protein/fat at all meals/snacks.    Call/e-mail/MyChart message diabetes educator if 3 or more blood sugars are above the goal in 1 week or if ketones are positive.    NIMA Curiel CDE    Time Spent: 28 minutes  Encounter Type: Individual    Any diabetes medication dose changes were made via the CDE Protocol and Collaborative Practice Agreement with the patient's endocrinology provider. A copy of this encounter was shared with the provider.

## 2020-06-15 ENCOUNTER — MYC MEDICAL ADVICE (OUTPATIENT)
Dept: ENDOCRINOLOGY | Facility: CLINIC | Age: 35
End: 2020-06-15

## 2020-06-16 ENCOUNTER — MYC MEDICAL ADVICE (OUTPATIENT)
Dept: ENDOCRINOLOGY | Facility: CLINIC | Age: 35
End: 2020-06-16

## 2020-06-17 ENCOUNTER — DOCUMENTATION ONLY (OUTPATIENT)
Dept: ENDOCRINOLOGY | Facility: CLINIC | Age: 35
End: 2020-06-17

## 2020-06-19 ENCOUNTER — ALLIED HEALTH/NURSE VISIT (OUTPATIENT)
Dept: EDUCATION SERVICES | Facility: CLINIC | Age: 35
End: 2020-06-19
Payer: COMMERCIAL

## 2020-06-19 ENCOUNTER — TELEPHONE (OUTPATIENT)
Dept: EDUCATION SERVICES | Facility: CLINIC | Age: 35
End: 2020-06-19

## 2020-06-19 DIAGNOSIS — O24.011 PREGNANCY COMPLICATED BY PRE-EXISTING TYPE 1 DIABETES IN FIRST TRIMESTER: Primary | ICD-10-CM

## 2020-06-19 PROCEDURE — 98966 PH1 ASSMT&MGMT NQHP 5-10: CPT

## 2020-06-19 NOTE — Clinical Note
Hi Dr. Hansen (FYI only),   I know you just talked to her 2 days ago.  Things look good.     Thanks! Lani Lemus RD, LD, CDE  Diabetes Education

## 2020-06-19 NOTE — Clinical Note
Hello!  (FYI only)   Denia's (Loot-zah's) blood sugars look really good.  She sees you on 6/29 for endocrinology follow up.   She uploads her shantanu at home and we have it connected with our [facility] account.  I am not sure of other accounts that it might be shared with.  If she uploads and you can't get her data, just rimma myself or Swapna and we can quick login and email or screen shot it to you!   Thanks,   Lani Lemus RD, LD, CDE  Diabetes Education

## 2020-06-19 NOTE — PROGRESS NOTES
Diabetes Follow-up  Patient verbally consented to the telephone visit service today: yes    Subjective/Objective:  Denia Montes sent in blood glucose log for review (screen shots of report and logs in the last telephone encounter)  Last date of communication was: 06/12; patient communicated with endocrinology 6/17 for insulin changes. .    Diabetes is being managed with   Lifestyle (diet/activity), Diabetes Medications   Diabetes Medication(s)     Insulin       insulin aspart (NOVOLOG PEN) 100 UNIT/ML pen    1 unit for every 15 grams of carbohydrates with meals and snacks plus correction dose. Uses up to 30 units daily.     insulin detemir (LEVEMIR FLEXTOUCH) 100 UNIT/ML pen    Inject 12 Units Subcutaneous 2 times daily      Per endocrinologist 6/17:  Continue Levemir 12-0-0-12.  Continue 1:15; if continuing to get post prandial readings below 70mg/dL, then decrease insulin to carbohydrate ratio to 1:20       BG/Food Log:   See telephone encounter       Assessment/Plan/Response:  Gestation age 7 weeks and 6 days.  Insulin needs will continue to change throughout pregnancy.  Very well controlled currently.  Is new to continuous glucose monitoring and started this 3 weeks ago.  Finger stick readings confirm fairly good accuracy.  Was not able to upload the FreeStyle Cory over the last 2 days, but noted blood sugars in logs sent in, 100% in target.      Feels the FreeStyle Cory runs a tiny bit lower than true blood sugars (FreeStyle Cory data shared in the [employer] account when uploaded).  Notes this with some lows as well; the cory will read in the red, below 70, however blood sugar fingerstick is in the 70s or higher and she is asymptomatic.    Ex: Fasting 6/17: 74 cory, 88 on fingerstick and today 89 cory, 94 fingerstick.         For meals: Patient feels that some of the lows may come from over estimating carbohydrates and/rounding up on the dose since half unit dosing is not available.  May try  rounding down on doses if running into more hypoglycemia (ex 53gm carbohydrate = 3.5 units using a 1:15 and may try 3u now instead of 4u.  At a 1:20 this would be 2.65units and then rounded up to 3units).   May benefit from a pen that does half unit dosing.   Also recommend rounding down on insulin when active and keeping an eye on sensor glucose patterns around activity.   Encouraged to look at the 8 hour graph on the shantanu reader every morning to see if there was any overnight hypoglycemia.        - mychart endocrinology / or email Diabetes edcuation at anytime if continuing to have hypoglycemia   - send in an email with logs next week to [email or web address] - quick review / check in   - Endocrinology appointment 6/29  - follow up with CDE telephone 7/6       Ana Laura Lemus RD, LD, CDE  Diabetes Education  Time spent: 9 minutes      A copy of this encounter was shared with the provider.

## 2020-06-25 ENCOUNTER — NURSE TRIAGE (OUTPATIENT)
Dept: NURSING | Facility: CLINIC | Age: 35
End: 2020-06-25

## 2020-06-26 ENCOUNTER — NURSE TRIAGE (OUTPATIENT)
Dept: NURSING | Facility: CLINIC | Age: 35
End: 2020-06-26

## 2020-06-26 ENCOUNTER — HOSPITAL ENCOUNTER (EMERGENCY)
Facility: CLINIC | Age: 35
Discharge: HOME | End: 2020-06-26
Attending: EMERGENCY MEDICINE | Admitting: EMERGENCY MEDICINE
Payer: COMMERCIAL

## 2020-06-26 ENCOUNTER — TELEPHONE (OUTPATIENT)
Dept: ENDOCRINOLOGY | Facility: CLINIC | Age: 35
End: 2020-06-26

## 2020-06-26 ENCOUNTER — APPOINTMENT (OUTPATIENT)
Dept: ULTRASOUND IMAGING | Facility: CLINIC | Age: 35
End: 2020-06-26
Attending: EMERGENCY MEDICINE
Payer: COMMERCIAL

## 2020-06-26 ENCOUNTER — MYC MEDICAL ADVICE (OUTPATIENT)
Dept: FAMILY MEDICINE | Facility: CLINIC | Age: 35
End: 2020-06-26

## 2020-06-26 ENCOUNTER — TELEPHONE (OUTPATIENT)
Dept: MIDWIFE SERVICES | Facility: CLINIC | Age: 35
End: 2020-06-26

## 2020-06-26 VITALS
HEART RATE: 88 BPM | SYSTOLIC BLOOD PRESSURE: 116 MMHG | OXYGEN SATURATION: 98 % | RESPIRATION RATE: 22 BRPM | DIASTOLIC BLOOD PRESSURE: 73 MMHG

## 2020-06-26 DIAGNOSIS — O20.9 VAGINAL BLEEDING AFFECTING EARLY PREGNANCY: ICD-10-CM

## 2020-06-26 DIAGNOSIS — O03.9 MISCARRIAGE: ICD-10-CM

## 2020-06-26 DIAGNOSIS — O03.9 COMPLETE MISCARRIAGE: Primary | ICD-10-CM

## 2020-06-26 LAB
ABO + RH BLD: NORMAL
ABO + RH BLD: NORMAL
B-HCG SERPL-ACNC: ABNORMAL IU/L (ref 0–5)
BASOPHILS # BLD AUTO: 0.1 10E9/L (ref 0–0.2)
BASOPHILS NFR BLD AUTO: 0.4 %
DIFFERENTIAL METHOD BLD: ABNORMAL
EOSINOPHIL # BLD AUTO: 0 10E9/L (ref 0–0.7)
EOSINOPHIL NFR BLD AUTO: 0.2 %
ERYTHROCYTE [DISTWIDTH] IN BLOOD BY AUTOMATED COUNT: 14.1 % (ref 10–15)
GLUCOSE BLDC GLUCOMTR-MCNC: 121 MG/DL (ref 70–99)
HCT VFR BLD AUTO: 34.6 % (ref 35–47)
HGB BLD-MCNC: 11.2 G/DL (ref 11.7–15.7)
IMM GRANULOCYTES # BLD: 0.1 10E9/L (ref 0–0.4)
IMM GRANULOCYTES NFR BLD: 0.5 %
LYMPHOCYTES # BLD AUTO: 4.1 10E9/L (ref 0.8–5.3)
LYMPHOCYTES NFR BLD AUTO: 20.3 %
MCH RBC QN AUTO: 26 PG (ref 26.5–33)
MCHC RBC AUTO-ENTMCNC: 32.4 G/DL (ref 31.5–36.5)
MCV RBC AUTO: 80 FL (ref 78–100)
MONOCYTES # BLD AUTO: 1.1 10E9/L (ref 0–1.3)
MONOCYTES NFR BLD AUTO: 5.7 %
NEUTROPHILS # BLD AUTO: 14.6 10E9/L (ref 1.6–8.3)
NEUTROPHILS NFR BLD AUTO: 72.9 %
NRBC # BLD AUTO: 0 10*3/UL
NRBC BLD AUTO-RTO: 0 /100
PLATELET # BLD AUTO: 482 10E9/L (ref 150–450)
RBC # BLD AUTO: 4.31 10E12/L (ref 3.8–5.2)
SPECIMEN EXP DATE BLD: NORMAL
WBC # BLD AUTO: 20 10E9/L (ref 4–11)

## 2020-06-26 PROCEDURE — 76815 OB US LIMITED FETUS(S): CPT

## 2020-06-26 PROCEDURE — 86901 BLOOD TYPING SEROLOGIC RH(D): CPT | Performed by: EMERGENCY MEDICINE

## 2020-06-26 PROCEDURE — 99284 EMERGENCY DEPT VISIT MOD MDM: CPT | Mod: 25 | Performed by: EMERGENCY MEDICINE

## 2020-06-26 PROCEDURE — 25000128 H RX IP 250 OP 636: Performed by: EMERGENCY MEDICINE

## 2020-06-26 PROCEDURE — 96374 THER/PROPH/DIAG INJ IV PUSH: CPT

## 2020-06-26 PROCEDURE — 86900 BLOOD TYPING SEROLOGIC ABO: CPT | Performed by: EMERGENCY MEDICINE

## 2020-06-26 PROCEDURE — 85025 COMPLETE CBC W/AUTO DIFF WBC: CPT | Performed by: EMERGENCY MEDICINE

## 2020-06-26 PROCEDURE — 25000132 ZZH RX MED GY IP 250 OP 250 PS 637: Performed by: EMERGENCY MEDICINE

## 2020-06-26 PROCEDURE — 76817 TRANSVAGINAL US OBSTETRIC: CPT

## 2020-06-26 PROCEDURE — 00000146 ZZHCL STATISTIC GLUCOSE BY METER IP

## 2020-06-26 PROCEDURE — 76815 OB US LIMITED FETUS(S): CPT | Mod: 26 | Performed by: EMERGENCY MEDICINE

## 2020-06-26 PROCEDURE — 84702 CHORIONIC GONADOTROPIN TEST: CPT | Performed by: EMERGENCY MEDICINE

## 2020-06-26 PROCEDURE — 99284 EMERGENCY DEPT VISIT MOD MDM: CPT

## 2020-06-26 RX ORDER — OXYCODONE HYDROCHLORIDE 5 MG/1
5 TABLET ORAL ONCE
Status: COMPLETED | OUTPATIENT
Start: 2020-06-26 | End: 2020-06-26

## 2020-06-26 RX ORDER — METOCLOPRAMIDE HYDROCHLORIDE 5 MG/ML
10 INJECTION INTRAMUSCULAR; INTRAVENOUS ONCE
Status: COMPLETED | OUTPATIENT
Start: 2020-06-26 | End: 2020-06-26

## 2020-06-26 RX ORDER — OXYCODONE HYDROCHLORIDE 5 MG/1
5 TABLET ORAL EVERY 6 HOURS PRN
Qty: 5 TABLET | Refills: 0 | Status: SHIPPED | OUTPATIENT
Start: 2020-06-26 | End: 2020-11-17

## 2020-06-26 RX ADMIN — OXYCODONE HYDROCHLORIDE 5 MG: 5 TABLET ORAL at 02:55

## 2020-06-26 RX ADMIN — METOCLOPRAMIDE HYDROCHLORIDE 10 MG: 5 INJECTION INTRAMUSCULAR; INTRAVENOUS at 02:42

## 2020-06-26 NOTE — TELEPHONE ENCOUNTER
Pt has new OB scheduled with you next week.  She was in ED last evening with vaginal bleeding.  US did not show IUP, it was presumed that she had already passed POC.  They advised clinic follow-up as scheduled.  Do you want to see pt on Tuesday to f/u after SAB?  Advised that she should have HCG checked on Monday prior to her appt.  (ED HCG was 11,906.)  Judith Barrientos RN

## 2020-06-26 NOTE — TELEPHONE ENCOUNTER
"S: Possible miscarriage.  BN: 9 weeks pregnant confirmed via home pregnancy test. Starting spotting late evening on  brownish colored blood.  Today has lower back pain \"feels like when my period is starting.\"  Has no abdominal pain.  Bleeding today is dark red with mucous.  Denia has never had a miscarriage before.  Does not have a fever.    A: Writer paged on call PCP Dr. Alatorre.   R: Per Dr. Alatorre edwin Loza call OB-gyn  office in the morning to get an appointment.  If bleeding increases, develops a fever or does not feel well go to the ED  Writer called patient with Dr. Alatorre's instructions.   Tanya Howard RN, [facility]    Additional Information    [1] Threatened miscarriage (threatened ) suspected AND [2] has not been examined by a HCP    Negative: Shock suspected (e.g., cold/pale/clammy skin, too weak to stand, low BP, rapid pulse)    Negative: Difficult to awaken or acting confused (e.g., disoriented, slurred speech)    Negative: Passed out (i.e., lost consciousness, collapsed and was not responding)    Negative: Sounds like a life-threatening emergency to the triager    Negative: SEVERE abdominal pain    Negative: [1] SEVERE vaginal bleeding (i.e., soaking 2 pads / hour, large blood clots) AND [2] present 2 or more hours    Negative: SEVERE dizziness (e.g., unable to stand, requires support to walk, feels like passing out)    Negative: [1] MODERATE vaginal bleeding (i.e., soaking 1 pad / hour; clots) AND [2] present > 6 hours    Negative: [1] MODERATE vaginal bleeding (i.e., soaking 1 pad / hour; clots) AND [2] pregnant > 12 weeks    Negative: Passed tissue (e.g., gray-white)    Negative: Shoulder pain    Negative: [1] Constant abdominal pain AND [2] present > 2 hours    Negative: Fever > 100.4 F (38.0 C)    Negative: [1] Intermittent lower abdominal pain (e.g., cramping) AND [2] present > 24 hours    Negative: Prior history of \"ectopic pregnancy\" or previous tubal surgery (e.g., " tubal ligation)    Negative: Pain or burning with passing urine (urination)    Negative: MODERATE vaginal bleeding (i.e., soaking 1 pad / hour; clots)    Negative: Has IUD    MILD vaginal bleeding (i.e., less than 1 pad / hour; less than patient's usual menstrual bleeding; not just spotting)    Protocols used:  - THREATENED MISCARRIAGE FOLLOW-UP CALL-A-AH, PREGNANCY - VAGINAL BLEEDING LESS THAN 20 WEEKS EGA-A-AH    COVID 19 Nurse Triage Plan/Patient Instructions    Please be aware that novel coronavirus (COVID-19) may be circulating in the community. If you develop symptoms such as fever, cough, or SOB or if you have concerns about the presence of another infection including coronavirus (COVID-19), please contact your health care provider or visit [email or web address].     Disposition/Instructions    Patient to schedule an In Person Visit with provider on . Reference Visit Selection Guide.    Thank you for taking steps to prevent the spread of this virus.  o Limit your contact with others.  o Wear a simple mask to cover your cough.  o Wash your hands well and often.    Resources    [facility]: About COVID-19: [email or web address]    CDC: What to Do If You're Sick: www.cdc.gov/coronavirus/2019-ncov/about/steps-when-sick.html    CDC: Ending Home Isolation: www.cdc.gov/coronavirus/2019-ncov/hcp/disposition-in-home-patients.html     CDC: Caring for Someone: www.cdc.gov/coronavirus/2019-ncov/if-you-are-sick/care-for-someone.html     [facility]: Interim Guidance for Hospital Discharge to Home: www.health.[facility].[place]/diseases/coronavirus/hcp/hospdischarge.pdf    [facility] clinical trials (COVID-19 research studies): clinicalaffairs.[facility]/um-clinical-trials     Below are the COVID-19 hotlines at the [place] Department of Health ([facility]). Interpreters are available.   o For health questions: Call 282-013-8662 or 1-750.901.3093 (7 a.m. to 7 p.m.)  o For questions about schools and childcare:  Call 541-989-0397 or 1-334.613.9749 (7 a.m. to 7 p.m.)

## 2020-06-26 NOTE — DISCHARGE INSTRUCTIONS
Use 650 mg acetaminophen every 6 hours as needed for pain. Use 1 tablet of oxycodone as needed for severe pain.     Please call in the morning to make an appointment to follow up with OB/Gyn - [facility] (phone: [phone]) in 2 days.     Return to the ED if you are having worsening symptoms, or any urgent/life-threatening concerns.

## 2020-06-26 NOTE — TELEPHONE ENCOUNTER
Caller is complaining of vaginal bleeding, cramping pain. Caller is 8 weeks pregnant. Caller states the spotting started two days ago, now she is passing blood clots, has on a pad, and states when she toilets, must of the vaginal bleeding seeps out in the toilet. Caller states there isn't much bleeding on the pad. Triage guidelines recommend to see pcp within 4 hours. Caller verbalized and understands directives.  COVID 19 Nurse Triage Plan/Patient Instructions    Please be aware that novel coronavirus (COVID-19) may be circulating in the community. If you develop symptoms such as fever, cough, or SOB or if you have concerns about the presence of another infection including coronavirus (COVID-19), please contact your health care provider or visit [email or web address].     Disposition/Instructions    Patient to go to ED and follow protocol based instructions.     Bring Your Own Device:  Please also bring your smart device(s) (smart phones, tablets, laptops) and their charging cables for your personal use and to communicate with your care team during your visit.    Thank you for taking steps to prevent the spread of this virus.  o Limit your contact with others.  o Wear a simple mask to cover your cough.  o Wash your hands well and often.    Resources    [facility]: About COVID-19: [email or web address]    CDC: What to Do If You're Sick: www.cdc.gov/coronavirus/2019-ncov/about/steps-when-sick.html    CDC: Ending Home Isolation: www.cdc.gov/coronavirus/2019-ncov/hcp/disposition-in-home-patients.html     CDC: Caring for Someone: www.cdc.gov/coronavirus/2019-ncov/if-you-are-sick/care-for-someone.html     [facility]: Interim Guidance for Hospital Discharge to Home: www.health.[facility].[place]/diseases/coronavirus/hcp/hospdischarge.pdf    [facility] clinical trials (COVID-19 research studies): [email or web address]     Below are the COVID-19 hotlines at the [place] Department of Health  ([facility]). Interpreters are available.   o For health questions: Call 957-220-3919 or 1-445.150.7871 (7 a.m. to 7 p.m.)  o For questions about schools and childcare: Call 104-528-9626 or 1-319.515.1515 (7 a.m. to 7 p.m.)                     Additional Information    Negative: Shock suspected (e.g., cold/pale/clammy skin, too weak to stand, low BP, rapid pulse)    Negative: Difficult to awaken or acting confused (e.g., disoriented, slurred speech)    Negative: Passed out (i.e., lost consciousness, collapsed and was not responding)    Negative: Sounds like a life-threatening emergency to the triager    [1] Threatened miscarriage (threatened ) suspected AND [2] has not been examined by a HCP    Negative: Shock suspected (e.g., cold/pale/clammy skin, too weak to stand, low BP, rapid pulse)    Negative: Difficult to awaken or acting confused (e.g., disoriented, slurred speech)    Negative: Passed out (i.e., lost consciousness, collapsed and was not responding)    Negative: Sounds like a life-threatening emergency to the triager    Negative: [1] Vaginal bleeding AND [2] pregnant > 20 weeks    Negative: Not pregnant or pregnancy status unknown    Negative: SEVERE abdominal pain    Negative: [1] SEVERE vaginal bleeding (i.e., soaking 2 pads / hour, large blood clots) AND [2] present 2 or more hours    Negative: SEVERE dizziness (e.g., unable to stand, requires support to walk, feels like passing out)    Negative: [1] MODERATE vaginal bleeding (i.e., soaking 1 pad / hour; clots) AND [2] present > 6 hours    Negative: [1] MODERATE vaginal bleeding (i.e., soaking 1 pad / hour; clots) AND [2] pregnant > 12 weeks    Negative: Passed tissue (e.g., gray-white)    Negative: Shoulder pain    Negative: Pale skin (pallor) of new onset or worsening    Negative: Patient sounds very sick or weak to the triager    [1] Constant abdominal pain AND [2] present > 2 hours    Protocols used:  - THREATENED MISCARRIAGE FOLLOW-UP  CALL-A-AH, PREGNANCY - VAGINAL BLEEDING LESS THAN 20 WEEKS EGA-A-AH

## 2020-06-26 NOTE — ED TRIAGE NOTES
"Pt began having \"brownish discharge\" on Wednesday and cramping w/bright blood tonight.  Pt states she took tylenol earlier for the cramps in her lower abd and back but they are much worse now.  Pt states she is 9 weeks along.  Pt has n/v.  " Patient is resting comfortably.

## 2020-06-26 NOTE — ED AVS SNAPSHOT
[facility], [place], Emergency Department  [address]  [place] [address]  Phone:  [phone]  Fax:  [phone]                                    Denia Montes   MRN: [id number]    Department:  [facility], Emergency Department   Date of Visit:  6/26/2020           After Visit Summary Signature Page    I have received my discharge instructions, and my questions have been answered. I have discussed any challenges I see with this plan with the nurse or doctor.    ..........................................................................................................................................  Patient/Patient Representative Signature      ..........................................................................................................................................  Patient Representative Print Name and Relationship to Patient    ..................................................               ................................................  Date                                   Time    ..........................................................................................................................................  Reviewed by Signature/Title    ...................................................              ..............................................  Date                                               Time          22EPIC Rev 08/18

## 2020-06-26 NOTE — TELEPHONE ENCOUNTER
[facility]    Phone Message    May a detailed message be left on voicemail: yes     Reason for Call: Other: Pt called to relay that she miscarried and wants to know if she should still do the appt on monday and if she should be making any medication changes. Please review and follow up     Action Taken: Message routed to:  [facility] Endo    Travel Screening: Not Applicable

## 2020-06-26 NOTE — ED PROVIDER NOTES
History     Chief Complaint   Patient presents with     Miscarriage     started around Wednesday evening, reddish blood     HPI  Denia Montes is a 34 year old  female at 8w6d with PMH notable for gestational diabetes who presents to the ED with vaginal bleeding. Spotting started 2 days ago. Red bleeding started 1 day ago. It is not soaking pads, is having some bleeding into the toilet. She endorses small clots or tissue passing tonight. She has had some vomiting. She is having cramp-like abdominal pain coming and going. Diabetes has been well controlled during the pregnancy. Patient was breathing fast en route to the ED, had some tingling and cramping in her hands and feet, which is now improved. Patient tried APAP for pain before arrival, requests something stronger.     Past Medical History  Past Medical History:   Diagnosis Date     Cervical high risk HPV (human papillomavirus) test positive 2015, 16, 18, 19    see problem list      Diabetes mellitus (H)      Hx of colposcopy with cervical biopsy 10/21/16    [place] Bx: Neg NIL, ECC: Neg.     Past Surgical History:   Procedure Laterality Date     NO HISTORY OF SURGERY       insulin aspart (NOVOLOG PEN) 100 UNIT/ML pen  insulin detemir (LEVEMIR FLEXTOUCH) 100 UNIT/ML pen  Omega-3 Fatty Acids (FISH OIL) 500 MG CAPS  oxyCODONE (ROXICODONE) 5 MG tablet  Prenatal Vit-Fe Fumarate-FA (PRENATAL VITAMIN) 27-0.8 MG TABS  vitamin B-12 (CYANOCOBALAMIN) 100 MCG tablet  ACE/ARB NOT PRESCRIBED, INTENTIONAL,  blood glucose (CONTOUR NEXT TEST) test strip  blood glucose monitoring (JACI CONTOUR) test strip  Continuous Blood Gluc  (FREESTYLE JERO 14 DAY READER) RAEANN  Continuous Blood Gluc Sensor (FREESTYLE JERO 14 DAY SENSOR) MISC  insulin pen needle (32G X 4 MM) 32G X 4 MM miscellaneous  MICROLET LANCETS MISC  order for DME  order for DME  order for DME      Allergies   Allergen Reactions     Sulfa Drugs Hives     Past medical history,  past surgical history, medications, and allergies were reviewed with the patient. Additional pertinent items: None    Family History  Family History   Problem Relation Age of Onset     Diabetes Mother      Hyperlipidemia Mother      Kidney Disease Mother      Thyroid Disease Mother      Hyperlipidemia Father      Hypertension Father      Kidney Disease Father      Substance Abuse Brother      Family history was reviewed with the patient. Additional pertinent items: None    Social History  Social History     Tobacco Use     Smoking status: Never Smoker     Smokeless tobacco: Never Used   Substance Use Topics     Alcohol use: No     Drug use: No      Social history was reviewed with the patient. Additional pertinent items: None    Review of Systems  A complete review of systems was performed with pertinent positives and negatives noted in the HPI, and all other systems negative.     Physical Exam   BP: 120/63  Pulse: 88  Resp: 22  SpO2: 100 %    Physical Exam  General: mildly uncomfortable. Appears stated age.   HENT: MMM, no oropharyngeal lesions  Eyes: PERRL, normal sclerae  Cardio: regular rate. Regular rhythm. Extremities well perfused  Resp: Normal work of breathing, clear breath sounds  Abdomen: mild suprapubic tenderness, non-distended, no rebound, no guarding  Pelvic: No external lesions. Moderate amount of blood in vaginal vault coming through cervix along with membrane-like tissue.   Neuro: alert and fully oriented. CN II-XII grossly intact. Grossly normal strength and sensation in all extremities.   MSK: no deformities.   Integumentary/Skin: no rash visualized, normal color  Psych: normal affect, normal behavior    ED Course      Procedures  Results for orders placed during the hospital encounter of 06/26/20   POC US OB TRANSABDOMINAL LIMITED    [facility] Procedure Note   Limited Bedside ED Pelvic Ultrasound (PREGNANT PATIENT):  PROCEDURE: PERFORMED BY: Dr. Melo West,  MD  INDICATIONS/SYMPTOM: Abdominal Pain and Vaginal Bleeding  PROBE: Low frequency convex probe  Type of US Study: Transabdominal Exam  BODY LOCATION: Pelvis, RUQ abdomen  FINDINGS: Exam limited by decompressed bladder and bowel gas scatter artifact, poorly visualized uterus. IUP not visualized. No free fluid in the pelvis. No free fluid in the RUQ.  INTERPRETATION: No IUP visualized. No pelvic nor abdominal free fluid visualized. Exam limited by poor visualization.  IMAGE DOCUMENTATION: Images were archived to PACs system.             Labs Ordered and Resulted from Time of ED Arrival Up to the Time of Departure from the ED   CBC WITH PLATELETS DIFFERENTIAL - Abnormal; Notable for the following components:       Result Value    WBC 20.0 (*)     Hemoglobin 11.2 (*)     Hematocrit 34.6 (*)     MCH 26.0 (*)     Platelet Count 482 (*)     Absolute Neutrophil 14.6 (*)     All other components within normal limits   HCG QUANTITATIVE PREGNANCY - Abnormal; Notable for the following components:    HCG Quantitative Serum 11,906 (*)     All other components within normal limits   GLUCOSE BY METER - Abnormal; Notable for the following components:    Glucose 121 (*)     All other components within normal limits   PREP FOR PROCEDURE   ABO AND RH     US OB < 14 Weeks w Transvaginal   Final Result   IMPRESSION:    1. No products of conception are visualized in the endometrial cavity. In the setting of a positive pregnancy test, the differential diagnosis includes early intrauterine pregnancy, spontaneous , and ectopic pregnancy. Recommend correlation with    beta-hCG levels and follow-up ultrasound as clinically indicated.   2. Unremarkable appearance of the ovaries.   3. A trace amount of nonspecific free fluid in the pelvis.         POC US OB TRANSABDOMINAL LIMITED   Final Result   [facility] Procedure Note    Limited Bedside ED Pelvic Ultrasound (PREGNANT PATIENT):   PROCEDURE: PERFORMED BY: Dr. Melo West,  MD   INDICATIONS/SYMPTOM: Abdominal Pain and Vaginal Bleeding   PROBE: Low frequency convex probe   Type of US Study: Transabdominal Exam   BODY LOCATION: Pelvis, RUQ abdomen   FINDINGS: Exam limited by decompressed bladder and bowel gas scatter artifact, poorly visualized uterus. IUP not visualized. No free fluid in the pelvis. No free fluid in the RUQ.   INTERPRETATION: No IUP visualized. No pelvic nor abdominal free fluid visualized. Exam limited by poor visualization.   IMAGE DOCUMENTATION: Images were archived to PACs system.                Assessments & Plan (with Medical Decision Making)   Patient presenting with vaginal bleeding in early pregnancy. Vitals in the ED unremarkable. Nursing notes reviewed. Initial differential diagnosis includes but not limited to incomplete miscarriage, threatened miscarriage, ectopic pregnancy, inevitable miscarriage.     Glucose 121. Hgb 11.2. Rh positive - Rhogam not indicated. POC US demonstrates no visualized IUP and no free fluid, but with poor visualization. Radiology formal ultrasound ordered - did not visualize IUP.     In the ED, the patient's pain and nausea symptoms were treated with oxycodone and metoclopramide with improvement upon reassessment.     Pelvic exam notable for membrane-like tissue and bleeding from cervical os. Membrane/tissue removed with ring forceps. Likely completed miscarriage. Ectopic unlikely given apparent tissue but possible. Plan 2 day recheck with OB.     After counseling on the diagnosis, work-up, and treatment plan, the patient was discharged to home. The patient was advised to follow-up with OB in 2 days. The patient was advised to return to the ED if worsening symptoms, severe abdominal pain, increasing bleeding, or if there are any urgent/life-threatening concerns.     Final diagnoses:   Miscarriage   Vaginal bleeding affecting early pregnancy     Discharge Medication List as of 6/26/2020  5:06 AM      START taking these medications     Details   oxyCODONE (ROXICODONE) 5 MG tablet Take 1 tablet (5 mg) by mouth every 6 hours as needed for pain, Disp-5 tablet,R-0, Local Print             --  Melo West MD   Emergency Medicine   [facility], [place], EMERGENCY DEPARTMENT  6/26/2020     Melo West MD  06/26/20 0531

## 2020-06-26 NOTE — ED NOTES
Sepsis BPA alert has fired and lactate was ordered No   If not, the reason was MD confirmed not sepsis and Dr. West was notified.  Vitals 24 hr (last day)     Date/Time Resp Pulse Pulse/Heart Rate Source BP    06/26/20 0202  22  88  Monitor  120/63            WBC   Date Value Ref Range Status   06/26/2020 20.0 (H) 4.0 - 11.0 10e9/L Final

## 2020-06-29 DIAGNOSIS — E10.9 TYPE 1 DIABETES MELLITUS WITHOUT COMPLICATION (H): ICD-10-CM

## 2020-06-29 DIAGNOSIS — Z3A.01 LESS THAN 8 WEEKS GESTATION OF PREGNANCY: ICD-10-CM

## 2020-06-29 DIAGNOSIS — O03.9 COMPLETE MISCARRIAGE: ICD-10-CM

## 2020-06-29 LAB
B-HCG SERPL-ACNC: 1134 IU/L (ref 0–5)
HBA1C MFR BLD: 6.1 % (ref 0–5.6)

## 2020-06-29 PROCEDURE — 36415 COLL VENOUS BLD VENIPUNCTURE: CPT | Performed by: ADVANCED PRACTICE MIDWIFE

## 2020-06-29 PROCEDURE — 84443 ASSAY THYROID STIM HORMONE: CPT | Performed by: ADVANCED PRACTICE MIDWIFE

## 2020-06-29 PROCEDURE — 83036 HEMOGLOBIN GLYCOSYLATED A1C: CPT | Performed by: ADVANCED PRACTICE MIDWIFE

## 2020-06-29 PROCEDURE — 86376 MICROSOMAL ANTIBODY EACH: CPT | Performed by: ADVANCED PRACTICE MIDWIFE

## 2020-06-29 PROCEDURE — 84702 CHORIONIC GONADOTROPIN TEST: CPT | Performed by: ADVANCED PRACTICE MIDWIFE

## 2020-06-29 NOTE — TELEPHONE ENCOUNTER
This writer called patient.   She has an appointment with [facility] tomorrow and next steps will be determined in terms of anti-diabetic medications following that visit (how soon she will be pregnant again). She will get back to us after that visit. In the meantime, she will continue the  Levemir with Novolog for now.

## 2020-06-30 ENCOUNTER — OFFICE VISIT (OUTPATIENT)
Dept: MIDWIFE SERVICES | Facility: CLINIC | Age: 35
End: 2020-06-30
Payer: COMMERCIAL

## 2020-06-30 VITALS
DIASTOLIC BLOOD PRESSURE: 74 MMHG | HEIGHT: 64 IN | SYSTOLIC BLOOD PRESSURE: 109 MMHG | WEIGHT: 154.1 LBS | BODY MASS INDEX: 26.31 KG/M2 | TEMPERATURE: 98.2 F | HEART RATE: 78 BPM

## 2020-06-30 DIAGNOSIS — E13.9 DIABETES MELLITUS TYPE 1.5 (H): ICD-10-CM

## 2020-06-30 DIAGNOSIS — O03.9 COMPLETE MISCARRIAGE: Primary | ICD-10-CM

## 2020-06-30 LAB
THYROPEROXIDASE AB SERPL-ACNC: 601 IU/ML
TSH SERPL DL<=0.005 MIU/L-ACNC: 1.21 MU/L (ref 0.4–4)

## 2020-06-30 PROCEDURE — 99213 OFFICE O/P EST LOW 20 MIN: CPT | Performed by: ADVANCED PRACTICE MIDWIFE

## 2020-06-30 ASSESSMENT — MIFFLIN-ST. JEOR: SCORE: 1378.99

## 2020-06-30 NOTE — PROGRESS NOTES
SUBJECTIVE: CC: Denia Montes is a 34 year old female who presents for follow up after 6 week SAB.  Bleeding has subsides to very light but some cramping.      HPI: Pt experience SAB on 6/26 at 8 6/7 based on LMP only.  Pt had onset of bleeding and cramping that sounded severe before being assessed at the ED.  Pt US was negative for pregnancy or retained POC at that time.  Quant  Was 11,906 on 6/26 and dropped to 1134 on 6/29 consistant with SAB.    Pt is a Type 1.5 DM per pt who is being cared for by Dr Hansen.  Patient was on Metformin and Novalog when she became pregnant and was a continious glucose monitoring system.  Initially in pregnancy her glucose was elevated but her A1C is 6.1 from 6/29.      HISTORIES:    Patient Active Problem List   Diagnosis     History of kidney stones     Hyperlipidemia LDL goal <70     Cervical high risk HPV (human papillomavirus) test positive     Plantar warts     Diabetes mellitus type 1.5 (H)     Past Medical History:   Diagnosis Date     Cervical high risk HPV (human papillomavirus) test positive 8/2015, 09/23/16, 12/14/18, 12/16/19    see problem list      Diabetes mellitus (H)      Hx of colposcopy with cervical biopsy 10/21/16    [place] Bx: Neg NIL, ECC: Neg.     Past Surgical History:   Procedure Laterality Date     NO HISTORY OF SURGERY       Current Outpatient Medications   Medication Sig Dispense Refill     blood glucose (CONTOUR NEXT TEST) test strip Use to test blood sugar 6 times daily or as directed. 500 each 3     blood glucose monitoring (JACI CONTOUR) test strip USE TO TEST BLOOD SUGAR TWICE DAILY IN MORNING AND EVENING 200 strip 1     Continuous Blood Gluc  (FREESTYLE JERO 14 DAY READER) RAEANN Use to read blood sugars as per 's instructions. 1 each 0     Continuous Blood Gluc Sensor (FREESTYLE JERO 14 DAY SENSOR) MISC Change every 14 days. 9 each 3     insulin detemir (LEVEMIR FLEXTOUCH) 100 UNIT/ML pen Inject 12 Units Subcutaneous 2  times daily 20 mL 3     insulin pen needle (32G X 4 MM) 32G X 4 MM miscellaneous Use 7 pen needles daily. 700 each 3     metFORMIN (GLUCOPHAGE) 1000 MG tablet Take 1,000 mg by mouth 2 times daily (with meals)       MICROLET LANCETS MISC TEST BLOOD SUGAR TWICE DAILY 200 each 11     Omega-3 Fatty Acids (FISH OIL) 500 MG CAPS        order for DME Equipment being ordered: Glucometer, brand as covered by insurance. Does not use insulin. 1 Device 0     order for DME Lancets bid and prn.  Fill per patient's insurance. Not on insulin 200 each 1     order for DME Test strips for pt's glucometer, brand as covered by insurance Test bid and prn. Not on insulin. 200 each 1     Prenatal Vit-Fe Fumarate-FA (PRENATAL VITAMIN) 27-0.8 MG TABS Take 1 tablet by mouth daily 90 tablet 3     vitamin B-12 (CYANOCOBALAMIN) 100 MCG tablet Take 1,000 mcg by mouth daily       ACE/ARB NOT PRESCRIBED, INTENTIONAL, Please choose reason not prescribed, below (Patient not taking: Reported on 6/26/2020)       insulin aspart (NOVOLOG PEN) 100 UNIT/ML pen 1 unit for every 15 grams of carbohydrates with meals and snacks plus correction dose. Uses up to 30 units daily. (Patient not taking: Reported on 6/30/2020) 15 mL 1     oxyCODONE (ROXICODONE) 5 MG tablet Take 1 tablet (5 mg) by mouth every 6 hours as needed for pain (Patient not taking: Reported on 6/30/2020) 5 tablet 0     Allergies   Allergen Reactions     Sulfa Drugs Hives     Social History     Socioeconomic History     Marital status: Single     Spouse name: Not on file     Number of children: Not on file     Years of education: Not on file     Highest education level: Not on file   Occupational History     Not on file   Social Needs     Financial resource strain: Not on file     Food insecurity     Worry: Not on file     Inability: Not on file     Transportation needs     Medical: Not on file     Non-medical: Not on file   Tobacco Use     Smoking status: Never Smoker     Smokeless tobacco:  Never Used   Substance and Sexual Activity     Alcohol use: No     Drug use: No     Sexual activity: Yes     Partners: Male     Birth control/protection: None   Lifestyle     Physical activity     Days per week: Not on file     Minutes per session: Not on file     Stress: Not at all   Relationships     Social connections     Talks on phone: Not on file     Gets together: Not on file     Attends [employer] service: Not on file     Active member of club or organization: Not on file     Attends meetings of clubs or organizations: Not on file     Relationship status: Not on file     Intimate partner violence     Fear of current or ex partner: No     Emotionally abused: No     Physically abused: No     Forced sexual activity: No   Other Topics Concern     Parent/sibling w/ CABG, MI or angioplasty before 65F 55M? No   Social History Narrative     Not on file     Family History   Problem Relation Age of Onset     Diabetes Mother      Hyperlipidemia Mother      Kidney Disease Mother      Thyroid Disease Mother      Hyperlipidemia Father      Hypertension Father      Kidney Disease Father      Substance Abuse Brother       Regular menses? yes    HEALTH MAINTENANCE:  Health Maintenance   Topic Date Due     PAP FOLLOW-UP  01/14/2021     HPV FOLLOW-UP  01/14/2021     EYE EXAM  11/21/2020     PREVENTIVE CARE VISIT  12/16/2020     BMP  12/16/2020     LIPID  12/16/2020     MICROALBUMIN  12/16/2020     DIABETIC FOOT EXAM  12/16/2020     A1C  12/29/2020     DTAP/TDAP/TD IMMUNIZATION (3 - Td) 08/14/2025     PHQ-2  Completed     INFLUENZA VACCINE  Completed     PNEUMOCOCCAL IMMUNIZATION 19-64 MEDIUM RISK  Completed     HIV SCREENING  Addressed     IPV IMMUNIZATION  Aged Out     MENINGITIS IMMUNIZATION  Aged Out       REVIEW OF OUTSIDE RECORDS: NO    ROS:  CONSTITUTIONAL: NEGATIVE for fever, chills  EYES: NEGATIVE for vision changes   RESP: NEGATIVE for significant cough or SOB  CV: NEGATIVE for chest pain, palpitations   GI: NEGATIVE  "for nausea, abdominal pain, heartburn, or change in bowel habits  : NEGATIVE for frequency, dysuria, or hematuria  MUSCULOSKELETAL: NEGATIVE for significant arthralgias or myalgia  NEURO: NEGATIVE for weakness, dizziness or paresthesias or headache  ENDOCRINE: POSITIVE  for HX diabetes    EXAM:  /74 (BP Location: Right arm, Patient Position: Sitting, Cuff Size: Adult Regular)   Pulse 78   Temp 98.2  F (36.8  C) (Oral)   Ht 1.626 m (5' 4\")   Wt 69.9 kg (154 lb 1.6 oz)   LMP 04/25/2020   BMI 26.45 kg/m    General - pleasant female in no acute distress.    ASSESSMENT/PLAN  (O03.9) Complete miscarriage  (primary encounter diagnosis)  Comment: Confirmed by US and dropping Quant.  Plan: Pt will do a UPT in 2-3 wks to confirm negative.  Expect menses mid August or before.  If no menses will repeat Quant.    (E13.9) Diabetes mellitus type 1.5 (H)  Comment:   Plan: Continue to keep close monitoring of glucose and Endocrine team approach    Daily PNV.    Post pone pregnancy x 1 menses with condom use.      Referred to have care with [facility] OB/GYN for underlining DM as preexisting condition of MD care.              I have discussed with patient the risks, benefits, medications, treatment options and modalities.   I have instructed the patient to call or schedule a follow-up appointment if any problems or failure to improve.    "

## 2020-07-01 ENCOUNTER — TELEPHONE (OUTPATIENT)
Dept: ENDOCRINOLOGY | Facility: CLINIC | Age: 35
End: 2020-07-01

## 2020-07-01 NOTE — TELEPHONE ENCOUNTER
Sreekanth Hansen,    Patient had recent abnormal thyroid test. She's scheduled for a video visit with you next week. She wanted to know if there were any additional tests she should have now, prior to your visit? Let me know, and I'll call her to schedule if needed.    Thanks,  Gissel

## 2020-07-01 NOTE — TELEPHONE ENCOUNTER
Attempted to reach patient.   She didn't answer.   Will try to reach her again.     Contacted patient again.   Discussed results documented below; next steps and the need to closely monitor thyroid hormone during pregnancy. Discussed data regarding low dose levothyroxine of 25 mcg daily as soon as she find out she is pregnant considering data on miscarriage and TPO levels. Data regarding pregnancy and hashimoto's discussed.       ENDO THYROID LABS-P Latest Ref Rng & Units 6/29/2020   TSH 0.40 - 4.00 mU/L 1.21   THYR PEROXIDASE HAROLDO <35 IU/mL 601 (H)

## 2020-07-01 NOTE — TELEPHONE ENCOUNTER
Hi Dr Hansen,    Did you still want the patient to keep her appt next week on 7/8 or should I cancel it since you spoke to her today? Thanks!

## 2020-07-01 NOTE — TELEPHONE ENCOUNTER
[facility] Call Center    Phone Message    May a detailed message be left on voicemail: yes     Reason for Call: Other: Pt wants to discuss if she needs to be seen by Dr. Hansen or Davina Waldron due to her abnormal thyroid results per labs completed 6/29/20. Please call Pt back to discuss.     Action Taken: Message routed to:  [facility]    Travel Screening: Not Applicable

## 2020-07-02 NOTE — RESULT ENCOUNTER NOTE
Denia,    This is this is a follow-up to our telephone conversation regarding the thyroid blood work results.    You have Hashimoto thyroiditis based on TPO [thyroid peroxidase antibody] antibody which was high. As the thyroid hormone is within the normal limits. Thyroid replacement therapy is not indicated at this time.     In women with this condition, hypothyroidism may occur during pregnancy due to the stress of pregnancy and the inability of the thyroid to augment hormone production. Studies has showed that women with normal thyroid levels (but with TPO antibody elevation) would have increased TSH levels as pregnancy progresses. Because of the risk of TSH elevation, close follow up of thyroid hormone during pregnancy is important.  I recommend TSH follow-up every 4 weeks until mid pregnancy.      Spontaneous pregnancy loss (miscarriage), occurs in 17%-31% of all gestations. Is there an association between thyroid antibody and pregnancy loss?  Some studies showed there might be an association, but direct cause-effect relationship has not been established.  Some experts recommend the use of low-dose levothyroxine replacement therapy starting from first trimester to prevent recurrent pregnancy loss.      Please let me know if you have any further questions regarding these results.  Please let us know as soon as you find out that you are pregnant.    Yuri Hansen MD

## 2020-08-19 ENCOUNTER — VIRTUAL VISIT (OUTPATIENT)
Dept: FAMILY MEDICINE | Facility: CLINIC | Age: 35
End: 2020-08-19
Payer: COMMERCIAL

## 2020-08-19 DIAGNOSIS — R30.0 DYSURIA: Primary | ICD-10-CM

## 2020-08-19 PROCEDURE — 99213 OFFICE O/P EST LOW 20 MIN: CPT | Mod: 95 | Performed by: PHYSICIAN ASSISTANT

## 2020-08-19 RX ORDER — NITROFURANTOIN 25; 75 MG/1; MG/1
100 CAPSULE ORAL 2 TIMES DAILY
Qty: 10 CAPSULE | Refills: 0 | Status: SHIPPED | OUTPATIENT
Start: 2020-08-19 | End: 2020-08-24

## 2020-08-19 NOTE — PROGRESS NOTES
"Denia Montes is a 34 year old female who is being evaluated via a billable telephone visit.      The patient has been notified of following:     \"This telephone visit will be conducted via a call between you and your physician/provider. We have found that certain health care needs can be provided without the need for a physical exam.  This service lets us provide the care you need with a short phone conversation.  If a prescription is necessary we can send it directly to your pharmacy.  If lab work is needed we can place an order for that and you can then stop by our lab to have the test done at a later time.    Telephone visits are billed at different rates depending on your insurance coverage. During this emergency period, for some insurers they may be billed the same as an in-person visit.  Please reach out to your insurance provider with any questions.    If during the course of the call the physician/provider feels a telephone visit is not appropriate, you will not be charged for this service.\"    Patient has given verbal consent for Telephone visit?  Yes    What phone number would you like to be contacted at? [phone]    How would you like to obtain your AVS? Sharad Ace     Denia Montes is a 34 year old female who presents via phone visit today for the following health issues:    HPI    Genitourinary - Female  Onset/Duration: about a week  Description:   Painful urination (Dysuria): YES           Frequency: YES  Blood in urine (Hematuria): no  Delay in urine (Hesitency): no  Intensity: moderate  Progression of Symptoms:  worsening and intermittent; also noted incomplete bladder emptying  Accompanying Signs & Symptoms:  Fever/chills: no  Flank pain: no  Nausea and vomiting: no  Vaginal symptoms: none  Abdominal/Pelvic Pain: no  History:   History of frequent UTI s: no  History of kidney stones: YES, as a child  Sexually Active: YES  Possibility of pregnancy: Yes  Precipitating or " alleviating factors: None  Therapies tried and outcome:  none       Review of Systems   Constitutional, HEENT, cardiovascular, pulmonary, GI, , musculoskeletal, neuro, skin, endocrine and psych systems are negative, except as otherwise noted.       Objective          Vitals:  No vitals were obtained today due to virtual visit.    alert and no distress  PSYCH: Alert and oriented times 3; coherent speech, normal   rate and volume, able to articulate logical thoughts, able   to abstract reason, no tangential thoughts, no hallucinations   or delusions  Her affect is normal  RESP: No cough, no audible wheezing, able to talk in full sentences  Remainder of exam unable to be completed due to telephone visits            Assessment/Plan:    Assessment & Plan     Denia was seen today for dysuria.    Diagnoses and all orders for this visit:    Dysuria  -     nitroFURantoin macrocrystal-monohydrate (MACROBID) 100 MG capsule; Take 1 capsule (100 mg) by mouth 2 times daily for 5 days      - Sx consistent with UTI, will treat with antibx. Given possibility of pregnancy and sulfa allergy, will treat with Macrobid, take as directly.  - Drink plenty of fluids. Tylenol prn pain.    Return in about 1 week (around 8/26/2020), or if symptoms worsen or fail to improve.    Lexy Saucedo PA-C  [facility]    Phone call duration:  5 minutes

## 2020-09-22 NOTE — PROGRESS NOTES
"Patients Glucose Data was Obtained via Phone and Located in Table Below     Week of__/__/__ Date  _9_/15__  Date  __/_16_ Date  __/17__ Date  __/_18_ Date  __/_19_ Date  __/20__ Date  __/_21_    Time BG Time BG Time BG Time BG Time BG Time BG Time BG    fasting  AM 96  94  110  104  92  101  97      130  169    214    137    Lunch  124  129  121        121    Dinner  140  158               Week of__/__/__ Date  __/_22_  Date  __/__ Date  __/__ Date  __/__ Date  __/__ Date  __/__ Date  __/__    Time BG Time BG Time BG Time BG Time BG Time BG Time BG    Fasting  99                                                                      Denia Montes is a 35 year old female who is being evaluated via a billable video visit.      The patient has been notified of following:     \"This video visit will be conducted via a call between you and your physician/provider. We have found that certain health care needs can be provided without the need for an in-person physical exam.  This service lets us provide the care you need with a video conversation.  If a prescription is necessary we can send it directly to your pharmacy.  If lab work is needed we can place an order for that and you can then stop by our lab to have the test done at a later time.    Video visits are billed at different rates depending on your insurance coverage.  Please reach out to your insurance provider with any questions.    If during the course of the call the physician/provider feels a video visit is not appropriate, you will not be charged for this service.\"    Patient has given verbal consent for Video visit? Yes  How would you like to obtain your AVS? MyChart  If you are dropped from the video visit, the video invite should be resent to: [email or web address]  Will anyone else be joining your video visit? No      [facility]  Endocrinology  Yuri Hansen MD  September 23, 2020         Chief Complaint:   Diabetes     History of Present Illness:   Denia " Jacqueline Montes is a 35 year old female with a history of diabetes mellitus type 1 who presents for follow up.  ANNA (gutamic acid decarboxylase) antibody was very high >250 indicating she has type 1 diabetes in the making (C-Peptide WNL).     She is currently on Levemir insulin (12 am  and 12 bedtime) and Novolog 1 unit for every 15 grams of CHO with meals.   Here is the blood sugar readings over the last two weeks.  Her blood sugar has been high before and and during her menses.  Patient was less than 8 weeks pregnant in June 2020 when she had a miscarriage.  At that time her A1c was 6.1.  She takes metformin 1000 mg twice daily in addition to the insulin regimen described above.  She recently resumed her insulin regimen as above since her blood sugar was controlled previously on metformin only.  She made his appointment today because despite starting the insulin regimen as documented above her blood sugar readings have been slightly high.  She has Hashimoto thyroiditis with normal TSH currently not on levothyroxine therapy.    Week of__/__/__ Date  _9_/15__  Date  __/_16_ Date  __/17__ Date  __/_18_ Date  __/_19_ Date  __/20__ Date  __/_21_    Time BG Time BG Time BG Time BG Time BG Time BG Time BG    fasting  AM 96  94  110  104  92  101  97      130  169    214    137    Lunch  124  129  121        121    Dinner  140  158               Week of__/__/__ Date  __/_22_  Date  __/__ Date  __/__ Date  __/__ Date  __/__ Date  __/__ Date  __/__    Time BG Time BG Time BG Time BG Time BG Time BG Time BG    Fasting  99                                                                      This morning   Diabetes monitoring and complications:  CAD: No  Last eye exam results: UTD - 12/2019 and no retinopathy.   Microalbuminuria: No  Neuropathy: No    Review of Systems:   As noted in HPI.      Active Medications:        Current Outpatient Medications:      blood glucose (CONTOUR NEXT TEST) test strip, Use to test blood  sugar 6 times daily or as directed., Disp: 500 each, Rfl: 3     blood glucose monitoring (JACI CONTOUR) test strip, USE TO TEST BLOOD SUGAR TWICE DAILY IN MORNING AND EVENING, Disp: 200 strip, Rfl: 1     insulin aspart (NOVOLOG PEN) 100 UNIT/ML pen, 1 unit for every 12 grams of carbohydrates with meals and snacks plus correction dose. Uses up to 40 units daily., Disp: 45 mL, Rfl: 3     insulin detemir (LEVEMIR FLEXTOUCH) 100 UNIT/ML pen, Inject 14 Units Subcutaneous 2 times daily, Disp: 30 mL, Rfl: 3     insulin pen needle (32G X 4 MM) 32G X 4 MM miscellaneous, Use 7 pen needles daily., Disp: 700 each, Rfl: 3     levothyroxine (SYNTHROID/LEVOTHROID) 25 MCG tablet, Take 1 tablet (25 mcg) by mouth daily, Disp: 90 tablet, Rfl: 3     metFORMIN (GLUCOPHAGE) 1000 MG tablet, Take 1 tablet (1,000 mg) by mouth 2 times daily (with meals), Disp: 180 tablet, Rfl: 3     MICROLET LANCETS MIS, TEST BLOOD SUGAR TWICE DAILY, Disp: 200 each, Rfl: 11     Omega-3 Fatty Acids (FISH OIL) 500 MG CAPS, , Disp: , Rfl:      order for DME, Equipment being ordered: Glucometer, brand as covered by insurance. Does not use insulin., Disp: 1 Device, Rfl: 0     order for DME, Lancets bid and prn.  Fill per patient's insurance. Not on insulin, Disp: 200 each, Rfl: 1     order for DME, Test strips for pt's glucometer, brand as covered by insurance Test bid and prn. Not on insulin., Disp: 200 each, Rfl: 1     Prenatal Vit-Fe Fumarate-FA (PRENATAL VITAMIN) 27-0.8 MG TABS, Take 1 tablet by mouth daily, Disp: 90 tablet, Rfl: 3     vitamin B-12 (CYANOCOBALAMIN) 100 MCG tablet, Take 1,000 mcg by mouth daily, Disp: , Rfl:      ACE/ARB NOT PRESCRIBED, INTENTIONAL,, Please choose reason not prescribed, below (Patient not taking: Reported on 6/26/2020), Disp: , Rfl:      Continuous Blood Gluc  (FREESTYLE JERO 14 DAY READER) RAEANN, Use to read blood sugars as per 's instructions. (Patient not taking: Reported on 9/22/2020), Disp: 1 each,  Rfl: 0     Continuous Blood Gluc Sensor (FREESTYLE JERO 14 DAY SENSOR) MISC, Change every 14 days. (Patient not taking: Reported on 9/22/2020), Disp: 9 each, Rfl: 3     oxyCODONE (ROXICODONE) 5 MG tablet, Take 1 tablet (5 mg) by mouth every 6 hours as needed for pain (Patient not taking: Reported on 6/30/2020), Disp: 5 tablet, Rfl: 0    Allergies:   Sulfa drugs      Past Medical History:  High risk HPV  Diabetes mellitus type 2   Kidney stones   HLD     Past Surgical History:  No pertinent past surgical history     Family History:   Mother: diabetes, HLD, kidney disease, thyroid disease  Father: HLD, HTN, kidney disease, pulmonary disease        Social History:   The patient was alone   Smoking Status: never  Smokeless Tobacco: never    Alcohol Use: no    Marital Status: single     Physical Exam:     Constitutional: Pleasant no acute cardiopulmonary distress.   Neurological: Alert and oriented.     Psychological: appropriate mood and affect        Data:     Ref. Range 1/30/2019 09:43   Glucose Latest Ref Range: 70 - 99 mg/dL 177 (H)   C-Peptide Latest Ref Range: 0.9 - 6.9 ng/mL 2.3       GFR Estimate   Date Value Ref Range Status   12/16/2019 >90 >60 mL/min/[1.73_m2] Final     Comment:     Non  GFR Calc  Starting 12/18/2018, serum creatinine based estimated GFR (eGFR) will be   calculated using the Chronic Kidney Disease Epidemiology Collaboration   (CKD-EPI) equation.       GFR Estimate If Black   Date Value Ref Range Status   12/16/2019 >90 >60 mL/min/[1.73_m2] Final     Comment:      GFR Calc  Starting 12/18/2018, serum creatinine based estimated GFR (eGFR) will be   calculated using the Chronic Kidney Disease Epidemiology Collaboration   (CKD-EPI) equation.           Hemoglobin A1C   Date Value Ref Range Status   06/29/2020 6.1 (H) 0 - 5.6 % Final     Comment:     Normal <5.7% Prediabetes 5.7-6.4%  Diabetes 6.5% or higher - adopted from ADA   consensus guidelines.     03/11/2020  6.9 (A) 4.3 - 6 % Final   09/11/2019 6.1 (A) 4.3 - 6 % Final   05/29/2019 6.1 (A) 4.3 - 6 % Final   01/11/2019 7.4 (H) 0 - 5.6 % Final     Comment:     Normal <5.7% Prediabetes 5.7-6.4%  Diabetes 6.5% or higher - adopted from ADA   consensus guidelines.     09/28/2018 6.3 (H) 0 - 5.6 % Final     Comment:     Normal <5.7% Prediabetes 5.7-6.4%  Diabetes 6.5% or higher - adopted from ADA   consensus guidelines.       TSH   Date Value Ref Range Status   06/29/2020 1.21 0.40 - 4.00 mU/L Final       Assessment:  MAGDI/treated as Type 1 diabetes currently - (H) Positive ANNA antibody/C-peptide WNL   Based on blood glucose readings documented above the following was discussed.  Slightly increasing Levemir to 14units twice daily and mealtime insulin slightly increased to 1 unit for every 12 g from every 15 g of carbohydrate.  Will be introducing also correction scale as documented below.  She is currently actively trying to get pregnant.  Preconception insulin goals discussed with the patient: A1c less than 6, fasting blood sugar between  and 2-hour postprandial glucose less than 150 mg/dL.  Discussed with the patient that during the early first trimester she would be sensitive to insulin and adjustment on insulin therapy needs to be done accordingly.  I have advised her to share her blood sugar readings in a few weeks for further adjustment.    Hashimoto thyroiditis: Patient currently euthyroid.  Given her recent pregnancy loss discussed the pros and cons of starting him on levothyroxine therapy when she becomes pregnant.  The evidence is not clear conclusively if levothyroxine replacement therapy in the setting of Hashimoto thyroiditis and euthyroid pregnant women; whether levothyroxine therapy will reduce the risk of recurrent pregnancy loss.  However, given risk benefits and particularly the low risk in the levothyroxine therapy have discussed to start levothyroxine 25 mcg daily as soon as she finds out that she is  pregnant.  We will closely monitor her TSH throughout pregnancy.  In preparation for that prescription for levothyroxine sent to her pharmacy.      Patient Instructions    [place], here is a summary of what we discussed.       INCREASE INSULIN DETEMIR- INJECT 14 UNITS twice per day.     Novolog 1 units for every 12 grams of carbohydrates with meals and snacks three times per day.   Please follow the following correction scale three times per day with meals and at bed time.   For  - 169 give  1 unit.   For  - 219 give 2 units.   For  - 269 give 3 units.   For  - 319 give 4 units.   For BG = or > 320 give 5 units.      Hashimoto thyroiditis: Levothyroxine (LT4) 25 mcg daily is sent to your pharmacy. This is a thyroid medication to be started once you find out that you are pregnant. The evidence is not clear to conclusively say that LT4 therapy decreases future pregnancy loss. However, administration of LT4 to TPOAb-positive pregnant women with normal thyroid labs is sometimes considered given low risk of taking thyroid medication and the potential benefit.    25 mcg daily is the lowest dose available and we will be starting at this dose.     We will schedule follow-up appointment tentatively in 3 months.  However, if you find out that you are pregnant earlier than that please contact our clinic to make an appointment.      TSH   Date Value Ref Range Status   06/29/2020 1.21 0.40 - 4.00 mU/L Final        Yuri Hansen MD  Staff Endocrinologist   Endocrinology and Metabolism  [facility]  Pager: [phone]    Video-Visit Details    Type of service:  Video Visit  All times are in your local time  1st VideoStart: 09/23/2020 03:02 pm   Stop: 09/23/2020 03:15 pm  Originating Location (pt. Location): Home    Distant Location (provider location):  [facility]     Platform used for Video Visit: [employer]

## 2020-09-23 ENCOUNTER — VIRTUAL VISIT (OUTPATIENT)
Dept: ENDOCRINOLOGY | Facility: CLINIC | Age: 35
End: 2020-09-23
Payer: COMMERCIAL

## 2020-09-23 DIAGNOSIS — Z3A.01 LESS THAN 8 WEEKS GESTATION OF PREGNANCY: ICD-10-CM

## 2020-09-23 DIAGNOSIS — Z87.59 HISTORY OF MISCARRIAGE: ICD-10-CM

## 2020-09-23 DIAGNOSIS — E06.3 HASHIMOTO'S THYROIDITIS: ICD-10-CM

## 2020-09-23 DIAGNOSIS — E10.9 TYPE 1 DIABETES MELLITUS WITHOUT COMPLICATION (H): Primary | ICD-10-CM

## 2020-09-23 RX ORDER — LEVOTHYROXINE SODIUM 25 UG/1
25 TABLET ORAL DAILY
Qty: 90 TABLET | Refills: 3 | Status: SHIPPED | OUTPATIENT
Start: 2020-09-23 | End: 2022-10-12

## 2020-09-23 NOTE — LETTER
"9/23/2020       RE: Denia Montes  [address]  [facility] [address]     Dear Colleague,    Thank you for referring your patient, Denia Montes, to the [facility] ENDOCRINOLOGY at [facility]. Please see a copy of my visit note below.    Patients Glucose Data was Obtained via Phone and Located in Table Below     Week of__/__/__ Date  _9_/15__  Date  __/_16_ Date  __/17__ Date  __/_18_ Date  __/_19_ Date  __/20__ Date  __/_21_    Time BG Time BG Time BG Time BG Time BG Time BG Time BG    fasting  AM 96  94  110  104  92  101  97      130  169    214    137    Lunch  124  129  121        121    Dinner  140  158               Week of__/__/__ Date  __/_22_  Date  __/__ Date  __/__ Date  __/__ Date  __/__ Date  __/__ Date  __/__    Time BG Time BG Time BG Time BG Time BG Time BG Time BG    Fasting  99                                                                      Denia Montes is a 35 year old female who is being evaluated via a billable video visit.      The patient has been notified of following:     \"This video visit will be conducted via a call between you and your physician/provider. We have found that certain health care needs can be provided without the need for an in-person physical exam.  This service lets us provide the care you need with a video conversation.  If a prescription is necessary we can send it directly to your pharmacy.  If lab work is needed we can place an order for that and you can then stop by our lab to have the test done at a later time.    Video visits are billed at different rates depending on your insurance coverage.  Please reach out to your insurance provider with any questions.    If during the course of the call the physician/provider feels a video visit is not appropriate, you will not be charged for this service.\"    Patient has given verbal consent for Video visit? Yes  How would you like to obtain your AVS? " Tequila  If you are dropped from the video visit, the video invite should be resent to: tequila  Will anyone else be joining your video visit? [facility]  Endocrinology  Yuri Hansen MD  September 23, 2020         Chief Complaint:   Diabetes     History of Present Illness:   Denia Montes is a 35 year old female with a history of diabetes mellitus type 1 who presents for follow up.  ANNA (gutamic acid decarboxylase) antibody was very high >250 indicating she has type 1 diabetes in the making (C-Peptide WNL).     She is currently on Levemir insulin (12 am  and 12 bedtime) and Novolog 1 unit for every 15 grams of CHO with meals.   Here is the blood sugar readings over the last two weeks.  Her blood sugar has been high before and and during her menses.  Patient was less than 8 weeks pregnant in June 2020 when she had a miscarriage.  At that time her A1c was 6.1.  She takes metformin 1000 mg twice daily in addition to the insulin regimen described above.  She recently resumed her insulin regimen as above since her blood sugar was controlled previously on metformin only.  She made his appointment today because despite starting the insulin regimen as documented above her blood sugar readings have been slightly high.  She has Hashimoto thyroiditis with normal TSH currently not on levothyroxine therapy.    Week of__/__/__ Date  _9_/15__  Date  __/_16_ Date  __/17__ Date  __/_18_ Date  __/_19_ Date  __/20__ Date  __/_21_    Time BG Time BG Time BG Time BG Time BG Time BG Time BG    fasting  AM 96  94  110  104  92  101  97      130  169    214    137    Lunch  124  129  121        121    Dinner  140  158               Week of__/__/__ Date  __/_22_  Date  __/__ Date  __/__ Date  __/__ Date  __/__ Date  __/__ Date  __/__    Time BG Time BG Time BG Time BG Time BG Time BG Time BG    Fasting  99                                                                      This morning   Diabetes monitoring and  complications:  CAD: No  Last eye exam results: UTD - 12/2019 and no retinopathy.   Microalbuminuria: No  Neuropathy: No    Review of Systems:   As noted in HPI.      Active Medications:        Current Outpatient Medications:      blood glucose (CONTOUR NEXT TEST) test strip, Use to test blood sugar 6 times daily or as directed., Disp: 500 each, Rfl: 3     blood glucose monitoring (JACI CONTOUR) test strip, USE TO TEST BLOOD SUGAR TWICE DAILY IN MORNING AND EVENING, Disp: 200 strip, Rfl: 1     insulin aspart (NOVOLOG PEN) 100 UNIT/ML pen, 1 unit for every 12 grams of carbohydrates with meals and snacks plus correction dose. Uses up to 40 units daily., Disp: 45 mL, Rfl: 3     insulin detemir (LEVEMIR FLEXTOUCH) 100 UNIT/ML pen, Inject 14 Units Subcutaneous 2 times daily, Disp: 30 mL, Rfl: 3     insulin pen needle (32G X 4 MM) 32G X 4 MM miscellaneous, Use 7 pen needles daily., Disp: 700 each, Rfl: 3     levothyroxine (SYNTHROID/LEVOTHROID) 25 MCG tablet, Take 1 tablet (25 mcg) by mouth daily, Disp: 90 tablet, Rfl: 3     metFORMIN (GLUCOPHAGE) 1000 MG tablet, Take 1 tablet (1,000 mg) by mouth 2 times daily (with meals), Disp: 180 tablet, Rfl: 3     MICROLET LANCETS MISC, TEST BLOOD SUGAR TWICE DAILY, Disp: 200 each, Rfl: 11     Omega-3 Fatty Acids (FISH OIL) 500 MG CAPS, , Disp: , Rfl:      order for DME, Equipment being ordered: Glucometer, brand as covered by insurance. Does not use insulin., Disp: 1 Device, Rfl: 0     order for DME, Lancets bid and prn.  Fill per patient's insurance. Not on insulin, Disp: 200 each, Rfl: 1     order for DME, Test strips for pt's glucometer, brand as covered by insurance Test bid and prn. Not on insulin., Disp: 200 each, Rfl: 1     Prenatal Vit-Fe Fumarate-FA (PRENATAL VITAMIN) 27-0.8 MG TABS, Take 1 tablet by mouth daily, Disp: 90 tablet, Rfl: 3     vitamin B-12 (CYANOCOBALAMIN) 100 MCG tablet, Take 1,000 mcg by mouth daily, Disp: , Rfl:      ACE/ARB NOT PRESCRIBED, INTENTIONAL,,  Please choose reason not prescribed, below (Patient not taking: Reported on 6/26/2020), Disp: , Rfl:      Continuous Blood Gluc  (FREESTYLE JERO 14 DAY READER) RAEANN, Use to read blood sugars as per 's instructions. (Patient not taking: Reported on 9/22/2020), Disp: 1 each, Rfl: 0     Continuous Blood Gluc Sensor (FREESTYLE JERO 14 DAY SENSOR) MISC, Change every 14 days. (Patient not taking: Reported on 9/22/2020), Disp: 9 each, Rfl: 3     oxyCODONE (ROXICODONE) 5 MG tablet, Take 1 tablet (5 mg) by mouth every 6 hours as needed for pain (Patient not taking: Reported on 6/30/2020), Disp: 5 tablet, Rfl: 0    Allergies:   Sulfa drugs      Past Medical History:  High risk HPV  Diabetes mellitus type 2   Kidney stones   HLD     Past Surgical History:  No pertinent past surgical history     Family History:   Mother: diabetes, HLD, kidney disease, thyroid disease  Father: HLD, HTN, kidney disease, pulmonary disease        Social History:   The patient was alone   Smoking Status: never  Smokeless Tobacco: never    Alcohol Use: no    Marital Status: single     Physical Exam:     Constitutional: Pleasant no acute cardiopulmonary distress.   Neurological: Alert and oriented.     Psychological: appropriate mood and affect        Data:     Ref. Range 1/30/2019 09:43   Glucose Latest Ref Range: 70 - 99 mg/dL 177 (H)   C-Peptide Latest Ref Range: 0.9 - 6.9 ng/mL 2.3       GFR Estimate   Date Value Ref Range Status   12/16/2019 >90 >60 mL/min/[1.73_m2] Final     Comment:     Non  GFR Calc  Starting 12/18/2018, serum creatinine based estimated GFR (eGFR) will be   calculated using the Chronic Kidney Disease Epidemiology Collaboration   (CKD-EPI) equation.       GFR Estimate If Black   Date Value Ref Range Status   12/16/2019 >90 >60 mL/min/[1.73_m2] Final     Comment:      GFR Calc  Starting 12/18/2018, serum creatinine based estimated GFR (eGFR) will be   calculated using the  Chronic Kidney Disease Epidemiology Collaboration   (CKD-EPI) equation.           Hemoglobin A1C   Date Value Ref Range Status   06/29/2020 6.1 (H) 0 - 5.6 % Final     Comment:     Normal <5.7% Prediabetes 5.7-6.4%  Diabetes 6.5% or higher - adopted from ADA   consensus guidelines.     03/11/2020 6.9 (A) 4.3 - 6 % Final   09/11/2019 6.1 (A) 4.3 - 6 % Final   05/29/2019 6.1 (A) 4.3 - 6 % Final   01/11/2019 7.4 (H) 0 - 5.6 % Final     Comment:     Normal <5.7% Prediabetes 5.7-6.4%  Diabetes 6.5% or higher - adopted from ADA   consensus guidelines.     09/28/2018 6.3 (H) 0 - 5.6 % Final     Comment:     Normal <5.7% Prediabetes 5.7-6.4%  Diabetes 6.5% or higher - adopted from ADA   consensus guidelines.       TSH   Date Value Ref Range Status   06/29/2020 1.21 0.40 - 4.00 mU/L Final       Assessment:  MAGDI/treated as Type 1 diabetes currently - (H) Positive ANNA antibody/C-peptide WNL   Based on blood glucose readings documented above the following was discussed.  Slightly increasing Levemir to 14units twice daily and mealtime insulin slightly increased to 1 unit for every 12 g from every 15 g of carbohydrate.  Will be introducing also correction scale as documented below.  She is currently actively trying to get pregnant.  Preconception insulin goals discussed with the patient: A1c less than 6, fasting blood sugar between  and 2-hour postprandial glucose less than 150 mg/dL.  Discussed with the patient that during the early first trimester she would be sensitive to insulin and adjustment on insulin therapy needs to be done accordingly.  I have advised her to share her blood sugar readings in a few weeks for further adjustment.    Hashimoto thyroiditis: Patient currently euthyroid.  Given her recent pregnancy loss discussed the pros and cons of starting him on levothyroxine therapy when she becomes pregnant.  The evidence is not clear conclusively if levothyroxine replacement therapy in the setting of Hashimoto  thyroiditis and euthyroid pregnant women; whether levothyroxine therapy will reduce the risk of recurrent pregnancy loss.  However, given risk benefits and particularly the low risk in the levothyroxine therapy have discussed to start levothyroxine 25 mcg daily as soon as she finds out that she is pregnant.  We will closely monitor her TSH throughout pregnancy.  In preparation for that prescription for levothyroxine sent to her pharmacy.      Patient Instructions    Denia, here is a summary of what we discussed.       INCREASE INSULIN DETEMIR- INJECT 14 UNITS twice per day.     Novolog 1 units for every 12 grams of carbohydrates with meals and snacks three times per day.   Please follow the following correction scale three times per day with meals and at bed time.   For  - 169 give  1 unit.   For  - 219 give 2 units.   For  - 269 give 3 units.   For  - 319 give 4 units.   For BG = or > 320 give 5 units.      Hashimoto thyroiditis: Levothyroxine (LT4) 25 mcg daily is sent to your pharmacy. This is a thyroid medication to be started once you find out that you are pregnant. The evidence is not clear to conclusively say that LT4 therapy decreases future pregnancy loss. However, administration of LT4 to TPOAb-positive pregnant women with normal thyroid labs is sometimes considered given low risk of taking thyroid medication and the potential benefit.    25 mcg daily is the lowest dose available and we will be starting at this dose.     We will schedule follow-up appointment tentatively in 3 months.  However, if you find out that you are pregnant earlier than that please contact our clinic to make an appointment.      TSH   Date Value Ref Range Status   06/29/2020 1.21 0.40 - 4.00 mU/L Final        Yuri Hansen MD  Staff Endocrinologist   Endocrinology and Metabolism  [facility]  Pager: [phone]    Video-Visit Details    Type of service:  Video Visit  All times are in  your local time  1st VideoStart: 09/23/2020 03:02 pm   Stop: 09/23/2020 03:15 pm  Originating Location (pt. Location): Home    Distant Location (provider location):  [facility] ENDOCRINOLOGY     Platform used for Video Visit: Luisito

## 2020-09-23 NOTE — PATIENT INSTRUCTIONS
Denia, here is a summary of what we discussed.       INCREASE INSULIN DETEMIR- INJECT 14 UNITS twice per day.     Novolog 1 units for every 12 grams of carbohydrates with meals and snacks three times per day.   Please follow the following correction scale three times per day with meals and at bed time.   For  - 169 give  1 unit.   For  - 219 give 2 units.   For  - 269 give 3 units.   For  - 319 give 4 units.   For BG = or > 320 give 5 units.      Hashimoto thyroiditis: Levothyroxine (LT4) 25 mcg daily is sent to your pharmacy. This is a thyroid medication to be started once you find out that you are pregnant. The evidence is not clear to conclusively say that LT4 therapy decreases future pregnancy loss. However, administration of LT4 to TPOAb-positive pregnant women with normal thyroid labs is sometimes considered given low risk of taking thyroid medication and the potential benefit.    25 mcg daily is the lowest dose available and we will be starting at this dose.     We will schedule follow-up appointment tentatively in 3 months.  However, if you find out that you are pregnant earlier than that please contact our clinic to make an appointment.

## 2020-11-17 ENCOUNTER — OFFICE VISIT (OUTPATIENT)
Dept: FAMILY MEDICINE | Facility: CLINIC | Age: 35
End: 2020-11-17
Payer: COMMERCIAL

## 2020-11-17 VITALS
DIASTOLIC BLOOD PRESSURE: 84 MMHG | SYSTOLIC BLOOD PRESSURE: 133 MMHG | BODY MASS INDEX: 27.14 KG/M2 | RESPIRATION RATE: 16 BRPM | TEMPERATURE: 98.9 F | HEIGHT: 64 IN | HEART RATE: 96 BPM | WEIGHT: 159 LBS | OXYGEN SATURATION: 98 %

## 2020-11-17 DIAGNOSIS — E06.3 HASHIMOTO'S THYROIDITIS: ICD-10-CM

## 2020-11-17 DIAGNOSIS — E11.9 TYPE 2 DIABETES MELLITUS WITHOUT COMPLICATION, WITHOUT LONG-TERM CURRENT USE OF INSULIN (H): ICD-10-CM

## 2020-11-17 DIAGNOSIS — E10.9 TYPE 1 DIABETES MELLITUS WITHOUT COMPLICATION (H): ICD-10-CM

## 2020-11-17 DIAGNOSIS — N91.2 ABSENCE OF MENSTRUATION: Primary | ICD-10-CM

## 2020-11-17 LAB
HBA1C MFR BLD: 5.3 % (ref 0–5.6)
HCG SERPL QL: POSITIVE
HCG UR QL: NEGATIVE
TSH SERPL DL<=0.005 MIU/L-ACNC: 1.86 MU/L (ref 0.4–4)

## 2020-11-17 PROCEDURE — 81025 URINE PREGNANCY TEST: CPT | Performed by: FAMILY MEDICINE

## 2020-11-17 PROCEDURE — 36415 COLL VENOUS BLD VENIPUNCTURE: CPT | Performed by: FAMILY MEDICINE

## 2020-11-17 PROCEDURE — 83036 HEMOGLOBIN GLYCOSYLATED A1C: CPT | Performed by: INTERNAL MEDICINE

## 2020-11-17 PROCEDURE — 84703 CHORIONIC GONADOTROPIN ASSAY: CPT | Performed by: FAMILY MEDICINE

## 2020-11-17 PROCEDURE — 99213 OFFICE O/P EST LOW 20 MIN: CPT | Performed by: FAMILY MEDICINE

## 2020-11-17 PROCEDURE — 84443 ASSAY THYROID STIM HORMONE: CPT | Performed by: INTERNAL MEDICINE

## 2020-11-17 ASSESSMENT — MIFFLIN-ST. JEOR: SCORE: 1396.22

## 2020-11-17 NOTE — PROGRESS NOTES
"Subjective     Denia Montes is a 35 year old female who presents to clinic today for the following health issues:    HPI         Home pregnancy test positive- slightly positive yesterday, today ,here the urine HCG was negative but her LMP was October 17th, 2020 which places her at STORM of July 24ht. She has had very ragualr menstrual cycles every 27 days so she was expecting her period last Friday and four days later still no period and some breast tenderness   She has had early miscarriage last summer   Some breast tenderness    Review of Systems   Constitutional, HEENT, cardiovascular, pulmonary, GI, , musculoskeletal, neuro, skin, endocrine and psych systems are negative, except as otherwise noted.      Objective    /84 (BP Location: Left arm, Cuff Size: Adult Regular)   Pulse 96   Temp 98.9  F (37.2  C) (Tympanic)   Resp 16   Ht 1.626 m (5' 4\")   Wt 72.1 kg (159 lb)   LMP 10/17/2020   SpO2 98%   Breastfeeding Unknown   BMI 27.29 kg/m    Body mass index is 27.29 kg/m .  Physical Exam   GENERAL: healthy, alert and no distress  NECK: no adenopathy, no asymmetry, masses, or scars and thyroid normal to palpation  MS: no gross musculoskeletal defects noted, no edema    Results for orders placed or performed in visit on 11/17/20 (from the past 24 hour(s))   HCG Qual, Urine ([id number])   Result Value Ref Range    HCG Qual Urine Negative NEG^Negative           Assessment & Plan     Absence of menstruation  Her urine HCG was negative but  Home HCG was positive , could be very early pregnancy as her periods have been very regular and LMP was October 17th, which based on this STORM July 24th 2021 , will do a blood qualitative HCG   - HCG Qual, Urine ([id number])  - HCG qualitative, Blood ([id number])    Type 2 diabetes mellitus without complication, without long-term current use of insulin (H)  She is on insulin and she sees endocrinology , she has a scheduled appointment in a week with endo Hashimoto's " thyroiditis  Will check TSH   - TSH with free T4 reflex    Type 1 diabetes mellitus without complication (H)  Is placed by endocrinologist to check her HGb A 1 c   - Hemoglobin A1c            RTC if no improving or worsening.  Pt is aware  and comfortable with the current plan.      Nakita Dang MD  [facility]

## 2020-11-17 NOTE — NURSING NOTE
"Chief Complaint   Patient presents with     Confirmation Of Pregnancy     initial /84 (BP Location: Left arm, Cuff Size: Adult Regular)   Pulse 96   Temp 98.9  F (37.2  C) (Tympanic)   Resp 16   Ht 1.626 m (5' 4\")   Wt 72.1 kg (159 lb)   LMP 10/17/2020   SpO2 98%   Breastfeeding Unknown   BMI 27.29 kg/m   Estimated body mass index is 27.29 kg/m  as calculated from the following:    Height as of this encounter: 1.626 m (5' 4\").    Weight as of this encounter: 72.1 kg (159 lb).  BP completed using cuff size: regular.  L  arm      Health Maintenance that is potentially due pending provider review:  NONE    n/a    Senthil Calderon ma  "

## 2020-11-19 NOTE — RESULT ENCOUNTER NOTE
[place],   I see that the blood pregnancy test is positive.  Congratulations.    #1 Your hemoglobin A1c is within the desired range of less than 6.  The target blood sugar readings during pregnancy are as follows:  Fasting blood glucose concentration <95 mg/dL   One-hour postprandial blood glucose concentration <140 mg/dL   Two-hour postprandial glucose concentration <120 mg/dL     #2 the thyroid blood work results are within the normal limits.     The evidence is not clear regarding Hashimoto thyroiditis and history of early miscarriage. However, administration of LT4 to TPOAb-positive pregnant women with normal thyroid labs is sometimes considered given low risk of taking thyroid medication and the potential benefit.  It might be reasonable to consider levothyroxine therapy 25 mcg daily starting from now.  I believe you have the prescription already.  We can further discuss about this issue at your upcoming visit.    In the meantime, if you have any questions please let me know.    Yuri Hansen MD

## 2020-11-24 DIAGNOSIS — E10.9 TYPE 1 DIABETES MELLITUS WITHOUT COMPLICATION (H): ICD-10-CM

## 2020-11-24 DIAGNOSIS — Z3A.01 LESS THAN 8 WEEKS GESTATION OF PREGNANCY: ICD-10-CM

## 2020-11-24 NOTE — TELEPHONE ENCOUNTER
insulin aspart (NOVOLOG PEN) 100 UNIT/ML pen   Last Written Prescription Date:  9/23/2020  Last Fill Quantity: 45,   # refills: 3  Last Office Visit : 9/23/2020  Future Office visit:  11/25/2020    Routing refill request to provider for review/approval because:  Drug not on the [facility], [facility] or [facility] refill protocol or controlled substance      Linda Jha RN  Central Triage Red Flags/Med Refills

## 2020-11-25 ENCOUNTER — VIRTUAL VISIT (OUTPATIENT)
Dept: ENDOCRINOLOGY | Facility: CLINIC | Age: 35
End: 2020-11-25
Payer: COMMERCIAL

## 2020-11-25 DIAGNOSIS — E06.3 HASHIMOTO'S THYROIDITIS: ICD-10-CM

## 2020-11-25 DIAGNOSIS — Z3A.01 LESS THAN 8 WEEKS GESTATION OF PREGNANCY: ICD-10-CM

## 2020-11-25 DIAGNOSIS — E10.9 TYPE 1 DIABETES MELLITUS WITHOUT COMPLICATION (H): Primary | ICD-10-CM

## 2020-11-25 PROCEDURE — 99214 OFFICE O/P EST MOD 30 MIN: CPT | Mod: 95 | Performed by: INTERNAL MEDICINE

## 2020-11-25 NOTE — PROGRESS NOTES
"Denia Montes is a 35 year old female who is being evaluated via a billable telephone visit.      The patient has been notified of following:     \"This telephone visit will be conducted via a call between you and your physician/provider. We have found that certain health care needs can be provided without the need for a physical exam.  This service lets us provide the care you need with a short phone conversation.  If a prescription is necessary we can send it directly to your pharmacy.  If lab work is needed we can place an order for that and you can then stop by our lab to have the test done at a later time.    Telephone visits are billed at different rates depending on your insurance coverage. During this emergency period, for some insurers they may be billed the same as an in-person visit.  Please reach out to your insurance provider with any questions.    If during the course of the call the physician/provider feels a telephone visit is not appropriate, you will not be charged for this service.\"    Patient has given verbal consent for Telephone visit?  Yes    What phone number would you like to be contacted at? [phone]    How would you like to obtain your AVS? [facility]  Endocrinology virtual Visit    Chief Complaint: Telephone (DM)     Information obtained from:Patient      Assessment/Treatment Plan:      MAGDI/treated as Type 1 diabetes currently - (H) Positive ANNA antibody/C-peptide WNL   Pregnant at 5 and half weeks: A1c 5.3  Based on blood glucose readings the following changes were made:     Continue with Levemir 12 twice per day.     Carbohydrate coverage 1 unit for every 10 grams of carbohydrates with breakfast. 1 unit for every 12 grams of carbohydrates with lunch and dinner for now (if the two hour BG is above the target range after dinner then you can increase dinner time to 1:10).     Please follow the following correction scale with meals three times per day   For  - 169 give  1 unit. "   For  - 219 give 2 units.   For  - 269 give 3 units.   For  - 319 give 4 units.   For BG = or > 320 give 5 units.    Based on the fasting BG readings - no adjustment made on basal today but if you notice persistent low's overnight; please let me know.   Please send BG readings every week and as needed also if BG readings are above the target range. Fasting > 95 and two hours >120.      Hashimoto thyroiditis: Patient currently euthyroid.  The evidence is not clear conclusively if levothyroxine replacement therapy in the setting of Hashimoto thyroiditis and euthyroid pregnant women; whether levothyroxine therapy will reduce the risk of recurrent pregnancy loss.  However, given risk benefits and particularly the low risk in the levothyroxine therapy levothyroxine 25 mcg daily started. TSH q 4 weeks during first trimester.       TSH   Date Value Ref Range Status   11/17/2020 1.86 0.40 - 4.00 mU/L Final       Patient Instructions    It was a pleasure talking to you [place].        Continue with Levemir 12 twice per day.     Carbohydrate coverage 1 unit for every 10 grams of carbohydrates with breakfast. 1 unit for every 12 grams of carbohydrates with lunch and dinner for now (if the two hour BG is above the target range after dinner then you can increase dinner time to 1:10).     Please follow the following correction scale with meals three times per day   For  - 169 give  1 unit.   For  - 219 give 2 units.   For  - 269 give 3 units.   For  - 319 give 4 units.   For BG = or > 320 give 5 units.    Based on the fasting BG readings - no adjustment made on basal today but if you notice persistent low's overnight; please let me know.   Please send BG readings every week and as needed also if BG readings are above the target range. Fasting > 95 and two hours >120.             I will contact the patient with the test results.      Test and/or medications prescribed today:  Orders Placed  This Encounter   Procedures     TSH with free T4 reflex     Yuri Hansen MD  Staff Endocrinologist    Division of Endocrinology and Diabetes      Subjective:         HPI: Denia Montes is a 35 year old female with history of MAGDI/treated as Type 1 diabetes currently - (H) Positive ANNA antibody/C-peptide WNL currently pregnant at 5 & 1/2 weeks.   Currently on:   INSULIN DETEMIR- INJECT 12 UNITS IN THE MORNING AND 12 UNITS AT BEDTIME  CHO 1:12 with meals and snacks and correction scale 1:50 above 120.                      No new symptoms.   Hashimoto thyroiditis and based on our last discussion regarding pros and cons of levothyroxine therapy; she has started levothyroxine 25 mcg daily.     Allergies   Allergen Reactions     Sulfa Drugs Hives       Current Outpatient Medications   Medication Sig Dispense Refill     Continuous Blood Gluc  (FREESTYLE JERO 14 DAY READER) RAEANN Use to read blood sugars as per 's instructions. 1 each 0     Continuous Blood Gluc Sensor (FREESTYLE JERO 14 DAY SENSOR) MISC Change every 14 days. 9 each 3     insulin detemir (LEVEMIR FLEXTOUCH) 100 UNIT/ML pen Inject 14 Units Subcutaneous 2 times daily 30 mL 3     insulin pen needle (32G X 4 MM) 32G X 4 MM miscellaneous Use 7 pen needles daily. 700 each 3     levothyroxine (SYNTHROID/LEVOTHROID) 25 MCG tablet Take 1 tablet (25 mcg) by mouth daily 90 tablet 3     MICROLET LANCETS MISC TEST BLOOD SUGAR TWICE DAILY 200 each 11     Omega-3 Fatty Acids (FISH OIL) 500 MG CAPS        Prenatal Vit-Fe Fumarate-FA (PRENATAL VITAMIN) 27-0.8 MG TABS Take 1 tablet by mouth daily 90 tablet 3     vitamin B-12 (CYANOCOBALAMIN) 100 MCG tablet Take 1,000 mcg by mouth daily       ACE/ARB NOT PRESCRIBED, INTENTIONAL, Please choose reason not prescribed, below (Patient not taking: Reported on 6/26/2020)       blood glucose (CONTOUR NEXT TEST) test strip Use to test blood sugar 6 times daily or as directed. (Patient not taking:  Reported on 11/17/2020) 500 each 3     blood glucose monitoring (JACI CONTOUR) test strip USE TO TEST BLOOD SUGAR TWICE DAILY IN MORNING AND EVENING (Patient not taking: Reported on 11/17/2020) 200 strip 1     insulin aspart (NOVOLOG PEN) 100 UNIT/ML pen 1 unit for every 12 grams of carbohydrates with meals and snacks plus correction dose. Uses up to 40 units daily. 45 mL 3       Review of Systems     as per HPI above    Past Medical History:   Diagnosis Date     Cervical high risk HPV (human papillomavirus) test positive 8/2015, 09/23/16, 12/14/18, 12/16/19    see problem list      Diabetes mellitus (H)      Hx of colposcopy with cervical biopsy 10/21/16    [place] Bx: Neg NIL, ECC: Neg.       Past Surgical History:   Procedure Laterality Date     NO HISTORY OF SURGERY         Family History   Problem Relation Age of Onset     Diabetes Mother      Hyperlipidemia Mother      Kidney Disease Mother      Thyroid Disease Mother      Hyperlipidemia Father      Hypertension Father      Kidney Disease Father      Substance Abuse Brother            Objective:   N/A    In House Labs:   Lab Results   Component Value Date    A1C 5.3 11/17/2020    A1C 6.1 06/29/2020    A1C 7.4 01/11/2019    A1C 6.3 09/28/2018    A1C 6.6 06/15/2018       TSH   Date Value Ref Range Status   11/17/2020 1.86 0.40 - 4.00 mU/L Final   06/29/2020 1.21 0.40 - 4.00 mU/L Final   03/11/2020 1.52 0.40 - 4.00 mU/L Final   01/30/2019 1.45 0.40 - 4.00 mU/L Final   02/17/2017 2.13 0.40 - 4.00 mU/L Final       Creatinine   Date Value Ref Range Status   12/16/2019 0.57 0.52 - 1.04 mg/dL Final           Phone call duration: 15 minutes

## 2020-11-25 NOTE — PATIENT INSTRUCTIONS
It was a pleasure talking to you [place].        Continue with Levemir 12 twice per day.     Carbohydrate coverage 1 unit for every 10 grams of carbohydrates with breakfast. 1 unit for every 12 grams of carbohydrates with lunch and dinner for now (if the two hour BG is above the target range after dinner then you can increase dinner time to 1:10).     Please follow the following correction scale with meals three times per day   For  - 169 give  1 unit.   For  - 219 give 2 units.   For  - 269 give 3 units.   For  - 319 give 4 units.   For BG = or > 320 give 5 units.    Based on the fasting BG readings - no adjustment made on basal today but if you notice persistent low's overnight; please let me know.   Please send BG readings every week and as needed also if BG readings are above the target range. Fasting > 95 and two hours >120.

## 2020-11-25 NOTE — LETTER
"11/25/2020       RE: Denia Montes  [address]  [facility] [address]     Dear Colleague,    Thank you for referring your patient, Denia Montes, to the [facility] ENDOCRINOLOGY [facility]INIC [place] at [facility]. Please see a copy of my visit note below.    Duplicate.      Denia Montes is a 35 year old female who is being evaluated via a billable telephone visit.      The patient has been notified of following:     \"This telephone visit will be conducted via a call between you and your physician/provider. We have found that certain health care needs can be provided without the need for a physical exam.  This service lets us provide the care you need with a short phone conversation.  If a prescription is necessary we can send it directly to your pharmacy.  If lab work is needed we can place an order for that and you can then stop by our lab to have the test done at a later time.    Telephone visits are billed at different rates depending on your insurance coverage. During this emergency period, for some insurers they may be billed the same as an in-person visit.  Please reach out to your insurance provider with any questions.    If during the course of the call the physician/provider feels a telephone visit is not appropriate, you will not be charged for this service.\"    Patient has given verbal consent for Telephone visit?  Yes    What phone number would you like to be contacted at? [phone]    How would you like to obtain your AVS? [facility]  Endocrinology virtual Visit    Chief Complaint: Telephone (DM)     Information obtained from:Patient      Assessment/Treatment Plan:      MAGDI/treated as Type 1 diabetes currently - (H) Positive ANNA antibody/C-peptide WNL   Pregnant at 5 and half weeks: A1c 5.3  Based on blood glucose readings the following changes were made:     Continue with Levemir 12 twice per day.     Carbohydrate coverage 1 unit for " every 10 grams of carbohydrates with breakfast. 1 unit for every 12 grams of carbohydrates with lunch and dinner for now (if the two hour BG is above the target range after dinner then you can increase dinner time to 1:10).     Please follow the following correction scale with meals three times per day   For  - 169 give  1 unit.   For  - 219 give 2 units.   For  - 269 give 3 units.   For  - 319 give 4 units.   For BG = or > 320 give 5 units.    Based on the fasting BG readings - no adjustment made on basal today but if you notice persistent low's overnight; please let me know.   Please send BG readings every week and as needed also if BG readings are above the target range. Fasting > 95 and two hours >120.      Hashimoto thyroiditis: Patient currently euthyroid.  The evidence is not clear conclusively if levothyroxine replacement therapy in the setting of Hashimoto thyroiditis and euthyroid pregnant women; whether levothyroxine therapy will reduce the risk of recurrent pregnancy loss.  However, given risk benefits and particularly the low risk in the levothyroxine therapy levothyroxine 25 mcg daily started. TSH q 4 weeks during first trimester.       TSH   Date Value Ref Range Status   11/17/2020 1.86 0.40 - 4.00 mU/L Final       Patient Instructions    It was a pleasure talking to you [place].        Continue with Levemir 12 twice per day.     Carbohydrate coverage 1 unit for every 10 grams of carbohydrates with breakfast. 1 unit for every 12 grams of carbohydrates with lunch and dinner for now (if the two hour BG is above the target range after dinner then you can increase dinner time to 1:10).     Please follow the following correction scale with meals three times per day   For  - 169 give  1 unit.   For  - 219 give 2 units.   For  - 269 give 3 units.   For  - 319 give 4 units.   For BG = or > 320 give 5 units.    Based on the fasting BG readings - no adjustment made  on basal today but if you notice persistent low's overnight; please let me know.   Please send BG readings every week and as needed also if BG readings are above the target range. Fasting > 95 and two hours >120.             I will contact the patient with the test results.      Test and/or medications prescribed today:  Orders Placed This Encounter   Procedures     TSH with free T4 reflex     Yuri Hansen MD  Staff Endocrinologist    Division of Endocrinology and Diabetes      Subjective:         HPI: Denia Montes is a 35 year old female with history of MAGDI/treated as Type 1 diabetes currently - (H) Positive ANNA antibody/C-peptide WNL currently pregnant at 5 & 1/2 weeks.   Currently on:   INSULIN DETEMIR- INJECT 12 UNITS IN THE MORNING AND 12 UNITS AT BEDTIME  CHO 1:12 with meals and snacks and correction scale 1:50 above 120.                      No new symptoms.   Hashimoto thyroiditis and based on our last discussion regarding pros and cons of levothyroxine therapy; she has started levothyroxine 25 mcg daily.     Allergies   Allergen Reactions     Sulfa Drugs Hives       Current Outpatient Medications   Medication Sig Dispense Refill     Continuous Blood Gluc  (FREESTYLE JERO 14 DAY READER) RAEANN Use to read blood sugars as per 's instructions. 1 each 0     Continuous Blood Gluc Sensor (FREESTYLE JERO 14 DAY SENSOR) MISC Change every 14 days. 9 each 3     insulin detemir (LEVEMIR FLEXTOUCH) 100 UNIT/ML pen Inject 14 Units Subcutaneous 2 times daily 30 mL 3     insulin pen needle (32G X 4 MM) 32G X 4 MM miscellaneous Use 7 pen needles daily. 700 each 3     levothyroxine (SYNTHROID/LEVOTHROID) 25 MCG tablet Take 1 tablet (25 mcg) by mouth daily 90 tablet 3     MICROLET LANCETS MISC TEST BLOOD SUGAR TWICE DAILY 200 each 11     Omega-3 Fatty Acids (FISH OIL) 500 MG CAPS        Prenatal Vit-Fe Fumarate-FA (PRENATAL VITAMIN) 27-0.8 MG TABS Take 1 tablet by mouth daily 90 tablet 3      vitamin B-12 (CYANOCOBALAMIN) 100 MCG tablet Take 1,000 mcg by mouth daily       ACE/ARB NOT PRESCRIBED, INTENTIONAL, Please choose reason not prescribed, below (Patient not taking: Reported on 6/26/2020)       blood glucose (CONTOUR NEXT TEST) test strip Use to test blood sugar 6 times daily or as directed. (Patient not taking: Reported on 11/17/2020) 500 each 3     blood glucose monitoring (JACI CONTOUR) test strip USE TO TEST BLOOD SUGAR TWICE DAILY IN MORNING AND EVENING (Patient not taking: Reported on 11/17/2020) 200 strip 1     insulin aspart (NOVOLOG PEN) 100 UNIT/ML pen 1 unit for every 12 grams of carbohydrates with meals and snacks plus correction dose. Uses up to 40 units daily. 45 mL 3       Review of Systems     as per HPI above    Past Medical History:   Diagnosis Date     Cervical high risk HPV (human papillomavirus) test positive 8/2015, 09/23/16, 12/14/18, 12/16/19    see problem list      Diabetes mellitus (H)      Hx of colposcopy with cervical biopsy 10/21/16    [place] Bx: Neg NIL, ECC: Neg.       Past Surgical History:   Procedure Laterality Date     NO HISTORY OF SURGERY         Family History   Problem Relation Age of Onset     Diabetes Mother      Hyperlipidemia Mother      Kidney Disease Mother      Thyroid Disease Mother      Hyperlipidemia Father      Hypertension Father      Kidney Disease Father      Substance Abuse Brother        Objective:   N/A    In House Labs:   Lab Results   Component Value Date    A1C 5.3 11/17/2020    A1C 6.1 06/29/2020    A1C 7.4 01/11/2019    A1C 6.3 09/28/2018    A1C 6.6 06/15/2018       TSH   Date Value Ref Range Status   11/17/2020 1.86 0.40 - 4.00 mU/L Final   06/29/2020 1.21 0.40 - 4.00 mU/L Final   03/11/2020 1.52 0.40 - 4.00 mU/L Final   01/30/2019 1.45 0.40 - 4.00 mU/L Final   02/17/2017 2.13 0.40 - 4.00 mU/L Final       Creatinine   Date Value Ref Range Status   12/16/2019 0.57 0.52 - 1.04 mg/dL Final     Phone call duration: 15 minutes

## 2020-11-30 DIAGNOSIS — E13.9 DIABETES MELLITUS TYPE 1.5 (H): ICD-10-CM

## 2020-11-30 DIAGNOSIS — O09.511 PRIMIGRAVIDA OF ADVANCED MATERNAL AGE IN FIRST TRIMESTER: Primary | ICD-10-CM

## 2020-12-09 ENCOUNTER — MYC MEDICAL ADVICE (OUTPATIENT)
Dept: ENDOCRINOLOGY | Facility: CLINIC | Age: 35
End: 2020-12-09

## 2020-12-10 NOTE — TELEPHONE ENCOUNTER
"                  Pregnant a/first trimester: A1c 5.3  Plan:     Based on blood glucose readings no change recommended.     The following message sent to pt..     \"C[place],      I reviewed your readings: no change on insulin at this time; overall it looks good. Overnight, I see that you still have some lows but I suspect those might be misreading by the shantanu as I don't see any lows when you wake up (if you have symptoms from the lows /wake you up at night then we can consider reducing but from my last discussion with you -that was not the case).    No change on insulin regimen at this time. Continue your current settings. Please let me know if any questions. \"         "

## 2020-12-14 ENCOUNTER — PRENATAL OFFICE VISIT (OUTPATIENT)
Dept: NURSING | Facility: CLINIC | Age: 35
End: 2020-12-14
Payer: COMMERCIAL

## 2020-12-14 VITALS — BODY MASS INDEX: 27.29 KG/M2 | HEIGHT: 64 IN

## 2020-12-14 DIAGNOSIS — Z23 NEED FOR TDAP VACCINATION: ICD-10-CM

## 2020-12-14 DIAGNOSIS — O09.529 ENCOUNTER FOR SUPERVISION OF HIGH-RISK PREGNANCY WITH MULTIGRAVIDA OF ADVANCED MATERNAL AGE: Primary | ICD-10-CM

## 2020-12-14 PROCEDURE — 99207 PR NO CHARGE NURSE ONLY: CPT

## 2020-12-14 NOTE — PROGRESS NOTES
New ob nurse intake by phone, third pregnancy, AMA. Recent SAB 6/2020. Ultrasound scheduled for 12/15/2020 with CNM to review after. Reviewed handouts. Declined first trimester screening. Has NOB with Dr South 12/30/2020   A1C drawn 11/17/2020, TSH 11/25/2020        Patient supplied answers from flow sheet for:  Prenatal OB Questionnaire.  Past Medical History  Diabetes?: (!) Yes  Hypertension : No  Heart disease, mitral valve prolapse or rheumatic fever?: No  An autoimmune disease such as lupus or rheumatoid arthritis?: No  Kidney disease or urinary tract infection?: No  Epilepsy, seizures or spells?: No  Migraine headaches?: No  A stroke or loss of function or sensation?: No  Any other neurological problems?: No  Have you ever been treated for depression?: No  Are you having problems with crying spells or loss of self-esteem?: No  Have you ever required psychiatric care?: No  Have you ever had hepatitis, liver disease or jaundice?: No  Have you had excessive bleeding after surgery or dental work?: No  Do you bleed more than other women after a cut or scratch?: No  Do you have a history of anemia?: No  Have you ever had thyroid problems or taken thyroid medication?: Yes   Do you have any endocrine problems?: Yes hashimoto's   Have you ever been in a major accident or suffered serious trauma?: No  Within the last year, has anyone hit, slapped, kicked or otherwise hurt you?: No  In the last year, has anyone forced you to have sex when you didn't want to?: No    Past Medical History 2   Have you ever received a blood transfusion?: No  Would you refuse a blood transfusion if a doctor judged it to be medically necessary?: No   If you answered Yes, would you rather die than receive a blood transfusion?: No  If you answered Yes, is this for Shinto reasons?: No  Does anyone in your home smoke?: No  Do you use tobacco products?: No  Do you drink beer, wine or hard liquor?: No  Do you use any of the following:  marijuana, speed, cocaine, heroin, hallucinogens or other drugs?: No   Is your blood type Rh negative?: No  Have you ever had abnormal antibodies in your blood?: No  Have you ever had asthma?: No  Have you ever had tuberculosis?: No  Do you have any allergies to drugs or over-the-counter medications?: (!) Yes(Sulfa)  Allergies: Dust Mites, Aspartame, Ethanol, Venlafaxine, Hydrochloride, Sertraline: (!) Yes mild seasonal allergies  Have you had any breast problems?: No  Have you ever ?: No  Have you had any gynecological surgical procedures such as cervical conization, a LEEP procedure, laser treatment, cryosurgery of the cervix or a dilation and curettage, etc?: Yes TAB  Have you ever had any other surgical procedures?:No  Have you been hospitalized for a nonsurgical reason excluding normal delivery?: No  Have you ever had any anesthetic complications?: No  Have you ever had an abnormal pap smear?: (!) Yes [place]'s     Past Medical History (Continued)  Do you have a history of abnormalities of the uterus?: No  Did your mother take NORM or any other hormones when she was pregnant with you?: No  Did it take you more than a year to become pregnant?: No  Have you ever been evaluated or treated for infertility?: No  Is there a history of medical problems in your family, which you feel may be important to this pregnancy?: No  Do you have any other problems we have not asked about which you feel may be important to this pregnancy?: No    Symptoms since last menstrual period  Do you have any of the following symptoms: abdominal pain, blood in stools or urine, chest pain, shortness of breath, coughing or vomiting up blood, your heart racing or skipping beats, nausea and vomiting, pain on urination or vaginal discharge or bleed: (!) Yes  Will the patient be 35 years old or older at the time of delivery?: (!) Yes    Has the patient, baby's father or anyone in either family had:  Thalassemia (Italian, Greek, Mediterranean  or  background only) and an MCV result less than 80?: No  Neural tube defect such as meningomyelocele, spina bifida or anencephaly?: No  Congenital heart defect?: No  Down's Syndrome?: No  Stef-Sachs disease (Adventism, Cajun, Libyan-Moldovan)?: No  Sickle cell disease or trait ()?: No  Hemophilia or other inherited problems of blood?: No  Muscular dystrophy?: No  Cystic fibrosis?: No  [place]'s chorea?: No  Mental retardation/autism?: No  If yes, was the person tested for fragile X?: No  Any other inherited genetic or chromosomal disorder?: No  Maternal metabolic disorder (e.g Insulin-dependent diabetes, PKU)?: No  A child with birth defects not listed above?: No  Recurrent pregnancy loss or stillbirth?: No   Has the patient had any medications/street drugs/alcohol since her last menstrual period?: No  Does the patient or baby's father have any other genetic risks?: No    Infection History   Do you object to being tested for Hepatitis B?: No  Do you object to being tested for HIV?: No   Do you feel that you are at high risk for coming in contact with the AIDS virus?: No  Have you ever been treated for tuberculosis?: No  Have you ever had a positive skin test for tuberculosis?: No  Do you live with someone who has tuberculosis?: No  Have you ever been exposed to tuberculosis?: No  Do you have genital herpes?: No  Does your partner have genital herpes?: No  Have you had a viral illness since your last period?: No  Have you ever had gonorrhea, chlamydia, syphilis, venereal warts, trichomoniasis, pelvic inflammatory disease or any other sexually transmitted disease?: No  Do you know if you are a genital group B streptococcus carrier?: No  Have you had chicken pox/varicella?:Yes   Have you been vaccinated against chicken Pox?: No  Have you had any other infectious diseases?: No

## 2020-12-14 NOTE — PROGRESS NOTES
New ob nurse intake by phone, third pregnancy, AMA. Recent SAB 6/2020. Ultrasound scheduled for 12/15/2020 with CNM to review after. Reviewed handouts. Declined first trimester screening. Has NOB with Dr South 12/30/2020   A1C drawn 11/17/2020, TSH 11/25/2020        Patient supplied answers from flow sheet for:  Prenatal OB Questionnaire.  Past Medical History  Diabetes?: (!) Yes  Hypertension : No  Heart disease, mitral valve prolapse or rheumatic fever?: No  An autoimmune disease such as lupus or rheumatoid arthritis?: No  Kidney disease or urinary tract infection?: No  Epilepsy, seizures or spells?: No  Migraine headaches?: No  A stroke or loss of function or sensation?: No  Any other neurological problems?: No  Have you ever been treated for depression?: No  Are you having problems with crying spells or loss of self-esteem?: No  Have you ever required psychiatric care?: No  Have you ever had hepatitis, liver disease or jaundice?: No  Have you had excessive bleeding after surgery or dental work?: No  Do you bleed more than other women after a cut or scratch?: No  Do you have a history of anemia?: No  Have you ever had thyroid problems or taken thyroid medication?: Yes   Do you have any endocrine problems?: Yes hashimoto's   Have you ever been in a major accident or suffered serious trauma?: No  Within the last year, has anyone hit, slapped, kicked or otherwise hurt you?: No  In the last year, has anyone forced you to have sex when you didn't want to?: No    Past Medical History 2   Have you ever received a blood transfusion?: No  Would you refuse a blood transfusion if a doctor judged it to be medically necessary?: No   If you answered Yes, would you rather die than receive a blood transfusion?: No  If you answered Yes, is this for Yazidism reasons?: No  Does anyone in your home smoke?: No  Do you use tobacco products?: No  Do you drink beer, wine or hard liquor?: No  Do you use any of the following:  marijuana, speed, cocaine, heroin, hallucinogens or other drugs?: No   Is your blood type Rh negative?: No  Have you ever had abnormal antibodies in your blood?: No  Have you ever had asthma?: No  Have you ever had tuberculosis?: No  Do you have any allergies to drugs or over-the-counter medications?: (!) Yes(Sulfa)  Allergies: Dust Mites, Aspartame, Ethanol, Venlafaxine, Hydrochloride, Sertraline: (!) Yes mild seasonal allergies  Have you had any breast problems?: No  Have you ever ?: No  Have you had any gynecological surgical procedures such as cervical conization, a LEEP procedure, laser treatment, cryosurgery of the cervix or a dilation and curettage, etc?: Yes TAB  Have you ever had any other surgical procedures?:No  Have you been hospitalized for a nonsurgical reason excluding normal delivery?: No  Have you ever had any anesthetic complications?: No  Have you ever had an abnormal pap smear?: (!) Yes Palmetto's     Past Medical History (Continued)  Do you have a history of abnormalities of the uterus?: No  Did your mother take NORM or any other hormones when she was pregnant with you?: No  Did it take you more than a year to become pregnant?: No  Have you ever been evaluated or treated for infertility?: No  Is there a history of medical problems in your family, which you feel may be important to this pregnancy?: No  Do you have any other problems we have not asked about which you feel may be important to this pregnancy?: No    Symptoms since last menstrual period  Do you have any of the following symptoms: abdominal pain, blood in stools or urine, chest pain, shortness of breath, coughing or vomiting up blood, your heart racing or skipping beats, nausea and vomiting, pain on urination or vaginal discharge or bleed: (!) Yes  Will the patient be 35 years old or older at the time of delivery?: (!) Yes    Has the patient, baby's father or anyone in either family had:  Thalassemia (Italian, Greek, Mediterranean  or  background only) and an MCV result less than 80?: No  Neural tube defect such as meningomyelocele, spina bifida or anencephaly?: No  Congenital heart defect?: No  Down's Syndrome?: No  Stef-Sachs disease (Protestant, Cajun, Ghanaian-Gibraltarian)?: No  Sickle cell disease or trait ()?: No  Hemophilia or other inherited problems of blood?: No  Muscular dystrophy?: No  Cystic fibrosis?: No  Woods's chorea?: No  Mental retardation/autism?: No  If yes, was the person tested for fragile X?: No  Any other inherited genetic or chromosomal disorder?: No  Maternal metabolic disorder (e.g Insulin-dependent diabetes, PKU)?: Patient, mother  A child with birth defects not listed above?: No  Recurrent pregnancy loss or stillbirth?: No   Has the patient had any medications/street drugs/alcohol since her last menstrual period?: No  Does the patient or baby's father have any other genetic risks?: No    Infection History   Do you object to being tested for Hepatitis B?: No  Do you object to being tested for HIV?: No   Do you feel that you are at high risk for coming in contact with the AIDS virus?: No  Have you ever been treated for tuberculosis?: No  Have you ever had a positive skin test for tuberculosis?: No  Do you live with someone who has tuberculosis?: No  Have you ever been exposed to tuberculosis?: No  Do you have genital herpes?: No  Does your partner have genital herpes?: No  Have you had a viral illness since your last period?: No  Have you ever had gonorrhea, chlamydia, syphilis, venereal warts, trichomoniasis, pelvic inflammatory disease or any other sexually transmitted disease?: No  Do you know if you are a genital group B streptococcus carrier?: No  Have you had chicken pox/varicella?:Yes   Have you been vaccinated against chicken Pox?: No  Have you had any other infectious diseases?: No

## 2020-12-15 NOTE — PROGRESS NOTES
"Denia Montes is a 35 year old female who is being evaluated via a billable video visit.      The patient has been notified of following:     \"This video visit will be conducted via a call between you and your physician/provider. We have found that certain health care needs can be provided without the need for an in-person physical exam.  This service lets us provide the care you need with a video conversation.  If a prescription is necessary we can send it directly to your pharmacy.  If lab work is needed we can place an order for that and you can then stop by our lab to have the test done at a later time.    Video visits are billed at different rates depending on your insurance coverage.  Please reach out to your insurance provider with any questions.    If during the course of the call the physician/provider feels a video visit is not appropriate, you will not be charged for this service.\"    Patient has given verbal consent for Video visit? Yes  How would you like to obtain your AVS? MyChart  If you are dropped from the video visit, the video invite should be resent to: Text to cell phone: [phone]  Will anyone else be joining your video visit? No   Endocrinology virtual Visit    Chief Complaint: Video Visit (DM)     Information obtained from:Patient      Assessment/Treatment Plan:      MAGDI/treated as Type 1 diabetes currently - (H) Positive ANNA antibody/C-peptide WNL   Pregnant at 9 weeks: A1c 5.3  Based on blood glucose readings the following changes were made:     Reduce bedtime Levemir to 10 units daily from the current 14 units daily.     Continue Levemir 14  Units in the morning.     Carbohydrate coverage 1 unit for every 12 grams of carbohydrates with breakfast, lunch and dinner.      Please follow the following correction scale with meals three times per day   For  - 169 give  1 unit.   For  - 219 give 2 units.   For  - 269 give 3 units.    For  - 319 give 4 units.   For " BG = or > 320 give 5 units.    Based on the fasting BG readings - no adjustment made on basal today but if you notice persistent low's overnight; please let me know.   Please send BG readings every week and as needed also if BG readings are above the target range. Fasting > 95 and two hours >120.      Hashimoto thyroiditis: continue levothyroxine 25 mcg daily started. TSH check.     TSH   Date Value Ref Range Status   11/17/2020 1.86 0.40 - 4.00 mU/L Final       Yuri Hansen MD  Staff Endocrinologist    Division of Endocrinology and Diabetes      Subjective:         HPI: Denia Montes is a 35 year old female with history of MAGDI/treated as Type 1 diabetes currently - (H) Positive ANNA antibody/C-peptide WNL currently pregnant at 5 & 1/2 weeks.   Currently on:   INSULIN DETEMIR- INJECT 14 UNITS IN THE MORNING AND 14 UNITS AT BEDTIME  CHO 1:10 with meals and snacks and correction scale 1:50 above 120.                                  Has been having hypoglycemia symptoms overnight when her blood sugar is low.      Allergies   Allergen Reactions     Sulfa Drugs Hives       Current Outpatient Medications   Medication Sig Dispense Refill     Continuous Blood Gluc  (FREESTYLE JERO 14 DAY READER) RAEANN Use to read blood sugars as per 's instructions. 1 each 0     Continuous Blood Gluc Sensor (FREESTYLE JERO 14 DAY SENSOR) MISC Change every 14 days. 9 each 3     insulin aspart (NOVOLOG PEN) 100 UNIT/ML pen 1 unit for every 12 grams of carbohydrates with meals and snacks plus correction dose. Uses up to 40 units daily. 45 mL 3     insulin detemir (LEVEMIR FLEXTOUCH) 100 UNIT/ML pen Inject 14 Units Subcutaneous 2 times daily 30 mL 3     insulin pen needle (32G X 4 MM) 32G X 4 MM miscellaneous Use 7 pen needles daily. 700 each 3     levothyroxine (SYNTHROID/LEVOTHROID) 25 MCG tablet Take 1 tablet (25 mcg) by mouth daily 90 tablet 3     MICROLET LANCETS MISC TEST BLOOD SUGAR TWICE DAILY 200 each  11     Omega-3 Fatty Acids (FISH OIL) 500 MG CAPS        Prenatal Vit-Fe Fumarate-FA (PRENATAL VITAMIN) 27-0.8 MG TABS Take 1 tablet by mouth daily 90 tablet 3     vitamin B-12 (CYANOCOBALAMIN) 100 MCG tablet Take 1,000 mcg by mouth daily       ACE/ARB NOT PRESCRIBED, INTENTIONAL, Please choose reason not prescribed, below (Patient not taking: Reported on 6/26/2020)       blood glucose (CONTOUR NEXT TEST) test strip Use to test blood sugar 6 times daily or as directed. (Patient not taking: Reported on 11/17/2020) 500 each 3     blood glucose monitoring (JACI CONTOUR) test strip USE TO TEST BLOOD SUGAR TWICE DAILY IN MORNING AND EVENING (Patient not taking: Reported on 11/17/2020) 200 strip 1       Review of Systems     as per HPI above    Past Medical History:   Diagnosis Date     Cervical high risk HPV (human papillomavirus) test positive 8/2015, 09/23/16, 12/14/18, 12/16/19    see problem list      Diabetes mellitus (H)      Hx of colposcopy with cervical biopsy 10/21/16    [place] Bx: Neg NIL, ECC: Neg.       Past Surgical History:   Procedure Laterality Date     NO HISTORY OF SURGERY         Family History   Problem Relation Age of Onset     Diabetes Mother      Hyperlipidemia Mother      Kidney Disease Mother      Thyroid Disease Mother      Hyperlipidemia Father      Hypertension Father      Kidney Disease Father      Substance Abuse Brother            Objective:     GENERAL: Healthy, alert and no distress  EYES: Eyes grossly normal to inspection.    RESP: No audible wheeze, cough, or visible cyanosis.    NEURO: Alert and oriented. mentation and speech appropriate for age.      In House Labs:   Lab Results   Component Value Date    A1C 5.3 11/17/2020    A1C 6.1 06/29/2020    A1C 7.4 01/11/2019    A1C 6.3 09/28/2018    A1C 6.6 06/15/2018       TSH   Date Value Ref Range Status   11/17/2020 1.86 0.40 - 4.00 mU/L Final   06/29/2020 1.21 0.40 - 4.00 mU/L Final   03/11/2020 1.52 0.40 - 4.00 mU/L Final   01/30/2019  1.45 0.40 - 4.00 mU/L Final   02/17/2017 2.13 0.40 - 4.00 mU/L Final       Creatinine   Date Value Ref Range Status   12/16/2019 0.57 0.52 - 1.04 mg/dL Final         Video-Visit Details    Type of service:  Video Visit  All times are in your local time  1st VideoStart: 12/16/2020 02:04 pm   Stop: 12/16/2020 02:15 pm    Originating Location (pt. Location): Home    Distant Location (provider location):  [facility] [place]     Platform used for Video Visit: [employer]

## 2020-12-16 ENCOUNTER — VIRTUAL VISIT (OUTPATIENT)
Dept: ENDOCRINOLOGY | Facility: CLINIC | Age: 35
End: 2020-12-16
Payer: COMMERCIAL

## 2020-12-16 DIAGNOSIS — Z3A.09 9 WEEKS GESTATION OF PREGNANCY: ICD-10-CM

## 2020-12-16 DIAGNOSIS — E10.9 TYPE 1 DIABETES MELLITUS WITHOUT COMPLICATION (H): Primary | ICD-10-CM

## 2020-12-16 PROCEDURE — 99213 OFFICE O/P EST LOW 20 MIN: CPT | Mod: 95 | Performed by: INTERNAL MEDICINE

## 2020-12-16 NOTE — PATIENT INSTRUCTIONS
Denia,    Based on blood glucose readings the following changes were made:     Reduce bedtime Levemir to 10 units daily from the current 14 units daily.     Continue Levemir 14  Units in the morning.     Carbohydrate coverage 1 unit for every 12 grams of carbohydrates with breakfast, lunch and dinner.      Please follow the following correction scale with meals three times per day   For  - 169 give  1 unit.   For  - 219 give 2 units.   For  - 269 give 3 units.   For  - 319 give 4 units.   For BG = or > 320 give 5 units.    Please send BG readings every week and as needed also if BG readings are above the target range or persistently low. Fasting > 95 and two hours >120. Or blood glucose readings below 70.      Here if the number to call us at [phone] select option #3 for triage nurse.   Thyroid blood work to be done at the time of your scheduled blood work for [facility].

## 2020-12-16 NOTE — LETTER
"12/16/2020       RE: Denia Montes  [address]  [facility] [address]     Dear Colleague,    Thank you for referring your patient, Denia Montes, to the [facility] [place] [facility]. Please see a copy of my visit note below.    Denia Montes is a 35 year old female who is being evaluated via a billable video visit.      The patient has been notified of following:     \"This video visit will be conducted via a call between you and your physician/provider. We have found that certain health care needs can be provided without the need for an in-person physical exam.  This service lets us provide the care you need with a video conversation.  If a prescription is necessary we can send it directly to your pharmacy.  If lab work is needed we can place an order for that and you can then stop by our lab to have the test done at a later time.    Video visits are billed at different rates depending on your insurance coverage.  Please reach out to your insurance provider with any questions.    If during the course of the call the physician/provider feels a video visit is not appropriate, you will not be charged for this service.\"    Patient has given verbal consent for Video visit? Yes  How would you like to obtain your AVS? MyChart  If you are dropped from the video visit, the video invite should be resent to: Text to cell phone: [phone]  Will anyone else be joining your video visit? No   Endocrinology virtual Visit    Chief Complaint: Video Visit (DM)     Information obtained from:Patient      Assessment/Treatment Plan:      MAGDI/treated as Type 1 diabetes currently - (H) Positive ANNA antibody/C-peptide WNL   Pregnant at 9 weeks: A1c 5.3  Based on blood glucose readings the following changes were made:     Reduce bedtime Levemir to 10 units daily from the current 14 units daily.     Continue Levemir 14  Units in the morning. "     Carbohydrate coverage 1 unit for every 12 grams of carbohydrates with breakfast, lunch and dinner.      Please follow the following correction scale with meals three times per day   For  - 169 give  1 unit.   For  - 219 give 2 units.   For  - 269 give 3 units.    For  - 319 give 4 units.   For BG = or > 320 give 5 units.    Based on the fasting BG readings - no adjustment made on basal today but if you notice persistent low's overnight; please let me know.   Please send BG readings every week and as needed also if BG readings are above the target range. Fasting > 95 and two hours >120.      Hashimoto thyroiditis: continue levothyroxine 25 mcg daily started. TSH check.     TSH   Date Value Ref Range Status   11/17/2020 1.86 0.40 - 4.00 mU/L Final       Yuri Hansen MD  Staff Endocrinologist    Division of Endocrinology and Diabetes      Subjective:         HPI: Denia Montes is a 35 year old female with history of MAGDI/treated as Type 1 diabetes currently - (H) Positive ANNA antibody/C-peptide WNL currently pregnant at 5 & 1/2 weeks.   Currently on:   INSULIN DETEMIR- INJECT 14 UNITS IN THE MORNING AND 14 UNITS AT BEDTIME  CHO 1:10 with meals and snacks and correction scale 1:50 above 120.                                  Has been having hypoglycemia symptoms overnight when her blood sugar is low.      Allergies   Allergen Reactions     Sulfa Drugs Hives       Current Outpatient Medications   Medication Sig Dispense Refill     Continuous Blood Gluc  (FREESTYLE JERO 14 DAY READER) RAEANN Use to read blood sugars as per 's instructions. 1 each 0     Continuous Blood Gluc Sensor (FREESTYLE JERO 14 DAY SENSOR) MISC Change every 14 days. 9 each 3     insulin aspart (NOVOLOG PEN) 100 UNIT/ML pen 1 unit for every 12 grams of carbohydrates with meals and snacks plus correction dose. Uses up to 40 units daily. 45 mL 3     insulin detemir (LEVEMIR FLEXTOUCH) 100  UNIT/ML pen Inject 14 Units Subcutaneous 2 times daily 30 mL 3     insulin pen needle (32G X 4 MM) 32G X 4 MM miscellaneous Use 7 pen needles daily. 700 each 3     levothyroxine (SYNTHROID/LEVOTHROID) 25 MCG tablet Take 1 tablet (25 mcg) by mouth daily 90 tablet 3     MICROLET LANCETS MISC TEST BLOOD SUGAR TWICE DAILY 200 each 11     Omega-3 Fatty Acids (FISH OIL) 500 MG CAPS        Prenatal Vit-Fe Fumarate-FA (PRENATAL VITAMIN) 27-0.8 MG TABS Take 1 tablet by mouth daily 90 tablet 3     vitamin B-12 (CYANOCOBALAMIN) 100 MCG tablet Take 1,000 mcg by mouth daily       ACE/ARB NOT PRESCRIBED, INTENTIONAL, Please choose reason not prescribed, below (Patient not taking: Reported on 6/26/2020)       blood glucose (CONTOUR NEXT TEST) test strip Use to test blood sugar 6 times daily or as directed. (Patient not taking: Reported on 11/17/2020) 500 each 3     blood glucose monitoring (JACI CONTOUR) test strip USE TO TEST BLOOD SUGAR TWICE DAILY IN MORNING AND EVENING (Patient not taking: Reported on 11/17/2020) 200 strip 1       Review of Systems     as per HPI above    Past Medical History:   Diagnosis Date     Cervical high risk HPV (human papillomavirus) test positive 8/2015, 09/23/16, 12/14/18, 12/16/19    see problem list      Diabetes mellitus (H)      Hx of colposcopy with cervical biopsy 10/21/16    [place] Bx: Neg NIL, ECC: Neg.       Past Surgical History:   Procedure Laterality Date     NO HISTORY OF SURGERY         Family History   Problem Relation Age of Onset     Diabetes Mother      Hyperlipidemia Mother      Kidney Disease Mother      Thyroid Disease Mother      Hyperlipidemia Father      Hypertension Father      Kidney Disease Father      Substance Abuse Brother            Objective:     GENERAL: Healthy, alert and no distress  EYES: Eyes grossly normal to inspection.    RESP: No audible wheeze, cough, or visible cyanosis.    NEURO: Alert and oriented. mentation and speech appropriate for age.      In House  Labs:   Lab Results   Component Value Date    A1C 5.3 11/17/2020    A1C 6.1 06/29/2020    A1C 7.4 01/11/2019    A1C 6.3 09/28/2018    A1C 6.6 06/15/2018       TSH   Date Value Ref Range Status   11/17/2020 1.86 0.40 - 4.00 mU/L Final   06/29/2020 1.21 0.40 - 4.00 mU/L Final   03/11/2020 1.52 0.40 - 4.00 mU/L Final   01/30/2019 1.45 0.40 - 4.00 mU/L Final   02/17/2017 2.13 0.40 - 4.00 mU/L Final       Creatinine   Date Value Ref Range Status   12/16/2019 0.57 0.52 - 1.04 mg/dL Final         Video-Visit Details    Type of service:  Video Visit  All times are in your local time  1st VideoStart: 12/16/2020 02:04 pm   Stop: 12/16/2020 02:15 pm    Originating Location (pt. Location): Home    Distant Location (provider location):  [facility]CR[facility] [place]     Platform used for Video Visit: [employer]    Again, thank you for allowing me to participate in the care of your patient.      Sincerely,    Yuri Hansen MD

## 2020-12-18 ENCOUNTER — TRANSFERRED RECORDS (OUTPATIENT)
Dept: MULTI SPECIALTY CLINIC | Facility: CLINIC | Age: 35
End: 2020-12-18

## 2020-12-18 ENCOUNTER — TELEPHONE (OUTPATIENT)
Dept: ENDOCRINOLOGY | Facility: CLINIC | Age: 35
End: 2020-12-18

## 2020-12-18 LAB — RETINOPATHY: NORMAL

## 2020-12-18 NOTE — TELEPHONE ENCOUNTER
Called patient and discuss over the phone on December 18, 2020  Overnight had BG 48, 52 despite reducing bedtime Levemir to 10.   Daytime BG within the target range. 2 hours post dinner on the last two days 95 and 112.   The following plan discussed.     Type 1 diabetes currently - (H) Positive ANNA antibody/C-peptide WNL   Pregnant at 9 weeks: A1c 5.3  Based on blood glucose readings the following changes were made:     Reduce bedtime Levemir to 6 units daily from the current 14 units daily.     Continue Levemir 14  Units in the morning.     Carbohydrate coverage 1 unit for every 12 grams of carbohydrates with breakfast, lunch and d

## 2020-12-18 NOTE — TELEPHONE ENCOUNTER
[facility] Call Center    Phone Message    May a detailed message be left on voicemail: yes     Reason for Call: Medication Question or concern regarding medication   Prescription Clarification  Name of Medication: Solution   Prescribing Provider: Dr. Hansen   Pharmacy: N/A   What on the order needs clarification? Patient lowered her solution to 10units, started a couple of nights ago. Still waking up with low blood sugar. Last night 52 and the night before was 48. Please call patient to advise. Thank you.           Action Taken: Message routed to:  [facility] ([facility]): Endo    Travel Screening: Not Applicable

## 2020-12-21 ENCOUNTER — ANCILLARY PROCEDURE (OUTPATIENT)
Dept: ULTRASOUND IMAGING | Facility: CLINIC | Age: 35
End: 2020-12-21
Attending: ADVANCED PRACTICE MIDWIFE
Payer: COMMERCIAL

## 2020-12-21 ENCOUNTER — OFFICE VISIT (OUTPATIENT)
Dept: MIDWIFE SERVICES | Facility: CLINIC | Age: 35
End: 2020-12-21
Attending: ADVANCED PRACTICE MIDWIFE
Payer: COMMERCIAL

## 2020-12-21 VITALS
DIASTOLIC BLOOD PRESSURE: 86 MMHG | TEMPERATURE: 99.4 F | BODY MASS INDEX: 28.73 KG/M2 | HEIGHT: 64 IN | HEART RATE: 91 BPM | WEIGHT: 168.3 LBS | SYSTOLIC BLOOD PRESSURE: 129 MMHG

## 2020-12-21 DIAGNOSIS — O09.511 PRIMIGRAVIDA OF ADVANCED MATERNAL AGE IN FIRST TRIMESTER: ICD-10-CM

## 2020-12-21 DIAGNOSIS — O09.529 ENCOUNTER FOR SUPERVISION OF HIGH-RISK PREGNANCY WITH MULTIGRAVIDA OF ADVANCED MATERNAL AGE: Primary | ICD-10-CM

## 2020-12-21 DIAGNOSIS — E13.9 DIABETES MELLITUS TYPE 1.5 (H): ICD-10-CM

## 2020-12-21 DIAGNOSIS — E06.3 HASHIMOTO'S THYROIDITIS: ICD-10-CM

## 2020-12-21 DIAGNOSIS — E10.9 TYPE 1 DIABETES MELLITUS WITHOUT COMPLICATION (H): ICD-10-CM

## 2020-12-21 DIAGNOSIS — Z3A.01 LESS THAN 8 WEEKS GESTATION OF PREGNANCY: ICD-10-CM

## 2020-12-21 LAB
ABO + RH BLD: NORMAL
ABO + RH BLD: NORMAL
ALBUMIN UR-MCNC: NEGATIVE MG/DL
APPEARANCE UR: CLEAR
BILIRUB UR QL STRIP: NEGATIVE
BLD GP AB SCN SERPL QL: NORMAL
BLOOD BANK CMNT PATIENT-IMP: NORMAL
COLOR UR AUTO: YELLOW
ERYTHROCYTE [DISTWIDTH] IN BLOOD BY AUTOMATED COUNT: 15.9 % (ref 10–15)
GLUCOSE UR STRIP-MCNC: 500 MG/DL
HBA1C MFR BLD: 5.5 % (ref 0–5.6)
HCT VFR BLD AUTO: 36.1 % (ref 35–47)
HGB BLD-MCNC: 11.9 G/DL (ref 11.7–15.7)
HGB UR QL STRIP: NEGATIVE
KETONES UR STRIP-MCNC: NEGATIVE MG/DL
LEUKOCYTE ESTERASE UR QL STRIP: NEGATIVE
MCH RBC QN AUTO: 26.2 PG (ref 26.5–33)
MCHC RBC AUTO-ENTMCNC: 33 G/DL (ref 31.5–36.5)
MCV RBC AUTO: 79 FL (ref 78–100)
NITRATE UR QL: NEGATIVE
PH UR STRIP: 6 PH (ref 5–7)
PLATELET # BLD AUTO: 432 10E9/L (ref 150–450)
RBC # BLD AUTO: 4.55 10E12/L (ref 3.8–5.2)
SOURCE: ABNORMAL
SP GR UR STRIP: <=1.005 (ref 1–1.03)
SPECIMEN EXP DATE BLD: NORMAL
UROBILINOGEN UR STRIP-ACNC: 0.2 EU/DL (ref 0.2–1)
WBC # BLD AUTO: 8.4 10E9/L (ref 4–11)

## 2020-12-21 PROCEDURE — 36415 COLL VENOUS BLD VENIPUNCTURE: CPT | Performed by: OBSTETRICS & GYNECOLOGY

## 2020-12-21 PROCEDURE — 86780 TREPONEMA PALLIDUM: CPT | Mod: 90 | Performed by: OBSTETRICS & GYNECOLOGY

## 2020-12-21 PROCEDURE — 86762 RUBELLA ANTIBODY: CPT | Performed by: OBSTETRICS & GYNECOLOGY

## 2020-12-21 PROCEDURE — 86900 BLOOD TYPING SEROLOGIC ABO: CPT | Performed by: OBSTETRICS & GYNECOLOGY

## 2020-12-21 PROCEDURE — 84443 ASSAY THYROID STIM HORMONE: CPT | Performed by: OBSTETRICS & GYNECOLOGY

## 2020-12-21 PROCEDURE — 87340 HEPATITIS B SURFACE AG IA: CPT | Performed by: OBSTETRICS & GYNECOLOGY

## 2020-12-21 PROCEDURE — 82306 VITAMIN D 25 HYDROXY: CPT | Performed by: OBSTETRICS & GYNECOLOGY

## 2020-12-21 PROCEDURE — 99212 OFFICE O/P EST SF 10 MIN: CPT | Performed by: ADVANCED PRACTICE MIDWIFE

## 2020-12-21 PROCEDURE — 99000 SPECIMEN HANDLING OFFICE-LAB: CPT | Performed by: OBSTETRICS & GYNECOLOGY

## 2020-12-21 PROCEDURE — 85027 COMPLETE CBC AUTOMATED: CPT | Performed by: OBSTETRICS & GYNECOLOGY

## 2020-12-21 PROCEDURE — 87086 URINE CULTURE/COLONY COUNT: CPT | Performed by: OBSTETRICS & GYNECOLOGY

## 2020-12-21 PROCEDURE — 86850 RBC ANTIBODY SCREEN: CPT | Performed by: OBSTETRICS & GYNECOLOGY

## 2020-12-21 PROCEDURE — 83036 HEMOGLOBIN GLYCOSYLATED A1C: CPT | Performed by: OBSTETRICS & GYNECOLOGY

## 2020-12-21 PROCEDURE — 87389 HIV-1 AG W/HIV-1&-2 AB AG IA: CPT | Performed by: OBSTETRICS & GYNECOLOGY

## 2020-12-21 PROCEDURE — 86901 BLOOD TYPING SEROLOGIC RH(D): CPT | Performed by: OBSTETRICS & GYNECOLOGY

## 2020-12-21 PROCEDURE — 76801 OB US < 14 WKS SINGLE FETUS: CPT | Performed by: OBSTETRICS & GYNECOLOGY

## 2020-12-21 PROCEDURE — 81003 URINALYSIS AUTO W/O SCOPE: CPT | Performed by: OBSTETRICS & GYNECOLOGY

## 2020-12-21 PROCEDURE — 84443 ASSAY THYROID STIM HORMONE: CPT | Mod: 59 | Performed by: OBSTETRICS & GYNECOLOGY

## 2020-12-21 ASSESSMENT — MIFFLIN-ST. JEOR: SCORE: 1438.4

## 2020-12-22 LAB
BACTERIA SPEC CULT: NORMAL
DEPRECATED CALCIDIOL+CALCIFEROL SERPL-MC: 26 UG/L (ref 20–75)
HBV SURFACE AG SERPL QL IA: NONREACTIVE
HIV 1+2 AB+HIV1 P24 AG SERPL QL IA: NONREACTIVE
Lab: NORMAL
RUBV IGG SERPL IA-ACNC: 20 IU/ML
SPECIMEN SOURCE: NORMAL
T PALLIDUM AB SER QL: NONREACTIVE
TSH SERPL DL<=0.005 MIU/L-ACNC: 1.62 MU/L (ref 0.4–4)
TSH SERPL DL<=0.005 MIU/L-ACNC: 1.62 MU/L (ref 0.4–4)

## 2020-12-22 NOTE — RESULT ENCOUNTER NOTE
[place],    Your thyroid blood work results are within the normal limits.  Please continue your current levothyroxine therapy without any change.  Please let me know if any questions regarding this blood work results.    Yuri Hansen MD

## 2020-12-29 ENCOUNTER — PRENATAL OFFICE VISIT (OUTPATIENT)
Dept: OBGYN | Facility: CLINIC | Age: 35
End: 2020-12-29
Payer: COMMERCIAL

## 2020-12-29 VITALS
DIASTOLIC BLOOD PRESSURE: 86 MMHG | WEIGHT: 171 LBS | TEMPERATURE: 97.1 F | BODY MASS INDEX: 29.35 KG/M2 | SYSTOLIC BLOOD PRESSURE: 136 MMHG | HEART RATE: 72 BPM

## 2020-12-29 DIAGNOSIS — O24.011 TYPE 1 DIABETES MELLITUS DURING PREGNANCY IN FIRST TRIMESTER: Primary | ICD-10-CM

## 2020-12-29 DIAGNOSIS — R87.810 CERVICAL HIGH RISK HPV (HUMAN PAPILLOMAVIRUS) TEST POSITIVE: ICD-10-CM

## 2020-12-29 LAB
CREAT UR-MCNC: 10 MG/DL
PROT UR-MCNC: <0.05 G/L
PROT/CREAT 24H UR: NORMAL G/G CR (ref 0–0.2)

## 2020-12-29 PROCEDURE — 84460 ALANINE AMINO (ALT) (SGPT): CPT | Performed by: OBSTETRICS & GYNECOLOGY

## 2020-12-29 PROCEDURE — 36415 COLL VENOUS BLD VENIPUNCTURE: CPT | Performed by: OBSTETRICS & GYNECOLOGY

## 2020-12-29 PROCEDURE — 87624 HPV HI-RISK TYP POOLED RSLT: CPT | Performed by: OBSTETRICS & GYNECOLOGY

## 2020-12-29 PROCEDURE — 84450 TRANSFERASE (AST) (SGOT): CPT | Performed by: OBSTETRICS & GYNECOLOGY

## 2020-12-29 PROCEDURE — 84156 ASSAY OF PROTEIN URINE: CPT | Performed by: OBSTETRICS & GYNECOLOGY

## 2020-12-29 PROCEDURE — 99207 PR FIRST OB VISIT: CPT | Performed by: OBSTETRICS & GYNECOLOGY

## 2020-12-29 PROCEDURE — 82565 ASSAY OF CREATININE: CPT | Performed by: OBSTETRICS & GYNECOLOGY

## 2020-12-29 NOTE — PROGRESS NOTES
CC: New Ob visit  HPI: Denia Montes is a 35 year old  here for new Ob visit.  Patient's last menstrual period was 10/17/2020..  She is 10w3d by known LMP c/w 8w4d us, giving her an EDC of 21.  The pregnancy was planned and she and her  are feeling excited.    This far the pregnancy has been uneventful other than mild nausea.  She has type I DM, well controlled.  Recent hgbA1c was 5.5,  She works closely with endocrine.    She had an abnormal pap and normal colp , due for repeat pap .    Past Medical History:   Diagnosis Date     Cervical high risk HPV (human papillomavirus) test positive 2015, 16, 18, 19    see problem list      Diabetes mellitus (H)      Hx of colposcopy with cervical biopsy 10/21/16    [place] Bx: Neg NIL, ECC: Neg.       Past Surgical History:   Procedure Laterality Date     NO HISTORY OF SURGERY       OB History    Para Term  AB Living   3 0 0 0 2 0   SAB TAB Ectopic Multiple Live Births   1 1 0 0 0      # Outcome Date GA Lbr Alexandro/2nd Weight Sex Delivery Anes PTL Lv   3 Current            2 SAB 20 8w6d    SAB      1 TAB      TAB              Current Outpatient Medications:      ACE/ARB NOT PRESCRIBED, INTENTIONAL,, Please choose reason not prescribed, below (Patient not taking: Reported on 2020), Disp: , Rfl:      blood glucose (CONTOUR NEXT TEST) test strip, Use to test blood sugar 6 times daily or as directed. (Patient not taking: Reported on 2020), Disp: 500 each, Rfl: 3     blood glucose monitoring (JACI CONTOUR) test strip, USE TO TEST BLOOD SUGAR TWICE DAILY IN MORNING AND EVENING (Patient not taking: Reported on 2020), Disp: 200 strip, Rfl: 1     Continuous Blood Gluc  ([employer] JERO 14 DAY READER) RAEANN, Use to read blood sugars as per 's instructions., Disp: 1 each, Rfl: 0     Continuous Blood Gluc Sensor ([employer] JERO 14 DAY SENSOR) MISC, Change every 14 days., Disp: 9 each,  Rfl: 3     insulin aspart (NOVOLOG PEN) 100 UNIT/ML pen, 1 unit for every 12 grams of carbohydrates with meals and snacks plus correction dose. Uses up to 40 units daily., Disp: 45 mL, Rfl: 3     insulin detemir (LEVEMIR FLEXTOUCH) 100 UNIT/ML pen, Inject 14 Units Subcutaneous 2 times daily, Disp: 30 mL, Rfl: 3     insulin pen needle (32G X 4 MM) 32G X 4 MM miscellaneous, Use 7 pen needles daily., Disp: 700 each, Rfl: 3     levothyroxine (SYNTHROID/LEVOTHROID) 25 MCG tablet, Take 1 tablet (25 mcg) by mouth daily, Disp: 90 tablet, Rfl: 3     MICROLET LANCETS MISC, TEST BLOOD SUGAR TWICE DAILY, Disp: 200 each, Rfl: 11     Omega-3 Fatty Acids (FISH OIL) 500 MG CAPS, , Disp: , Rfl:      Prenatal Vit-Fe Fumarate-FA (PRENATAL VITAMIN) 27-0.8 MG TABS, Take 1 tablet by mouth daily, Disp: 90 tablet, Rfl: 3     vitamin B-12 (CYANOCOBALAMIN) 100 MCG tablet, Take 1,000 mcg by mouth daily, Disp: , Rfl:     Allergies   Allergen Reactions     Sulfa Drugs Hives       Family History   Problem Relation Age of Onset     Diabetes Mother      Hyperlipidemia Mother      Kidney Disease Mother      Thyroid Disease Mother      Hyperlipidemia Father      Hypertension Father      Kidney Disease Father      Substance Abuse Brother        Past medical, social, surgical and family history were reviewed and updated in EPIC.    ROS: No TIA's or unusual headaches, no dysphagia.  No prolonged cough. No dyspnea or chest pain on exertion.  No abdominal pain, change in bowel habits, black or bloody stools.  No urinary tract symptoms.  No new or unusual musculoskeletal symptoms.  Normal menses, no abnormal vaginal bleeding, discharge or unexpected pelvic pain. No new breast lumps, breast pain or nipple discharge.    PE: BP (!) 146/86   Pulse 72   Temp 97.1  F (36.2  C) (Temporal)   Wt 77.6 kg (171 lb)   LMP 10/17/2020   BMI 29.35 kg/m      Gen: Healthy appearing female in no acute distress  Heart: RRR  Lungs: CTAB  Abd: +BS, SNT  Ex: No C/C/E, no  suspicious rashes or lesions    Pelvic: Normal vulva and vagina with scant white d/c.  Cervix without lesions, no CMT.  Uterus approximately 10 week size, mobile, NT.  Adnexa NT, no masses.    TVBSUS: single live IUP, CRL 9w5d, +FCA    A/P:  1) IUP at 10w3d, T1 DM, AMA, h/o abnormal pap        PNL wnl, pap due.  Will also obtain baseline labs today        Reviewed anticipated course of prenatal care        Reviewed recommendations for weight gain, activity and diet        We had a discussion about the potential risks of DM in pregnancy, especially uncontrolled, including macrosomia, shoulder dystocia and subsequent sequelae, IUGR and IUFD.  We also discussed the increased risk of other pregnancy related conditions like preeclampsia.  I reiterated the importance of close BS monitoring along with diet and exercise.  Discussed serial growth us and BPPs at 32wks, sooner if needed.  Plan for delivery by 39wks.         We discussed options for genetic screening and diagnosis including CVS/amnio, first trimester screen and quad screen.  We discussed a fetal survey will be performed around 18-20 weeks. She declines genetic screening, will order level 2.         We discussed COVID 19 in pregnancy, increased risk of severe disease requiring hospitalization and intubation, slight increased risk of death, possible increased risk of PTL/PTD.  Instructed strict adherence to safety guidelines.  She currently works from home with plans to continue.  Discussed safety of vaccine in pregnancy and breastfeeding.           Discussed MD call schedule as well as role of residents and med students both in clinic and hospital.  She is ok with resident care        RTC 4 weeks    Ellen South MD

## 2020-12-30 ENCOUNTER — TRANSCRIBE ORDERS (OUTPATIENT)
Dept: MATERNAL FETAL MEDICINE | Facility: CLINIC | Age: 35
End: 2020-12-30

## 2020-12-30 ENCOUNTER — MYC MEDICAL ADVICE (OUTPATIENT)
Dept: ENDOCRINOLOGY | Facility: CLINIC | Age: 35
End: 2020-12-30

## 2020-12-30 DIAGNOSIS — O26.90 PREGNANCY RELATED CONDITION, ANTEPARTUM: Primary | ICD-10-CM

## 2020-12-30 LAB
ALT SERPL W P-5'-P-CCNC: 52 U/L (ref 0–50)
AST SERPL W P-5'-P-CCNC: 26 U/L (ref 0–45)
CREAT SERPL-MCNC: 0.59 MG/DL (ref 0.52–1.04)
GFR SERPL CREATININE-BSD FRML MDRD: >90 ML/MIN/{1.73_M2}

## 2021-01-04 LAB
COPATH REPORT: NORMAL
PAP: NORMAL

## 2021-01-06 ENCOUNTER — PATIENT OUTREACH (OUTPATIENT)
Dept: OBGYN | Facility: CLINIC | Age: 36
End: 2021-01-06

## 2021-01-13 ENCOUNTER — MYC MEDICAL ADVICE (OUTPATIENT)
Dept: ENDOCRINOLOGY | Facility: CLINIC | Age: 36
End: 2021-01-13

## 2021-01-13 ENCOUNTER — VIRTUAL VISIT (OUTPATIENT)
Dept: ENDOCRINOLOGY | Facility: CLINIC | Age: 36
End: 2021-01-13
Payer: COMMERCIAL

## 2021-01-13 DIAGNOSIS — Z3A.12 12 WEEKS GESTATION OF PREGNANCY: ICD-10-CM

## 2021-01-13 DIAGNOSIS — E10.9 TYPE 1 DIABETES MELLITUS WITHOUT COMPLICATION (H): Primary | ICD-10-CM

## 2021-01-13 DIAGNOSIS — E06.3 HASHIMOTO'S THYROIDITIS: ICD-10-CM

## 2021-01-13 PROCEDURE — 99213 OFFICE O/P EST LOW 20 MIN: CPT | Mod: 95 | Performed by: INTERNAL MEDICINE

## 2021-01-13 NOTE — PROGRESS NOTES
Outcome for 01/13/21 9:39 AM :Glucose data sent in [employer] Message      Denia Phillips Jyoti  is being evaluated via a billable video visit.      How would you like to obtain your AVS? Devtoo  How to connect : [employer]   Will anyone else be joining your video visit? No              .

## 2021-01-13 NOTE — PATIENT INSTRUCTIONS
Denia,      Continue current insulin regimen without any change.     Bedtime Levemir 4 units daily.     Continue Levemir 14  Units in the morning.     Carbohydrate coverage 1 unit for every 15 grams of carbohydrates with breakfast, lunch and dinner.      Please follow the following correction scale with meals three times per day and bedtime   For  - 169 give  1 unit.   For  - 219 give 2 units.   For  - 269 give 3 units.   For  - 319 give 4 units.   For BG = or > 320 give 5 units.    Please send BG readings every week and as needed also if BG readings are above the target range or persistently low. Fasting > 95 and two hours >120. Or blood glucose readings below 70.      Here if the number to call us at [phone] select option #3 for triage nurse.

## 2021-01-13 NOTE — PROGRESS NOTES
Endocrinology virtual Visit    Chief Complaint: Video Visit (dm1)     Information obtained from:Patient      Assessment/Treatment Plan:      MAGDI/treated as Type 1 diabetes currently - (H) Positive ANNA antibody/C-peptide WNL   Pregnant at 12 weeks: A1c 5.3  All fasting blood sugar readings within target range of less than 95.  About 95% of postprandial blood sugar within the target range of less than 120 at 2 hours.  Yesterday after dinner she had lows in the 50s-60s on a continuous glucose monitor following exercise.  I have advised to take less bolus with meals prior to exercise.  Overall, on CGM multiple lows noted in the 60s and sometimes in the 50s however Denia didn't have hypoglycemic symptoms during those episodes indicating this might be misread by a CGM.  She has checked POC blood sugars to double check low blood sugar readings on CGM and the finger stick blood sugar readings were higher than the CGM (as indicated in the Asterix below).     Patient Instructions   Denia,      Continue current insulin regimen without any change.     Bedtime Levemir 4 units daily.     Continue Levemir 14  Units in the morning.     Carbohydrate coverage 1 unit for every 15 grams of carbohydrates with breakfast, lunch and dinner.      Please follow the following correction scale with meals three times per day and bedtime   For  - 169 give  1 unit.   For  - 219 give 2 units.   For  - 269 give 3 units.   For  - 319 give 4 units.   For BG = or > 320 give 5 units.    Please send BG readings every week and as needed also if BG readings are above the target range or persistently low. Fasting > 95 and two hours >120. Or blood glucose readings below 70.      Here if the number to call us at [phone] select option #3 for triage nurse.         Hashimoto thyroiditis: continue levothyroxine 25 mcg daily. Check TSH with the next set of prenatal labs.     Yuri Hansen MD  Staff Endocrinologist    Division of  Endocrinology and Diabetes      Subjective:         HPI: Denia Montes is a 35 year old female with history of MAGDI/treated as Type 1 diabetes currently - (H) Positive ANNA antibody/C-peptide WNL currently pregnant at 12w4d.   Currently on:   INSULIN DETEMIR- INJECT 14 UNITS IN THE MORNING AND 4 UNITS AT BEDTIME  CHO 1:15 with meals and snacks and correction scale 1:50 above 120.            Allergies   Allergen Reactions     Sulfa Drugs Hives       Current Outpatient Medications   Medication Sig Dispense Refill     Continuous Blood Gluc  (FREESTYLE JERO 14 DAY READER) RAEANN Use to read blood sugars as per 's instructions. 1 each 0     Continuous Blood Gluc Sensor (FREESTYLE JERO 14 DAY SENSOR) MISC Change every 14 days. 9 each 3     insulin aspart (NOVOLOG PEN) 100 UNIT/ML pen 1 unit for every 12 grams of carbohydrates with meals and snacks plus correction dose. Uses up to 40 units daily. 45 mL 3     insulin detemir (LEVEMIR FLEXTOUCH) 100 UNIT/ML pen Inject 14 Units Subcutaneous 2 times daily 30 mL 3     insulin pen needle (32G X 4 MM) 32G X 4 MM miscellaneous Use 7 pen needles daily. 700 each 3     levothyroxine (SYNTHROID/LEVOTHROID) 25 MCG tablet Take 1 tablet (25 mcg) by mouth daily 90 tablet 3     MICROLET LANCETS MISC TEST BLOOD SUGAR TWICE DAILY 200 each 11     Omega-3 Fatty Acids (FISH OIL) 500 MG CAPS        Prenatal Vit-Fe Fumarate-FA (PRENATAL VITAMIN) 27-0.8 MG TABS Take 1 tablet by mouth daily 90 tablet 3     vitamin B-12 (CYANOCOBALAMIN) 100 MCG tablet Take 1,000 mcg by mouth daily       ACE/ARB NOT PRESCRIBED, INTENTIONAL, Please choose reason not prescribed, below (Patient not taking: Reported on 6/26/2020)       blood glucose (CONTOUR NEXT TEST) test strip Use to test blood sugar 6 times daily or as directed. (Patient not taking: Reported on 12/21/2020) 500 each 3     blood glucose monitoring ([employer] CONTOUR) test strip USE TO TEST BLOOD SUGAR TWICE DAILY IN MORNING AND  EVENING (Patient not taking: Reported on 11/17/2020) 200 strip 1       Review of Systems   as per HPI above      Objective:     GENERAL: Healthy, alert and no distress  EYES: Eyes grossly normal to inspection.    RESP: No audible wheeze, cough, or visible cyanosis.    NEURO: Alert and oriented. mentation and speech appropriate for age.      In House Labs:   Lab Results   Component Value Date    A1C 5.3 11/17/2020    A1C 6.1 06/29/2020    A1C 7.4 01/11/2019    A1C 6.3 09/28/2018    A1C 6.6 06/15/2018       TSH   Date Value Ref Range Status   12/21/2020 1.62 0.40 - 4.00 mU/L Final   12/21/2020 1.62 0.40 - 4.00 mU/L Final   11/17/2020 1.86 0.40 - 4.00 mU/L Final   06/29/2020 1.21 0.40 - 4.00 mU/L Final   03/11/2020 1.52 0.40 - 4.00 mU/L Final       Creatinine   Date Value Ref Range Status   12/29/2020 0.59 0.52 - 1.04 mg/dL Final         Video-Visit Details    Type of service:  Video Visit  All times are in your local time  All times are in your local time  1st VideoStart: 01/13/2021 03:35 pm   Stop: 01/13/2021 03:47 pm  20 minutes spent on the date of the encounter doing chart review, history and exam, documentation and further activities as noted above    Originating Location (pt. Location): Home    Distant Location (provider location):  [facility]OLOG[facility]INIC [place]     Platform used for Video Visit: [employer]   :15

## 2021-01-13 NOTE — LETTER
1/13/2021       RE: Denia Montes  [address]  [facility] [address]     Dear Colleague,    Thank you for referring your patient, Denia Montes, to the [facility] ENDOCRINOLOGY CLINIC [place] at [facility]. Please see a copy of my visit note below.    Outcome for 01/13/21 9:39 AM :Glucose data sent in [employer] Message      Denia Montes  is being evaluated via a billable video visit.      How would you like to obtain your AVS? [employer]  How to connect : [employer]   Will anyone else be joining your video visit? No              .    Endocrinology virtual Visit    Chief Complaint: Video Visit (dm1)     Information obtained from:Patient      Assessment/Treatment Plan:      MAGDI/treated as Type 1 diabetes currently - (H) Positive ANNA antibody/C-peptide WNL   Pregnant at 12 weeks: A1c 5.3  All fasting blood sugar readings within target range of less than 95.  About 95% of postprandial blood sugar within the target range of less than 120 at 2 hours.  Yesterday after dinner she had lows in the 50s-60s on a continuous glucose monitor following exercise.  I have advised to take less bolus with meals prior to exercise.  Overall, on CGM multiple lows noted in the 60s and sometimes in the 50s however Denia didn't have hypoglycemic symptoms during those episodes indicating this might be misread by a CGM.  She has checked POC blood sugars to double check low blood sugar readings on CGM and the finger stick blood sugar readings were higher than the CGM (as indicated in the Asterix below).     Patient Instructions   Denia,      Continue current insulin regimen without any change.     Bedtime Levemir 4 units daily.     Continue Levemir 14  Units in the morning.     Carbohydrate coverage 1 unit for every 15 grams of carbohydrates with breakfast, lunch and dinner.      Please follow the following correction scale with meals three times per day and bedtime   For  -  169 give  1 unit.   For  - 219 give 2 units.   For  - 269 give 3 units.   For  - 319 give 4 units.   For BG = or > 320 give 5 units.    Please send BG readings every week and as needed also if BG readings are above the target range or persistently low. Fasting > 95 and two hours >120. Or blood glucose readings below 70.      Here if the number to call us at [phone] select option #3 for triage nurse.         Hashimoto thyroiditis: continue levothyroxine 25 mcg daily. Check TSH with the next set of prenatal labs.     Yuri Hansen MD  Staff Endocrinologist    Division of Endocrinology and Diabetes      Subjective:         HPI: Denia Montes is a 35 year old female with history of MAGDI/treated as Type 1 diabetes currently - (H) Positive ANNA antibody/C-peptide WNL currently pregnant at 12w4d.   Currently on:   INSULIN DETEMIR- INJECT 14 UNITS IN THE MORNING AND 4 UNITS AT BEDTIME  CHO 1:15 with meals and snacks and correction scale 1:50 above 120.            Allergies   Allergen Reactions     Sulfa Drugs Hives       Current Outpatient Medications   Medication Sig Dispense Refill     Continuous Blood Gluc  (FREESTYLE JERO 14 DAY READER) RAEANN Use to read blood sugars as per 's instructions. 1 each 0     Continuous Blood Gluc Sensor (FREESTYLE JERO 14 DAY SENSOR) MISC Change every 14 days. 9 each 3     insulin aspart (NOVOLOG PEN) 100 UNIT/ML pen 1 unit for every 12 grams of carbohydrates with meals and snacks plus correction dose. Uses up to 40 units daily. 45 mL 3     insulin detemir (LEVEMIR FLEXTOUCH) 100 UNIT/ML pen Inject 14 Units Subcutaneous 2 times daily 30 mL 3     insulin pen needle (32G X 4 MM) 32G X 4 MM miscellaneous Use 7 pen needles daily. 700 each 3     levothyroxine (SYNTHROID/LEVOTHROID) 25 MCG tablet Take 1 tablet (25 mcg) by mouth daily 90 tablet 3     MICROLET LANCETS MISC TEST BLOOD SUGAR TWICE DAILY 200 each 11     Omega-3 Fatty Acids (FISH  OIL) 500 MG CAPS        Prenatal Vit-Fe Fumarate-FA (PRENATAL VITAMIN) 27-0.8 MG TABS Take 1 tablet by mouth daily 90 tablet 3     vitamin B-12 (CYANOCOBALAMIN) 100 MCG tablet Take 1,000 mcg by mouth daily       ACE/ARB NOT PRESCRIBED, INTENTIONAL, Please choose reason not prescribed, below (Patient not taking: Reported on 6/26/2020)       blood glucose (CONTOUR NEXT TEST) test strip Use to test blood sugar 6 times daily or as directed. (Patient not taking: Reported on 12/21/2020) 500 each 3     blood glucose monitoring (JACI CONTOUR) test strip USE TO TEST BLOOD SUGAR TWICE DAILY IN MORNING AND EVENING (Patient not taking: Reported on 11/17/2020) 200 strip 1       Review of Systems   as per HPI above      Objective:     GENERAL: Healthy, alert and no distress  EYES: Eyes grossly normal to inspection.    RESP: No audible wheeze, cough, or visible cyanosis.    NEURO: Alert and oriented. mentation and speech appropriate for age.      In House Labs:   Lab Results   Component Value Date    A1C 5.3 11/17/2020    A1C 6.1 06/29/2020    A1C 7.4 01/11/2019    A1C 6.3 09/28/2018    A1C 6.6 06/15/2018       TSH   Date Value Ref Range Status   12/21/2020 1.62 0.40 - 4.00 mU/L Final   12/21/2020 1.62 0.40 - 4.00 mU/L Final   11/17/2020 1.86 0.40 - 4.00 mU/L Final   06/29/2020 1.21 0.40 - 4.00 mU/L Final   03/11/2020 1.52 0.40 - 4.00 mU/L Final       Creatinine   Date Value Ref Range Status   12/29/2020 0.59 0.52 - 1.04 mg/dL Final         Video-Visit Details    Type of service:  Video Visit  All times are in your local time  All times are in your local time  1st VideoStart: 01/13/2021 03:35 pm   Stop: 01/13/2021 03:47 pm  20 minutes spent on the date of the encounter doing chart review, history and exam, documentation and further activities as noted above    Originating Location (pt. Location): Home    Distant Location (provider location):  [facility] ENDOCRINOLOGY [facility]     Platform used for Video Visit:  AmWell   :15

## 2021-01-15 ENCOUNTER — HOSPITAL ENCOUNTER (EMERGENCY)
Facility: CLINIC | Age: 36
Discharge: HOME | End: 2021-01-16
Attending: EMERGENCY MEDICINE | Admitting: EMERGENCY MEDICINE
Payer: COMMERCIAL

## 2021-01-15 ENCOUNTER — OFFICE VISIT (OUTPATIENT)
Dept: FAMILY MEDICINE | Facility: CLINIC | Age: 36
End: 2021-01-15
Payer: COMMERCIAL

## 2021-01-15 ENCOUNTER — ANCILLARY PROCEDURE (OUTPATIENT)
Dept: ULTRASOUND IMAGING | Facility: CLINIC | Age: 36
End: 2021-01-15
Attending: EMERGENCY MEDICINE
Payer: COMMERCIAL

## 2021-01-15 VITALS
HEIGHT: 64 IN | TEMPERATURE: 98.5 F | RESPIRATION RATE: 14 BRPM | DIASTOLIC BLOOD PRESSURE: 64 MMHG | HEART RATE: 89 BPM | BODY MASS INDEX: 28.85 KG/M2 | SYSTOLIC BLOOD PRESSURE: 112 MMHG | OXYGEN SATURATION: 98 % | WEIGHT: 169 LBS

## 2021-01-15 DIAGNOSIS — Z00.00 ROUTINE GENERAL MEDICAL EXAMINATION AT A HEALTH CARE FACILITY: Primary | ICD-10-CM

## 2021-01-15 DIAGNOSIS — O20.9 HEMORRHAGE IN EARLY PREGNANCY: ICD-10-CM

## 2021-01-15 DIAGNOSIS — E06.3 HASHIMOTO'S THYROIDITIS: ICD-10-CM

## 2021-01-15 DIAGNOSIS — O46.90 VAGINAL BLEEDING IN PREGNANCY: ICD-10-CM

## 2021-01-15 DIAGNOSIS — Z3A.13 13 WEEKS GESTATION OF PREGNANCY: ICD-10-CM

## 2021-01-15 DIAGNOSIS — R74.01 ELEVATED ALANINE AMINOTRANSFERASE (ALT) LEVEL: ICD-10-CM

## 2021-01-15 LAB
ALBUMIN UR-MCNC: NEGATIVE MG/DL
APPEARANCE UR: CLEAR
BACTERIA #/AREA URNS HPF: ABNORMAL /HPF
BASOPHILS # BLD AUTO: 0 10E9/L (ref 0–0.2)
BASOPHILS NFR BLD AUTO: 0.4 %
BILIRUB UR QL STRIP: NEGATIVE
COLOR UR AUTO: ABNORMAL
DIFFERENTIAL METHOD BLD: ABNORMAL
EOSINOPHIL # BLD AUTO: 0.1 10E9/L (ref 0–0.7)
EOSINOPHIL NFR BLD AUTO: 0.7 %
ERYTHROCYTE [DISTWIDTH] IN BLOOD BY AUTOMATED COUNT: 15.5 % (ref 10–15)
GLUCOSE UR STRIP-MCNC: NEGATIVE MG/DL
HCT VFR BLD AUTO: 34.5 % (ref 35–47)
HGB BLD-MCNC: 11.4 G/DL (ref 11.7–15.7)
HGB UR QL STRIP: ABNORMAL
IMM GRANULOCYTES # BLD: 0 10E9/L (ref 0–0.4)
IMM GRANULOCYTES NFR BLD: 0.2 %
KETONES UR STRIP-MCNC: NEGATIVE MG/DL
LEUKOCYTE ESTERASE UR QL STRIP: NEGATIVE
LYMPHOCYTES # BLD AUTO: 2.8 10E9/L (ref 0.8–5.3)
LYMPHOCYTES NFR BLD AUTO: 35 %
MCH RBC QN AUTO: 26.8 PG (ref 26.5–33)
MCHC RBC AUTO-ENTMCNC: 33 G/DL (ref 31.5–36.5)
MCV RBC AUTO: 81 FL (ref 78–100)
MONOCYTES # BLD AUTO: 0.6 10E9/L (ref 0–1.3)
MONOCYTES NFR BLD AUTO: 6.8 %
NEUTROPHILS # BLD AUTO: 4.6 10E9/L (ref 1.6–8.3)
NEUTROPHILS NFR BLD AUTO: 56.9 %
NITRATE UR QL: NEGATIVE
NRBC # BLD AUTO: 0 10*3/UL
NRBC BLD AUTO-RTO: 0 /100
PH UR STRIP: 6 PH (ref 5–7)
PLATELET # BLD AUTO: 370 10E9/L (ref 150–450)
RBC # BLD AUTO: 4.25 10E12/L (ref 3.8–5.2)
RBC #/AREA URNS AUTO: <1 /HPF (ref 0–2)
SOURCE: ABNORMAL
SP GR UR STRIP: 1 (ref 1–1.03)
SQUAMOUS #/AREA URNS AUTO: <1 /HPF (ref 0–1)
UROBILINOGEN UR STRIP-MCNC: NORMAL MG/DL (ref 0–2)
WBC # BLD AUTO: 8.1 10E9/L (ref 4–11)
WBC #/AREA URNS AUTO: <1 /HPF (ref 0–5)

## 2021-01-15 PROCEDURE — 84702 CHORIONIC GONADOTROPIN TEST: CPT | Performed by: EMERGENCY MEDICINE

## 2021-01-15 PROCEDURE — 99395 PREV VISIT EST AGE 18-39: CPT | Performed by: NURSE PRACTITIONER

## 2021-01-15 PROCEDURE — 76815 OB US LIMITED FETUS(S): CPT

## 2021-01-15 PROCEDURE — 86900 BLOOD TYPING SEROLOGIC ABO: CPT | Performed by: EMERGENCY MEDICINE

## 2021-01-15 PROCEDURE — 86850 RBC ANTIBODY SCREEN: CPT | Performed by: EMERGENCY MEDICINE

## 2021-01-15 PROCEDURE — 84443 ASSAY THYROID STIM HORMONE: CPT | Performed by: EMERGENCY MEDICINE

## 2021-01-15 PROCEDURE — 76815 OB US LIMITED FETUS(S): CPT | Mod: 26 | Performed by: EMERGENCY MEDICINE

## 2021-01-15 PROCEDURE — 81001 URINALYSIS AUTO W/SCOPE: CPT | Performed by: EMERGENCY MEDICINE

## 2021-01-15 PROCEDURE — 80048 BASIC METABOLIC PNL TOTAL CA: CPT | Performed by: EMERGENCY MEDICINE

## 2021-01-15 PROCEDURE — 99284 EMERGENCY DEPT VISIT MOD MDM: CPT | Mod: 25

## 2021-01-15 PROCEDURE — 87086 URINE CULTURE/COLONY COUNT: CPT | Performed by: EMERGENCY MEDICINE

## 2021-01-15 PROCEDURE — 80076 HEPATIC FUNCTION PANEL: CPT | Performed by: EMERGENCY MEDICINE

## 2021-01-15 PROCEDURE — 86901 BLOOD TYPING SEROLOGIC RH(D): CPT | Performed by: EMERGENCY MEDICINE

## 2021-01-15 PROCEDURE — 85025 COMPLETE CBC W/AUTO DIFF WBC: CPT | Performed by: EMERGENCY MEDICINE

## 2021-01-15 PROCEDURE — 99284 EMERGENCY DEPT VISIT MOD MDM: CPT | Mod: 25 | Performed by: EMERGENCY MEDICINE

## 2021-01-15 ASSESSMENT — ENCOUNTER SYMPTOMS
HEMATURIA: 0
LIGHT-HEADEDNESS: 0
SHORTNESS OF BREATH: 0
FREQUENCY: 0
FEVER: 0
DYSURIA: 0
ABDOMINAL PAIN: 1
DIARRHEA: 0

## 2021-01-15 ASSESSMENT — MIFFLIN-ST. JEOR: SCORE: 1441.83

## 2021-01-15 NOTE — ED AVS SNAPSHOT
[facility] Emergency Department  [address]  [place] [address]  Phone: [phone]  Fax: [phone]                                    Denia Montes   MRN: [id number]    Department: [facility] Emergency Department   Date of Visit: 1/15/2021           After Visit Summary Signature Page    I have received my discharge instructions, and my questions have been answered. I have discussed any challenges I see with this plan with the nurse or doctor.    ..........................................................................................................................................  Patient/Patient Representative Signature      ..........................................................................................................................................  Patient Representative Print Name and Relationship to Patient    ..................................................               ................................................  Date                                   Time    ..........................................................................................................................................  Reviewed by Signature/Title    ...................................................              ..............................................  Date                                               Time          22EPIC Rev 08/18

## 2021-01-15 NOTE — PATIENT INSTRUCTIONS
- Liver tests ordered.  - Call clinic with any questions or concerns.     Preventive Health Recommendations  Female Ages 26 - 39  Yearly exam:   See your health care provider every year in order to    Review health changes.     Discuss preventive care.      Review your medicines if you your doctor has prescribed any.    Until age 30: Get a Pap test every three years (more often if you have had an abnormal result).    After age 30: Talk to your doctor about whether you should have a Pap test every 3 years or have a Pap test with HPV screening every 5 years.   You do not need a Pap test if your uterus was removed (hysterectomy) and you have not had cancer.  You should be tested each year for STDs (sexually transmitted diseases), if you're at risk.   Talk to your provider about how often to have your cholesterol checked.  If you are at risk for diabetes, you should have a diabetes test (fasting glucose).  Shots: Get a flu shot each year. Get a tetanus shot every 10 years.   Nutrition:     Eat at least 5 servings of fruits and vegetables each day.    Eat whole-grain bread, whole-wheat pasta and brown rice instead of white grains and rice.    Get adequate Calcium and Vitamin D.     Lifestyle    Exercise at least 150 minutes a week (30 minutes a day, 5 days of the week). This will help you control your weight and prevent disease.    Limit alcohol to one drink per day.    No smoking.     Wear sunscreen to prevent skin cancer.    See your dentist every six months for an exam and cleaning.

## 2021-01-15 NOTE — PROGRESS NOTES
SUBJECTIVE:   CC: Denia Montes is an 35 year old woman who presents for preventive health visit.     Split Bill scripting  The purpose of this visit is to discuss your medical history and prevent health problems before you are sick. You may be responsible for a co-pay, coinsurance, or deductible if your visit today includes services such as checking on a sore throat, having an x-ray or lab test, or treating and evaluating a new or existing condition :[id number]  Patient has been advised of split billing requirements and indicates understanding: Yes       Healthy Habits:    Do you get at least three servings of calcium containing foods daily (dairy, green leafy vegetables, etc.)? yes    Amount of exercise or daily activities, outside of work: uses an exercise bike at home and also walks.    Problems taking medications regularly No    Medication side effects: No    Have you had an eye exam in the past two years? yes    Do you see a dentist twice per year? yes    Do you have sleep apnea, excessive snoring or daytime drowsiness?no    Pregnancy: Patient reports that she is almost 13 weeks pregnant.  States that this is her 3rd pregnancy, currently has no issues with nausea and vomiting. Gaining weight appropriately. No swelling, no palpitations. No headaches, no constipation or diarrhea.   Has a partner that she lives with and he has been supportive of her. Currently working with her endocrinologist closely through this pregnancy.      Diabetes Type 1: Was misdiagnosed 6 years ago with type 2 diabetes when her medications stopped controlling her blood sugars and referred to endocrine at which time in 2/27/2019- endocrine continued to treat her with DM2 since her C-Peptide was within the normal limits.   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  Indicating that this patient is at risk of developing  type 1 diabetes or type 1 diabetes in the making.  The fact that she is not diagnosed as type 1 diabetes at this point in time is due to C-peptide which is within the normal limit for glucose of 177 as documented in HPI.    Assessment:3/11/2020  Type 1 diabetes mellitus without complication (H) Positive ANNA antibody/preconception planing  Denia Montes is a 34 year old female with a history of diabetes mellitus type 1.5 diagnosed in 05/2015. High ANNA and normal C-peptide normal.  She is currently on metformin 2 gm and Levemir insulin (planning pregnancy soon).    Today's PHQ-2 Score:   PHQ-2 ( 1999 Pfizer) 12/15/2020 5/27/2020   Q1: Little interest or pleasure in doing things 0 0   Q2: Feeling down, depressed or hopeless 0 0   PHQ-2 Score 0 0   Q1: Little interest or pleasure in doing things - -   Q2: Feeling down, depressed or hopeless - -   PHQ-2 Score - -       Abuse: Current or Past(Physical, Sexual or Emotional)- No  Do you feel safe in your environment? Yes        Social History     Tobacco Use     Smoking status: Never Smoker     Smokeless tobacco: Never Used   Substance Use Topics     Alcohol use: No     If you drink alcohol do you typically have >3 drinks per day or >7 drinks per week? No                     Reviewed orders with patient.  Reviewed health maintenance and updated orders accordingly - Yes  Lab work is in process    Mammogram not appropriate for this patient based on age.    Pertinent mammograms are reviewed under the imaging tab.  History of abnormal Pap smear:   Last 3 Pap and HPV Results:   PAP / HPV Latest Ref Rng & Units 12/29/2020 12/16/2019 12/14/2018   PAP - NIL NIL NIL   HPV 16 DNA NEG:Negative Negative Negative Negative   HPV 18 DNA NEG:Negative Negative Negative Negative   OTHER HR HPV NEG:Negative Positive(A) Positive(A) Positive(A)     PAP / HPV Latest Ref Rng & Units 12/29/2020 12/16/2019 12/14/2018   PAP - NIL NIL NIL   HPV 16 DNA NEG:Negative Negative Negative Negative  "  HPV 18 DNA NEG:Negative Negative Negative Negative   OTHER HR HPV NEG:Negative Positive(A) Positive(A) Positive(A)     Reviewed and updated as needed this visit by clinical staff  Tobacco  Allergies  Meds  Problems  Med Hx  Surg Hx  Fam Hx          Reviewed and updated as needed this visit by Provider                    ROS:  CONSTITUTIONAL: NEGATIVE for fever, chills, change in weight  INTEGUMENTARU/SKIN: NEGATIVE for worrisome rashes, moles or lesions  EYES: NEGATIVE for vision changes or irritation  ENT: NEGATIVE for ear, mouth and throat problems  RESP: NEGATIVE for significant cough or SOB  BREAST: NEGATIVE for masses, tenderness or discharge  CV: NEGATIVE for chest pain, palpitations or peripheral edema  GI: NEGATIVE for nausea, abdominal pain, heartburn, or change in bowel habits  : NEGATIVE for unusual urinary or vaginal symptoms. Periods are regular.  MUSCULOSKELETAL: NEGATIVE for significant arthralgias or myalgia  NEURO: NEGATIVE for weakness, dizziness or paresthesias  PSYCHIATRIC: NEGATIVE for changes in mood or affect    OBJECTIVE:   /64   Pulse 89   Temp 98.5  F (36.9  C) (Temporal)   Resp 14   Ht 1.626 m (5' 4.02\")   Wt 76.7 kg (169 lb)   LMP 10/17/2020   SpO2 98%   BMI 28.99 kg/m    EXAM:  GENERAL: healthy, alert and no distress  EYES: Eyes grossly normal to inspection, PERRL and conjunctivae and sclerae normal  HENT: ear canals and TM's normal, nose and mouth without ulcers or lesions  NECK: no adenopathy, no asymmetry, masses, or scars and thyroid normal to palpation  RESP: lungs clear to auscultation - no rales, rhonchi or wheezes  BREAST: normal without masses, tenderness or nipple discharge and no palpable axillary masses or adenopathy  CV: regular rate and rhythm, normal S1 S2, no S3 or S4, no murmur, click or rub, no peripheral edema and peripheral pulses strong  ABDOMEN: soft, nontender, no hepatosplenomegaly, no masses and bowel sounds normal  MS: no gross " "musculoskeletal defects noted, no edema  SKIN: no suspicious lesions or rashes  NEURO: Normal strength and tone, mentation intact and speech normal  PSYCH: mentation appears normal, affect normal/bright    Diagnostic Test Results:  Labs reviewed in Epic    ASSESSMENT/PLAN:       ICD-10-CM    1. Routine general medical examination at a health care facility  Z00.00    2. Elevated alanine aminotransferase (ALT) level  R74.01 Hepatic panel (Albumin, ALT, AST, Bili, Alk Phos, TP)     CANCELED: Hepatic panel (Albumin, ALT, AST, Bili, Alk Phos, TP)     CANCELED: Hepatic panel (Albumin, ALT, AST, Bili, Alk Phos, TP)   3. Hashimoto's thyroiditis  E06.3 CANCELED: TSH with free T4 reflex   4. 13 weeks gestation of pregnancy  Z3A.13        Patient has been advised of split billing requirements and indicates understanding: Yes  COUNSELING:   Reviewed preventive health counseling, as reflected in patient instructions       Regular exercise       Healthy diet/nutrition       Vision screening       Hearing screening    Estimated body mass index is 28.99 kg/m  as calculated from the following:    Height as of this encounter: 1.626 m (5' 4.02\").    Weight as of this encounter: 76.7 kg (169 lb).    She reports that she has never smoked. She has never used smokeless tobacco.      Counseling Resources:  ATP IV Guidelines  Pooled Cohorts Equation Calculator  Breast Cancer Risk Calculator  BRCA-Related Cancer Risk Assessment: FHS-7 Tool  FRAX Risk Assessment  ICSI Preventive Guidelines  Dietary Guidelines for Americans, 2010  USDA's MyPlate  ASA Prophylaxis  Lung CA Screening    SHANDA [facility]  "

## 2021-01-16 VITALS
DIASTOLIC BLOOD PRESSURE: 79 MMHG | OXYGEN SATURATION: 98 % | HEART RATE: 80 BPM | WEIGHT: 171.3 LBS | TEMPERATURE: 98.8 F | BODY MASS INDEX: 29.39 KG/M2 | RESPIRATION RATE: 18 BRPM | SYSTOLIC BLOOD PRESSURE: 130 MMHG

## 2021-01-16 LAB
ABO + RH BLD: NORMAL
ABO + RH BLD: NORMAL
ANION GAP SERPL CALCULATED.3IONS-SCNC: 8 MMOL/L (ref 3–14)
B-HCG SERPL-ACNC: ABNORMAL IU/L (ref 0–5)
BLD GP AB SCN SERPL QL: NORMAL
BLOOD BANK CMNT PATIENT-IMP: NORMAL
BUN SERPL-MCNC: 9 MG/DL (ref 7–30)
CALCIUM SERPL-MCNC: 8.7 MG/DL (ref 8.5–10.1)
CHLORIDE SERPL-SCNC: 108 MMOL/L (ref 94–109)
CO2 SERPL-SCNC: 23 MMOL/L (ref 20–32)
CREAT SERPL-MCNC: 0.56 MG/DL (ref 0.52–1.04)
GFR SERPL CREATININE-BSD FRML MDRD: >90 ML/MIN/{1.73_M2}
GLUCOSE SERPL-MCNC: 114 MG/DL (ref 70–99)
POTASSIUM SERPL-SCNC: 3.8 MMOL/L (ref 3.4–5.3)
SODIUM SERPL-SCNC: 139 MMOL/L (ref 133–144)
SPECIMEN EXP DATE BLD: NORMAL

## 2021-01-16 NOTE — ED PROVIDER NOTES
ED Provider Note  [facility]      History     Chief Complaint   Patient presents with     Vaginal Bleeding     13 weeks pregnant tomorrow and lots of bleeding when she went to the [facility] about 30 minutes ago.     HPI  Denia Montes is a 35 year old female who is 13 weeks pregnant with a past medical history significant for type 1 diabetes mellitus and hyperlipidemia who presents to the ED for evaluation of vaginal bleeding.  Around 10 PM, the patient reports watching TV and all of a sudden feeling an urge to go to the bathroom.  When she went to the bathroom, she noticed a toilet bowl full of blood.  Afterwards, she has had some mild right lower quadrant cramping.  She states that she was not able to see any clots or tissue in the toilet bowl.  The patient reports having a miscarriage about 6 months ago.  She denies any shortness of breath, chest pain, lightheadedness, hematuria, dysuria or urinary frequency.    Patient is no other complaints at this time.    Past Medical History  Past Medical History:   Diagnosis Date     Cervical high risk HPV (human papillomavirus) test positive 08/2015 8/2015, 09/23/16, 12/14/18, 12/16/19, 12/29/20     Diabetes mellitus (H)      Hx of colposcopy with cervical biopsy 10/21/2016    [place] Bx: Neg NIL, ECC: Neg.     Past Surgical History:   Procedure Laterality Date     NO HISTORY OF SURGERY            ACE/ARB NOT PRESCRIBED, INTENTIONAL,       blood glucose (CONTOUR NEXT TEST) test strip       blood glucose monitoring (JACI CONTOUR) test strip       Continuous Blood Gluc  (FREESTYLE JERO 14 DAY READER) RAEANN       Continuous Blood Gluc Sensor (FREESTYLE JERO 14 DAY SENSOR) MISC       insulin aspart (NOVOLOG PEN) 100 UNIT/ML pen       insulin detemir (LEVEMIR FLEXTOUCH) 100 UNIT/ML pen       insulin pen needle (32G X 4 MM) 32G X 4 MM miscellaneous       levothyroxine (SYNTHROID/LEVOTHROID) 25 MCG tablet       metFORMIN (GLUCOPHAGE) 500 MG  tablet       MICROLET LANCETS MISC       Omega-3 Fatty Acids (FISH OIL) 500 MG CAPS       Prenatal Vit-Fe Fumarate-FA (PRENATAL VITAMIN) 27-0.8 MG TABS       vitamin B-12 (CYANOCOBALAMIN) 100 MCG tablet      Allergies   Allergen Reactions     Sulfa Drugs Hives     Family History  Family History   Problem Relation Age of Onset     Diabetes Mother      Hyperlipidemia Mother      Kidney Disease Mother      Thyroid Disease Mother      Hyperlipidemia Father      Hypertension Father      Kidney Disease Father      Substance Abuse Brother      Social History   Social History     Tobacco Use     Smoking status: Never Smoker     Smokeless tobacco: Never Used   Substance Use Topics     Alcohol use: No     Drug use: No      Past medical history, past surgical history, medications, allergies, family history, and social history were reviewed with the patient. No additional pertinent items.       Review of Systems   Constitutional: Negative for fever.   Respiratory: Negative for shortness of breath.    Cardiovascular: Negative for chest pain.   Gastrointestinal: Positive for abdominal pain. Negative for diarrhea.   Genitourinary: Positive for vaginal bleeding. Negative for dysuria, frequency, hematuria and vaginal discharge.   Neurological: Negative for light-headedness.   All other systems reviewed and are negative.    A complete review of systems was performed with pertinent positives and negatives noted in the HPI, and all other systems negative.    Physical Exam   BP: (!) 151/98  Pulse: 85  Temp: 98.3  F (36.8  C)  Resp: 16  Weight: 77.7 kg (171 lb 4.8 oz)  SpO2: 99 %  Physical Exam  General: awake, alert, NAD  Head: normal cephalic  HEENT: pupils equal, conjugate gaze in tact  Neck: Supple  CV: regular rate and rhythm without murmur  Lungs: clear to ascultation  Abd: soft, non-tender, no guarding, no peritoneal signs  Gyn: Cervical os appears closed.  There is blood in the vaginal vault.  Vaginal mucosa normal-appearing.  No  clots.  No significant cervical tenderness.   EXT: lower extremities without swelling or edema  Neuro: awake, answers questions appropriately. No focal deficits noted   ED Course       10:56 PM  The patient was seen and examined by Jeffrey Amador MD in Room ED05.   Procedures  Results for orders placed during the hospital encounter of 01/15/21   POC US OB TRANSABDOMINAL LIMITED    Impression Limited Bedside Transabdominal ultrasound for evaluation of IUP        Performed any interpreted by me.    Indication:  vaginal bleeding  Findings:  The lower abdomen was interrogated with a curvilinear probe. The uterus was identified.   Within the uterus there is a moving fetus with visible heart rate with fetal heart rate, unable to document fetal heart rate due to amount of movement of the fetus.    Impression: Intrauterine pregnancy          Results for orders placed or performed during the hospital encounter of 01/15/21   POC US OB TRANSABDOMINAL LIMITED     Status: None    Impression    Limited Bedside Transabdominal ultrasound for evaluation of IUP        Performed any interpreted by me.    Indication:  vaginal bleeding  Findings:  The lower abdomen was interrogated with a curvilinear probe. The uterus was identified.   Within the uterus there is a moving fetus with visible heart rate with fetal heart rate, unable to document fetal heart rate due to amount of movement of the fetus.    Impression: Intrauterine pregnancy     CBC with platelets differential     Status: Abnormal   Result Value Ref Range    WBC 8.1 4.0 - 11.0 10e9/L    RBC Count 4.25 3.8 - 5.2 10e12/L    Hemoglobin 11.4 (L) 11.7 - 15.7 g/dL    Hematocrit 34.5 (L) 35.0 - 47.0 %    MCV 81 78 - 100 fl    MCH 26.8 26.5 - 33.0 pg    MCHC 33.0 31.5 - 36.5 g/dL    RDW 15.5 (H) 10.0 - 15.0 %    Platelet Count 370 150 - 450 10e9/L    Diff Method Automated Method     % Neutrophils 56.9 %    % Lymphocytes 35.0 %    % Monocytes 6.8 %    % Eosinophils 0.7 %    % Basophils  0.4 %    % Immature Granulocytes 0.2 %    Nucleated RBCs 0 0 /100    Absolute Neutrophil 4.6 1.6 - 8.3 10e9/L    Absolute Lymphocytes 2.8 0.8 - 5.3 10e9/L    Absolute Monocytes 0.6 0.0 - 1.3 10e9/L    Absolute Eosinophils 0.1 0.0 - 0.7 10e9/L    Absolute Basophils 0.0 0.0 - 0.2 10e9/L    Abs Immature Granulocytes 0.0 0 - 0.4 10e9/L    Absolute Nucleated RBC 0.0    Basic metabolic panel     Status: Abnormal   Result Value Ref Range    Sodium 139 133 - 144 mmol/L    Potassium 3.8 3.4 - 5.3 mmol/L    Chloride 108 94 - 109 mmol/L    Carbon Dioxide 23 20 - 32 mmol/L    Anion Gap 8 3 - 14 mmol/L    Glucose 114 (H) 70 - 99 mg/dL    Urea Nitrogen 9 7 - 30 mg/dL    Creatinine 0.56 0.52 - 1.04 mg/dL    GFR Estimate >90 >60 mL/min/[1.73_m2]    GFR Estimate If Black >90 >60 mL/min/[1.73_m2]    Calcium 8.7 8.5 - 10.1 mg/dL   HCG quantitative pregnancy     Status: Abnormal   Result Value Ref Range    HCG Quantitative Serum 34,502 (H) 0 - 5 IU/L   UA with Microscopic     Status: Abnormal   Result Value Ref Range    Color Urine Straw     Appearance Urine Clear     Glucose Urine Negative NEG^Negative mg/dL    Bilirubin Urine Negative NEG^Negative    Ketones Urine Negative NEG^Negative mg/dL    Specific Gravity Urine 1.001 (L) 1.003 - 1.035    Blood Urine Moderate (A) NEG^Negative    pH Urine 6.0 5.0 - 7.0 pH    Protein Albumin Urine Negative NEG^Negative mg/dL    Urobilinogen mg/dL Normal 0.0 - 2.0 mg/dL    Nitrite Urine Negative NEG^Negative    Leukocyte Esterase Urine Negative NEG^Negative    Source Unspecified Urine     WBC Urine <1 0 - 5 /HPF    RBC Urine <1 0 - 2 /HPF    Bacteria Urine Few (A) NEG^Negative /HPF    Squamous Epithelial /HPF Urine <1 0 - 1 /HPF   ABO/Rh type and screen     Status: None (In process)   Result Value Ref Range    ABO PENDING     Antibody Screen PENDING     Test Valid Only At          [facility]    Specimen Expires 01/18/2021      Medications - No data to  display     Assessments & Plan (with Medical Decision Making)   This is 13 weeks pregnant presents with vaginal bleeding.  Differential includes threatened AB, spontaneous AB, other cause of vaginal bleeding such as vaginal trauma or other.  On initial evaluation patient is well-appearing, nontoxic, benign abdominal exam.  Initial evaluation included labs and pelvic exam.  Pelvic exam showed a closed os with no evidence of vaginal trauma.  Blood noted in vaginal vault, os closed.  Bedside ultrasound showed a live IUP with good movement and cardiac activity.  Blood type O+ per chart review.  RhoGam not indicated.  Her hemoglobin is unchanged from recent.     Of note patient was initially hypertensive in triage vitals though repeat vital signs normalized without intervention and stayed normal throughout her ER visit.    Given live IUP, stable vital signs, and stable hemoglobin do not feel further intervention is ER is necessary.    Patient was given strict ER return cautions including heavy vaginal bleeding, syncope or near syncope to return to the ER otherwise can follow-up with her OB/GYN on Monday for further evaluation and work-up.  He understands discharge instructions and follow-up plans.    This part of the medical record was transcribed by Aly Teixeira Medical Scribe, from a dictation done by Jeffrey Amador MD.     I have reviewed the nursing notes. I have reviewed the findings, diagnosis, plan and need for follow up with the patient.    New Prescriptions    No medications on file       Final diagnoses:   Vaginal bleeding in pregnancy       --  IAly, am serving as a trained medical scribe to document services personally performed by Jeffrey Amador MD, based on the provider's statements to me.     IJeffrey MD, was physically present and have reviewed and verified the accuracy of this note documented by Aly Teixeira.    Jeffrey Amador MD  [facility] EMERGENCY  DEPARTMENT  1/15/2021     Jeffrey Amador MD  01/16/21 0023

## 2021-01-16 NOTE — DISCHARGE INSTRUCTIONS
If you have significant bleeding (soaking a super size pad 2 and an hour for 2 hours or more), worsening abdominal pain, dizziness or near syncope and you need to return to the emergency department.    Contact your OB/GYN on Monday for follow-up.

## 2021-01-17 LAB
BACTERIA SPEC CULT: NORMAL
Lab: NORMAL
SPECIMEN SOURCE: NORMAL

## 2021-01-19 PROBLEM — Z3A.13 13 WEEKS GESTATION OF PREGNANCY: Status: ACTIVE | Noted: 2021-01-19

## 2021-01-19 LAB
ALBUMIN SERPL-MCNC: 3.3 G/DL (ref 3.4–5)
ALP SERPL-CCNC: 66 U/L (ref 40–150)
ALT SERPL W P-5'-P-CCNC: 28 U/L (ref 0–50)
AST SERPL W P-5'-P-CCNC: 22 U/L (ref 0–45)
BILIRUB DIRECT SERPL-MCNC: <0.1 MG/DL (ref 0–0.2)
BILIRUB SERPL-MCNC: 0.3 MG/DL (ref 0.2–1.3)
PROT SERPL-MCNC: 6.4 G/DL (ref 6.8–8.8)
TSH SERPL DL<=0.005 MIU/L-ACNC: 2.19 MU/L (ref 0.4–4)

## 2021-01-20 ENCOUNTER — OFFICE VISIT (OUTPATIENT)
Dept: OBGYN | Facility: CLINIC | Age: 36
End: 2021-01-20
Payer: COMMERCIAL

## 2021-01-20 VITALS
OXYGEN SATURATION: 99 % | HEART RATE: 89 BPM | WEIGHT: 169.5 LBS | DIASTOLIC BLOOD PRESSURE: 70 MMHG | BODY MASS INDEX: 29.08 KG/M2 | SYSTOLIC BLOOD PRESSURE: 120 MMHG

## 2021-01-20 DIAGNOSIS — O20.9 BLEEDING IN EARLY PREGNANCY: Primary | ICD-10-CM

## 2021-01-20 PROCEDURE — 99213 OFFICE O/P EST LOW 20 MIN: CPT | Performed by: OBSTETRICS & GYNECOLOGY

## 2021-01-20 NOTE — PROGRESS NOTES
CC:  Bleeding in early pregnancy    Denia Montes is a 35 year old yr old  here for an early pregnancy  follow up.   Patient presents for an ED follow up after acute onset of bleeding 4 days ago which, after a large gush of blood, quickly tapered off. In the ED a bedside US was done which confirmed a viable intrauterine pregnancy.   She has been avoiding increased physical activity since then and the bleeding is down to light spotting now.   She is not cramping and has no pain.    She is anxious since her last pregnancy ended in a miscarriage.    Blood type rh O+  Her partner is on face time for the visit today.     OBJECTIVE:   Blood pressure 120/70, pulse 89, weight 76.9 kg (169 lb 8 oz), last menstrual period 10/17/2020, SpO2 99 %, unknown if currently breastfeeding.  BMI= Body mass index is 29.08 kg/m .  Patient's last menstrual period was 10/17/2020.  General - pleasant female in no acute distress.  Neurological - alert and oriented X 3  Psychiatric - normal mood and affect    BEDSIDE ABDOMINAL U/S: done to confirm viability.  There is a single live intrauterine pregnancy noted with normal  gestational sac.  There is normal  cardiac activity and fetal movement noted.        EXAM:  Blood pressure 120/70, pulse 89, weight 76.9 kg (169 lb 8 oz), last menstrual period 10/17/2020, SpO2 99 %, unknown if currently breastfeeding.  BMI= Body mass index is 29.08 kg/m .  Patient's last menstrual period was 10/17/2020.  General - pleasant female in no acute distress.  Neurological - alert and oriented X 3  Psychiatric - normal mood and affect    prep time 4  visit time with the patient 12  post visit work including documentation time 7  Total time: 23     ASSESSMENT:  Encounter Diagnoses   Name Primary?     Bleeding in early pregnancy Yes     Subchorionic hemorrhage of placenta in first trimester, single or unspecified fetus          PLAN:  We discussed subchorionic hemorrhage and normal expectations.  She was  reassured that if she continues to avoid strenuous physical activity and observe pelvic rest, the pregnancy will likely proceed without further complication.   She has declined the first trimester screen.       She has a f/u appt next and will keep that, reviewed ongoing restrictions.   Will call with increase in bleeding or pain.     Minnie Lopez MD

## 2021-01-27 ENCOUNTER — MYC MEDICAL ADVICE (OUTPATIENT)
Dept: ENDOCRINOLOGY | Facility: CLINIC | Age: 36
End: 2021-01-27

## 2021-01-28 NOTE — TELEPHONE ENCOUNTER
Noted lows after dinner bolus. Reduce mealtime bolus with dinner.   Noted low after correction dose for 181; adjusting correction dose.   Overnight lows; will stop bedtime Levemir 4  Units.     MAGDI treated as Type 1 14.5 weeks pregnant   Plan     Stop Bedtime Levemir 4 units daily.     Levemir 14  Units in the morning.     Carbohydrate coverage 1 unit for every 15 grams of carbohydrates with breakfast & lunch and 1:12 for dinner.       please use the following adjusted correction dose with meals three times per day and bedtime   For  - 189 give  1 unit.   For  - 239 give 2 units.   For  - 289 give 3 units.   For  - 339 give 4 units.   For BG = or > 340 give 5 units.

## 2021-01-29 ENCOUNTER — PRENATAL OFFICE VISIT (OUTPATIENT)
Dept: OBGYN | Facility: CLINIC | Age: 36
End: 2021-01-29
Payer: COMMERCIAL

## 2021-01-29 VITALS
DIASTOLIC BLOOD PRESSURE: 78 MMHG | HEART RATE: 95 BPM | OXYGEN SATURATION: 100 % | WEIGHT: 171 LBS | HEIGHT: 65 IN | BODY MASS INDEX: 28.49 KG/M2 | SYSTOLIC BLOOD PRESSURE: 137 MMHG

## 2021-01-29 DIAGNOSIS — R30.0 DYSURIA: ICD-10-CM

## 2021-01-29 DIAGNOSIS — O09.519 ENCOUNTER FOR SUPERVISION OF HIGH RISK PREGNANCY DUE TO ADVANCED MATERNAL AGE IN PRIMIGRAVIDA: Primary | ICD-10-CM

## 2021-01-29 DIAGNOSIS — O24.012 TYPE 1 DIABETES MELLITUS DURING PREGNANCY IN SECOND TRIMESTER: ICD-10-CM

## 2021-01-29 LAB
ALBUMIN UR-MCNC: NEGATIVE MG/DL
APPEARANCE UR: CLEAR
BACTERIA #/AREA URNS HPF: ABNORMAL /HPF
BILIRUB UR QL STRIP: NEGATIVE
COLOR UR AUTO: YELLOW
GLUCOSE UR STRIP-MCNC: 100 MG/DL
HGB UR QL STRIP: NEGATIVE
KETONES UR STRIP-MCNC: NEGATIVE MG/DL
LEUKOCYTE ESTERASE UR QL STRIP: NEGATIVE
NITRATE UR QL: NEGATIVE
NON-SQ EPI CELLS #/AREA URNS LPF: ABNORMAL /LPF
PH UR STRIP: 6 PH (ref 5–7)
RBC #/AREA URNS AUTO: ABNORMAL /HPF
SOURCE: ABNORMAL
SP GR UR STRIP: 1.02 (ref 1–1.03)
UROBILINOGEN UR STRIP-ACNC: 0.2 EU/DL (ref 0.2–1)
WBC #/AREA URNS AUTO: ABNORMAL /HPF

## 2021-01-29 PROCEDURE — 87086 URINE CULTURE/COLONY COUNT: CPT | Performed by: OBSTETRICS & GYNECOLOGY

## 2021-01-29 PROCEDURE — 81001 URINALYSIS AUTO W/SCOPE: CPT | Performed by: OBSTETRICS & GYNECOLOGY

## 2021-01-29 PROCEDURE — 99212 OFFICE O/P EST SF 10 MIN: CPT | Performed by: OBSTETRICS & GYNECOLOGY

## 2021-01-29 RX ORDER — NITROFURANTOIN 25; 75 MG/1; MG/1
100 CAPSULE ORAL 2 TIMES DAILY
Qty: 6 CAPSULE | Refills: 0 | Status: CANCELLED | OUTPATIENT
Start: 2021-01-29 | End: 2021-02-01

## 2021-01-29 ASSESSMENT — MIFFLIN-ST. JEOR: SCORE: 1466.53

## 2021-01-29 NOTE — PROGRESS NOTES
Denia Montes is a 35 year old  at 14w6d with type 1 DM who presents today for prenatal visit  Had episode of bleeding 2 weeks ago for which she went to ED. No subsequent bleeding.  Reports urinary frequency. No burning with urination or hematuria.  ALT (20) mildly elevated 52, repeat 1/15/21 normal 28    1. Encounter for supervision of high risk pregnancy due to advanced maternal age in primigravida    2. Type 1 diabetes mellitus during pregnancy in second trimester  Working with endocrine    3. Dysuria  - UA with Microscopic reflex to Culture  - Urine Culture Aerobic Bacterial    RTC 4 weeks, sooner PRN.  Silvia Gonzalez MD

## 2021-01-30 LAB
BACTERIA SPEC CULT: NORMAL
Lab: NORMAL
SPECIMEN SOURCE: NORMAL

## 2021-02-01 ENCOUNTER — TELEPHONE (OUTPATIENT)
Dept: OBGYN | Facility: CLINIC | Age: 36
End: 2021-02-01

## 2021-02-01 NOTE — TELEPHONE ENCOUNTER
Will call pt on Tuesday morning.  US spot held on Friday afternoon if pt wants it.  Judith Barrientos RN

## 2021-02-01 NOTE — TELEPHONE ENCOUNTER
Okay for limited Us to evaluate for subchorionic hematoma/hemorrhage. If continued bleeding recommend clinic visit so we can do speculum exam as well.  Silvia Gonzalez MD

## 2021-02-01 NOTE — TELEPHONE ENCOUNTER
Pt is calling today because she had another episode of red vaginal bleeding.  No cramping or other symptoms along with it.  She is wondering if any imaging should be done just to check to make sure that a BRITTANI is indeed the cause of her bleeding.  She hasn't really had this done yet and is feeling slightly concerned.  She has MFM but not until 2/22/21.  Do you feel like there's any value to doing US now?  Judith Barrientos, RN

## 2021-02-02 NOTE — TELEPHONE ENCOUNTER
Pt coming for US on Friday afternoon to [facility].  Routing to on-call MD that day as pt would like a phone call to go over results when they are available.  Judith Barrientos RN

## 2021-02-05 ENCOUNTER — ANCILLARY PROCEDURE (OUTPATIENT)
Dept: ULTRASOUND IMAGING | Facility: CLINIC | Age: 36
End: 2021-02-05
Attending: OBSTETRICS & GYNECOLOGY
Payer: COMMERCIAL

## 2021-02-05 DIAGNOSIS — O20.9 BLEEDING IN EARLY PREGNANCY: Primary | ICD-10-CM

## 2021-02-05 PROCEDURE — 76815 OB US LIMITED FETUS(S): CPT | Performed by: OBSTETRICS & GYNECOLOGY

## 2021-02-08 ENCOUNTER — PRE VISIT (OUTPATIENT)
Dept: MATERNAL FETAL MEDICINE | Facility: CLINIC | Age: 36
End: 2021-02-08

## 2021-02-10 ENCOUNTER — OFFICE VISIT (OUTPATIENT)
Dept: MATERNAL FETAL MEDICINE | Facility: CLINIC | Age: 36
End: 2021-02-10
Attending: OBSTETRICS & GYNECOLOGY
Payer: COMMERCIAL

## 2021-02-10 ENCOUNTER — HOSPITAL ENCOUNTER (OUTPATIENT)
Dept: ULTRASOUND IMAGING | Facility: CLINIC | Age: 36
End: 2021-02-10
Attending: OBSTETRICS & GYNECOLOGY
Payer: COMMERCIAL

## 2021-02-10 DIAGNOSIS — O26.90 PREGNANCY RELATED CONDITION, ANTEPARTUM: ICD-10-CM

## 2021-02-10 DIAGNOSIS — O09.529 ANTEPARTUM MULTIGRAVIDA OF ADVANCED MATERNAL AGE: Primary | ICD-10-CM

## 2021-02-10 PROCEDURE — 76805 OB US >/= 14 WKS SNGL FETUS: CPT | Mod: 26 | Performed by: OBSTETRICS & GYNECOLOGY

## 2021-02-10 PROCEDURE — 76805 OB US >/= 14 WKS SNGL FETUS: CPT

## 2021-02-10 NOTE — PROGRESS NOTES
"Please see \"Imaging\" tab under Chart Review for full details.    Sena Urbano MD  Maternal Fetal Medicine    "

## 2021-02-12 DIAGNOSIS — Z3A.01 LESS THAN 8 WEEKS GESTATION OF PREGNANCY: ICD-10-CM

## 2021-02-12 DIAGNOSIS — Z34.90 SUPERVISION OF NORMAL PREGNANCY: Primary | ICD-10-CM

## 2021-02-12 RX ORDER — PNV NO.95/FERROUS FUM/FOLIC AC 28MG-0.8MG
1 TABLET ORAL DAILY
Qty: 90 TABLET | Refills: 3 | Status: SHIPPED | OUTPATIENT
Start: 2021-02-12 | End: 2021-05-18

## 2021-02-12 NOTE — TELEPHONE ENCOUNTER
Pt requesting new prescription for prenatal vitamin as her insurance changed. New rx sent.   Mirtha Yancey RN-BSN

## 2021-02-15 DIAGNOSIS — E10.9 TYPE 1 DIABETES MELLITUS WITHOUT COMPLICATION (H): Primary | ICD-10-CM

## 2021-02-15 DIAGNOSIS — Z3A.17 17 WEEKS GESTATION OF PREGNANCY: ICD-10-CM

## 2021-02-15 DIAGNOSIS — R76.8 POSITIVE GAD ANTIBODY: ICD-10-CM

## 2021-02-15 RX ORDER — PEN NEEDLE, DIABETIC 32GX 5/32"
NEEDLE, DISPOSABLE MISCELLANEOUS
Qty: 1000 EACH | Refills: 11 | Status: SHIPPED | OUTPATIENT
Start: 2021-02-15 | End: 2022-10-20

## 2021-02-15 RX ORDER — PNV NO.95/FERROUS FUM/FOLIC AC 28MG-0.8MG
1 TABLET ORAL DAILY
Qty: 100 TABLET | Refills: 3 | Status: SHIPPED | OUTPATIENT
Start: 2021-02-15 | End: 2021-05-18

## 2021-02-15 NOTE — TELEPHONE ENCOUNTER
Pt calling again and states insurance doesn't cover this prenatal vitamin. New rx sent to pharmacy.   Mirtha Yancey RN-BSN

## 2021-02-16 NOTE — PROGRESS NOTES
Outcome for 02/16/21 11:55 AM :Patient is sharing data via clinic device website and patient has been instructed to update data on website. Find login information by using .Vivian Montes  is being evaluated via a billable video visit.      How would you like to obtain your AVS? Skatazhart  For the video visit, send the invitation by: Translimithart   Will anyone else be joining your video visit? No

## 2021-02-17 ENCOUNTER — VIRTUAL VISIT (OUTPATIENT)
Dept: ENDOCRINOLOGY | Facility: CLINIC | Age: 36
End: 2021-02-17
Payer: COMMERCIAL

## 2021-02-17 DIAGNOSIS — E10.9 TYPE 1 DIABETES MELLITUS WITHOUT COMPLICATION (H): Primary | ICD-10-CM

## 2021-02-17 DIAGNOSIS — Z3A.17 17 WEEKS GESTATION OF PREGNANCY: ICD-10-CM

## 2021-02-17 DIAGNOSIS — E06.3 HASHIMOTO'S THYROIDITIS: ICD-10-CM

## 2021-02-17 PROCEDURE — 99214 OFFICE O/P EST MOD 30 MIN: CPT | Mod: 95 | Performed by: INTERNAL MEDICINE

## 2021-02-17 NOTE — PROGRESS NOTES
Endocrinology virtual Visit    Chief Complaint: Video Visit (dm1)     Information obtained from:Patient      Assessment/Treatment Plan:      MAGDI/treated as Type 1 diabetes currently - (H) Positive ANNA antibody/C-peptide WNL   Pregnant at 17.4 weeks: A1c 5.3  All fasting blood sugar readings within target range of less than 95.  About 95% of postprandial blood sugar within the target range of less than 120 at 2 hours.  Yesterday after dinner she had lows in the 50s-60s due to too much coverage at dinner(didn't eat as much as she thought she would eat) .  Between meals has to take snacks to keep the BG from going low; therefore will reduce Levemir to 15 units (was increased to 17 on 2/13/21).  Overnight reading in the 60's on CGM likely underestimate since POC usually reads 15-20 point higher.      Patient Instructions   [place],      Bedtime Levemir 4 units daily.     Continue Levemir 15  Units in the morning.     Carbohydrate coverage 1 unit for every 10 grams of carbohydrates with breakfast & lunch and 1:15 with dinner.      Please follow the following correction scale with meals three times per day and bedtime   For  - 169 give  1 unit.   For  - 219 give 2 units.   For  - 269 give 3 units.   For  - 319 give 4 units.   For BG = or > 320 give 5 units.    Please send BG readings every 3 days and as needed also if BG readings are above the target range or persistently low. Fasting > 95 and two hours >120. Or blood glucose readings below 70.      Here if the number to call us at [phone] select option #3 for triage nurse.         Hashimoto thyroiditis: continue levothyroxine 25 mcg daily. Check TSH q 4 weeks for now.     Yuri Hansen MD  Staff Endocrinologist    Division of Endocrinology and Diabetes      Subjective:         HPI: Denia Montes is a 35 year old female with history of MAGID/treated as Type 1 diabetes currently - (H) Positive ANNA antibody/C-peptide WNL currently  pregnant at 17w4d.     Currently on:   INSULIN DETEMIR- INJECT 17 UNITS (last change 2/13/21) IN THE MORNING AND 4 UNITS AT BEDTIME  CHO 1:12 with breakfast and lunch and 1:15 with dinner.  correction scale 1:50 above 120.   CHO changed to 1:10 for breakfast and lunch on 2/15/21    Reviewed BG readings in detail. Last night had pasta and took 7 units for mealtime bolus and had lows afterwards (overestimated what she was going to eat).   Doesn't feel hypoglycemia symptoms with overnight lows reading on CGM or occasionally during the day time.                                  Allergies   Allergen Reactions     Sulfa Drugs Hives       Current Outpatient Medications   Medication Sig Dispense Refill     ACE/ARB NOT PRESCRIBED, INTENTIONAL, Please choose reason not prescribed, below (Patient not taking: Reported on 1/15/2021)       blood glucose (CONTOUR NEXT TEST) test strip Use to test blood sugar 6 times daily or as directed. (Patient not taking: Reported on 1/15/2021) 500 each 3     blood glucose monitoring (JACI CONTOUR) test strip USE TO TEST BLOOD SUGAR TWICE DAILY IN MORNING AND EVENING 200 strip 1     Continuous Blood Gluc  (FREESTYLE JERO 14 DAY READER) RAEANN Use to read blood sugars as per 's instructions. 1 each 0     Continuous Blood Gluc Sensor (FREESTYLE JERO 14 DAY SENSOR) MISC Change every 14 days. 9 each 3     insulin aspart (NOVOLOG PEN) 100 UNIT/ML pen 1 unit for every 12 grams of carbohydrates with meals and snacks plus correction dose. Uses up to 40 units daily. 45 mL 3     insulin detemir (LEVEMIR FLEXTOUCH) 100 UNIT/ML pen Inject 14 Units Subcutaneous 2 times daily 30 mL 3     insulin pen needle (BD RUSSELL U/F) 32G X 4 MM miscellaneous USE 8 PEN NEEDLES DAILY 1000 each 11     levothyroxine (SYNTHROID/LEVOTHROID) 25 MCG tablet Take 1 tablet (25 mcg) by mouth daily 90 tablet 3     metFORMIN (GLUCOPHAGE) 500 MG tablet Take 500 mg by mouth daily (with breakfast) Currently pregnant  "so not taking it .       MICROLET LANCETS MISC TEST BLOOD SUGAR TWICE DAILY 200 each 11     Omega-3 Fatty Acids (FISH OIL) 500 MG CAPS        Prenatal Vit-Fe Fumarate-FA (PRENATAL VITAMIN) 27-0.8 MG TABS Take 1 tablet by mouth daily 100 tablet 3     Prenatal Vit-Fe Fumarate-FA (PRENATAL VITAMIN) 27-0.8 MG TABS Take 1 tablet by mouth daily 90 tablet 3     vitamin B-12 (CYANOCOBALAMIN) 100 MCG tablet Take 1,000 mcg by mouth daily         Review of Systems   as per HPI above      Objective:     GENERAL: Healthy, alert and no distress  EYES: Eyes grossly normal to inspection.    RESP: No audible wheeze, cough, or visible cyanosis.    NEURO: Alert and oriented. mentation and speech appropriate for age.      In House Labs:   Lab Results   Component Value Date    A1C 5.3 11/17/2020    A1C 6.1 06/29/2020    A1C 7.4 01/11/2019    A1C 6.3 09/28/2018    A1C 6.6 06/15/2018       TSH   Date Value Ref Range Status   01/15/2021 2.19 0.40 - 4.00 mU/L Final   12/21/2020 1.62 0.40 - 4.00 mU/L Final   12/21/2020 1.62 0.40 - 4.00 mU/L Final   11/17/2020 1.86 0.40 - 4.00 mU/L Final   06/29/2020 1.21 0.40 - 4.00 mU/L Final       Creatinine   Date Value Ref Range Status   01/15/2021 0.56 0.52 - 1.04 mg/dL Final     Fetal ultrasound report copied below.   \"IMPRESSION  ---------------------------------------------------------------------------------------------------------  1) Alvarado intrauterine pregnancy at 16w 4d gestational age.  2) None of the anomalies commonly detected by ultrasound were evident in the fetal anatomic survey, anatomy limited by early gestational age, as described above.  3) Growth parameters and estimated fetal weight were consistent with established dates.  4) The amniotic fluid volume appeared normal.  5) Normal fetal activity for gestational age.  6) On transabdominal imaging the cervix appears long and closed.  7) A subchorionic hemorrhage over the cervix is noted with the above measurements\"    Video-Visit " Details    Type of service:  Video Visit  1st VideoStart: 02/17/2021 08:32 am  Stop: 02/17/2021 08:43 am    Originating Location (pt. Location): Home    Distant Location (provider location):  [facility]     Platform used for Video Visit: Luisito

## 2021-02-17 NOTE — LETTER
2/17/2021       RE: Denia Montes  [address]  [facility] [address]     Dear Colleague,    Thank you for referring your patient, Denia Montes, to the [facility] [place] at [facility]. Please see a copy of my visit note below.    Outcome for 02/16/21 11:55 AM :Patient is sharing data via clinic device website and patient has been instructed to update data on website. Find login information by using .[email or web address]     Denia Montes  is being evaluated via a billable video visit.      How would you like to obtain your AVS? Peela  For the video visit, send the invitation by: [employer]   Will anyone else be joining your video visit? No            Endocrinology virtual Visit    Chief Complaint: Video Visit (dm1)     Information obtained from:Patient      Assessment/Treatment Plan:      MAGDI/treated as Type 1 diabetes currently - (H) Positive ANNA antibody/C-peptide WNL   Pregnant at 17.4 weeks: A1c 5.3  All fasting blood sugar readings within target range of less than 95.  About 95% of postprandial blood sugar within the target range of less than 120 at 2 hours.  Yesterday after dinner she had lows in the 50s-60s due to too much coverage at dinner(didn't eat as much as she thought she would eat) .  Between meals has to take snacks to keep the BG from going low; therefore will reduce Levemir to 15 units (was increased to 17 on 2/13/21).  Overnight reading in the 60's on CGM likely underestimate since POC usually reads 15-20 point higher.      Patient Instructions   [place],      Bedtime Levemir 4 units daily.     Continue Levemir 15  Units in the morning.     Carbohydrate coverage 1 unit for every 10 grams of carbohydrates with breakfast & lunch and 1:15 with dinner.      Please follow the following correction scale with meals three times per day and bedtime   For  - 169 give  1 unit.   For  - 219 give 2 units.   For BG  220 - 269 give 3 units.   For  - 319 give 4 units.   For BG = or > 320 give 5 units.    Please send BG readings every 3 days and as needed also if BG readings are above the target range or persistently low. Fasting > 95 and two hours >120. Or blood glucose readings below 70.      Here if the number to call us at [phone] select option #3 for triage nurse.         Hashimoto thyroiditis: continue levothyroxine 25 mcg daily. Check TSH q 4 weeks for now.     Yuri Hansen MD  Staff Endocrinologist    Division of Endocrinology and Diabetes      Subjective:         HPI: Denia Montes is a 35 year old female with history of MAGDI/treated as Type 1 diabetes currently - (H) Positive ANNA antibody/C-peptide WNL currently pregnant at 17w4d.     Currently on:   INSULIN DETEMIR- INJECT 17 UNITS (last change 2/13/21) IN THE MORNING AND 4 UNITS AT BEDTIME  CHO 1:12 with breakfast and lunch and 1:15 with dinner.  correction scale 1:50 above 120.   CHO changed to 1:10 for breakfast and lunch on 2/15/21    Reviewed BG readings in detail. Last night had pasta and took 7 units for mealtime bolus and had lows afterwards (overestimated what she was going to eat).   Doesn't feel hypoglycemia symptoms with overnight lows reading on CGM or occasionally during the day time.                                  Allergies   Allergen Reactions     Sulfa Drugs Hives       Current Outpatient Medications   Medication Sig Dispense Refill     ACE/ARB NOT PRESCRIBED, INTENTIONAL, Please choose reason not prescribed, below (Patient not taking: Reported on 1/15/2021)       blood glucose (CONTOUR NEXT TEST) test strip Use to test blood sugar 6 times daily or as directed. (Patient not taking: Reported on 1/15/2021) 500 each 3     blood glucose monitoring (JACI CONTOUR) test strip USE TO TEST BLOOD SUGAR TWICE DAILY IN MORNING AND EVENING 200 strip 1     Continuous Blood Gluc  (FREESTYLE JERO 14 DAY READER) RAEANN Use to read  blood sugars as per 's instructions. 1 each 0     Continuous Blood Gluc Sensor (FREESTYLE JERO 14 DAY SENSOR) MISC Change every 14 days. 9 each 3     insulin aspart (NOVOLOG PEN) 100 UNIT/ML pen 1 unit for every 12 grams of carbohydrates with meals and snacks plus correction dose. Uses up to 40 units daily. 45 mL 3     insulin detemir (LEVEMIR FLEXTOUCH) 100 UNIT/ML pen Inject 14 Units Subcutaneous 2 times daily 30 mL 3     insulin pen needle (BD RUSSELL U/F) 32G X 4 MM miscellaneous USE 8 PEN NEEDLES DAILY 1000 each 11     levothyroxine (SYNTHROID/LEVOTHROID) 25 MCG tablet Take 1 tablet (25 mcg) by mouth daily 90 tablet 3     metFORMIN (GLUCOPHAGE) 500 MG tablet Take 500 mg by mouth daily (with breakfast) Currently pregnant so not taking it .       MICROLET LANCETS MISC TEST BLOOD SUGAR TWICE DAILY 200 each 11     Omega-3 Fatty Acids (FISH OIL) 500 MG CAPS        Prenatal Vit-Fe Fumarate-FA (PRENATAL VITAMIN) 27-0.8 MG TABS Take 1 tablet by mouth daily 100 tablet 3     Prenatal Vit-Fe Fumarate-FA (PRENATAL VITAMIN) 27-0.8 MG TABS Take 1 tablet by mouth daily 90 tablet 3     vitamin B-12 (CYANOCOBALAMIN) 100 MCG tablet Take 1,000 mcg by mouth daily         Review of Systems   as per HPI above      Objective:     GENERAL: Healthy, alert and no distress  EYES: Eyes grossly normal to inspection.    RESP: No audible wheeze, cough, or visible cyanosis.    NEURO: Alert and oriented. mentation and speech appropriate for age.      In House Labs:   Lab Results   Component Value Date    A1C 5.3 11/17/2020    A1C 6.1 06/29/2020    A1C 7.4 01/11/2019    A1C 6.3 09/28/2018    A1C 6.6 06/15/2018       TSH   Date Value Ref Range Status   01/15/2021 2.19 0.40 - 4.00 mU/L Final   12/21/2020 1.62 0.40 - 4.00 mU/L Final   12/21/2020 1.62 0.40 - 4.00 mU/L Final   11/17/2020 1.86 0.40 - 4.00 mU/L Final   06/29/2020 1.21 0.40 - 4.00 mU/L Final       Creatinine   Date Value Ref Range Status   01/15/2021 0.56 0.52 - 1.04 mg/dL  "Final     Fetal ultrasound report copied below.   \"IMPRESSION  ---------------------------------------------------------------------------------------------------------  1) Alvarado intrauterine pregnancy at 16w 4d gestational age.  2) None of the anomalies commonly detected by ultrasound were evident in the fetal anatomic survey, anatomy limited by early gestational age, as described above.  3) Growth parameters and estimated fetal weight were consistent with established dates.  4) The amniotic fluid volume appeared normal.  5) Normal fetal activity for gestational age.  6) On transabdominal imaging the cervix appears long and closed.  7) A subchorionic hemorrhage over the cervix is noted with the above measurements\"    Video-Visit Details    Type of service:  Video Visit  1st VideoStart: 02/17/2021 08:32 am  Stop: 02/17/2021 08:43 am    Originating Location (pt. Location): Home    Distant Location (provider location):  [facility]     Platform used for Video Visit: Luisito      "

## 2021-02-17 NOTE — PATIENT INSTRUCTIONS
Denia,      Bedtime Levemir 4 units daily.     Reduce Levemir 15  Units in the morning.     Carbohydrate coverage 1 unit for every 10 grams of carbohydrates with breakfast & lunch and 1:15 with dinner.      Please follow the following correction scale with meals three times per day and bedtime   For  - 169 give  1 unit.   For  - 219 give 2 units.   For  - 269 give 3 units.   For  - 319 give 4 units.   For BG = or > 320 give 5 units.    Please send BG readings every 3 days and as needed also if BG readings are above the target range or persistently low. Fasting > 95 and two hours >120. Or blood glucose readings below 70.      Here if the number to call us at [phone] select option #3 for triage nurse.

## 2021-02-18 RX ORDER — PNV NO.95/FERROUS FUM/FOLIC AC 28MG-0.8MG
1 TABLET ORAL DAILY
Qty: 100 TABLET | Refills: 3 | Status: ON HOLD | OUTPATIENT
Start: 2021-02-18 | End: 2021-07-21

## 2021-02-18 NOTE — TELEPHONE ENCOUNTER
Patient calling again stating that insurance doesn't cover prenatal vitamin that was sent. Requesting that another prescription be sent. Discussed with patient that she may just have to purchase a PNV OTC. Pt asked to switch pharmacies. Discussed that if it is an insurance issue, the pharmacy wouldn't matter. Rx sent per patient request.   Mirtha Yancey, DEVI-BSN

## 2021-02-22 ENCOUNTER — MYC MEDICAL ADVICE (OUTPATIENT)
Dept: ENDOCRINOLOGY | Facility: CLINIC | Age: 36
End: 2021-02-22

## 2021-02-23 NOTE — TELEPHONE ENCOUNTER
Plan      Increase bedtime Levemir to 6 units daily.     Continue Levemir 15  Units in the morning.     Carbohydrate coverage 1 unit for every 10 grams of carbohydrates with breakfast & lunch and 1:15 with dinner.      Please follow the following correction scale with meals three times per day and bedtime   For  - 169 give  1 unit.   For  - 219 give 2 units.   For  - 269 give 3 units.   For  - 319 give 4 units.   For BG = or > 320 give 5 units.

## 2021-02-26 ENCOUNTER — PATIENT OUTREACH (OUTPATIENT)
Dept: EDUCATION SERVICES | Facility: CLINIC | Age: 36
End: 2021-02-26
Payer: COMMERCIAL

## 2021-02-26 DIAGNOSIS — E11.9 TYPE 2 DIABETES MELLITUS WITHOUT COMPLICATION, WITHOUT LONG-TERM CURRENT USE OF INSULIN (H): ICD-10-CM

## 2021-02-26 DIAGNOSIS — O24.011 TYPE 1 DIABETES MELLITUS DURING PREGNANCY IN FIRST TRIMESTER: Primary | ICD-10-CM

## 2021-02-26 PROCEDURE — G0108 DIAB MANAGE TRN  PER INDIV: HCPCS | Mod: 95

## 2021-02-26 NOTE — PATIENT INSTRUCTIONS
1. Check blood glucose by finger stick before breakfast and 2 hours after the start of meals since the sensor is normally reading lower than actual blood glucose.    2. Take Novolog 5-15 minutes before eating if you do not get full quickly and usually can eat the portions of carbohydrates that you take.    3. If you confirm after lunch today with blood glucose check that blood glucose are going above 120 mg/dL, try a carbohydrate ratio of 1:9 or 1:8 at lunch.  Other meals look good.    4. We did not discuss but I did order in ketone strips for you.  I am not sure if you need any or have used in the past but good to check urine in the morning for the first week to ensure getting a good calorie and carbohydrate intake.  If negative, can reduce to 1 time a week or if needed if not feeling well.     If you already have some at home to use, can decline from [facility] if needed.    FOLLOW UP: We can adjust insulin every 3-4 days if needed so if you are seeing high blood glucose after talking to the doctor on Tuesday, can send us a Tap[employer] message on Fridays to help adjust again for the weekend.    FOLLOW UP APPOINTMENT: Will call you again on Friday, March 12th around 10am.    Hilda Gould RDN, LD, [facility]   [phone]

## 2021-02-26 NOTE — PROGRESS NOTES
Diabetes Self-Management Education & Support  Type of Service: Telephone Visit    How would patient like to obtain AVS? Sharad     Presents for: Individual review    SUBJECTIVE/OBJECTIVE:  Presents for: Individual review  Accompanied by: Self  Diabetes education in the past 24mo: Yes  Focus of Visit: Taking Medication  Diabetes type: Type 1  Date of diagnosis: 2014  How confident are you filling out medical forms by yourself:: Extremely  Diabetes management related comments/concerns: Says seeing some higher BG in the morning and Levemir was increased on Levemir in the morning on Tuesday.  Says BG is under 95 mg/dL the past few days.  Says noticing reader's bg is lower than actual BG - checks when noted to be hypoglcyemia and BG have been ok.    Doing 1:10 for breakfast and lunch and 1:15 for dinner.  15 units Levemir AM and 6 units before bed.     Difficulty affording diabetes medication?: No  Difficulty affording diabetes testing supplies?: No  Other concerns:: None  Cultural Influences/Ethnic Background:  American    Diabetes Symptoms & Complications  Fatigue: No  Neuropathy: No  Polydipsia: No  Polyphagia: No  Polyuria: No  Visual change: No  Slow healing wounds: No  Complications assessed today?: Yes  Autonomic neuropathy: No  CVA: No  Heart disease: No  Nephropathy: No  Peripheral neuropathy: No  Peripheral Vascular Disease: No  Retinopathy: No  Sexual dysfunction: No    Patient Problem List and Family Medical History reviewed for relevant medical history, current medical status, and diabetes risk factors.    Vitals:  LMP 10/17/2020     Pre pregnancy weight: 159#    Weight gain 12 lbs at 18.5 weeks gestation.    Estimated Date of Delivery: Jul 24, 2021    Labs:  Lab Results   Component Value Date    A1C 5.5 12/21/2020     Lab Results   Component Value Date     01/15/2021     Lab Results   Component Value Date     12/16/2019     HDL Cholesterol   Date Value Ref Range Status   12/16/2019 54 >49  mg/dL Final   ]  GFR Estimate   Date Value Ref Range Status   01/15/2021 >90 >60 mL/min/[1.73_m2] Final     Comment:     Non  GFR Calc  Starting 12/18/2018, serum creatinine based estimated GFR (eGFR) will be   calculated using the Chronic Kidney Disease Epidemiology Collaboration   (CKD-EPI) equation.       GFR Estimate If Black   Date Value Ref Range Status   01/15/2021 >90 >60 mL/min/[1.73_m2] Final     Comment:      GFR Calc  Starting 12/18/2018, serum creatinine based estimated GFR (eGFR) will be   calculated using the Chronic Kidney Disease Epidemiology Collaboration   (CKD-EPI) equation.       Lab Results   Component Value Date    CR 0.56 01/15/2021     No results found for: MICROALBUMIN    Last 3 BP:   BP Readings from Last 3 Encounters:   01/29/21 137/78   01/20/21 120/70   01/16/21 130/79       History   Smoking Status     Never Smoker   Smokeless Tobacco     Never Used       Healthy Eating  Healthy Eating Assessed Today: Yes  Cultural/Samaritan diet restrictions?: Yes  Meal planning/habits: Carb counting, Low carb, Smaller portions, Frequent snacking  How many times a week on average do you eat food made away from home (restaurant/take-out)?: 2  Meals include: Breakfast, Lunch, Dinner, Morning Snack, Afternoon Snack, Evening Snack  Beverages: Water, Tea, Coffee, Milk, Other  Food record:        Being Active  Being Active Assessed Today: Yes  Exercise:: Yes  Days per week of moderate to strenuous exercise (like a brisk walk): 4  On average, minutes per day of exercise at this level: 20(Walking, hiking and exercise bike)  How intense was your typical exercise? : Moderate (like brisk walking)  Exercise Minutes per Week: 80  Barrier to exercise: None    Monitoring  Monitoring Assessed Today: Yes  Did patient bring glucose meter to appointment? : Yes  Blood Glucose Meter: CGM, [employer]  Times checking blood sugar at home (number): 1  Times checking blood sugar at home (per):  Day        Checking for urine ketones? no    Taking Medications  Diabetes Medication(s)     Biguanides       metFORMIN (GLUCOPHAGE) 500 MG tablet    Take 500 mg by mouth daily (with breakfast) Currently pregnant so not taking it .    Insulin       insulin aspart (NOVOLOG PEN) 100 UNIT/ML pen    1 unit for every 12 grams of carbohydrates with meals and snacks plus correction dose. Uses up to 40 units daily.     insulin detemir (LEVEMIR FLEXTOUCH) 100 UNIT/ML pen    Inject 14 Units Subcutaneous 2 times daily          Any changes to above medications since becoming pregnant: Taking 15-0-0-6 units Levemir and Carbohydrate ratio of 1:10 at breakfast and lunch and 1:15 at dinner.    Taking Medication Assessed Today: Yes  Current Treatments: Oral Medication (taken by mouth), Insulin Injections  Dose schedule: Pre-breakfast, Pre-lunch, Pre-dinner, At bedtime  Given by: Patient  Injection/Infusion sites: Abdomen  Problems taking diabetes medications regularly?: No  Diabetes medication side effects?: No    Problem Solving  Problem Solving Assessed Today: Yes  Is the patient at risk for hypoglycemia?: Yes  Hypoglycemia Treatment: Juice    Hypoglycemia symptoms  Dizziness or Light-Headedness: Yes(Bright spots)  Feeling shaky: Yes         Reducing Risks  Reducing Risks Assessed Today: Yes  Has dilated eye exam at least once a year?: Yes(12/2020)  Sees dentist every 6 months?: Yes  Feet checked by healthcare provider in the last year?: Yes    Healthy Coping  Healthy Coping Assessed Today: Yes  Emotional response to diabetes: Ready to learn, Concern for health and well-being  Informal Support system:: Family, Friends, Significant other  Stage of change: ACTION (Actively working towards change)  Support resources: Websites    Patient Activation Measure Survey Score:  PRANAY Score (Last Two) 5/15/2015 10/9/2015   PRANAY Raw Score 49 51   Activation Score 82.8 91.6   PRANAY Level 4 4         INTERVENTION:  Patient presented for Type 1  Diabetes. Advised to check glucose at least 6 times a day, before each meal and 2 hours after the start of each meal., Reviewed carbohydrate counting and label reading. and Reviewed bolus insulin dosing and timing: Insulin to carb ratio is 1 unit of insulin to 10 grams at breakfast, 1:10 grams at lunch, 1:15 grams at dinner, na grams at snacks.    Healthy Eating: carbohydrate counting, consistency in amount, composition, and timing of food intake and portion control  Being Active: relationship to blood glucose, describe appropriate activity program and precautions to take  Monitoring: purpose, log and interpret results, individual blood glucose targets and frequency of monitoring  Taking Medication: action of prescribed medication, proper site selection and rotation for injections, side effects of prescribed medications and when to take medications  Problem Solving: low blood glucose - causes, signs/symptoms, treatment and prevention, carrying a carbohydrate source at all times and when to call health care provider  Reducing Risks: major complications of diabetes, prevention, early diagnostic measures and treatment of complications, foot care, appropriate dental care, annual eye exam and A1C - goals, relating to blood glucose levels, how often to check  Healthy Coping: benefits of making appropriate lifestyle changes and utilize support systems    Referral to care team: None.  Up-to-date on eye exam and already working with Endo.      ASSESSMENT:  Ketones: na.   Fasting blood glucoses: 100% in target since Levemir increased at bedtime on Tuesday.  After breakfast: 100% in target.  After lunch: 67% in target.  After dinner: 100% in target.    Commended patient on good blood glucose control coming into pregnancy. She started meal time insulin with start of her pregnancy. Has type 1 diabetes (postivie ANNA antibodies) but has been controlled with basal insulin prior to pregnancy.    Did recommende that Calvert use  glucometer to check blood glucose both fasting and 2 hours after start of meals since the Cory may be reading lower than actual and during pregnancy, the 10-15 mg/dL that the Cory may be off could impact her control during pregnancy since want below 120 mg/dL after meals.  Encouraged her to start at lunch since has been high per Cory past 3 of 4 days and close to 110 mg/dL on other days.  If confirms blood glucose is >120 mg/dL but finger stick today, recommended she change carbohydrate ratio to 1:9 if blood glucose was close or 1:8 if >130 mg/dL.  Dorado is agreeable.  No other insulin adjustments recommended today.    Reviewed timing of insulin and encouraged her to take Novolog 5-15 minutes before eating if able to do so safely (not seeing often changes in appetite).  She is rotating around with injection sites without issues. She is already working with endo was was informed insulin can be adjusted every 3-4 days so encouraged her to reach out by [facility] message if blood glucose continue to be high after endo follow ups.  She is up-to-date on recent eye, dental and foot exams. She denies any issues with carbohydrate counting at this time; uses [employer] website as needed.  Pt verbalized understanding of concepts discussed and recommendations provided.       Education provided today on:  AADE Self-Care Behaviors:  Healthy Eating: carbohydrate counting  Being Active: relationship to blood glucose, describe appropriate activity program and precautions to take  Monitoring: purpose, log and interpret results, individual blood glucose targets and frequency of monitoring  Taking Medication: action of prescribed medication, proper site selection and rotation for injections, side effects of prescribed medications and when to take medications  Problem Solving: low blood glucose - causes, signs/symptoms, treatment and prevention, carrying a carbohydrate source at all times and when to call health care provider  Reducing  Risks: major complications of diabetes, prevention, early diagnostic measures and treatment of complications, foot care, appropriate dental care and annual eye exam  Healthy Coping: benefits of making appropriate lifestyle changes, utilize support systems and methods for coping with stress    Opportunities for ongoing education and support in diabetes-self management were discussed.    Pt verbalized understanding of concepts discussed and recommendations provided today.       Education Materials Provided:  No new materials provided today      PLAN:  See Patient Instructions for co-developed, patient-stated behavior change goals.  AVS sent by [facility]. See Follow-Up section for recommended follow-up (3/12/21).    Hilda Gould RDN, BALDOMERO, NICOLETTEES   Time Spent: 36 minutes  Encounter Type: Individual telephone visit    Any diabetes medication dose changes were made via the CDE Protocol and Collaborative Practice Agreement with the patient's referring provider. A copy of this encounter was shared with the provider.

## 2021-03-03 ENCOUNTER — MYC MEDICAL ADVICE (OUTPATIENT)
Dept: ENDOCRINOLOGY | Facility: CLINIC | Age: 36
End: 2021-03-03

## 2021-03-03 NOTE — TELEPHONE ENCOUNTER
"      \"Here is my blood sugar log for the week. I've started doing finger sticks in the morning and after meals because the sensor is running 10-20 lower. I had gone down to 1:9 Novalog at lunch a few days ago, and adjusted down again to 1:8 yesterday\"      Current medications       bedtime Levemir to 6 units daily.      Levemir 15  Units in the morning.     Carbohydrate coverage 1 unit for every 10 grams of carbohydrates with breakfast & 1:8 lunch and 1:15 with dinner.      Please follow the following correction scale with meals three times per day and bedtime   For  - 169 give  1 unit.   For  - 219 give 2 units.   For  - 269 give 3 units.   For  - 319 give 4 units.   For BG = or > 320 give 5 units.    Plan - no change.     Blood sugar readings reviewed in detail.  Currently pregnant at 19 weeks and 4 days.  Fasting blood sugars over the last 1 week within the target range 100% of the time.  No change on the long-acting insulin.  Postprandial 2 hours following breakfast has been within the target range of less than 120.  Postprandial 2 hours following lunch high twice; lunchtime NovoLog dose adjusted yesterday further to 1:8.  Postprandial 2 hours following dinner has been close to the cutoff of less than 120 however she had blood sugar of 75 following dinner yesterday; therefore we will continue the same for now.        "

## 2021-03-04 ENCOUNTER — PRENATAL OFFICE VISIT (OUTPATIENT)
Dept: OBGYN | Facility: CLINIC | Age: 36
End: 2021-03-04
Payer: COMMERCIAL

## 2021-03-04 ENCOUNTER — OFFICE VISIT (OUTPATIENT)
Dept: MATERNAL FETAL MEDICINE | Facility: CLINIC | Age: 36
End: 2021-03-04
Attending: OBSTETRICS & GYNECOLOGY
Payer: COMMERCIAL

## 2021-03-04 ENCOUNTER — HOSPITAL ENCOUNTER (OUTPATIENT)
Dept: ULTRASOUND IMAGING | Facility: CLINIC | Age: 36
End: 2021-03-04
Attending: OBSTETRICS & GYNECOLOGY
Payer: COMMERCIAL

## 2021-03-04 VITALS
WEIGHT: 175.1 LBS | BODY MASS INDEX: 29.14 KG/M2 | SYSTOLIC BLOOD PRESSURE: 126 MMHG | DIASTOLIC BLOOD PRESSURE: 74 MMHG | OXYGEN SATURATION: 97 % | HEART RATE: 84 BPM

## 2021-03-04 DIAGNOSIS — O24.012 TYPE 1 DIABETES MELLITUS DURING PREGNANCY IN SECOND TRIMESTER: Primary | ICD-10-CM

## 2021-03-04 DIAGNOSIS — O26.90 PREGNANCY RELATED CONDITION, ANTEPARTUM: ICD-10-CM

## 2021-03-04 DIAGNOSIS — O24.012 TYPE 1 DIABETES MELLITUS DURING PREGNANCY IN SECOND TRIMESTER: ICD-10-CM

## 2021-03-04 DIAGNOSIS — O09.512 AMA (ADVANCED MATERNAL AGE) PRIMIGRAVIDA 35+, SECOND TRIMESTER: Primary | ICD-10-CM

## 2021-03-04 DIAGNOSIS — O35.9XX0 SUSPECTED FETAL ANOMALY, ANTEPARTUM, SINGLE OR UNSPECIFIED FETUS: ICD-10-CM

## 2021-03-04 PROCEDURE — 76811 OB US DETAILED SNGL FETUS: CPT | Mod: 26 | Performed by: OBSTETRICS & GYNECOLOGY

## 2021-03-04 PROCEDURE — 76811 OB US DETAILED SNGL FETUS: CPT

## 2021-03-04 PROCEDURE — 99207 PR PRENATAL VISIT: CPT | Performed by: OBSTETRICS & GYNECOLOGY

## 2021-03-04 NOTE — PROGRESS NOTES
19w5d feeling good, no c/o.  Not feeling mvmt yet, reports she has anterior placenta.  Had level 2 today, having a boy.  Has f/u ultrasound scheduled in 3 weeks.  Reports BS have been stable, getting a little higher recently, but working closely with lin and DE. Will plan visit here same day as next us.  jose

## 2021-03-05 ENCOUNTER — MYC MEDICAL ADVICE (OUTPATIENT)
Dept: ENDOCRINOLOGY | Facility: CLINIC | Age: 36
End: 2021-03-05

## 2021-03-05 ENCOUNTER — MYC MEDICAL ADVICE (OUTPATIENT)
Dept: EDUCATION SERVICES | Facility: CLINIC | Age: 36
End: 2021-03-05

## 2021-03-05 DIAGNOSIS — E11.9 TYPE 2 DIABETES MELLITUS WITHOUT COMPLICATION, WITHOUT LONG-TERM CURRENT USE OF INSULIN (H): ICD-10-CM

## 2021-03-05 DIAGNOSIS — Z3A.01 LESS THAN 8 WEEKS GESTATION OF PREGNANCY: ICD-10-CM

## 2021-03-05 DIAGNOSIS — E10.9 TYPE 1 DIABETES MELLITUS WITHOUT COMPLICATION (H): ICD-10-CM

## 2021-03-05 NOTE — PROGRESS NOTES
"Please see \"Imaging\" tab under \"Chart Review\" for details of today's US.    Ana Luisa Stevens, DO    "

## 2021-03-05 NOTE — TELEPHONE ENCOUNTER
Gestational Diabetes Follow-up    Subjective/Objective:    Denia Montes sent in blood glucose log for review. Last date of communication was: 2/26/21.    Gestational diabetes is being managed with diet, activity and medications    Taking diabetes medications:   yes:     Diabetes Medication(s)     Biguanides       metFORMIN (GLUCOPHAGE) 500 MG tablet    Take 500 mg by mouth daily (with breakfast) Currently pregnant so not taking it .    Insulin       insulin aspart (NOVOLOG PEN) 100 UNIT/ML pen    1 unit for every 12 grams of carbohydrates with meals and snacks plus correction dose. Uses up to 40 units daily.     insulin detemir (LEVEMIR FLEXTOUCH) 100 UNIT/ML pen    Inject 14 Units Subcutaneous 2 times daily          Estimated Date of Delivery: Jul 24, 2021    BG/Food Log:   No updated freestyle shantanu data        Assessment:  Patient is at the start of growth hormone increase period of pregnancy, do not expect significant increase to insulin needs, rather gradual.  Recommend continuing 7 units of Levemir for 1-2 more nights then if elevation continues would go up to 8 units by Sunday OR Monday.  Would first recommend patient try 1:12 at dinner which should provide an additional 1 unit of rapid acting insulin.  IF fasting blood sugars still elevated after 2 nights of an increased dinner ratio then increase Levemir.    Ketones: not assessed.   Fasting blood glucoses: 88% in target.  After breakfast: 100% in target.  After lunch: 71% in target.  After dinner: 29% in target.    Plan/Response:  Recommend increase to Novolog dinner ratio: 1:15 --> 1:12, if after 2 nights fasting blood sugar remains elevated then further increase Levemir 0-0-0-7 --> 0-0-0-8    Follow [facility] via [facility] 3/8-3/9 if fasting or post dinner remains elevated.    Hannah Pike MS, RD, LD, CDE    Any diabetes medication dose changes were made via the CDE Protocol and Collaborative Practice Agreement with the patient's OB/GYN provider. A copy of  this encounter was shared with the provider.

## 2021-03-08 NOTE — TELEPHONE ENCOUNTER
Gestational Diabetes Follow-up    Subjective/Objective:    Denia Montes sent in blood glucose log for review. Last date of communication was: 3/5/21.    Gestational diabetes is being managed with diet, activity and medications    Taking diabetes medications:   yes:     Diabetes Medication(s)     Biguanides       metFORMIN (GLUCOPHAGE) 500 MG tablet    Take 500 mg by mouth daily (with breakfast) Currently pregnant so not taking it .    Insulin       insulin aspart (NOVOLOG PEN) 100 UNIT/ML pen    1 unit for every 12 grams of carbohydrates with meals and snacks plus correction dose. Uses up to 40 units daily.     insulin detemir (LEVEMIR FLEXTOUCH) 100 UNIT/ML pen    Inject 14 Units Subcutaneous 2 times daily          Estimated Date of Delivery: Jul 24, 2021    BG/Food Log:       Assessment:  Fasting blood sugar remains elevated despite decrease in after dinner readings. Recommend increase to levemir.    Ketones: small.   Fasting blood glucoses: 60% in target.  After breakfast: 60% in target.  After lunch: 100% in target.  After dinner: 67% in target, improving since adjusting insulin to carbohydrate ratio.    Plan/Response:  Recommend increase to insulin - Levemir 0-0-0-8 --> 0-0-0-9.    Follow up as planned 3/12/21.    Hannah Pike MS, RD, LD, CDE    Any diabetes medication dose changes were made via the CDE Protocol and Collaborative Practice Agreement with the patient's endocrinology provider. A copy of this encounter was shared with the provider.

## 2021-03-12 ENCOUNTER — PATIENT OUTREACH (OUTPATIENT)
Dept: EDUCATION SERVICES | Facility: CLINIC | Age: 36
End: 2021-03-12
Payer: COMMERCIAL

## 2021-03-12 ENCOUNTER — MYC MEDICAL ADVICE (OUTPATIENT)
Dept: EDUCATION SERVICES | Facility: CLINIC | Age: 36
End: 2021-03-12

## 2021-03-12 DIAGNOSIS — O24.011 TYPE 1 DIABETES MELLITUS DURING PREGNANCY IN FIRST TRIMESTER: Primary | ICD-10-CM

## 2021-03-12 DIAGNOSIS — E10.9 TYPE 1 DIABETES MELLITUS WITHOUT COMPLICATION (H): ICD-10-CM

## 2021-03-12 DIAGNOSIS — Z3A.17 17 WEEKS GESTATION OF PREGNANCY: ICD-10-CM

## 2021-03-12 DIAGNOSIS — Z3A.01 LESS THAN 8 WEEKS GESTATION OF PREGNANCY: ICD-10-CM

## 2021-03-12 PROCEDURE — 98968 PH1 ASSMT&MGMT NQHP 21-30: CPT | Mod: 95

## 2021-03-12 NOTE — PATIENT INSTRUCTIONS
1. Try to walk around if blood glucose high after the meal until Cory indicates blood glucose is coming down (5-10 minutes).    Can try this instead if the correction is dropping blood glucose too much when just a few points above target.    2. Check on fluids on days with ketones- felt like drink enough day before or do anything that caused sweating.     3. Let us know if you have any questions on carbohydrate intake at meals or if you feel like a high blood glucose after the meal was from being unsure of the carbohydrate intake.  You can always record portion size and what you estimated and we can confirm if that looks like or to count something different.    FOLLOW UP: Please MyChart blood glucose weekly for review and let us know if you made any changes to the insulin amounts.    Hilda Gould,  ESA, LD, [facility]   [phone]

## 2021-03-12 NOTE — PROGRESS NOTES
Diabetes Self-Management Education & Support  Type of Service: Telephone Visit    How would patient like to obtain AVS? Sharad     Presents for: Follow-up    SUBJECTIVE/OBJECTIVE:  Presents for: Follow-up  Accompanied by: Self  Diabetes education in the past 24mo: Yes  Focus of Visit: Taking Medication  Diabetes type: Type 1  Date of diagnosis:   How confident are you filling out medical forms by yourself:: Extremely  Diabetes management related comments/concerns: Small amount of ketones last Friday and last Monday.    Says made some changes this past week.  Levemir 16 units (increased yesterday) in the morning and 9 units before bed.  Novolo:8 with breakfast and lunch and 1:12 at dinner (adjusted this on Tuesday).  Says lower carbohydrate ratio    Correction for patient to take if blood glucose high after the meal: 1:50>120    Small amount of ketones last Friday and last Monday.    Says overnights, blood glucose consistently higher.  No problems with low blood glucose overnight.  Only issue before meal after the correction.    Difficulty affording diabetes medication?: No  Difficulty affording diabetes testing supplies?: No  Other concerns:: None  Cultural Influences/Ethnic Background:  American    Diabetes Symptoms & Complications  Fatigue: No  Neuropathy: No  Polydipsia: No  Polyphagia: No  Polyuria: No  Visual change: No  Slow healing wounds: No  Complications assessed today?: Yes  Autonomic neuropathy: No  CVA: No  Heart disease: No  Nephropathy: No  Peripheral neuropathy: No  Peripheral Vascular Disease: No  Retinopathy: No  Sexual dysfunction: No    Patient Problem List and Family Medical History reviewed for relevant medical history, current medical status, and diabetes risk factors.    Vitals:  LMP 10/17/2020     Pre pregnancy weight: 159#    Weight gain 16 lbs at 20.5 weeks gestation.    Estimated Date of Delivery: 2021    Labs:  Lab Results   Component Value Date    A1C 5.5 2020      Lab Results   Component Value Date     01/15/2021     Lab Results   Component Value Date     12/16/2019     HDL Cholesterol   Date Value Ref Range Status   12/16/2019 54 >49 mg/dL Final   ]  GFR Estimate   Date Value Ref Range Status   01/15/2021 >90 >60 mL/min/[1.73_m2] Final     Comment:     Non  GFR Calc  Starting 12/18/2018, serum creatinine based estimated GFR (eGFR) will be   calculated using the Chronic Kidney Disease Epidemiology Collaboration   (CKD-EPI) equation.       GFR Estimate If Black   Date Value Ref Range Status   01/15/2021 >90 >60 mL/min/[1.73_m2] Final     Comment:      GFR Calc  Starting 12/18/2018, serum creatinine based estimated GFR (eGFR) will be   calculated using the Chronic Kidney Disease Epidemiology Collaboration   (CKD-EPI) equation.       Lab Results   Component Value Date    CR 0.56 01/15/2021     No results found for: MICROALBUMIN    Last 3 BP:   BP Readings from Last 3 Encounters:   03/04/21 126/74   01/29/21 137/78   01/20/21 120/70       History   Smoking Status     Never Smoker   Smokeless Tobacco     Never Used       Healthy Eating  Healthy Eating Assessed Today: Yes  Cultural/Gnosticist diet restrictions?: Yes  Meal planning/habits: Carb counting, Low carb, Smaller portions, Frequent snacking  How many times a week on average do you eat food made away from home (restaurant/take-out)?: 2  Meals include: Breakfast, Lunch, Dinner, Morning Snack, Afternoon Snack, Evening Snack  Beverages: Water, Tea, Coffee, Milk, Other        Being Active  Being Active Assessed Today: Yes  Exercise:: Yes  Days per week of moderate to strenuous exercise (like a brisk walk): 4  On average, minutes per day of exercise at this level: 20(Walking, hiking and exercise bike)  How intense was your typical exercise? : Moderate (like brisk walking)  Exercise Minutes per Week: 80  Barrier to exercise: None    Monitoring  Monitoring Assessed Today: Yes  Did  patient bring glucose meter to appointment? : Yes  Blood Glucose Meter: CGM, [employer]  Times checking blood sugar at home (number): 4  Times checking blood sugar at home (per): Day              Checking for urine ketones? Yes during pregnancy    Taking Medications  Diabetes Medication(s)     Biguanides       metFORMIN (GLUCOPHAGE) 500 MG tablet    Take 500 mg by mouth daily (with breakfast) Currently pregnant so not taking it .    Insulin       insulin aspart (NOVOLOG PEN) 100 UNIT/ML pen    1 unit for every 12 grams of carbohydrates with meals and snacks plus correction dose. Uses up to 40 units daily.     insulin detemir (LEVEMIR FLEXTOUCH) 100 UNIT/ML pen    Inject 14 Units Subcutaneous 2 times daily          Taking Medication Assessed Today: Yes  Current Treatments: Oral Medication (taken by mouth), Insulin Injections  Dose schedule: Pre-breakfast, Pre-lunch, Pre-dinner, At bedtime  Given by: Patient  Injection/Infusion sites: Abdomen  Problems taking diabetes medications regularly?: No  Diabetes medication side effects?: No    Problem Solving  Problem Solving Assessed Today: Yes  Is the patient at risk for hypoglycemia?: Yes  Hypoglycemia Treatment: Juice    Hypoglycemia symptoms  Dizziness or Light-Headedness: Yes(Bright spots)  Feeling shaky: Yes         Reducing Risks  Reducing Risks Assessed Today: Yes  Has dilated eye exam at least once a year?: Yes(12/18/2020)  Sees dentist every 6 months?: Yes  Feet checked by healthcare provider in the last year?: Yes    Healthy Coping  Healthy Coping Assessed Today: Yes  Emotional response to diabetes: Ready to learn, Concern for health and well-being  Informal Support system:: Family, Friends, Significant other  Stage of change: ACTION (Actively working towards change)  Support resources: Websites    Patient Activation Measure Survey Score:  PRANAY Score (Last Two) 5/15/2015 10/9/2015   PRANAY Raw Score 49 51   Activation Score 82.8 91.6   PRANAY Level 4 4          INTERVENTION:  Patient presented for Type 1 Diabetes. Advised to check glucose at least 6 times a day, before each meal and 2 hours after the start of each meal., Advised to check urine ketones daily in the morning until negative for 7 days in a row, then reduce to checking once every 7 days. and Reviewed carbohydrate counting and label reading.    Referral to care team: None- already working with Endo and up-to-date for eye exam      ASSESSMENT:  Ketones: negative-small; negative 75% of the time.   Fasting blood glucoses: 88% in target.  After breakfast: 67% in target (after adjusted carbohydrate on Tuesday to 1:8).  After lunch: 83% in target.  After dinner: 71% in target.    Denia continues to make appropriate adjustments on her own. She adjusted the carbohydrate ratio at breakfast to 1:8 on Tuesday after 3 days of elevated post-breakfast blood glucose.  She also found that taking 1 unit of correction after lunch was causing hypoglycemia between lunch and dinner so tried to increase morning Levemir dose to 16 units . Felt yesterday, this worked well for her so will give more time to see if any other adjustments are needed. Suggested if post-meal correction is too much, can try walking 5-10 minutes to lower blood glucose with movement if close to target and/or in the future, could see if her insurance will cover insulin pen with 0.5 unit option. Could also try to use Cory as guide and have correction taken when blood glucose high coming into the meal. Pt verbalized understanding.       Checked on hypoglycemia but reader is usually reading low for Denia.  She says some of the pre-meal low blood glucose were confirmed with finger stick and having symptoms of hypoglycemia but overnight, tends to be >70 mg/dL when she has checked.  She feels carbohydrate counting is going well and denies any questions.  Has occasional elevated but carbohydrate amount and insulin dose not listed so unsure if from underestimating  carbohydrates or other factors. Can request her to record portion and carbohydrate amount calculated in the future if seeing more random high post-meal blood glucose.    Unable to identify reasons ketones formed on Friday and Monday as she did not skip any meals or appear to be low in carbs for meals nor snacks and ate bedtime snacks.  Suggested trying more protein with snack before bed but ketones not form on night misses bedtime snack.  Suggested checking that was good on fluids the day before if ketones had formed.          Education provided today on:  AADE Self-Care Behaviors:  Healthy Eating: carbohydrate counting, consistency in amount, composition, and timing of food intake and heart healthy diet  Being Active: relationship to blood glucose and describe appropriate activity program  Monitoring: purpose, log and interpret results, individual blood glucose targets and frequency of monitoring  Taking Medication: action of prescribed medication and when to take medications  Problem Solving: low blood glucose - causes, signs/symptoms, treatment and prevention, carrying a carbohydrate source at all times and when to call health care provider      Opportunities for ongoing education and support in diabetes-self management were discussed.    Pt verbalized understanding of concepts discussed and recommendations provided today.       Education Materials Provided:  No new materials provided today      PLAN:  See Patient Instructions for co-developed, patient-stated behavior change goals.  AVS sent by [employer]. See Follow-Up section for recommended follow-up (weekly follow ups).    Hilda Gould RDN, BALDOMERO, GARRICK   Time Spent: 22 minutes  Encounter Type: Individual telephone visit    Any diabetes medication dose changes were made via the CDE Protocol and Collaborative Practice Agreement with the patient's referring provider. A copy of this encounter was shared with the provider.

## 2021-03-16 NOTE — PROGRESS NOTES
Outcome for 03/16/21 3:33 PM :Left Voicemail for patient to call back   Outcome for 03/16/21 3:37 PM :Patient was State she will upload meter tomorrow morning.       Denia Hartmans  is being evaluated via a billable video visit.      How would you like to obtain your AVS? Tagenthart  For the video visit, send the invitation by: tequila  Will anyone else be joining your video visit? No

## 2021-03-17 ENCOUNTER — VIRTUAL VISIT (OUTPATIENT)
Dept: ENDOCRINOLOGY | Facility: CLINIC | Age: 36
End: 2021-03-17
Payer: COMMERCIAL

## 2021-03-17 ENCOUNTER — MYC MEDICAL ADVICE (OUTPATIENT)
Dept: ENDOCRINOLOGY | Facility: CLINIC | Age: 36
End: 2021-03-17

## 2021-03-17 DIAGNOSIS — E06.3 HASHIMOTO'S THYROIDITIS: ICD-10-CM

## 2021-03-17 DIAGNOSIS — O24.012 TYPE 1 DIABETES MELLITUS DURING PREGNANCY IN SECOND TRIMESTER: Primary | ICD-10-CM

## 2021-03-17 PROCEDURE — 99215 OFFICE O/P EST HI 40 MIN: CPT | Mod: 95 | Performed by: INTERNAL MEDICINE

## 2021-03-17 NOTE — Clinical Note
Hi, I saw Wright today. She has low's during the day which follows correction scale mostly. Do you have any suggestion on how to get her an insulin pen with 0.5 increments? Thank you, Yuri Hansen

## 2021-03-17 NOTE — PATIENT INSTRUCTIONS
Denia,   The percentage of low BG readings on CGM is much higher than recommended.   (time below range less than 63 mg/dL goal is less than 4% in time range of less than 54 mg/dL goal is less than 1%).  Based on this assessment, I recommend reducing the basal insulin during the day.  And a correction scale just a little bit over 120-do not correct until we come up with an insulin pen that allows 0.5 increments.  If you experience a persistent blood sugars during the day or overnight in the 50s and 60s; please let me know so that we can make further adjustments or reduction in insulin.  For now the plan is:       Continue Bedtime Levemir 9 units daily.     Reduce Levemir 14  units in the morning.     Carbohydrate coverage 1 unit for every 8 grams of carbohydrates with breakfast & lunch and 1:12 with dinner.    Based on review the correction scale appropriate seems to be as follows. However, I see that your current insulin pend doesn't allow a 0.5 increment. Don't correct if BG is only slightly above 120 until we come up with a pen that allows 0.5 increments.  In the meantime, we have reduced Levemir during the day as well.   -155 = 0.5 units.  -190 = 1 units.  -225 = 1.5 units.  -260 = 2 units.  -295 = 2.5 units.   -330 = 3 units.   BG >330 = 3.5 units.      Please send BG readings every 3-4 days and as needed also if BG readings are above the target range or persistently low. Fasting > 95 and two hours >120. Or blood glucose readings below 70.      Here if the number to call us at [phone] select option #3 for triage nurse.     Please check blood work for thyroid.  Lab appointment can be made by calling Lab scheduling to schedule at any [place] lab locations: [phone] select option 1.

## 2021-03-17 NOTE — PROGRESS NOTES
Endocrinology virtual Visit    Chief Complaint: Video Visit (dm1)     Information obtained from:Patient      Assessment/Treatment Plan:      MAGDI/treated as Type 1 diabetes currently - (H) Positive ANNA antibody/C-peptide WNL   Pregnant at 21.4 weeks: A1c 5.3  All fasting blood sugar readings within target range of less than 95.  About 80% of postprandial blood sugar within the target range of less than 120 at 2 hours.  On the shantanu, it shows lows overnight however no hypoglycemia symptoms overnight and when she checks blood sugar using point-of-care overnight-reading usually within the normal limits/higher than recorded on shantanu.  During the day, she had lows following correction scale mostly.  During the day she experiences hypoglycemia symptoms about 2-3 times a week.  The current insulin been does not allow 0.5 increments therefore [until considering a new pen] we are reducing the Levemir during the day and also I have advised not to correct if blood sugars are closer to 120s.  We will review her blood sugar readings in 3-4 days for further adjustment.    Blood glucose targets for point-of-care for fasting blood sugar is less than 95 and 2-hour postprandial is less than 120 mg/dL however CGM targets are slightly different/during pregnancy-CGM target ranges 63 to 140 mg/dL with time in range or goal more than 70% and time below range less than 63 mg/dL goal is less than 4% in time range of less than 54 mg/dL goal is less than 1%.  On CGM the low BG are higher than the target percentage and we are reducing both basal and correction scale today.     Patient Instructions   [place],   The percentage of low BG readings on CGM is much higher than recommended.   (time below range less than 63 mg/dL goal is less than 4% in time range of less than 54 mg/dL goal is less than 1%).  Based on this assessment, I recommend reducing the basal insulin during the day.  And a correction scale just a little bit over 120-do not correct  until we come up with an insulin pen that allows 0.5 increments.  If you experience a persistent blood sugars during the day or overnight in the 50s and 60s; please let me know so that we can make further adjustments or reduction in insulin.  For now the plan is:       Continue Bedtime Levemir 9 units daily.     Reduce Levemir 14  units in the morning.     Carbohydrate coverage 1 unit for every 8 grams of carbohydrates with breakfast & lunch and 1:12 with dinner.    Based on review the correction scale appropriate seems to be as follows. However, I see that your current insulin pend doesn't allow a 0.5 increment. Don't correct if BG is only slightly above 120 until we come up with a pen that allows 0.5 increments.  In the meantime, we have reduced Levemir during the day as well.   -155 = 0.5 units.  -190 = 1 units.  -225 = 1.5 units.  -260 = 2 units.  -295 = 2.5 units.   -330 = 3 units.   BG >330 = 3.5 units.      Please send BG readings every 3-4 days and as needed also if BG readings are above the target range or persistently low. Fasting > 95 and two hours >120. Or blood glucose readings below 70.      Here if the number to call us at [phone] select option #3 for triage nurse.     Please check blood work for thyroid.  Lab appointment can be made by calling Lab scheduling to schedule at any [place] lab locations: [phone] select option 1.           Hashimoto thyroiditis: continue levothyroxine 25 mcg daily. Check TSH q 4 weeks-TSH was not checked for February 2021/re-ordered TSH today..    Yuri Hansen MD  Staff Endocrinologist    Division of Endocrinology and Diabetes      Subjective:         HPI: Denia Montes is a 35 year old female with history of MAGDI/treated as Type 1 diabetes currently - (H) Positive ANNA antibody/C-peptide WNL currently pregnant at 17w4d.     Currently on:   INSULIN DETEMIR- INJECT 16 UNITS (last week) IN THE  MORNING AND 9 UNITS (two weeks ago) AT BEDTIME  CHO 1:8 with breakfast (3/8) and lunch (few weeks ago) and 1:12 with dinner.  correction scale 1:50 above 120.     vernight - no hypoglycemia symptoms.  Occasionally when she goes to the bathroom she checks her blood sugars overnight and the usually the readings are within the normal limits.  However, during the day she gets blood sugars in the 50s 60s and about 2-3 times per week she experiences hypoglycemia symptoms with those.  Reviewed jero which is documented below and I see that the lows are mostly following correction scales and her correction scale is to be adjusted.                        No new issues.  Pregnancy is going well.  Noted last ultrasound from 3/4/2021.  In summary the ultrasound stated intrauterine pregnancy at expected gestational age.     Allergies   Allergen Reactions     Sulfa Drugs Hives       Current Outpatient Medications   Medication Sig Dispense Refill     ACE/ARB NOT PRESCRIBED, INTENTIONAL, Please choose reason not prescribed, below       acetone urine (KETOSTIX) test strip Use to check urine for ketones every morning. 50 strip 11     blood glucose (JACI CONTOUR) test strip Check blood glucose 6 times a day (before and 2 hours after each meal) 600 strip 3     blood glucose (CONTOUR NEXT TEST) test strip Use to test blood sugar 6 times daily or as directed. 500 each 3     Continuous Blood Gluc  (FREESTYLE JERO 14 DAY READER) RAEANN Use to read blood sugars as per 's instructions. 1 each 0     Continuous Blood Gluc Sensor (FREESTYLE JERO 14 DAY SENSOR) MISC Change every 14 days. 9 each 3     insulin aspart (NOVOLOG PEN) 100 UNIT/ML pen Take 1u:8g breakfast, 1u:8g lunch, 1u:12g dinner, + 1u/50mg/dL>120mg/dL +2u prime before each dose. Uses up to 40 units daily. 45 mL 3     insulin detemir (LEVEMIR FLEXTOUCH) 100 UNIT/ML pen Inject 16 units subcutaneous in the morning and 9 units before bed 30 mL 3     insulin pen  needle (BD RUSSELL U/F) 32G X 4 MM miscellaneous USE 8 PEN NEEDLES DAILY 1000 each 11     levothyroxine (SYNTHROID/LEVOTHROID) 25 MCG tablet Take 1 tablet (25 mcg) by mouth daily 90 tablet 3     metFORMIN (GLUCOPHAGE) 500 MG tablet Take 500 mg by mouth daily (with breakfast) Currently pregnant so not taking it .       MICROLET LANCETS MISC TEST BLOOD SUGAR TWICE DAILY 200 each 11     Omega-3 Fatty Acids (FISH OIL) 500 MG CAPS        Prenatal Vit-Fe Fumarate-FA (PRENATAL VITAMIN) 27-0.8 MG TABS Take 1 tablet by mouth daily 100 tablet 3     Prenatal Vit-Fe Fumarate-FA (PRENATAL VITAMIN) 27-0.8 MG TABS Take 1 tablet by mouth daily 90 tablet 3     Prenatal Vit-Fe Fumarate-FA (PRENATAL VITAMINS) 28-0.8 MG TABS Take 1 tablet by mouth daily 100 tablet 3     vitamin B-12 (CYANOCOBALAMIN) 100 MCG tablet Take 1,000 mcg by mouth daily         Review of Systems   as per HPI above      Objective:     GENERAL: Healthy, alert and no distress  EYES: Eyes grossly normal to inspection.    RESP: No audible wheeze, cough, or visible cyanosis.    NEURO: Alert and oriented. mentation and speech and mood stable.    In House Labs:   Hemoglobin A1C   Date Value Ref Range Status   12/21/2020 5.5 0 - 5.6 % Final     Comment:     Normal <5.7% Prediabetes 5.7-6.4%  Diabetes 6.5% or higher - adopted from ADA   consensus guidelines.     11/17/2020 5.3 0 - 5.6 % Final     Comment:     Normal <5.7% Prediabetes 5.7-6.4%  Diabetes 6.5% or higher - adopted from ADA   consensus guidelines.     06/29/2020 6.1 (H) 0 - 5.6 % Final     Comment:     Normal <5.7% Prediabetes 5.7-6.4%  Diabetes 6.5% or higher - adopted from ADA   consensus guidelines.     03/11/2020 6.9 (A) 4.3 - 6 % Final   09/11/2019 6.1 (A) 4.3 - 6 % Final   05/29/2019 6.1 (A) 4.3 - 6 % Final       TSH   Date Value Ref Range Status   01/15/2021 2.19 0.40 - 4.00 mU/L Final   12/21/2020 1.62 0.40 - 4.00 mU/L Final   12/21/2020 1.62 0.40 - 4.00 mU/L Final   11/17/2020 1.86 0.40 - 4.00 mU/L Final  "  06/29/2020 1.21 0.40 - 4.00 mU/L Final       Creatinine   Date Value Ref Range Status   01/15/2021 0.56 0.52 - 1.04 mg/dL Final     Fetal ultrasound report copied below.   \"IMPRESSION  ---------------------------------------------------------------------------------------------------------  1) Intrauterine pregnancy at 19w5d gestational age.  2) None of the anomalies commonly detected by ultrasound were evident in the detailed fetal anatomic survey described above.  3) Suboptimal spine and four chamber heart view/IVC/SVC due to fetal positioning during the exam.  4)Growth parameters and estimated fetal weight were consistent with an appropriate for gestation age pattern of growth.  5) The amniotic fluid volume appeared normal.  6) Subchorionic hemorrhage measuring 42.9 x 43.1 x 8.4 mm again visualized overlying the cervix.  7) On transabdominal imaging, cervix appears long and closed.\"      \"    Video-Visit Details    Type of service:  Video Visit  All times are in your local time  1st VideoStart: 03/17/2021 08:35 am  Stop: 03/17/2021 08:52 am  Originating Location (pt. Location): Home  More than 40 minutes spent on the date of the encounter doing chart review, history, documentation and further activities as noted above.   Distant Location (provider location):  [facility]     Platform used for Video Visit: Luisito      "

## 2021-03-17 NOTE — LETTER
3/17/2021       RE: Denia Montes  [address]  [facility] [address]     Dear Colleague,    Thank you for referring your patient, Denia Montes, to the [facility] [place] at [facility]. Please see a copy of my visit note below.    Outcome for 03/16/21 3:33 PM :Left Voicemail for patient to call back   Outcome for 03/16/21 3:37 PM :Patient was State she will upload meter tomorrow morning.       Denia Montes  is being evaluated via a billable video visit.      How would you like to obtain your AVS? [employer]  For the video visit, send the invitation by: Cor[employer]  Will anyone else be joining your video visit? No          Endocrinology virtual Visit    Chief Complaint: Video Visit (dm1)     Information obtained from:Patient      Assessment/Treatment Plan:      MAGDI/treated as Type 1 diabetes currently - (H) Positive ANNA antibody/C-peptide WNL   Pregnant at 21.4 weeks: A1c 5.3  All fasting blood sugar readings within target range of less than 95.  About 80% of postprandial blood sugar within the target range of less than 120 at 2 hours.  On the shantanu, it shows lows overnight however no hypoglycemia symptoms overnight and when she checks blood sugar using point-of-care overnight-reading usually within the normal limits/higher than recorded on shantanu.  During the day, she had lows following correction scale mostly.  During the day she experiences hypoglycemia symptoms about 2-3 times a week.  The current insulin been does not allow 0.5 increments therefore [until considering a new pen] we are reducing the Levemir during the day and also I have advised not to correct if blood sugars are closer to 120s.  We will review her blood sugar readings in 3-4 days for further adjustment.    Blood glucose targets for point-of-care for fasting blood sugar is less than 95 and 2-hour postprandial is less than 120 mg/dL however CGM targets are  slightly different/during pregnancy-CGM target ranges 63 to 140 mg/dL with time in range or goal more than 70% and time below range less than 63 mg/dL goal is less than 4% in time range of less than 54 mg/dL goal is less than 1%.  On CGM the low BG are higher than the target percentage and we are reducing both basal and correction scale today.     Patient Instructions   Snyder,   The percentage of low BG readings on CGM is much higher than recommended.   (time below range less than 63 mg/dL goal is less than 4% in time range of less than 54 mg/dL goal is less than 1%).  Based on this assessment, I recommend reducing the basal insulin during the day.  And a correction scale just a little bit over 120-do not correct until we come up with an insulin pen that allows 0.5 increments.  If you experience a persistent blood sugars during the day or overnight in the 50s and 60s; please let me know so that we can make further adjustments or reduction in insulin.  For now the plan is:       Continue Bedtime Levemir 9 units daily.     Reduce Levemir 14  units in the morning.     Carbohydrate coverage 1 unit for every 8 grams of carbohydrates with breakfast & lunch and 1:12 with dinner.    Based on review the correction scale appropriate seems to be as follows. However, I see that your current insulin pend doesn't allow a 0.5 increment. Don't correct if BG is only slightly above 120 until we come up with a pen that allows 0.5 increments.  In the meantime, we have reduced Levemir during the day as well.   -155 = 0.5 units.  -190 = 1 units.  -225 = 1.5 units.  -260 = 2 units.  -295 = 2.5 units.   -330 = 3 units.   BG >330 = 3.5 units.      Please send BG readings every 3-4 days and as needed also if BG readings are above the target range or persistently low. Fasting > 95 and two hours >120. Or blood glucose readings below 70.      Here if the number to call us at [phone] select option #3 for  triage nurse.     Please check blood work for thyroid.  Lab appointment can be made by calling Lab scheduling to schedule at any [facility] lab locations: [phone] select option 1.           Hashimoto thyroiditis: continue levothyroxine 25 mcg daily. Check TSH q 4 weeks-TSH was not checked for February 2021/re-ordered TSH today..    Yuri Hansen MD  Staff Endocrinologist    Division of Endocrinology and Diabetes      Subjective:         HPI: Denia Montes is a 35 year old female with history of MAGDI/treated as Type 1 diabetes currently - (H) Positive ANNA antibody/C-peptide WNL currently pregnant at 17w4d.     Currently on:   INSULIN DETEMIR- INJECT 16 UNITS (last week) IN THE MORNING AND 9 UNITS (two weeks ago) AT BEDTIME  CHO 1:8 with breakfast (3/8) and lunch (few weeks ago) and 1:12 with dinner.  correction scale 1:50 above 120.     vernight - no hypoglycemia symptoms.  Occasionally when she goes to the bathroom she checks her blood sugars overnight and the usually the readings are within the normal limits.  However, during the day she gets blood sugars in the 50s 60s and about 2-3 times per week she experiences hypoglycemia symptoms with those.  Reviewed shantanu which is documented below and I see that the lows are mostly following correction scales and her correction scale is to be adjusted.                        No new issues.  Pregnancy is going well.  Noted last ultrasound from 3/4/2021.  In summary the ultrasound stated intrauterine pregnancy at expected gestational age.     Allergies   Allergen Reactions     Sulfa Drugs Hives       Current Outpatient Medications   Medication Sig Dispense Refill     ACE/ARB NOT PRESCRIBED, INTENTIONAL, Please choose reason not prescribed, below       acetone urine (KETOSTIX) test strip Use to check urine for ketones every morning. 50 strip 11     blood glucose (JACI CONTOUR) test strip Check blood glucose 6 times a day (before and 2 hours  after each meal) 600 strip 3     blood glucose (CONTOUR NEXT TEST) test strip Use to test blood sugar 6 times daily or as directed. 500 each 3     Continuous Blood Gluc  (FREESTYLE JERO 14 DAY READER) RAEANN Use to read blood sugars as per 's instructions. 1 each 0     Continuous Blood Gluc Sensor (FREESTYLE JERO 14 DAY SENSOR) MISC Change every 14 days. 9 each 3     insulin aspart (NOVOLOG PEN) 100 UNIT/ML pen Take 1u:8g breakfast, 1u:8g lunch, 1u:12g dinner, + 1u/50mg/dL>120mg/dL +2u prime before each dose. Uses up to 40 units daily. 45 mL 3     insulin detemir (LEVEMIR FLEXTOUCH) 100 UNIT/ML pen Inject 16 units subcutaneous in the morning and 9 units before bed 30 mL 3     insulin pen needle (BD RUSSELL U/F) 32G X 4 MM miscellaneous USE 8 PEN NEEDLES DAILY 1000 each 11     levothyroxine (SYNTHROID/LEVOTHROID) 25 MCG tablet Take 1 tablet (25 mcg) by mouth daily 90 tablet 3     metFORMIN (GLUCOPHAGE) 500 MG tablet Take 500 mg by mouth daily (with breakfast) Currently pregnant so not taking it .       MICROLET LANCETS MISC TEST BLOOD SUGAR TWICE DAILY 200 each 11     Omega-3 Fatty Acids (FISH OIL) 500 MG CAPS        Prenatal Vit-Fe Fumarate-FA (PRENATAL VITAMIN) 27-0.8 MG TABS Take 1 tablet by mouth daily 100 tablet 3     Prenatal Vit-Fe Fumarate-FA (PRENATAL VITAMIN) 27-0.8 MG TABS Take 1 tablet by mouth daily 90 tablet 3     Prenatal Vit-Fe Fumarate-FA (PRENATAL VITAMINS) 28-0.8 MG TABS Take 1 tablet by mouth daily 100 tablet 3     vitamin B-12 (CYANOCOBALAMIN) 100 MCG tablet Take 1,000 mcg by mouth daily         Review of Systems   as per HPI above      Objective:     GENERAL: Healthy, alert and no distress  EYES: Eyes grossly normal to inspection.    RESP: No audible wheeze, cough, or visible cyanosis.    NEURO: Alert and oriented. mentation and speech and mood stable.    In House Labs:   Hemoglobin A1C   Date Value Ref Range Status   12/21/2020 5.5 0 - 5.6 % Final     Comment:     Normal <5.7%  "Prediabetes 5.7-6.4%  Diabetes 6.5% or higher - adopted from ADA   consensus guidelines.     11/17/2020 5.3 0 - 5.6 % Final     Comment:     Normal <5.7% Prediabetes 5.7-6.4%  Diabetes 6.5% or higher - adopted from ADA   consensus guidelines.     06/29/2020 6.1 (H) 0 - 5.6 % Final     Comment:     Normal <5.7% Prediabetes 5.7-6.4%  Diabetes 6.5% or higher - adopted from ADA   consensus guidelines.     03/11/2020 6.9 (A) 4.3 - 6 % Final   09/11/2019 6.1 (A) 4.3 - 6 % Final   05/29/2019 6.1 (A) 4.3 - 6 % Final       TSH   Date Value Ref Range Status   01/15/2021 2.19 0.40 - 4.00 mU/L Final   12/21/2020 1.62 0.40 - 4.00 mU/L Final   12/21/2020 1.62 0.40 - 4.00 mU/L Final   11/17/2020 1.86 0.40 - 4.00 mU/L Final   06/29/2020 1.21 0.40 - 4.00 mU/L Final       Creatinine   Date Value Ref Range Status   01/15/2021 0.56 0.52 - 1.04 mg/dL Final     Fetal ultrasound report copied below.   \"IMPRESSION  ---------------------------------------------------------------------------------------------------------  1) Intrauterine pregnancy at 19w5d gestational age.  2) None of the anomalies commonly detected by ultrasound were evident in the detailed fetal anatomic survey described above.  3) Suboptimal spine and four chamber heart view/IVC/SVC due to fetal positioning during the exam.  4)Growth parameters and estimated fetal weight were consistent with an appropriate for gestation age pattern of growth.  5) The amniotic fluid volume appeared normal.  6) Subchorionic hemorrhage measuring 42.9 x 43.1 x 8.4 mm again visualized overlying the cervix.  7) On transabdominal imaging, cervix appears long and closed.\"      \"    Video-Visit Details    Type of service:  Video Visit  All times are in your local time  1st VideoStart: 03/17/2021 08:35 am  Stop: 03/17/2021 08:52 am  Originating Location (pt. Location): Home  More than 40 minutes spent on the date of the encounter doing chart review, history, documentation and further activities as " noted above.   Distant Location (provider location):  [facility]     Platform used for Video Visit: Luisito    Again, thank you for allowing me to participate in the care of your patient.      Sincerely,    Yuri Hansen MD

## 2021-03-19 NOTE — TELEPHONE ENCOUNTER
T1 diabetes in pregnancy Follow-up    Subjective/Objective:    Denia Montes sent in blood glucose log for review. Last date of communication was: 3/12/21.    Gestational diabetes is being managed with diet, activity and medications    Taking diabetes medications:   yes:     Diabetes Medication(s)     Biguanides       metFORMIN (GLUCOPHAGE) 500 MG tablet    Take 500 mg by mouth daily (with breakfast) Currently pregnant so not taking it .    Insulin       insulin aspart (NOVOLOG PEN) 100 UNIT/ML pen    Take 1u:8g breakfast, 1u:8g lunch, 1u:12g dinner, + 1u/50mg/dL>120mg/dL +2u prime before each dose. Uses up to 40 units daily.     insulin detemir (LEVEMIR FLEXTOUCH) 100 UNIT/ML pen    Inject 16 units subcutaneous in the morning and 9 units before bed          Estimated Date of Delivery: Jul 24, 2021    BG/Food Log:         Assessment:  Patient continues to have some highs and lows, recommend follow up with endocrinologist Monday if blood sugars above or below target.     Ketones: none reported.   Fasting blood glucoses: 100% in target, with some lows.  After breakfast: 100% in target.  After lunch: 57% in target.  After dinner: 50% in target.    Plan/Response:  Follow up with Endo Monday if highs or lows.    Recommend giving insulin dose adjustment provided on 3/17/21 time to work.    Hannah Pike MS, RD, LD, CDE      Any diabetes medication dose changes were made via the CDE Protocol and Collaborative Practice Agreement with the patient's endocrinology provider. A copy of this encounter was shared with the provider.

## 2021-03-22 DIAGNOSIS — E06.3 HASHIMOTO'S THYROIDITIS: ICD-10-CM

## 2021-03-22 DIAGNOSIS — O09.519 ENCOUNTER FOR SUPERVISION OF HIGH RISK PREGNANCY DUE TO ADVANCED MATERNAL AGE IN PRIMIGRAVIDA: ICD-10-CM

## 2021-03-22 LAB — HGB BLD-MCNC: 12.2 G/DL (ref 11.7–15.7)

## 2021-03-22 PROCEDURE — 99N1025 PR STATISTIC OBHBG - HEMOGLOBIN: Performed by: INTERNAL MEDICINE

## 2021-03-22 PROCEDURE — 36415 COLL VENOUS BLD VENIPUNCTURE: CPT | Performed by: INTERNAL MEDICINE

## 2021-03-22 PROCEDURE — 84443 ASSAY THYROID STIM HORMONE: CPT | Performed by: INTERNAL MEDICINE

## 2021-03-23 LAB — TSH SERPL DL<=0.005 MIU/L-ACNC: 1.27 MU/L (ref 0.4–4)

## 2021-03-23 NOTE — TELEPHONE ENCOUNTER
plan    Bedtime Levemir 9 units daily.     Levemir 14  units in the morning.     Carbohydrate coverage 1 unit for every 8 grams of carbohydrates with breakfast; lunch 1:7 and 1:12 with dinner.

## 2021-03-26 ENCOUNTER — OFFICE VISIT (OUTPATIENT)
Dept: MATERNAL FETAL MEDICINE | Facility: CLINIC | Age: 36
End: 2021-03-26
Attending: OBSTETRICS & GYNECOLOGY
Payer: COMMERCIAL

## 2021-03-26 ENCOUNTER — HOSPITAL ENCOUNTER (OUTPATIENT)
Dept: ULTRASOUND IMAGING | Facility: CLINIC | Age: 36
End: 2021-03-26
Attending: OBSTETRICS & GYNECOLOGY
Payer: COMMERCIAL

## 2021-03-26 ENCOUNTER — PRENATAL OFFICE VISIT (OUTPATIENT)
Dept: OBGYN | Facility: CLINIC | Age: 36
End: 2021-03-26
Payer: COMMERCIAL

## 2021-03-26 VITALS
HEART RATE: 77 BPM | WEIGHT: 180 LBS | SYSTOLIC BLOOD PRESSURE: 125 MMHG | OXYGEN SATURATION: 100 % | DIASTOLIC BLOOD PRESSURE: 79 MMHG | BODY MASS INDEX: 29.95 KG/M2

## 2021-03-26 DIAGNOSIS — O24.012 TYPE 1 DIABETES MELLITUS DURING PREGNANCY IN SECOND TRIMESTER: Primary | ICD-10-CM

## 2021-03-26 DIAGNOSIS — O35.9XX0 SUSPECTED FETAL ANOMALY, ANTEPARTUM, SINGLE OR UNSPECIFIED FETUS: ICD-10-CM

## 2021-03-26 PROCEDURE — 76816 OB US FOLLOW-UP PER FETUS: CPT

## 2021-03-26 PROCEDURE — 76816 OB US FOLLOW-UP PER FETUS: CPT | Mod: 26 | Performed by: OBSTETRICS & GYNECOLOGY

## 2021-03-26 PROCEDURE — 99207 PR PRENATAL VISIT: CPT | Performed by: OBSTETRICS & GYNECOLOGY

## 2021-03-26 NOTE — PROGRESS NOTES
"Please see \"Imaging\" tab under \"Chart Review\" for details of today's US at the [facility].    Benny Carmichael MD  Maternal-Fetal Medicine      "

## 2021-03-26 NOTE — PROGRESS NOTES
"22w6d feeling good, no c/o.  Starting to feel some mvmt now.  Reports us with MFM today was good.  BS have been ok, FBS within range but pp are going up a little.  Has gotten in touch with her endo to adjust dosing.  Discussed this is expected as she gets into the late 2nd and third tri.  Discussed CB classes, \"tour\" and peds.  Tdap next visit.  RTC 4 weeks  jlp  "

## 2021-03-29 NOTE — TELEPHONE ENCOUNTER
Current regimen      Bedtime Levemir 9 units daily.     Levemir 14  units in the morning.     Carbohydrate coverage 1 unit for every 8 grams of carbohydrates with breakfast; lunch 1:7 and 1:12 with dinner.

## 2021-04-02 ENCOUNTER — HOSPITAL ENCOUNTER (OUTPATIENT)
Dept: CARDIOLOGY | Facility: CLINIC | Age: 36
Discharge: HOME | End: 2021-04-02
Attending: OBSTETRICS & GYNECOLOGY | Admitting: OBSTETRICS & GYNECOLOGY
Payer: COMMERCIAL

## 2021-04-02 ENCOUNTER — MYC MEDICAL ADVICE (OUTPATIENT)
Dept: ENDOCRINOLOGY | Facility: CLINIC | Age: 36
End: 2021-04-02

## 2021-04-02 ENCOUNTER — OFFICE VISIT (OUTPATIENT)
Dept: PEDIATRIC CARDIOLOGY | Facility: CLINIC | Age: 36
End: 2021-04-02

## 2021-04-02 DIAGNOSIS — O35.BXX0 ANOMALY OF HEART OF FETUS AFFECTING PREGNANCY, ANTEPARTUM, SINGLE OR UNSPECIFIED FETUS: Primary | ICD-10-CM

## 2021-04-02 DIAGNOSIS — O24.012 TYPE 1 DIABETES MELLITUS DURING PREGNANCY IN SECOND TRIMESTER: ICD-10-CM

## 2021-04-02 PROCEDURE — 93325 DOPPLER ECHO COLOR FLOW MAPG: CPT | Mod: 26 | Performed by: PEDIATRICS

## 2021-04-02 PROCEDURE — 99202 OFFICE O/P NEW SF 15 MIN: CPT | Mod: 25 | Performed by: PEDIATRICS

## 2021-04-02 PROCEDURE — 76827 ECHO EXAM OF FETAL HEART: CPT | Mod: 26 | Performed by: PEDIATRICS

## 2021-04-02 PROCEDURE — 76827 ECHO EXAM OF FETAL HEART: CPT

## 2021-04-02 PROCEDURE — 93325 DOPPLER ECHO COLOR FLOW MAPG: CPT

## 2021-04-02 PROCEDURE — 76825 ECHO EXAM OF FETAL HEART: CPT | Mod: 26 | Performed by: PEDIATRICS

## 2021-04-02 NOTE — LETTER
4/2/2021      RE: Denia Montes  [address]  [facility] [address]       Fetal Cardiology Consult    Date of Visit:   04/02/2021  Gestational Age: 23 weeks  Due Date:             07/24/2021  Delivery:  [facility]      Dear Dr. Stevens    I had the opportunity to meet with Denia and her partner today for a Fetal Cardiology Consult and Fetal Echocardiography.    Fetal Echo demonstrated Normal fetal cardiac anatomy. Normal right and left ventricular size and function. No hydrops. Fetal heart rate is regular at 144 bpm.     I have reviewed the normal Fetal Echo findings.      The parents had appropriate questions. I did my best to answer their questions.    Plan:    No additional fetal echocardiograms are recommended.     Thank you for allowing me to participate in Denia's care.  Feel free to contact me with questions.    I spend 10 minutes counseling the patient about her fetal echocardiogram findings. All of this time was face to face.    Dr Payal Brady  Pediatric Cardiologist  [facility]  Phone [phone]

## 2021-04-02 NOTE — PROGRESS NOTES
Fetal Cardiology Consult    Date of Visit:   04/02/2021  Gestational Age: 23 weeks  Due Date:             07/24/2021  Delivery:   [facility]      Dear Dr. Stevens    I had the opportunity to meet with Denia and her partner today for a Fetal Cardiology Consult and Fetal Echocardiography.    Fetal Echo demonstrated Normal fetal cardiac anatomy. Normal right and left ventricular size and function. No hydrops. Fetal heart rate is regular at 144 bpm.     I have reviewed the normal Fetal Echo findings.      The parents had appropriate questions. I did my best to answer their questions.    Plan:    No additional fetal echocardiograms are recommended.     Thank you for allowing me to participate in Denia's care.  Feel free to contact me with questions.    I spend 10 minutes counseling the patient about her fetal echocardiogram findings. All of this time was face to face.    Dr Payal Brady  Pediatric Cardiologist  [facility]  Phone [phone]

## 2021-04-06 NOTE — TELEPHONE ENCOUNTER
"              Plan:      Bedtime Levemir 10 units daily.     Levemir 14  units in the morning.     Carbohydrate coverage 1 unit for every 7 grams of carbohydrates with breakfast;    lunch 1:6 and 1:11 with dinner.    The following was sent:    \"  Sreekanth Loza,      Thank you for downloading shantanu.  Plan:  Levemir 10 units at bedtime.   Levemir 14  units in the morning.   Carbohydrate coverage 1 unit for every 7 grams of carbohydrates with breakfast;  Carbohydrate coverage 1 unit for every 6 grams of carbohydrates with Lunch;  Carbohydrate coverage 1 unit for every 11 grams of carbohydrates with dinner.   We will review again in 3-4 days.    In the meantime, please let me know if persistent high's or lows.      Here are the targets: CGM targets are slightly different/during pregnancy-CGM target ranges 63 to 140 mg/dL with time in range or goal more than 70% and time below range less than 63 mg/dL goal is less than 4% in time range of less than 54 mg/dL goal is less than 1%.   \"  "

## 2021-04-08 ENCOUNTER — MYC MEDICAL ADVICE (OUTPATIENT)
Dept: ENDOCRINOLOGY | Facility: CLINIC | Age: 36
End: 2021-04-08

## 2021-04-12 NOTE — TELEPHONE ENCOUNTER
Fasting was below 90 until today when it was 111.  No overnight lows even though looks that way on the Cory.   After breakfast yesterday 148, lunch high,   After dinner 138  Lunch 111 on 4/10  In between meals; occasional lows.     Plan:  Levemir 11 units at bedtime.   Levemir 16  units in the morning.   Carbohydrate coverage 1 unit for every 7 grams of carbohydrates with breakfast;  Carbohydrate coverage 1 unit for every 6 grams of carbohydrates with Lunch;  Carbohydrate coverage 1 unit for every 11 grams of carbohydrates with dinner.     Check in three days.

## 2021-04-13 NOTE — PROGRESS NOTES
Outcome for 04/13/21 3:31 PM :Glucose sent via Email Patient states she will send me data before her apt tomorrow   Outcome for 04/14/21 12:08 PM :Glucose data attachment located on [employer] message     Denia Montes  is being evaluated via a billable video visit.      How would you like to obtain your AVS? [employer]  For the video visit, send the invitation by: [employer]  Will anyone else be joining your video visit? No

## 2021-04-14 ENCOUNTER — VIRTUAL VISIT (OUTPATIENT)
Dept: ENDOCRINOLOGY | Facility: CLINIC | Age: 36
End: 2021-04-14
Payer: COMMERCIAL

## 2021-04-14 ENCOUNTER — MYC MEDICAL ADVICE (OUTPATIENT)
Dept: ENDOCRINOLOGY | Facility: CLINIC | Age: 36
End: 2021-04-14

## 2021-04-14 DIAGNOSIS — O24.012 TYPE 1 DIABETES MELLITUS DURING PREGNANCY IN SECOND TRIMESTER: Primary | ICD-10-CM

## 2021-04-14 PROCEDURE — 99214 OFFICE O/P EST MOD 30 MIN: CPT | Mod: 95 | Performed by: INTERNAL MEDICINE

## 2021-04-14 NOTE — LETTER
4/14/2021       RE: Denia Montes  [address]  [facility] [address]     Dear Colleague,    Thank you for referring your patient, Denia Montes, to the [facility] [place] at [facility]. Please see a copy of my visit note below.    Outcome for 04/13/21 3:31 PM :Glucose sent via Email Patient states she will send me data before her apt tomorrow   Outcome for 04/14/21 12:08 PM :Glucose data attachment located on [employer] message     Denia oMntes  is being evaluated via a billable video visit.      How would you like to obtain your AVS? [employer]  For the video visit, send the invitation by: [employer]  Will anyone else be joining your video visit? No                Endocrinology virtual Visit    Chief Complaint: Video Visit (dm1.5)     Information obtained from:Patient      Assessment/Treatment Plan:      MAGDI/treated as Type 1 diabetes currently - (H) Positive ANNA antibody/C-peptide WNL   Pregnant at 25.4 weeks:   All fasting blood sugar readings within target range of less than 95 except one over the last two weeks.  Post prandial hyperglycemia following breakfast -137 and dinner -152 since we have adjusted her regimen three days ago. We have made adjustment as follows today. Cory and POC discrepancy particularly for lows/POC much higher at times. The percentage of lows on Cory has been higher due to this discrepancy,        Patient Instructions   Furnas,       Bedtime Levemir 13 units daily.     Levemir 16 units in the morning; increase to 18 if no hypoglycemia during the day tomorrow.      Carbohydrate coverage 1 unit for every 6 grams of carbohydrates with breakfast & lunch and 1:8 with dinner.    Continue current correction dose.     Please send BG readings every 3-4 days and as needed also if BG readings are above the target range or persistently low. Fasting > 95 and two hours >120. Or blood glucose readings below 70.           Hashimoto thyroiditis: continue levothyroxine 25 mcg daily.     Yuri Hansen MD  Staff Endocrinologist    Division of Endocrinology and Diabetes      Subjective:         HPI: Denia Montes is a 35 year old female with history of MAGDI/treated as Type 1 diabetes currently - (H) Positive ANNA antibody/C-peptide WNL currently pregnant at 25w4d.     Currently on:   INSULIN DETEMIR- INJECT 16 UNITS IN THE MORNING AND 11 UNITS AT BEDTIME  CHO 1:7 with breakfast and 1:6 lunch and 1:11 with dinner.  correction scale 1:50 above 120.     Had hypoglycemia symptoms with the lows she had yesterday. We have adjusted insulin three days ago and since adjustment as follows.             No new issues.  Pregnancy is going well.  Noted last ultrasound from 3/4/2021.  In summary the ultrasound stated intrauterine pregnancy at expected gestational age.     Allergies   Allergen Reactions     Sulfa Drugs Hives       Current Outpatient Medications   Medication Sig Dispense Refill     ACE/ARB NOT PRESCRIBED, INTENTIONAL, Please choose reason not prescribed, below       acetone urine (KETOSTIX) test strip Use to check urine for ketones every morning. 50 strip 11     blood glucose (JACI CONTOUR) test strip Check blood glucose 6 times a day (before and 2 hours after each meal) 600 strip 3     blood glucose (CONTOUR NEXT TEST) test strip Use to test blood sugar 6 times daily or as directed. 500 each 3     Continuous Blood Gluc  (FREESTYLE JERO 14 DAY READER) RAEANN Use to read blood sugars as per 's instructions. 1 each 0     Continuous Blood Gluc Sensor (FREESTYLE JERO 14 DAY SENSOR) MISC Change every 14 days. 9 each 3     insulin aspart (NOVOLOG PEN) 100 UNIT/ML pen Take 1u:8g breakfast, 1u:8g lunch, 1u:12g dinner, + 1u/50mg/dL>120mg/dL +2u prime before each dose. Uses up to 40 units daily. 45 mL 3     insulin detemir (LEVEMIR FLEXTOUCH) 100 UNIT/ML pen Inject 16 units subcutaneous in the morning and 9  units before bed 30 mL 3     insulin pen needle (BD RUSSELL U/F) 32G X 4 MM miscellaneous USE 8 PEN NEEDLES DAILY 1000 each 11     levothyroxine (SYNTHROID/LEVOTHROID) 25 MCG tablet Take 1 tablet (25 mcg) by mouth daily 90 tablet 3     metFORMIN (GLUCOPHAGE) 500 MG tablet Take 500 mg by mouth daily (with breakfast) Currently pregnant so not taking it .       MICROLET LANCETS MISC TEST BLOOD SUGAR TWICE DAILY 200 each 11     Omega-3 Fatty Acids (FISH OIL) 500 MG CAPS        Prenatal Vit-Fe Fumarate-FA (PRENATAL VITAMIN) 27-0.8 MG TABS Take 1 tablet by mouth daily 100 tablet 3     Prenatal Vit-Fe Fumarate-FA (PRENATAL VITAMIN) 27-0.8 MG TABS Take 1 tablet by mouth daily 90 tablet 3     Prenatal Vit-Fe Fumarate-FA (PRENATAL VITAMINS) 28-0.8 MG TABS Take 1 tablet by mouth daily 100 tablet 3     vitamin B-12 (CYANOCOBALAMIN) 100 MCG tablet Take 1,000 mcg by mouth daily         Review of Systems   as per HPI above      Objective:     GENERAL: Healthy, alert and no distress  EYES: Eyes grossly normal to inspection.    RESP: No audible wheeze, cough, or visible cyanosis.    NEURO: Alert and oriented. mentation and speech and mood stable.    In House Labs:   Hemoglobin A1C   Date Value Ref Range Status   12/21/2020 5.5 0 - 5.6 % Final     Comment:     Normal <5.7% Prediabetes 5.7-6.4%  Diabetes 6.5% or higher - adopted from ADA   consensus guidelines.     11/17/2020 5.3 0 - 5.6 % Final     Comment:     Normal <5.7% Prediabetes 5.7-6.4%  Diabetes 6.5% or higher - adopted from ADA   consensus guidelines.     06/29/2020 6.1 (H) 0 - 5.6 % Final     Comment:     Normal <5.7% Prediabetes 5.7-6.4%  Diabetes 6.5% or higher - adopted from ADA   consensus guidelines.     03/11/2020 6.9 (A) 4.3 - 6 % Final   09/11/2019 6.1 (A) 4.3 - 6 % Final   05/29/2019 6.1 (A) 4.3 - 6 % Final       TSH   Date Value Ref Range Status   03/22/2021 1.27 0.40 - 4.00 mU/L Final   01/15/2021 2.19 0.40 - 4.00 mU/L Final   12/21/2020 1.62 0.40 - 4.00 mU/L Final  "  12/21/2020 1.62 0.40 - 4.00 mU/L Final   11/17/2020 1.86 0.40 - 4.00 mU/L Final       Creatinine   Date Value Ref Range Status   01/15/2021 0.56 0.52 - 1.04 mg/dL Final     Fetal ultrasound report copied below.   \"IMPRESSION  ---------------------------------------------------------------------------------------------------------  1) Intrauterine pregnancy at 19w5d gestational age.  2) None of the anomalies commonly detected by ultrasound were evident in the detailed fetal anatomic survey described above.  3) Suboptimal spine and four chamber heart view/IVC/SVC due to fetal positioning during the exam.  4)Growth parameters and estimated fetal weight were consistent with an appropriate for gestation age pattern of growth.  5) The amniotic fluid volume appeared normal.  6) Subchorionic hemorrhage measuring 42.9 x 43.1 x 8.4 mm again visualized overlying the cervix.  7) On transabdominal imaging, cervix appears long and closed.\"          Video-Visit Details    Type of service:  Video Visit  All times are in your local time  1st VideoStart: 04/14/2021 03:49 pm  Stop: 04/14/2021 03:58 pm    Originating Location (pt. Location): Home    Distant Location (provider location):  [facility]     Platform used for Video Visit: Luisito    "

## 2021-04-14 NOTE — PROGRESS NOTES
Endocrinology virtual Visit    Chief Complaint: Video Visit (dm1.5)     Information obtained from:Patient      Assessment/Treatment Plan:      MAGDI/treated as Type 1 diabetes currently - (H) Positive ANNA antibody/C-peptide WNL   Pregnant at 25.4 weeks:   All fasting blood sugar readings within target range of less than 95 except one over the last two weeks.  Post prandial hyperglycemia following breakfast -137 and dinner -152 since we have adjusted her regimen three days ago. We have made adjustment as follows today. Cory and POC discrepancy particularly for lows/POC much higher at times. The percentage of lows on Cory has been higher due to this discrepancy,        Patient Instructions   [place],       Bedtime Levemir 13 units daily.     Levemir 16 units in the morning; increase to 18 if no hypoglycemia during the day tomorrow.      Carbohydrate coverage 1 unit for every 6 grams of carbohydrates with breakfast & lunch and 1:8 with dinner.    Continue current correction dose.     Please send BG readings every 3-4 days and as needed also if BG readings are above the target range or persistently low. Fasting > 95 and two hours >120. Or blood glucose readings below 70.          Hashimoto thyroiditis: continue levothyroxine 25 mcg daily.     Yuri Hansen MD  Staff Endocrinologist    Division of Endocrinology and Diabetes      Subjective:         HPI: Denia Montes is a 35 year old female with history of MAGDI/treated as Type 1 diabetes currently - (H) Positive ANNA antibody/C-peptide WNL currently pregnant at 25w4d.     Currently on:   INSULIN DETEMIR- INJECT 16 UNITS IN THE MORNING AND 11 UNITS AT BEDTIME  CHO 1:7 with breakfast and 1:6 lunch and 1:11 with dinner.  correction scale 1:50 above 120.     Had hypoglycemia symptoms with the lows she had yesterday. We have adjusted insulin three days ago and since adjustment as follows.             No new issues.  Pregnancy is going well.  Noted last ultrasound  from 3/4/2021.  In summary the ultrasound stated intrauterine pregnancy at expected gestational age.     Allergies   Allergen Reactions     Sulfa Drugs Hives       Current Outpatient Medications   Medication Sig Dispense Refill     ACE/ARB NOT PRESCRIBED, INTENTIONAL, Please choose reason not prescribed, below       acetone urine (KETOSTIX) test strip Use to check urine for ketones every morning. 50 strip 11     blood glucose (JACI CONTOUR) test strip Check blood glucose 6 times a day (before and 2 hours after each meal) 600 strip 3     blood glucose (CONTOUR NEXT TEST) test strip Use to test blood sugar 6 times daily or as directed. 500 each 3     Continuous Blood Gluc  (FREESTYLE JERO 14 DAY READER) RAEANN Use to read blood sugars as per 's instructions. 1 each 0     Continuous Blood Gluc Sensor ([employer] TrainingSTYLE JERO 14 DAY SENSOR) MISC Change every 14 days. 9 each 3     insulin aspart (NOVOLOG PEN) 100 UNIT/ML pen Take 1u:8g breakfast, 1u:8g lunch, 1u:12g dinner, + 1u/50mg/dL>120mg/dL +2u prime before each dose. Uses up to 40 units daily. 45 mL 3     insulin detemir (LEVEMIR FLEXTOUCH) 100 UNIT/ML pen Inject 16 units subcutaneous in the morning and 9 units before bed 30 mL 3     insulin pen needle (BD RUSSELL U/F) 32G X 4 MM miscellaneous USE 8 PEN NEEDLES DAILY 1000 each 11     levothyroxine (SYNTHROID/LEVOTHROID) 25 MCG tablet Take 1 tablet (25 mcg) by mouth daily 90 tablet 3     metFORMIN (GLUCOPHAGE) 500 MG tablet Take 500 mg by mouth daily (with breakfast) Currently pregnant so not taking it .       MICROLET LANCETS MISC TEST BLOOD SUGAR TWICE DAILY 200 each 11     Omega-3 Fatty Acids (FISH OIL) 500 MG CAPS        Prenatal Vit-Fe Fumarate-FA (PRENATAL VITAMIN) 27-0.8 MG TABS Take 1 tablet by mouth daily 100 tablet 3     Prenatal Vit-Fe Fumarate-FA (PRENATAL VITAMIN) 27-0.8 MG TABS Take 1 tablet by mouth daily 90 tablet 3     Prenatal Vit-Fe Fumarate-FA (PRENATAL VITAMINS) 28-0.8 MG TABS Take 1  "tablet by mouth daily 100 tablet 3     vitamin B-12 (CYANOCOBALAMIN) 100 MCG tablet Take 1,000 mcg by mouth daily         Review of Systems   as per HPI above      Objective:     GENERAL: Healthy, alert and no distress  EYES: Eyes grossly normal to inspection.    RESP: No audible wheeze, cough, or visible cyanosis.    NEURO: Alert and oriented. mentation and speech and mood stable.    In House Labs:   Hemoglobin A1C   Date Value Ref Range Status   12/21/2020 5.5 0 - 5.6 % Final     Comment:     Normal <5.7% Prediabetes 5.7-6.4%  Diabetes 6.5% or higher - adopted from ADA   consensus guidelines.     11/17/2020 5.3 0 - 5.6 % Final     Comment:     Normal <5.7% Prediabetes 5.7-6.4%  Diabetes 6.5% or higher - adopted from ADA   consensus guidelines.     06/29/2020 6.1 (H) 0 - 5.6 % Final     Comment:     Normal <5.7% Prediabetes 5.7-6.4%  Diabetes 6.5% or higher - adopted from ADA   consensus guidelines.     03/11/2020 6.9 (A) 4.3 - 6 % Final   09/11/2019 6.1 (A) 4.3 - 6 % Final   05/29/2019 6.1 (A) 4.3 - 6 % Final       TSH   Date Value Ref Range Status   03/22/2021 1.27 0.40 - 4.00 mU/L Final   01/15/2021 2.19 0.40 - 4.00 mU/L Final   12/21/2020 1.62 0.40 - 4.00 mU/L Final   12/21/2020 1.62 0.40 - 4.00 mU/L Final   11/17/2020 1.86 0.40 - 4.00 mU/L Final       Creatinine   Date Value Ref Range Status   01/15/2021 0.56 0.52 - 1.04 mg/dL Final     Fetal ultrasound report copied below.   \"IMPRESSION  ---------------------------------------------------------------------------------------------------------  1) Intrauterine pregnancy at 19w5d gestational age.  2) None of the anomalies commonly detected by ultrasound were evident in the detailed fetal anatomic survey described above.  3) Suboptimal spine and four chamber heart view/IVC/SVC due to fetal positioning during the exam.  4)Growth parameters and estimated fetal weight were consistent with an appropriate for gestation age pattern of growth.  5) The amniotic fluid " "volume appeared normal.  6) Subchorionic hemorrhage measuring 42.9 x 43.1 x 8.4 mm again visualized overlying the cervix.  7) On transabdominal imaging, cervix appears long and closed.\"          Video-Visit Details    Type of service:  Video Visit  All times are in your local time  1st VideoStart: 04/14/2021 03:49 pm  Stop: 04/14/2021 03:58 pm    Originating Location (pt. Location): Home    Distant Location (provider location):  [facility]     Platform used for Video Visit: Luisito    "

## 2021-04-14 NOTE — PATIENT INSTRUCTIONS
Denia,       Bedtime Levemir 13 units daily.     Levemir 16 units in the morning; increase to 18 if no hypoglycemia during the day tomorrow.      Carbohydrate coverage 1 unit for every 6 grams of carbohydrates with breakfast & lunch and 1:8 with dinner.    Continue current correction dose.     Please send BG readings every 3-4 days and as needed also if BG readings are above the target range or persistently low. Fasting > 95 and two hours >120. Or blood glucose readings below 70.

## 2021-04-19 ENCOUNTER — MYC MEDICAL ADVICE (OUTPATIENT)
Dept: ENDOCRINOLOGY | Facility: CLINIC | Age: 36
End: 2021-04-19

## 2021-04-19 NOTE — TELEPHONE ENCOUNTER
"        \"I've had a couple lows in the 40s - last Thursday afternoon and then last night in the middle of the night. I increased to 18 units morning Levemir this past Saturday.\"  (time below range less than 63 mg/dL goal is less than 4% in time range of less than 54 mg/dL goal is less than 1%).       Plan:    [place],       Reduce Bedtime Levemir to 11 units from current 13 units daily based on BG of 48 last night.     Continue Levemir 18 units in the morning.    Continue Carbohydrate coverage 1 unit for every 6 grams of carbohydrates with breakfast & lunch and 1:8 with dinner.        "

## 2021-04-22 ENCOUNTER — MYC MEDICAL ADVICE (OUTPATIENT)
Dept: ENDOCRINOLOGY | Facility: CLINIC | Age: 36
End: 2021-04-22

## 2021-04-23 ENCOUNTER — OFFICE VISIT (OUTPATIENT)
Dept: MATERNAL FETAL MEDICINE | Facility: CLINIC | Age: 36
End: 2021-04-23
Attending: OBSTETRICS & GYNECOLOGY
Payer: COMMERCIAL

## 2021-04-23 ENCOUNTER — HOSPITAL ENCOUNTER (OUTPATIENT)
Dept: ULTRASOUND IMAGING | Facility: CLINIC | Age: 36
End: 2021-04-23
Attending: OBSTETRICS & GYNECOLOGY
Payer: COMMERCIAL

## 2021-04-23 ENCOUNTER — PRENATAL OFFICE VISIT (OUTPATIENT)
Dept: OBGYN | Facility: CLINIC | Age: 36
End: 2021-04-23
Payer: COMMERCIAL

## 2021-04-23 VITALS
WEIGHT: 189 LBS | HEART RATE: 88 BPM | SYSTOLIC BLOOD PRESSURE: 131 MMHG | BODY MASS INDEX: 31.45 KG/M2 | OXYGEN SATURATION: 99 % | DIASTOLIC BLOOD PRESSURE: 86 MMHG

## 2021-04-23 DIAGNOSIS — O09.519 ENCOUNTER FOR SUPERVISION OF HIGH RISK PREGNANCY DUE TO ADVANCED MATERNAL AGE IN PRIMIGRAVIDA: Primary | ICD-10-CM

## 2021-04-23 DIAGNOSIS — O24.012 TYPE 1 DIABETES MELLITUS DURING PREGNANCY IN SECOND TRIMESTER: Primary | ICD-10-CM

## 2021-04-23 DIAGNOSIS — O24.012 TYPE 1 DIABETES MELLITUS DURING PREGNANCY IN SECOND TRIMESTER: ICD-10-CM

## 2021-04-23 PROCEDURE — 76816 OB US FOLLOW-UP PER FETUS: CPT | Mod: 26 | Performed by: OBSTETRICS & GYNECOLOGY

## 2021-04-23 PROCEDURE — 99207 PR PRENATAL VISIT: CPT | Performed by: OBSTETRICS & GYNECOLOGY

## 2021-04-23 PROCEDURE — 76816 OB US FOLLOW-UP PER FETUS: CPT

## 2021-04-23 NOTE — PATIENT INSTRUCTIONS
Patient Education     Adapting to Pregnancy: Third Trimester    Although common during pregnancy, some discomforts may seem worse in the final weeks. Simple lifestyle changes can help. Take care of yourself. And ask your partner to help out with small tasks.  Limiting leg problems  Ways to combat leg issues:    Wear support hose all day.    Avoid snug shoes and clothes that bind, like tight pants and socks with elastic tops.    Sit with your feet and legs raised often.  Caring for your breasts  Tips to follow include:    Wash with plain water. Avoid using harsh soaps or rubbing alcohol. They may cause dryness.    Wear a nursing bra for extra support. It can also hide any leaks from your nipples.  Controlling hemorrhoids  Ways to avoid hemorrhoids include:    Eat foods that are high in fiber. Also, exercise and drink enough fluids. This will reduce constipation and hemorrhoids.    Sleep and nap on your side. This limits pressure on the veins of your rectum.    Try not to stand or sit for long periods.  Controlling back pain  As your body changes during pregnancy, your back must work in new ways. Back pain is due to many causes. Physical changes in your body can strain your back and its supporting muscles. Also, hormones (chemicals that carry messages throughout the body) increase during pregnancy. This can affect how your muscles and joints work together. All of these changes can lead to pain. Pain may be felt in the upper or lower back. Pain is also common in the pelvis. Some pregnant women have sciatica. This is pain caused by pressure on the sciatic nerve running down the back of the leg. Ask your healthcare provider for specific tips and exercises to help control your back pain.  Tips to help you rest  Good rest and sleep will help you feel better. Here are some ideas:    Ask your partner to massage your shoulders, neck, or back.    Limit the errands you do each day.    Lie down in the afternoon or after work  for a few minutes.    Take a warm bath before you go to sleep.    Drink warm milk or teas without caffeine.    Avoid coffee, black tea, and cola.  Stopping heartburn    Avoid spicy, greasy, fried, or acidic foods.    Eat small amounts more often. Eat slowly.   Wait 2 hours after eating before lying down.    Sleep with your upper body raised 6 inches.   Managing mood swings  Ways to manage mood swings include:    Know that mood changes are normal.    Exercise often, but get plenty of rest.    Address any concerns and limit stress. Talking to your partner, other women, or your healthcare provider may help.  Dealing with urinary frequency  Tips to deal with having to urinate often include:    Drink plenty of water all day. If you drink a lot in the evening, though, you may have to get up more in the night.    Limit coffee, black tea, and cola.  [employer] last reviewed this educational content on 2/1/2018 2000-2021 [employer]. All rights reserved. This information is not intended as a substitute for professional medical care. Always follow your healthcare professional's instructions.           Patient Education     Pregnancy: Your Third Trimester Changes  As the baby grows, your body changes too. You may also see signs that your body is getting ready for labor. Be patient. Within a few more weeks, your baby will be born.  How you are changing  Your body is preparing for the birth of your baby. Some of the most common changes are listed below. If you have any questions or concerns, ask your healthcare provider:    You ll gain more weight from fluids, extra blood, and fat deposits.    Your breasts will grow as your body gets ready to feed the baby. They may be more tender. You may also notice a slight yellow or white discharge from the nipples.    Discharge from your vagina may increase. This is normal.    You might see some skin color changes on your forehead, cheeks, or nose. Most of these will go away  after you deliver.  How your baby is growing       Month 7  Your baby can open and close his or her eyes and weighs around 4 pounds (1.8 kg). If born prematurely (too early), your baby would likely survive with special care. Month 8  Your baby is building up body fat and weighs around 6 pounds (2.7 kg). Month 9  Your baby weighs nearly 7 pounds (3.2 kg) and is about 19 to 21 inches long. In other words, any day now...   [employer] last reviewed this educational content on 2/1/2018 2000-2021 [employer]. All rights reserved. This information is not intended as a substitute for professional medical care. Always follow your healthcare professional's instructions.

## 2021-04-23 NOTE — PROGRESS NOTES
[facility]- OBGYN    CC: routine prenatal visit.    S:Denia Montes is a 35 year old  at 26w6d by LMP c/w 8w4d us who presents today for routine prenatal visit.  Patient reports feeling some intermittent BH ctx's.  Patient denies any vaginal bleeding, leakage of fluid.  She states she is feeling normal fetal movement.  She denies any headache, changes in vision, chest pain, chest pressure, shortness of breath, and edema.      Pregnancy notable for:  -Type 1 DM  -AMA    O:   Patient Vitals for the past 24 hrs:   BP Pulse SpO2 Weight   21 1115 131/86 88 99 % 85.7 kg (189 lb)   ]  Exam:  General- awake, alert, answering questions appropriately, appears comfortable  CV- regular rate  Lung- breathing comfortably on room air  Ext- bilateral lower extremities with no edema    MFM ultrasound:  [place], Fetal heart rate 149 bpm, EFW 37.8%     A&P: Denia Montes is a 35 year old  at 26w6d by LMP c/w 8w4d us who presents today for routine prenatal visit.    (O09.519) Encounter for supervision of high risk pregnancy due to advanced maternal age in primigravida  (primary encounter diagnosis)  Comment: Patient overall doing well.  Discussed preliminary findings on MFM ultrasound, final read is not yet completed.    Plan: Plan for next visit in 2 weeks with Tdap vaccine.      (O24.012) Type 1 diabetes mellitus during pregnancy in second trimester  Comment: Patient working closely with endocrinologist.  Good control of blood sugar and titration of insulin.  Reviewed with patient recommendation for taking daily ASA 81mg.  She will get it OTC and start now.  Reports last eye exam was 2020 with no abnormalities.  Does not think she has ever had an EKG.  Normal fetal ECHO on 21  Plan: Next endocrine visit is 21      Jessica Arellano MD

## 2021-05-04 ENCOUNTER — PRENATAL OFFICE VISIT (OUTPATIENT)
Dept: OBGYN | Facility: CLINIC | Age: 36
End: 2021-05-04
Payer: COMMERCIAL

## 2021-05-04 VITALS
BODY MASS INDEX: 32.03 KG/M2 | HEART RATE: 80 BPM | WEIGHT: 192.5 LBS | SYSTOLIC BLOOD PRESSURE: 117 MMHG | DIASTOLIC BLOOD PRESSURE: 74 MMHG

## 2021-05-04 DIAGNOSIS — Z23 NEED FOR TDAP VACCINATION: ICD-10-CM

## 2021-05-04 DIAGNOSIS — O24.012 TYPE 1 DIABETES MELLITUS DURING PREGNANCY IN SECOND TRIMESTER: Primary | ICD-10-CM

## 2021-05-04 PROCEDURE — 99207 PR PRENATAL VISIT: CPT | Performed by: OBSTETRICS & GYNECOLOGY

## 2021-05-04 PROCEDURE — 90715 TDAP VACCINE 7 YRS/> IM: CPT | Performed by: OBSTETRICS & GYNECOLOGY

## 2021-05-04 PROCEDURE — 90471 IMMUNIZATION ADMIN: CPT | Performed by: OBSTETRICS & GYNECOLOGY

## 2021-05-04 ASSESSMENT — ANXIETY QUESTIONNAIRES
2. NOT BEING ABLE TO STOP OR CONTROL WORRYING: NOT AT ALL
1. FEELING NERVOUS, ANXIOUS, OR ON EDGE: NOT AT ALL
6. BECOMING EASILY ANNOYED OR IRRITABLE: NOT AT ALL
GAD7 TOTAL SCORE: 0
7. FEELING AFRAID AS IF SOMETHING AWFUL MIGHT HAPPEN: NOT AT ALL
IF YOU CHECKED OFF ANY PROBLEMS ON THIS QUESTIONNAIRE, HOW DIFFICULT HAVE THESE PROBLEMS MADE IT FOR YOU TO DO YOUR WORK, TAKE CARE OF THINGS AT HOME, OR GET ALONG WITH OTHER PEOPLE: NOT DIFFICULT AT ALL
5. BEING SO RESTLESS THAT IT IS HARD TO SIT STILL: NOT AT ALL
3. WORRYING TOO MUCH ABOUT DIFFERENT THINGS: NOT AT ALL

## 2021-05-04 ASSESSMENT — PATIENT HEALTH QUESTIONNAIRE - PHQ9
5. POOR APPETITE OR OVEREATING: NOT AT ALL
SUM OF ALL RESPONSES TO PHQ QUESTIONS 1-9: 0

## 2021-05-04 NOTE — PROGRESS NOTES
28w3d feeling ok, no c/o.  Good fm.  Reports BS have been stable, only needing minor adjustments of insulin.  Working closely with endo.  Discussed peds, planning [facility]. Tdap today.  RTC 2 weeks  jose

## 2021-05-05 ASSESSMENT — ANXIETY QUESTIONNAIRES: GAD7 TOTAL SCORE: 0

## 2021-05-11 ENCOUNTER — MYC MEDICAL ADVICE (OUTPATIENT)
Dept: ENDOCRINOLOGY | Facility: CLINIC | Age: 36
End: 2021-05-11

## 2021-05-11 NOTE — TELEPHONE ENCOUNTER
Fasting goal= less than 95. If today's fasting BG was higher than 95 similar to 5/10/21; please increase bedtime Levemir by 1  Unit.   2 hour post breakfast  over the last three days. Continue same coverage at breakfast time.   2 hour post lunch all within the range except one reading 193 5/9 (miscalculated CHO coverage). Will wait before making an adjustment.   2 hour Post dinner within the target range.   Bedtime within the target range.

## 2021-05-18 NOTE — PATIENT INSTRUCTIONS
[place],       Bedtime Levemir 10 units daily.     Levemir 22 units in the morning.    Carbohydrate coverage 1 unit for every 6 grams of carbohydrates with breakfast & lunch and 1:8 with dinner.    Continue current correction dose.     Please send BG readings every 3-4 days and as needed also if BG readings are above the target range or persistently low. Fasting > 95 and two hours >120. Or blood glucose readings below 70.

## 2021-05-18 NOTE — PROGRESS NOTES
Denia is a 35 year old who is being evaluated via a billable video visit.      How would you like to obtain your AVS? MyChart    Will anyone else be joining your video visit? No    Zara CASANOVA MA    Outcome for 05/18/21 2:43 PM :Glucose data sent in [employer] Message

## 2021-05-19 ENCOUNTER — MYC MEDICAL ADVICE (OUTPATIENT)
Dept: ENDOCRINOLOGY | Facility: CLINIC | Age: 36
End: 2021-05-19

## 2021-05-19 ENCOUNTER — VIRTUAL VISIT (OUTPATIENT)
Dept: ENDOCRINOLOGY | Facility: CLINIC | Age: 36
End: 2021-05-19
Payer: COMMERCIAL

## 2021-05-19 DIAGNOSIS — O24.013 TYPE 1 DIABETES MELLITUS DURING PREGNANCY IN THIRD TRIMESTER: Primary | ICD-10-CM

## 2021-05-19 PROCEDURE — 99214 OFFICE O/P EST MOD 30 MIN: CPT | Mod: 95 | Performed by: INTERNAL MEDICINE

## 2021-05-19 NOTE — PROGRESS NOTES
Endocrinology virtual Visit    Chief Complaint: Video Visit (Type 1 Diabetes)     Information obtained from:Patient      Assessment/Treatment Plan:      MAGDI/treated as Type 1 diabetes currently - (H) Positive ANNA antibody/C-peptide WNL   Pregnant at 30W4d weeks:     BG readings reviewed.  Overall within the target range of fasting less than 95 and 2 hours postprandial less than 120 more than 80% of the time.  No hypoglycemia in between meals.  We are adjusting daytime Levemir insulin from 20 units daily to 22 minutes daily. Denia, has been updating us with her blood sugar readings every 3-4 days throughout pregnancy and insulin adjustment has been being made via [employer].  She would continue to closely follow-up with us via [employer].  Noted last fetal ultrasound from April 2021 which documented normal parameters.    Patient Instructions   [place],       Bedtime Levemir 10 units daily.     Levemir 22 units in the morning.    Carbohydrate coverage 1 unit for every 6 grams of carbohydrates with breakfast & lunch and 1:8 with dinner.    Continue current correction dose.     Please send BG readings every 3-4 days and as needed also if BG readings are above the target range or persistently low. Fasting > 95 and two hours >120. Or blood glucose readings below 70.          Hashimoto thyroiditis: continue levothyroxine 25 mcg daily.     Yuri Hansen MD  Staff Endocrinologist    Division of Endocrinology and Diabetes      Subjective:         HPI: Denia Montes is a 35 year old female with history of MAGDI/treated as Type 1 diabetes currently - (H) Positive ANNA antibody/C-peptide WNL currently pregnant at 30w4d.     Currently on:   INSULIN DETEMIR- INJECT 20 UNITS IN THE MORNING AND 10 UNITS AT BEDTIME  CHO 1:6 with breakfast and 1:6 lunch and 1:8 with dinner.  correction scale 1:50 above 120.     No recent hypoglycemia symptoms or sig lows.             No new issues.  Pregnancy is going well.  Noted last ultrasound  from 4/2021.  In summary the ultrasound stated intrauterine pregnancy at expected gestational age.     Allergies   Allergen Reactions     Sulfa Drugs Hives       Current Outpatient Medications   Medication Sig Dispense Refill     ACE/ARB NOT PRESCRIBED, INTENTIONAL, Please choose reason not prescribed, below       acetone urine (KETOSTIX) test strip Use to check urine for ketones every morning. 50 strip 11     ASPIRIN PO Take 81 mg by mouth       blood glucose (JACI CONTOUR) test strip Check blood glucose 6 times a day (before and 2 hours after each meal) 600 strip 3     blood glucose (CONTOUR NEXT TEST) test strip Use to test blood sugar 6 times daily or as directed. 500 each 3     Continuous Blood Gluc  (FREESTYLE JERO 14 DAY READER) RAEANN Use to read blood sugars as per 's instructions. 1 each 0     Continuous Blood Gluc Sensor ([email or web address]YLE JERO 14 DAY SENSOR) MISC Change every 14 days. 9 each 3     insulin aspart (NOVOLOG PEN) 100 UNIT/ML pen Take 1u:8g breakfast, 1u:8g lunch, 1u:12g dinner, + 1u/50mg/dL>120mg/dL +2u prime before each dose. Uses up to 40 units daily. 45 mL 3     insulin detemir (LEVEMIR FLEXTOUCH) 100 UNIT/ML pen Inject 16 units subcutaneous in the morning and 9 units before bed 30 mL 3     insulin pen needle (BD RUSSELL U/F) 32G X 4 MM miscellaneous USE 8 PEN NEEDLES DAILY 1000 each 11     levothyroxine (SYNTHROID/LEVOTHROID) 25 MCG tablet Take 1 tablet (25 mcg) by mouth daily 90 tablet 3     MICROLET LANCETS MISC TEST BLOOD SUGAR TWICE DAILY 200 each 11     Omega-3 Fatty Acids (FISH OIL) 500 MG CAPS        Prenatal Vit-Fe Fumarate-FA (PRENATAL VITAMINS) 28-0.8 MG TABS Take 1 tablet by mouth daily 100 tablet 3     vitamin B-12 (CYANOCOBALAMIN) 100 MCG tablet Take 1,000 mcg by mouth daily       metFORMIN (GLUCOPHAGE) 500 MG tablet Take 500 mg by mouth daily (with breakfast) Currently pregnant so not taking it .         Review of Systems   as per HPI above      Objective:  "    GENERAL: Healthy, alert and no distress  EYES: Eyes grossly normal to inspection.    RESP: No audible wheeze, cough, or visible cyanosis.    NEURO: Alert and oriented. mentation and speech and mood stable.    In House Labs:   Hemoglobin A1C   Date Value Ref Range Status   12/21/2020 5.5 0 - 5.6 % Final     Comment:     Normal <5.7% Prediabetes 5.7-6.4%  Diabetes 6.5% or higher - adopted from ADA   consensus guidelines.     11/17/2020 5.3 0 - 5.6 % Final     Comment:     Normal <5.7% Prediabetes 5.7-6.4%  Diabetes 6.5% or higher - adopted from ADA   consensus guidelines.     06/29/2020 6.1 (H) 0 - 5.6 % Final     Comment:     Normal <5.7% Prediabetes 5.7-6.4%  Diabetes 6.5% or higher - adopted from ADA   consensus guidelines.     03/11/2020 6.9 (A) 4.3 - 6 % Final   09/11/2019 6.1 (A) 4.3 - 6 % Final   05/29/2019 6.1 (A) 4.3 - 6 % Final       TSH   Date Value Ref Range Status   03/22/2021 1.27 0.40 - 4.00 mU/L Final   01/15/2021 2.19 0.40 - 4.00 mU/L Final   12/21/2020 1.62 0.40 - 4.00 mU/L Final   12/21/2020 1.62 0.40 - 4.00 mU/L Final   11/17/2020 1.86 0.40 - 4.00 mU/L Final       Creatinine   Date Value Ref Range Status   01/15/2021 0.56 0.52 - 1.04 mg/dL Final     Fetal ultrasound report copied below.   \"IMPRESSION  ---------------------------------------------------------------------------------------------------------  1) [place] intrauterine pregnancy at 26w 6d gestational age.  2) None of the anomalies commonly detected by ultrasound were evident in the limited fetal anatomic survey as described above.  3) Growth parameters and estimated fetal weight were consistent with established dates.  4) The amniotic fluid volume appeared normal.\"          Video-Visit Details    Type of service:  Video Visit  All times are in your local time  All times are in your local time  1st VideoStart: 05/19/2021 08:37 am  Stop: 05/19/2021 08:45 amOriginating Location (pt. Location): Home  Distant Location (provider " location):  [facility]   Platform used for Video Visit: [employer]Well

## 2021-05-19 NOTE — LETTER
5/19/2021       RE: Denia Montes  [address]  [facility] [place] [address]     Dear Colleague,    Thank you for referring your patient, Denia Montes, to the [facility] ENDOCRINOLOGY [facility] [place] [facility]. Please see a copy of my visit note below.    Denia is a 35 year old who is being evaluated via a billable video visit.      How would you like to obtain your AVS? BigBad    Will anyone else be joining your video visit? No    Zara CASANOVA MA    Outcome for 05/18/21 2:43 PM :Glucose data sent in [email or web address] Message          Endocrinology virtual Visit    Chief Complaint: Video Visit (Type 1 Diabetes)     Information obtained from:Patient      Assessment/Treatment Plan:      MAGDI/treated as Type 1 diabetes currently - (H) Positive ANNA antibody/C-peptide WNL   Pregnant at 30W4d weeks:     BG readings reviewed.  Overall within the target range of fasting less than 95 and 2 hours postprandial less than 120 more than 80% of the time.  No hypoglycemia in between meals.  We are adjusting daytime Levemir insulin from 20 units daily to 22 minutes daily. Denia, has been updating us with her blood sugar readings every 3-4 days throughout pregnancy and insulin adjustment has been being made via BigBad.  She would continue to closely follow-up with us via BigBad.  Noted last fetal ultrasound from April 2021 which documented normal parameters.    Patient Instructions   Denia,       Bedtime Levemir 10 units daily.     Levemir 22 units in the morning.    Carbohydrate coverage 1 unit for every 6 grams of carbohydrates with breakfast & lunch and 1:8 with dinner.    Continue current correction dose.     Please send BG readings every 3-4 days and as needed also if BG readings are above the target range or persistently low. Fasting > 95 and two hours >120. Or blood glucose readings below 70.          Hashimoto thyroiditis: continue levothyroxine 25 mcg daily.      Yuri Hansen MD  Staff Endocrinologist    Division of Endocrinology and Diabetes      Subjective:         HPI: Denia Montes is a 35 year old female with history of MAGDI/treated as Type 1 diabetes currently - (H) Positive ANNA antibody/C-peptide WNL currently pregnant at 30w4d.     Currently on:   INSULIN DETEMIR- INJECT 20 UNITS IN THE MORNING AND 10 UNITS AT BEDTIME  CHO 1:6 with breakfast and 1:6 lunch and 1:8 with dinner.  correction scale 1:50 above 120.     No recent hypoglycemia symptoms or sig lows.             No new issues.  Pregnancy is going well.  Noted last ultrasound from 4/2021.  In summary the ultrasound stated intrauterine pregnancy at expected gestational age.     Allergies   Allergen Reactions     Sulfa Drugs Hives       Current Outpatient Medications   Medication Sig Dispense Refill     ACE/ARB NOT PRESCRIBED, INTENTIONAL, Please choose reason not prescribed, below       acetone urine (KETOSTIX) test strip Use to check urine for ketones every morning. 50 strip 11     ASPIRIN PO Take 81 mg by mouth       blood glucose (JACI CONTOUR) test strip Check blood glucose 6 times a day (before and 2 hours after each meal) 600 strip 3     blood glucose (CONTOUR NEXT TEST) test strip Use to test blood sugar 6 times daily or as directed. 500 each 3     Continuous Blood Gluc  (FREESTYLE JERO 14 DAY READER) RAEANN Use to read blood sugars as per 's instructions. 1 each 0     Continuous Blood Gluc Sensor (FREESTYLE JERO 14 DAY SENSOR) MISC Change every 14 days. 9 each 3     insulin aspart (NOVOLOG PEN) 100 UNIT/ML pen Take 1u:8g breakfast, 1u:8g lunch, 1u:12g dinner, + 1u/50mg/dL>120mg/dL +2u prime before each dose. Uses up to 40 units daily. 45 mL 3     insulin detemir (LEVEMIR FLEXTOUCH) 100 UNIT/ML pen Inject 16 units subcutaneous in the morning and 9 units before bed 30 mL 3     insulin pen needle (BD RUSSELL U/F) 32G X 4 MM miscellaneous USE 8 PEN NEEDLES DAILY 1000 each 11  "    levothyroxine (SYNTHROID/LEVOTHROID) 25 MCG tablet Take 1 tablet (25 mcg) by mouth daily 90 tablet 3     MICROLET LANCETS MISC TEST BLOOD SUGAR TWICE DAILY 200 each 11     Omega-3 Fatty Acids (FISH OIL) 500 MG CAPS        Prenatal Vit-Fe Fumarate-FA (PRENATAL VITAMINS) 28-0.8 MG TABS Take 1 tablet by mouth daily 100 tablet 3     vitamin B-12 (CYANOCOBALAMIN) 100 MCG tablet Take 1,000 mcg by mouth daily       metFORMIN (GLUCOPHAGE) 500 MG tablet Take 500 mg by mouth daily (with breakfast) Currently pregnant so not taking it .         Review of Systems   as per HPI above      Objective:     GENERAL: Healthy, alert and no distress  EYES: Eyes grossly normal to inspection.    RESP: No audible wheeze, cough, or visible cyanosis.    NEURO: Alert and oriented. mentation and speech and mood stable.    In House Labs:   Hemoglobin A1C   Date Value Ref Range Status   12/21/2020 5.5 0 - 5.6 % Final     Comment:     Normal <5.7% Prediabetes 5.7-6.4%  Diabetes 6.5% or higher - adopted from ADA   consensus guidelines.     11/17/2020 5.3 0 - 5.6 % Final     Comment:     Normal <5.7% Prediabetes 5.7-6.4%  Diabetes 6.5% or higher - adopted from ADA   consensus guidelines.     06/29/2020 6.1 (H) 0 - 5.6 % Final     Comment:     Normal <5.7% Prediabetes 5.7-6.4%  Diabetes 6.5% or higher - adopted from ADA   consensus guidelines.     03/11/2020 6.9 (A) 4.3 - 6 % Final   09/11/2019 6.1 (A) 4.3 - 6 % Final   05/29/2019 6.1 (A) 4.3 - 6 % Final       TSH   Date Value Ref Range Status   03/22/2021 1.27 0.40 - 4.00 mU/L Final   01/15/2021 2.19 0.40 - 4.00 mU/L Final   12/21/2020 1.62 0.40 - 4.00 mU/L Final   12/21/2020 1.62 0.40 - 4.00 mU/L Final   11/17/2020 1.86 0.40 - 4.00 mU/L Final       Creatinine   Date Value Ref Range Status   01/15/2021 0.56 0.52 - 1.04 mg/dL Final     Fetal ultrasound report copied below.   \"IMPRESSION  ---------------------------------------------------------------------------------------------------------  1) " "Alvarado intrauterine pregnancy at 26w 6d gestational age.  2) None of the anomalies commonly detected by ultrasound were evident in the limited fetal anatomic survey as described above.  3) Growth parameters and estimated fetal weight were consistent with established dates.  4) The amniotic fluid volume appeared normal.\"          Video-Visit Details    Type of service:  Video Visit  All times are in your local time  All times are in your local time  1st VideoStart: 05/19/2021 08:37 am  Stop: 05/19/2021 08:45 amOriginating Location (pt. Location): Home  Distant Location (provider location):  [facility]   Platform used for Video Visit: Luisito    "

## 2021-05-21 ENCOUNTER — HOSPITAL ENCOUNTER (OUTPATIENT)
Dept: ULTRASOUND IMAGING | Facility: CLINIC | Age: 36
End: 2021-05-21
Attending: OBSTETRICS & GYNECOLOGY
Payer: COMMERCIAL

## 2021-05-21 ENCOUNTER — PRENATAL OFFICE VISIT (OUTPATIENT)
Dept: OBGYN | Facility: CLINIC | Age: 36
End: 2021-05-21
Payer: COMMERCIAL

## 2021-05-21 ENCOUNTER — OFFICE VISIT (OUTPATIENT)
Dept: MATERNAL FETAL MEDICINE | Facility: CLINIC | Age: 36
End: 2021-05-21
Attending: OBSTETRICS & GYNECOLOGY
Payer: COMMERCIAL

## 2021-05-21 VITALS — WEIGHT: 195 LBS | BODY MASS INDEX: 32.45 KG/M2 | SYSTOLIC BLOOD PRESSURE: 126 MMHG | DIASTOLIC BLOOD PRESSURE: 84 MMHG

## 2021-05-21 DIAGNOSIS — O24.012 TYPE 1 DIABETES MELLITUS DURING PREGNANCY IN SECOND TRIMESTER: ICD-10-CM

## 2021-05-21 DIAGNOSIS — O24.012 TYPE 1 DIABETES MELLITUS DURING PREGNANCY IN SECOND TRIMESTER: Primary | ICD-10-CM

## 2021-05-21 DIAGNOSIS — O09.519 ENCOUNTER FOR SUPERVISION OF HIGH RISK PREGNANCY DUE TO ADVANCED MATERNAL AGE IN PRIMIGRAVIDA: Primary | ICD-10-CM

## 2021-05-21 PROCEDURE — 99207 PR PRENATAL VISIT: CPT | Performed by: OBSTETRICS & GYNECOLOGY

## 2021-05-21 PROCEDURE — 76816 OB US FOLLOW-UP PER FETUS: CPT

## 2021-05-21 PROCEDURE — 76816 OB US FOLLOW-UP PER FETUS: CPT | Mod: 26 | Performed by: OBSTETRICS & GYNECOLOGY

## 2021-05-21 RX ORDER — BREAST PUMP
EACH MISCELLANEOUS
Qty: 1 EACH | Refills: 0 | Status: SHIPPED | OUTPATIENT
Start: 2021-05-21 | End: 2021-06-18

## 2021-05-21 ASSESSMENT — ANXIETY QUESTIONNAIRES
7. FEELING AFRAID AS IF SOMETHING AWFUL MIGHT HAPPEN: NOT AT ALL
6. BECOMING EASILY ANNOYED OR IRRITABLE: NOT AT ALL
2. NOT BEING ABLE TO STOP OR CONTROL WORRYING: NOT AT ALL
IF YOU CHECKED OFF ANY PROBLEMS ON THIS QUESTIONNAIRE, HOW DIFFICULT HAVE THESE PROBLEMS MADE IT FOR YOU TO DO YOUR WORK, TAKE CARE OF THINGS AT HOME, OR GET ALONG WITH OTHER PEOPLE: NOT DIFFICULT AT ALL
1. FEELING NERVOUS, ANXIOUS, OR ON EDGE: NOT AT ALL
5. BEING SO RESTLESS THAT IT IS HARD TO SIT STILL: NOT AT ALL
3. WORRYING TOO MUCH ABOUT DIFFERENT THINGS: NOT AT ALL
GAD7 TOTAL SCORE: 0

## 2021-05-21 ASSESSMENT — PATIENT HEALTH QUESTIONNAIRE - PHQ9
SUM OF ALL RESPONSES TO PHQ QUESTIONS 1-9: 0
5. POOR APPETITE OR OVEREATING: NOT AT ALL

## 2021-05-21 NOTE — PROGRESS NOTES
Please see ultrasound report under Imaging tab for details of today's ultrasound.    Yvette Alvarado MD  Maternal-Fetal Medicine

## 2021-05-21 NOTE — PROGRESS NOTES
Has MFM today and growth 33%, has twice weekly BPP scheduled. Blood sugars are really good. Script for breast pump done today. Plan RTC in 2 weeks with ultrasound. Wants to have circ done and reviewed outpatient. She was disappointed and hoping for circ in the hospital. [facility] discussed. BE

## 2021-05-22 ASSESSMENT — ANXIETY QUESTIONNAIRES: GAD7 TOTAL SCORE: 0

## 2021-05-26 ENCOUNTER — MYC MEDICAL ADVICE (OUTPATIENT)
Dept: ENDOCRINOLOGY | Facility: CLINIC | Age: 36
End: 2021-05-26

## 2021-06-01 ENCOUNTER — HOSPITAL ENCOUNTER (OUTPATIENT)
Dept: ULTRASOUND IMAGING | Facility: CLINIC | Age: 36
End: 2021-06-01
Attending: OBSTETRICS & GYNECOLOGY
Payer: COMMERCIAL

## 2021-06-01 ENCOUNTER — OFFICE VISIT (OUTPATIENT)
Dept: MATERNAL FETAL MEDICINE | Facility: CLINIC | Age: 36
End: 2021-06-01
Attending: OBSTETRICS & GYNECOLOGY
Payer: COMMERCIAL

## 2021-06-01 DIAGNOSIS — O24.012 TYPE 1 DIABETES MELLITUS DURING PREGNANCY IN SECOND TRIMESTER: ICD-10-CM

## 2021-06-01 DIAGNOSIS — O24.012 TYPE 1 DIABETES MELLITUS DURING PREGNANCY IN SECOND TRIMESTER: Primary | ICD-10-CM

## 2021-06-01 PROCEDURE — 76819 FETAL BIOPHYS PROFIL W/O NST: CPT

## 2021-06-01 PROCEDURE — 76819 FETAL BIOPHYS PROFIL W/O NST: CPT | Mod: 26 | Performed by: OBSTETRICS & GYNECOLOGY

## 2021-06-01 NOTE — PROGRESS NOTES
The patient was seen for an ultrasound in the Maternal-Fetal Medicine Center at the [facility] today.  For a detailed report of the ultrasound examination, please see the ultrasound report which can be found under the imaging tab.    Nicole Pond MD  , OB/GYN  Maternal-Fetal Medicine  [phone] (Pager)

## 2021-06-04 ENCOUNTER — OFFICE VISIT (OUTPATIENT)
Dept: MATERNAL FETAL MEDICINE | Facility: CLINIC | Age: 36
End: 2021-06-04
Attending: OBSTETRICS & GYNECOLOGY
Payer: COMMERCIAL

## 2021-06-04 ENCOUNTER — HOSPITAL ENCOUNTER (OUTPATIENT)
Dept: ULTRASOUND IMAGING | Facility: CLINIC | Age: 36
End: 2021-06-04
Attending: OBSTETRICS & GYNECOLOGY
Payer: COMMERCIAL

## 2021-06-04 ENCOUNTER — PRENATAL OFFICE VISIT (OUTPATIENT)
Dept: OBGYN | Facility: CLINIC | Age: 36
End: 2021-06-04
Payer: COMMERCIAL

## 2021-06-04 VITALS
DIASTOLIC BLOOD PRESSURE: 82 MMHG | WEIGHT: 196.4 LBS | HEART RATE: 73 BPM | SYSTOLIC BLOOD PRESSURE: 119 MMHG | BODY MASS INDEX: 32.68 KG/M2

## 2021-06-04 DIAGNOSIS — O24.013 TYPE 1 DIABETES MELLITUS DURING PREGNANCY IN THIRD TRIMESTER: ICD-10-CM

## 2021-06-04 DIAGNOSIS — E06.3 HASHIMOTO'S THYROIDITIS: ICD-10-CM

## 2021-06-04 DIAGNOSIS — O09.519 ENCOUNTER FOR SUPERVISION OF HIGH RISK PREGNANCY DUE TO ADVANCED MATERNAL AGE IN PRIMIGRAVIDA: Primary | ICD-10-CM

## 2021-06-04 DIAGNOSIS — O24.013 TYPE 1 DIABETES MELLITUS DURING PREGNANCY IN THIRD TRIMESTER: Primary | ICD-10-CM

## 2021-06-04 DIAGNOSIS — O24.012 TYPE 1 DIABETES MELLITUS DURING PREGNANCY IN SECOND TRIMESTER: ICD-10-CM

## 2021-06-04 PROCEDURE — 76819 FETAL BIOPHYS PROFIL W/O NST: CPT | Mod: 26 | Performed by: OBSTETRICS & GYNECOLOGY

## 2021-06-04 PROCEDURE — 99207 PR PRENATAL VISIT: CPT | Performed by: OBSTETRICS & GYNECOLOGY

## 2021-06-04 PROCEDURE — 76819 FETAL BIOPHYS PROFIL W/O NST: CPT

## 2021-06-04 NOTE — PROGRESS NOTES
"Please see \"Imaging\" tab under \"Chart Review\" for details of today's US at the [facility].    Benny Carmichael MD  Maternal-Fetal Medicine      "

## 2021-06-04 NOTE — PROGRESS NOTES
32w6d  [facility] US: BPP 8/8. Fetus is transverse, head to maternal right    Active fetal movement. No leaking or bleeding. Occasional antoni collier contractions.     Type 1 DM: Reviewed BG, BG well controlled. Insulin managed by endocrine.    Bedtime Levemir 11 units daily.     Levemir 22 units in the morning.    Carbohydrate coverage 1 unit for every 6 grams of carbohydrates with breakfast & lunch and 1:8 with dinner.      Hashimoto's: continue levothyroxine 25mcg daily.    RTC 2 weeks, then weekly, sooner PRN  Silvia Gonzalez MD

## 2021-06-05 NOTE — PATIENT INSTRUCTIONS
[email or web address]

## 2021-06-08 ENCOUNTER — OFFICE VISIT (OUTPATIENT)
Dept: MATERNAL FETAL MEDICINE | Facility: CLINIC | Age: 36
End: 2021-06-08
Attending: OBSTETRICS & GYNECOLOGY
Payer: COMMERCIAL

## 2021-06-08 ENCOUNTER — HOSPITAL ENCOUNTER (OUTPATIENT)
Dept: ULTRASOUND IMAGING | Facility: CLINIC | Age: 36
End: 2021-06-08
Attending: OBSTETRICS & GYNECOLOGY
Payer: COMMERCIAL

## 2021-06-08 DIAGNOSIS — O24.013 TYPE 1 DIABETES MELLITUS DURING PREGNANCY IN THIRD TRIMESTER: Primary | ICD-10-CM

## 2021-06-08 DIAGNOSIS — O24.012 TYPE 1 DIABETES MELLITUS DURING PREGNANCY IN SECOND TRIMESTER: ICD-10-CM

## 2021-06-08 PROCEDURE — 76819 FETAL BIOPHYS PROFIL W/O NST: CPT

## 2021-06-08 PROCEDURE — 76819 FETAL BIOPHYS PROFIL W/O NST: CPT | Mod: 26 | Performed by: OBSTETRICS & GYNECOLOGY

## 2021-06-08 NOTE — PROGRESS NOTES
"Please see \"Imaging\" tab under \"Chart Review\" for details of today's US at the [facility].    Benny Carmichael MD  Maternal-Fetal Medicine      "

## 2021-06-09 DIAGNOSIS — E10.9 TYPE 1 DIABETES MELLITUS WITHOUT COMPLICATION (H): ICD-10-CM

## 2021-06-11 ENCOUNTER — HOSPITAL ENCOUNTER (OUTPATIENT)
Dept: ULTRASOUND IMAGING | Facility: CLINIC | Age: 36
End: 2021-06-11
Attending: OBSTETRICS & GYNECOLOGY
Payer: COMMERCIAL

## 2021-06-11 ENCOUNTER — OFFICE VISIT (OUTPATIENT)
Dept: MATERNAL FETAL MEDICINE | Facility: CLINIC | Age: 36
End: 2021-06-11
Attending: OBSTETRICS & GYNECOLOGY
Payer: COMMERCIAL

## 2021-06-11 DIAGNOSIS — E10.9 TYPE 1 DIABETES MELLITUS WITHOUT COMPLICATION (H): ICD-10-CM

## 2021-06-11 DIAGNOSIS — O24.013 TYPE 1 DIABETES MELLITUS DURING PREGNANCY IN THIRD TRIMESTER: Primary | ICD-10-CM

## 2021-06-11 DIAGNOSIS — O24.012 TYPE 1 DIABETES MELLITUS DURING PREGNANCY IN SECOND TRIMESTER: ICD-10-CM

## 2021-06-11 PROCEDURE — 76819 FETAL BIOPHYS PROFIL W/O NST: CPT | Mod: 26 | Performed by: OBSTETRICS & GYNECOLOGY

## 2021-06-11 PROCEDURE — 76819 FETAL BIOPHYS PROFIL W/O NST: CPT

## 2021-06-11 NOTE — TELEPHONE ENCOUNTER
Dosage change per Pt report    Reason for Call: Medication Refill Request    Has the patient contacted the pharmacy for the refill? Yes   Name of medication being requested: insulin aspart (NOVOLOG PEN) 100 UNIT/ML pen & insulin detemir (LEVEMIR FLEXTOUCH) 100 UNIT/ML pen  Provider who prescribed the medication: Jade  Pharmacy:[facility] - [address]  Date medication is needed: Whenever possible.  Pt is requesting 90 day supply.  Pt also stated Levemir dosage is 24 units AM and 11 units at bedtime.       Please review and adjust script

## 2021-06-11 NOTE — PROGRESS NOTES
"Please see \"Imaging\" tab under \"Chart Review\" for details of today's US at the [facility].    Benny Carmichael MD  Maternal-Fetal Medicine      "

## 2021-06-11 NOTE — TELEPHONE ENCOUNTER
[facility] Call Center    Phone Message    May a detailed message be left on voicemail: yes     Reason for Call: Medication Refill Request    Has the patient contacted the pharmacy for the refill? Yes   Name of medication being requested: insulin aspart (NOVOLOG PEN) 100 UNIT/ML pen & insulin detemir (LEVEMIR FLEXTOUCH) 100 UNIT/ML pen  Provider who prescribed the medication: Jade  Pharmacy:[facility] - [address]  Date medication is needed: Whenever possible.  Pt is requesting 90 day supply.  Pt also stated Levemir dosage is 24 units AM and 11 units at bedtime.      Please review and adjust script.    Action Taken: Message routed to:  [facility] endo    Travel Screening: Not Applicable

## 2021-06-15 ENCOUNTER — HOSPITAL ENCOUNTER (OUTPATIENT)
Dept: ULTRASOUND IMAGING | Facility: CLINIC | Age: 36
End: 2021-06-15
Attending: OBSTETRICS & GYNECOLOGY
Payer: COMMERCIAL

## 2021-06-15 ENCOUNTER — MYC MEDICAL ADVICE (OUTPATIENT)
Dept: ENDOCRINOLOGY | Facility: CLINIC | Age: 36
End: 2021-06-15

## 2021-06-15 ENCOUNTER — OFFICE VISIT (OUTPATIENT)
Dept: MATERNAL FETAL MEDICINE | Facility: CLINIC | Age: 36
End: 2021-06-15
Attending: OBSTETRICS & GYNECOLOGY
Payer: COMMERCIAL

## 2021-06-15 DIAGNOSIS — O24.012 TYPE 1 DIABETES MELLITUS DURING PREGNANCY IN SECOND TRIMESTER: ICD-10-CM

## 2021-06-15 DIAGNOSIS — O24.013 TYPE 1 DIABETES MELLITUS DURING PREGNANCY IN THIRD TRIMESTER: Primary | ICD-10-CM

## 2021-06-15 PROCEDURE — 76819 FETAL BIOPHYS PROFIL W/O NST: CPT

## 2021-06-15 PROCEDURE — 76819 FETAL BIOPHYS PROFIL W/O NST: CPT | Mod: 26 | Performed by: OBSTETRICS & GYNECOLOGY

## 2021-06-15 NOTE — PROGRESS NOTES
"Please see \"Imaging\" tab under \"Chart Review\" for details of today's visit.    Chris Mendez    "

## 2021-06-18 ENCOUNTER — HOSPITAL ENCOUNTER (OUTPATIENT)
Dept: ULTRASOUND IMAGING | Facility: CLINIC | Age: 36
End: 2021-06-18
Attending: OBSTETRICS & GYNECOLOGY
Payer: COMMERCIAL

## 2021-06-18 ENCOUNTER — PRENATAL OFFICE VISIT (OUTPATIENT)
Dept: OBGYN | Facility: CLINIC | Age: 36
End: 2021-06-18
Payer: COMMERCIAL

## 2021-06-18 ENCOUNTER — OFFICE VISIT (OUTPATIENT)
Dept: MATERNAL FETAL MEDICINE | Facility: CLINIC | Age: 36
End: 2021-06-18
Attending: OBSTETRICS & GYNECOLOGY
Payer: COMMERCIAL

## 2021-06-18 VITALS
HEART RATE: 76 BPM | HEIGHT: 65 IN | BODY MASS INDEX: 33.49 KG/M2 | TEMPERATURE: 98.5 F | SYSTOLIC BLOOD PRESSURE: 124 MMHG | WEIGHT: 201 LBS | DIASTOLIC BLOOD PRESSURE: 83 MMHG

## 2021-06-18 DIAGNOSIS — O24.012 TYPE 1 DIABETES MELLITUS DURING PREGNANCY IN SECOND TRIMESTER: ICD-10-CM

## 2021-06-18 DIAGNOSIS — Z11.59 ENCOUNTER FOR SCREENING FOR OTHER VIRAL DISEASES: ICD-10-CM

## 2021-06-18 DIAGNOSIS — O24.013 TYPE 1 DIABETES MELLITUS DURING PREGNANCY IN THIRD TRIMESTER: Primary | ICD-10-CM

## 2021-06-18 DIAGNOSIS — E06.3 HASHIMOTO'S THYROIDITIS: ICD-10-CM

## 2021-06-18 DIAGNOSIS — O09.519 ENCOUNTER FOR SUPERVISION OF HIGH RISK PREGNANCY DUE TO ADVANCED MATERNAL AGE IN PRIMIGRAVIDA: Primary | ICD-10-CM

## 2021-06-18 DIAGNOSIS — O24.013 TYPE 1 DIABETES MELLITUS DURING PREGNANCY IN THIRD TRIMESTER: ICD-10-CM

## 2021-06-18 PROCEDURE — 76819 FETAL BIOPHYS PROFIL W/O NST: CPT | Mod: 26 | Performed by: OBSTETRICS & GYNECOLOGY

## 2021-06-18 PROCEDURE — 76816 OB US FOLLOW-UP PER FETUS: CPT

## 2021-06-18 PROCEDURE — 76819 FETAL BIOPHYS PROFIL W/O NST: CPT

## 2021-06-18 PROCEDURE — 76816 OB US FOLLOW-UP PER FETUS: CPT | Mod: 26 | Performed by: OBSTETRICS & GYNECOLOGY

## 2021-06-18 PROCEDURE — 99207 PR PRENATAL VISIT: CPT | Performed by: OBSTETRICS & GYNECOLOGY

## 2021-06-18 ASSESSMENT — MIFFLIN-ST. JEOR: SCORE: 1602.61

## 2021-06-18 NOTE — PROGRESS NOTES
34w6d  BG running a little higher, adjusting insulin with endocrinologist.  Wants IOL 7/17 (39w) at 10am, will schedule.   Active fetal movement. No leaking or bleeding. occ contractions.   MFM US today shows EFW 2542g (5lb 10oz) 47%, AC 64%, normal fluid, cephalic  RTC weekly.   Silvia Gonzalez MD

## 2021-06-22 ENCOUNTER — OFFICE VISIT (OUTPATIENT)
Dept: MATERNAL FETAL MEDICINE | Facility: CLINIC | Age: 36
End: 2021-06-22
Attending: OBSTETRICS & GYNECOLOGY
Payer: COMMERCIAL

## 2021-06-22 ENCOUNTER — HOSPITAL ENCOUNTER (OUTPATIENT)
Dept: ULTRASOUND IMAGING | Facility: CLINIC | Age: 36
End: 2021-06-22
Attending: OBSTETRICS & GYNECOLOGY
Payer: COMMERCIAL

## 2021-06-22 DIAGNOSIS — O24.012 TYPE 1 DIABETES MELLITUS DURING PREGNANCY IN SECOND TRIMESTER: ICD-10-CM

## 2021-06-22 DIAGNOSIS — O24.013 TYPE 1 DIABETES MELLITUS DURING PREGNANCY IN THIRD TRIMESTER: Primary | ICD-10-CM

## 2021-06-22 PROCEDURE — 76819 FETAL BIOPHYS PROFIL W/O NST: CPT

## 2021-06-22 PROCEDURE — 76819 FETAL BIOPHYS PROFIL W/O NST: CPT | Mod: 26 | Performed by: OBSTETRICS & GYNECOLOGY

## 2021-06-22 NOTE — PROGRESS NOTES
"Please see \"Imaging\" tab under \"Chart Review\" for details of today's visit.    Silvia Weaver MD PhD  Maternal Fetal Medicine     "

## 2021-06-23 ENCOUNTER — MYC MEDICAL ADVICE (OUTPATIENT)
Dept: ENDOCRINOLOGY | Facility: CLINIC | Age: 36
End: 2021-06-23

## 2021-06-24 NOTE — TELEPHONE ENCOUNTER
Spoke with Denia over the phone.    Reviewed BG readings.   Noted high after lunch on 6/22 however has had post lunch 50-60's over the weekend. Today post lunch was 160's. Given fluctuation at lunch time between mild low to high; will keep the same regimen for now.   Fasting within the target >90%  Post breakfast within range >90%  Post dinner 105-120's last 3-4 days however last night was 60's therefore no increment in insulin made at this time.      above was discussed in over the phone. June 24, 2021  Review BG in 3- 4 days. Contact immediately if lows.

## 2021-06-25 ENCOUNTER — OFFICE VISIT (OUTPATIENT)
Dept: MATERNAL FETAL MEDICINE | Facility: CLINIC | Age: 36
End: 2021-06-25
Attending: OBSTETRICS & GYNECOLOGY
Payer: COMMERCIAL

## 2021-06-25 ENCOUNTER — PRENATAL OFFICE VISIT (OUTPATIENT)
Dept: OBGYN | Facility: CLINIC | Age: 36
End: 2021-06-25
Payer: COMMERCIAL

## 2021-06-25 ENCOUNTER — HOSPITAL ENCOUNTER (OUTPATIENT)
Dept: ULTRASOUND IMAGING | Facility: CLINIC | Age: 36
End: 2021-06-25
Attending: OBSTETRICS & GYNECOLOGY
Payer: COMMERCIAL

## 2021-06-25 VITALS
DIASTOLIC BLOOD PRESSURE: 82 MMHG | WEIGHT: 201 LBS | SYSTOLIC BLOOD PRESSURE: 129 MMHG | TEMPERATURE: 97.9 F | HEART RATE: 73 BPM | BODY MASS INDEX: 33.45 KG/M2

## 2021-06-25 DIAGNOSIS — O24.013 TYPE 1 DIABETES MELLITUS DURING PREGNANCY IN THIRD TRIMESTER: Primary | ICD-10-CM

## 2021-06-25 DIAGNOSIS — O09.519 ENCOUNTER FOR SUPERVISION OF HIGH RISK PREGNANCY DUE TO ADVANCED MATERNAL AGE IN PRIMIGRAVIDA: ICD-10-CM

## 2021-06-25 DIAGNOSIS — O24.012 TYPE 1 DIABETES MELLITUS DURING PREGNANCY IN SECOND TRIMESTER: ICD-10-CM

## 2021-06-25 LAB — HGB BLD-MCNC: 12.2 G/DL (ref 11.7–15.7)

## 2021-06-25 PROCEDURE — 36415 COLL VENOUS BLD VENIPUNCTURE: CPT | Performed by: OBSTETRICS & GYNECOLOGY

## 2021-06-25 PROCEDURE — 87653 STREP B DNA AMP PROBE: CPT | Performed by: OBSTETRICS & GYNECOLOGY

## 2021-06-25 PROCEDURE — 99207 PR COMPLICATED OB VISIT: CPT | Performed by: OBSTETRICS & GYNECOLOGY

## 2021-06-25 PROCEDURE — 99N1025 PR STATISTIC OBHBG - HEMOGLOBIN: Performed by: OBSTETRICS & GYNECOLOGY

## 2021-06-25 PROCEDURE — 76819 FETAL BIOPHYS PROFIL W/O NST: CPT | Mod: 26 | Performed by: OBSTETRICS & GYNECOLOGY

## 2021-06-25 PROCEDURE — 76819 FETAL BIOPHYS PROFIL W/O NST: CPT

## 2021-06-25 NOTE — PROGRESS NOTES
"Please see \"Imaging\" tab under \"Chart Review\" for details of today's US at the [facility].    Benny Carmichael MD  Maternal-Fetal Medicine      "

## 2021-06-25 NOTE — PROGRESS NOTES
35w6d feeling ok, no c/o.  Good fm.  Reports BPP was 8/8.  BS have been ok, more adjustments up and down now, but has been manageable.  Scheduled for IOL 7/17, discussed likely need for cervical ripening and anticipated timeline and meds.  Discussed DM mgmt during labor and pp, she has appt with endo coming up, will get recs for insulin dosing after delivery.  GBS today.  RTC weekly  ailynp

## 2021-06-26 LAB
GP B STREP DNA SPEC QL NAA+PROBE: NEGATIVE
SPECIMEN SOURCE: NORMAL

## 2021-06-29 ENCOUNTER — OFFICE VISIT (OUTPATIENT)
Dept: MATERNAL FETAL MEDICINE | Facility: CLINIC | Age: 36
End: 2021-06-29
Attending: OBSTETRICS & GYNECOLOGY
Payer: COMMERCIAL

## 2021-06-29 ENCOUNTER — HOSPITAL ENCOUNTER (OUTPATIENT)
Dept: ULTRASOUND IMAGING | Facility: CLINIC | Age: 36
End: 2021-06-29
Attending: OBSTETRICS & GYNECOLOGY
Payer: COMMERCIAL

## 2021-06-29 DIAGNOSIS — O24.013 TYPE 1 DIABETES MELLITUS DURING PREGNANCY IN THIRD TRIMESTER: Primary | ICD-10-CM

## 2021-06-29 DIAGNOSIS — O24.012 TYPE 1 DIABETES MELLITUS DURING PREGNANCY IN SECOND TRIMESTER: ICD-10-CM

## 2021-06-29 PROCEDURE — 76819 FETAL BIOPHYS PROFIL W/O NST: CPT | Mod: 26 | Performed by: OBSTETRICS & GYNECOLOGY

## 2021-06-29 PROCEDURE — 76819 FETAL BIOPHYS PROFIL W/O NST: CPT

## 2021-06-29 NOTE — PROGRESS NOTES
Outcome for 06/29/21 2:03 PM :Left Voicemail for patient to call back  Outcome for 06/29/21 3:47 PM :Glucose sent via Email     Denia Montes  is being evaluated via a billable video visit.      How would you like to obtain your AVS? MyChart  For the video visit, send the invitation by: Allen  Will anyone else be joining your video visit? No

## 2021-06-30 ENCOUNTER — VIRTUAL VISIT (OUTPATIENT)
Dept: ENDOCRINOLOGY | Facility: CLINIC | Age: 36
End: 2021-06-30
Payer: COMMERCIAL

## 2021-06-30 DIAGNOSIS — O24.013 TYPE 1 DIABETES MELLITUS DURING PREGNANCY IN THIRD TRIMESTER: Primary | ICD-10-CM

## 2021-06-30 PROCEDURE — 99214 OFFICE O/P EST MOD 30 MIN: CPT | Mod: 95 | Performed by: INTERNAL MEDICINE

## 2021-06-30 NOTE — PATIENT INSTRUCTIONS
Denia,       Bedtime Levemir 11 units daily.     Levemir 24 units in the morning.    Carbohydrate coverage 1 unit for every 5 grams of carbohydrates with breakfast & 1 for every 4.5 with lunch and 1 or every 6.5 with dinner.    Continue current correction dose.   Please note that the insulin need will significantly drop the last few weeks of pregnancy therefore close monitoring of blood sugar is very important.  Our goal is to avoid hypoglycemia or low blood sugar going forward.  If you notice blood sugar readings below 70; please contact us immediately.  Until you hear from us, please reduce Levemir and mealtime insulin as needed.    Please send BG readings every 3-4 days and as needed also if BG readings are above the target range or persistently low. Fasting > 95 and two hours >120. Or blood glucose readings below 70.

## 2021-06-30 NOTE — PROGRESS NOTES
Endocrinology virtual Visit    Chief Complaint: Video Visit     Information obtained from:Patient      Assessment/Treatment Plan:      MAGDI/treated as Type 1 diabetes currently - (H) Positive ANNA antibody/C-peptide WNL   Pregnant at 36 weeks:     BG readings reviewed.  Overall within the target range of fasting less than 95 and 2 hours postprandial less than 120 more than 90% of the time at breakfast. Lunch and dinner 2 hour post prandial have been higher.  Will adjust slightly.   Overall pregnancy going well per chart review of Veronica/Gyn notes.   Goal is to avoid hypoglycemia. Last few weeks insulin need can drop therefore close monitoring is necessary.   During delivery; likely will be on insulin drip.  Recommend consultation of inpatient diabetes team; for discharge insulin planning after delivery.    Patient Instructions   [place],       Bedtime Levemir 11 units daily.     Levemir 24 units in the morning.    Carbohydrate coverage 1 unit for every 5 grams of carbohydrates with breakfast & 1 for every 4.5 with lunch and 1 or every 6.5 with dinner.    Continue current correction dose.   Please note that the insulin need will significantly drop the last few weeks of pregnancy therefore close monitoring of blood sugar is very important.  Our goal is to avoid hypoglycemia or low blood sugar going forward.  If you notice blood sugar readings below 70; please contact us immediately.  Until you hear from us, please reduce Levemir and mealtime insulin as needed.    Please send BG readings every 3-4 days and as needed also if BG readings are above the target range or persistently low. Fasting > 95 and two hours >120. Or blood glucose readings below 70.          Hashimoto thyroiditis: continue levothyroxine 25 mcg daily.  This medication will not be needed postpartum therefore we will plan on stopping it after delivery based on TSH.     Yuri Hansen MD  Staff Endocrinologist    Division of Endocrinology and  Diabetes      Subjective:         HPI: Denia Montes is a 35 year old female with history of MAGDI/treated as Type 1 diabetes currently - (H) Positive ANNA antibody/C-peptide WNL currently pregnant at 36 weeks.     Currently on:   INSULIN DETEMIR- INJECT 24 UNITS IN THE MORNING AND 11 UNITS AT BEDTIME  CHO 1:5 with breakfast and 1:5 lunch and 1:7 with dinner.  correction scale 1:50 above 120.     No recent hypoglycemia symptoms or sig lows.           No new issues.  Pregnancy is going well.      Allergies   Allergen Reactions     Sulfa Drugs Hives       Current Outpatient Medications   Medication Sig Dispense Refill     ACE/ARB NOT PRESCRIBED, INTENTIONAL, Please choose reason not prescribed, below (Patient not taking: Reported on 6/4/2021)       acetone urine (KETOSTIX) test strip Use to check urine for ketones every morning. 50 strip 11     ASPIRIN PO Take 81 mg by mouth       blood glucose (Docker CONTOUR) test strip Check blood glucose 6 times a day (before and 2 hours after each meal) 600 strip 3     blood glucose (CONTOUR NEXT TEST) test strip Use to test blood sugar 6 times daily or as directed. 500 each 3     Continuous Blood Gluc  (FREESTYLE JERO 14 DAY READER) RAEANN Use to read blood sugars as per 's instructions. 1 each 0     Continuous Blood Gluc Sensor (FREESTYLE JERO 14 DAY SENSOR) MISC Change every 14 days. 9 each 3     insulin aspart (NOVOLOG PEN) 100 UNIT/ML pen Take 1u:8g breakfast, 1u:8g lunch, 1u:12g dinner, + 1u/50mg/dL>120mg/dL +2u prime before each dose. Uses up to 40 units daily. 45 mL 3     insulin detemir (LEVEMIR FLEXTOUCH) 100 UNIT/ML pen Inject 24 units subcutaneous in the morning and 11 units before bed 45 mL 3     insulin pen needle (BD RUSSELL U/F) 32G X 4 MM miscellaneous USE 8 PEN NEEDLES DAILY 1000 each 11     levothyroxine (SYNTHROID/LEVOTHROID) 25 MCG tablet Take 1 tablet (25 mcg) by mouth daily 90 tablet 3     metFORMIN (GLUCOPHAGE) 500 MG tablet Take 500 mg  "by mouth daily (with breakfast) Currently pregnant so not taking it .       MICROLET LANCETS MISC TEST BLOOD SUGAR TWICE DAILY 200 each 11     Omega-3 Fatty Acids (FISH OIL) 500 MG CAPS        Prenatal Vit-Fe Fumarate-FA (PRENATAL VITAMINS) 28-0.8 MG TABS Take 1 tablet by mouth daily 100 tablet 3     vitamin B-12 (CYANOCOBALAMIN) 100 MCG tablet Take 1,000 mcg by mouth daily         Review of Systems   as per HPI above      Objective:     GENERAL: Healthy, alert and no distress  EYES: Eyes grossly normal to inspection.    RESP: No audible wheeze, cough, or visible cyanosis.    NEURO: Alert and oriented. mentation and speech and mood stable.    In House Labs:   Hemoglobin A1C   Date Value Ref Range Status   12/21/2020 5.5 0 - 5.6 % Final     Comment:     Normal <5.7% Prediabetes 5.7-6.4%  Diabetes 6.5% or higher - adopted from ADA   consensus guidelines.     11/17/2020 5.3 0 - 5.6 % Final     Comment:     Normal <5.7% Prediabetes 5.7-6.4%  Diabetes 6.5% or higher - adopted from ADA   consensus guidelines.     06/29/2020 6.1 (H) 0 - 5.6 % Final     Comment:     Normal <5.7% Prediabetes 5.7-6.4%  Diabetes 6.5% or higher - adopted from ADA   consensus guidelines.     03/11/2020 6.9 (A) 4.3 - 6 % Final   09/11/2019 6.1 (A) 4.3 - 6 % Final   05/29/2019 6.1 (A) 4.3 - 6 % Final       TSH   Date Value Ref Range Status   03/22/2021 1.27 0.40 - 4.00 mU/L Final   01/15/2021 2.19 0.40 - 4.00 mU/L Final   12/21/2020 1.62 0.40 - 4.00 mU/L Final   12/21/2020 1.62 0.40 - 4.00 mU/L Final   11/17/2020 1.86 0.40 - 4.00 mU/L Final       Creatinine   Date Value Ref Range Status   01/15/2021 0.56 0.52 - 1.04 mg/dL Final     Fetal ultrasound report copied below.   \"IMPRESSION  ---------------------------------------------------------------------------------------------------------  1) Alvarado intrauterine pregnancy at 26w 6d gestational age.  2) None of the anomalies commonly detected by ultrasound were evident in the limited fetal " "anatomic survey as described above.  3) Growth parameters and estimated fetal weight were consistent with established dates.  4) The amniotic fluid volume appeared normal.\"          Video-Visit Details    Type of service:  Video Visit  All times are in your local time  1st VideoStart: 06/30/2021 03:41 pm  Stop: 06/30/2021 03:51 pm  Originating Location (pt. Location): Home  Platform used for Video Visit: [employer]  "

## 2021-06-30 NOTE — LETTER
6/30/2021       RE: Denia Montes  [address]  [facility] [address]     Dear Colleague,    Thank you for referring your patient, Denia Montes, to the [facility] ENDOCR[facility]OLOG[facility] [place] [facility]. Please see a copy of my visit note below.    Outcome for 06/29/21 2:03 PM :Left Voicemail for patient to call back  Outcome for 06/29/21 3:47 PM :Glucose sent via Email     Denia Montes  is being evaluated via a billable video visit.      How would you like to obtain your AVS? People and Pages  For the video visit, send the invitation by: Pure Focus  Will anyone else be joining your video visit? No            Endocrinology virtual Visit    Chief Complaint: Video Visit     Information obtained from:Patient      Assessment/Treatment Plan:      MAGDI/treated as Type 1 diabetes currently - (H) Positive ANNA antibody/C-peptide WNL   Pregnant at 36 weeks:     BG readings reviewed.  Overall within the target range of fasting less than 95 and 2 hours postprandial less than 120 more than 90% of the time at breakfast. Lunch and dinner 2 hour post prandial have been higher.  Will adjust slightly.   Overall pregnancy going well per chart review of Veronica/Gyn notes.   Goal is to avoid hypoglycemia. Last few weeks insulin need can drop therefore close monitoring is necessary.   During delivery; likely will be on insulin drip.  Recommend consultation of inpatient diabetes team; for discharge insulin planning after delivery.    Patient Instructions   [place],       Bedtime Levemir 11 units daily.     Levemir 24 units in the morning.    Carbohydrate coverage 1 unit for every 5 grams of carbohydrates with breakfast & 1 for every 4.5 with lunch and 1 or every 6.5 with dinner.    Continue current correction dose.   Please note that the insulin need will significantly drop the last few weeks of pregnancy therefore close monitoring of blood sugar is very important.  Our goal  is to avoid hypoglycemia or low blood sugar going forward.  If you notice blood sugar readings below 70; please contact us immediately.  Until you hear from us, please reduce Levemir and mealtime insulin as needed.    Please send BG readings every 3-4 days and as needed also if BG readings are above the target range or persistently low. Fasting > 95 and two hours >120. Or blood glucose readings below 70.          Hashimoto thyroiditis: continue levothyroxine 25 mcg daily.  This medication will not be needed postpartum therefore we will plan on stopping it after delivery based on TSH.     Yuri Hansen MD  Staff Endocrinologist    Division of Endocrinology and Diabetes      Subjective:         HPI: Denia Montes is a 35 year old female with history of MAGDI/treated as Type 1 diabetes currently - (H) Positive ANNA antibody/C-peptide WNL currently pregnant at 36 weeks.     Currently on:   INSULIN DETEMIR- INJECT 24 UNITS IN THE MORNING AND 11 UNITS AT BEDTIME  CHO 1:5 with breakfast and 1:5 lunch and 1:7 with dinner.  correction scale 1:50 above 120.     No recent hypoglycemia symptoms or sig lows.           No new issues.  Pregnancy is going well.      Allergies   Allergen Reactions     Sulfa Drugs Hives       Current Outpatient Medications   Medication Sig Dispense Refill     ACE/ARB NOT PRESCRIBED, INTENTIONAL, Please choose reason not prescribed, below (Patient not taking: Reported on 6/4/2021)       acetone urine (KETOSTIX) test strip Use to check urine for ketones every morning. 50 strip 11     ASPIRIN PO Take 81 mg by mouth       blood glucose (JACI CONTOUR) test strip Check blood glucose 6 times a day (before and 2 hours after each meal) 600 strip 3     blood glucose (CONTOUR NEXT TEST) test strip Use to test blood sugar 6 times daily or as directed. 500 each 3     Continuous Blood Gluc  (FREESTYLE JERO 14 DAY READER) RAEANN Use to read blood sugars as per 's instructions. 1 each 0      Continuous Blood Gluc Sensor (FREESTYLE JERO 14 DAY SENSOR) MISC Change every 14 days. 9 each 3     insulin aspart (NOVOLOG PEN) 100 UNIT/ML pen Take 1u:8g breakfast, 1u:8g lunch, 1u:12g dinner, + 1u/50mg/dL>120mg/dL +2u prime before each dose. Uses up to 40 units daily. 45 mL 3     insulin detemir (LEVEMIR FLEXTOUCH) 100 UNIT/ML pen Inject 24 units subcutaneous in the morning and 11 units before bed 45 mL 3     insulin pen needle (BD RUSSELL U/F) 32G X 4 MM miscellaneous USE 8 PEN NEEDLES DAILY 1000 each 11     levothyroxine (SYNTHROID/LEVOTHROID) 25 MCG tablet Take 1 tablet (25 mcg) by mouth daily 90 tablet 3     metFORMIN (GLUCOPHAGE) 500 MG tablet Take 500 mg by mouth daily (with breakfast) Currently pregnant so not taking it .       MICROLET LANCETS MISC TEST BLOOD SUGAR TWICE DAILY 200 each 11     Omega-3 Fatty Acids (FISH OIL) 500 MG CAPS        Prenatal Vit-Fe Fumarate-FA (PRENATAL VITAMINS) 28-0.8 MG TABS Take 1 tablet by mouth daily 100 tablet 3     vitamin B-12 (CYANOCOBALAMIN) 100 MCG tablet Take 1,000 mcg by mouth daily         Review of Systems   as per HPI above      Objective:     GENERAL: Healthy, alert and no distress  EYES: Eyes grossly normal to inspection.    RESP: No audible wheeze, cough, or visible cyanosis.    NEURO: Alert and oriented. mentation and speech and mood stable.    In House Labs:   Hemoglobin A1C   Date Value Ref Range Status   12/21/2020 5.5 0 - 5.6 % Final     Comment:     Normal <5.7% Prediabetes 5.7-6.4%  Diabetes 6.5% or higher - adopted from ADA   consensus guidelines.     11/17/2020 5.3 0 - 5.6 % Final     Comment:     Normal <5.7% Prediabetes 5.7-6.4%  Diabetes 6.5% or higher - adopted from ADA   consensus guidelines.     06/29/2020 6.1 (H) 0 - 5.6 % Final     Comment:     Normal <5.7% Prediabetes 5.7-6.4%  Diabetes 6.5% or higher - adopted from ADA   consensus guidelines.     03/11/2020 6.9 (A) 4.3 - 6 % Final   09/11/2019 6.1 (A) 4.3 - 6 % Final   05/29/2019 6.1 (A)  "4.3 - 6 % Final       TSH   Date Value Ref Range Status   03/22/2021 1.27 0.40 - 4.00 mU/L Final   01/15/2021 2.19 0.40 - 4.00 mU/L Final   12/21/2020 1.62 0.40 - 4.00 mU/L Final   12/21/2020 1.62 0.40 - 4.00 mU/L Final   11/17/2020 1.86 0.40 - 4.00 mU/L Final       Creatinine   Date Value Ref Range Status   01/15/2021 0.56 0.52 - 1.04 mg/dL Final     Fetal ultrasound report copied below.   \"IMPRESSION  ---------------------------------------------------------------------------------------------------------  1) Alvarado intrauterine pregnancy at 26w 6d gestational age.  2) None of the anomalies commonly detected by ultrasound were evident in the limited fetal anatomic survey as described above.  3) Growth parameters and estimated fetal weight were consistent with established dates.  4) The amniotic fluid volume appeared normal.\"          Video-Visit Details    Type of service:  Video Visit  All times are in your local time  1st VideoStart: 06/30/2021 03:41 pm  Stop: 06/30/2021 03:51 pm  Originating Location (pt. Location): Home  Platform used for Video Visit: Luisito      "

## 2021-07-02 ENCOUNTER — HOSPITAL ENCOUNTER (OUTPATIENT)
Dept: ULTRASOUND IMAGING | Facility: CLINIC | Age: 36
End: 2021-07-02
Attending: OBSTETRICS & GYNECOLOGY
Payer: COMMERCIAL

## 2021-07-02 ENCOUNTER — PRENATAL OFFICE VISIT (OUTPATIENT)
Dept: OBGYN | Facility: CLINIC | Age: 36
End: 2021-07-02
Payer: COMMERCIAL

## 2021-07-02 ENCOUNTER — OFFICE VISIT (OUTPATIENT)
Dept: MATERNAL FETAL MEDICINE | Facility: CLINIC | Age: 36
End: 2021-07-02
Attending: OBSTETRICS & GYNECOLOGY
Payer: COMMERCIAL

## 2021-07-02 VITALS
HEART RATE: 69 BPM | HEIGHT: 65 IN | SYSTOLIC BLOOD PRESSURE: 121 MMHG | WEIGHT: 202.4 LBS | BODY MASS INDEX: 33.72 KG/M2 | DIASTOLIC BLOOD PRESSURE: 83 MMHG | TEMPERATURE: 97.9 F

## 2021-07-02 DIAGNOSIS — O24.013 TYPE 1 DIABETES MELLITUS DURING PREGNANCY IN THIRD TRIMESTER: Primary | ICD-10-CM

## 2021-07-02 DIAGNOSIS — O09.519 ENCOUNTER FOR SUPERVISION OF HIGH RISK PREGNANCY DUE TO ADVANCED MATERNAL AGE IN PRIMIGRAVIDA: Primary | ICD-10-CM

## 2021-07-02 DIAGNOSIS — O24.012 TYPE 1 DIABETES MELLITUS DURING PREGNANCY IN SECOND TRIMESTER: ICD-10-CM

## 2021-07-02 DIAGNOSIS — O24.013 TYPE 1 DIABETES MELLITUS DURING PREGNANCY IN THIRD TRIMESTER: ICD-10-CM

## 2021-07-02 PROCEDURE — 76819 FETAL BIOPHYS PROFIL W/O NST: CPT | Mod: 26 | Performed by: OBSTETRICS & GYNECOLOGY

## 2021-07-02 PROCEDURE — 99207 PR PRENATAL VISIT: CPT | Performed by: OBSTETRICS & GYNECOLOGY

## 2021-07-02 PROCEDURE — 76819 FETAL BIOPHYS PROFIL W/O NST: CPT

## 2021-07-02 RX ORDER — IBUPROFEN 600 MG/1
600 TABLET, FILM COATED ORAL EVERY 6 HOURS PRN
Qty: 60 TABLET | Refills: 0 | Status: ON HOLD | COMMUNITY
Start: 2021-07-02 | End: 2021-07-21

## 2021-07-02 RX ORDER — AMOXICILLIN 250 MG
1 CAPSULE ORAL DAILY
Qty: 100 TABLET | Refills: 0 | Status: ON HOLD | COMMUNITY
Start: 2021-07-02 | End: 2021-07-21

## 2021-07-02 RX ORDER — ACETAMINOPHEN 325 MG/1
650 TABLET ORAL EVERY 6 HOURS PRN
Qty: 100 TABLET | Refills: 0 | Status: ON HOLD | COMMUNITY
Start: 2021-07-02 | End: 2021-07-21

## 2021-07-02 ASSESSMENT — MIFFLIN-ST. JEOR: SCORE: 1608.96

## 2021-07-02 NOTE — PROGRESS NOTES
36w6d  Active fetal movement. Rare contractions. No leaking, bleeding or abnormal discharge.   BG mostly in range, >90%  IOL scheduled, aware 2 IVs (one for possible insulin)  BPP today 8/8, cephalic    GBS: Negative  Hemoglobin   Date Value Ref Range Status   06/25/2021 12.2 11.7 - 15.7 g/dL Final       Breast pump rx: Not done, need to discuss next time  Labor orders: Not Done  Birth plan: planning no meds  Length of stay: discussed   Disability paperwork: will bring next time  Resident involvement: discussed and agrees.  OTC postpartum meds: will get OTC     Reviewed labor instructions, kick counts, s/sx pre-eclampsia.  RTC weekly.  Silvia Gonzalez MD

## 2021-07-05 ENCOUNTER — HOSPITAL ENCOUNTER (OUTPATIENT)
Dept: ULTRASOUND IMAGING | Facility: CLINIC | Age: 36
End: 2021-07-05
Attending: OBSTETRICS & GYNECOLOGY
Payer: COMMERCIAL

## 2021-07-05 ENCOUNTER — OFFICE VISIT (OUTPATIENT)
Dept: MATERNAL FETAL MEDICINE | Facility: CLINIC | Age: 36
End: 2021-07-05
Attending: OBSTETRICS & GYNECOLOGY
Payer: COMMERCIAL

## 2021-07-05 DIAGNOSIS — O24.013 TYPE 1 DIABETES MELLITUS DURING PREGNANCY IN THIRD TRIMESTER: Primary | ICD-10-CM

## 2021-07-05 DIAGNOSIS — O24.013 TYPE 1 DIABETES MELLITUS DURING PREGNANCY IN THIRD TRIMESTER: ICD-10-CM

## 2021-07-05 PROCEDURE — 76819 FETAL BIOPHYS PROFIL W/O NST: CPT | Mod: 26 | Performed by: OBSTETRICS & GYNECOLOGY

## 2021-07-05 PROCEDURE — 76819 FETAL BIOPHYS PROFIL W/O NST: CPT

## 2021-07-05 NOTE — PROGRESS NOTES
The patient was seen for an ultrasound in the Maternal-Fetal Medicine Center at the [facility] today.  For a detailed report of the ultrasound examination, please see the ultrasound report which can be found under the imaging tab.    Nicole Pond MD  , OB/GYN  Maternal-Fetal Medicine  [phone] (Pager)

## 2021-07-09 ENCOUNTER — OFFICE VISIT (OUTPATIENT)
Dept: MATERNAL FETAL MEDICINE | Facility: CLINIC | Age: 36
End: 2021-07-09
Attending: OBSTETRICS & GYNECOLOGY
Payer: COMMERCIAL

## 2021-07-09 ENCOUNTER — PRENATAL OFFICE VISIT (OUTPATIENT)
Dept: OBGYN | Facility: CLINIC | Age: 36
End: 2021-07-09
Payer: COMMERCIAL

## 2021-07-09 ENCOUNTER — HOSPITAL ENCOUNTER (OUTPATIENT)
Dept: ULTRASOUND IMAGING | Facility: CLINIC | Age: 36
End: 2021-07-09
Attending: OBSTETRICS & GYNECOLOGY
Payer: COMMERCIAL

## 2021-07-09 ENCOUNTER — TELEPHONE (OUTPATIENT)
Dept: OBGYN | Facility: CLINIC | Age: 36
End: 2021-07-09

## 2021-07-09 VITALS
WEIGHT: 205 LBS | HEART RATE: 78 BPM | SYSTOLIC BLOOD PRESSURE: 118 MMHG | BODY MASS INDEX: 34.16 KG/M2 | OXYGEN SATURATION: 98 % | HEIGHT: 65 IN | DIASTOLIC BLOOD PRESSURE: 77 MMHG

## 2021-07-09 DIAGNOSIS — O24.013 TYPE 1 DIABETES MELLITUS DURING PREGNANCY IN THIRD TRIMESTER: Primary | ICD-10-CM

## 2021-07-09 DIAGNOSIS — O24.013 TYPE 1 DIABETES MELLITUS DURING PREGNANCY IN THIRD TRIMESTER: ICD-10-CM

## 2021-07-09 DIAGNOSIS — O09.519 ENCOUNTER FOR SUPERVISION OF HIGH RISK PREGNANCY DUE TO ADVANCED MATERNAL AGE IN PRIMIGRAVIDA: Primary | ICD-10-CM

## 2021-07-09 PROCEDURE — 76819 FETAL BIOPHYS PROFIL W/O NST: CPT | Mod: 26 | Performed by: OBSTETRICS & GYNECOLOGY

## 2021-07-09 PROCEDURE — 76819 FETAL BIOPHYS PROFIL W/O NST: CPT

## 2021-07-09 PROCEDURE — 99207 PR PRENATAL VISIT: CPT | Performed by: OBSTETRICS & GYNECOLOGY

## 2021-07-09 ASSESSMENT — MIFFLIN-ST. JEOR: SCORE: 1620.75

## 2021-07-09 NOTE — PROGRESS NOTES
Has IOL scheduled 7/17 and will plan for covid test on Tuesday when here already for appt. No questions. Has appt next Friday and discussed can check cx then and possibly sweep membranes. BE

## 2021-07-09 NOTE — PROGRESS NOTES
"Please see \"Imaging\" tab under \"Chart Review\" for details of today's US at the [facility].    Benny Carmichael MD  Maternal-Fetal Medicine      "

## 2021-07-12 ENCOUNTER — TELEPHONE (OUTPATIENT)
Dept: OBGYN | Facility: CLINIC | Age: 36
End: 2021-07-12

## 2021-07-13 ENCOUNTER — LAB (OUTPATIENT)
Dept: LAB | Facility: CLINIC | Age: 36
End: 2021-07-13

## 2021-07-13 ENCOUNTER — HOSPITAL ENCOUNTER (OUTPATIENT)
Dept: ULTRASOUND IMAGING | Facility: CLINIC | Age: 36
End: 2021-07-13
Attending: OBSTETRICS & GYNECOLOGY
Payer: COMMERCIAL

## 2021-07-13 ENCOUNTER — OFFICE VISIT (OUTPATIENT)
Dept: MATERNAL FETAL MEDICINE | Facility: CLINIC | Age: 36
End: 2021-07-13
Attending: OBSTETRICS & GYNECOLOGY
Payer: COMMERCIAL

## 2021-07-13 DIAGNOSIS — O24.013 TYPE 1 DIABETES MELLITUS DURING PREGNANCY IN THIRD TRIMESTER: Primary | ICD-10-CM

## 2021-07-13 DIAGNOSIS — Z11.59 ENCOUNTER FOR SCREENING FOR OTHER VIRAL DISEASES: ICD-10-CM

## 2021-07-13 DIAGNOSIS — O24.013 TYPE 1 DIABETES MELLITUS DURING PREGNANCY IN THIRD TRIMESTER: ICD-10-CM

## 2021-07-13 PROCEDURE — 76819 FETAL BIOPHYS PROFIL W/O NST: CPT

## 2021-07-13 PROCEDURE — U0003 INFECTIOUS AGENT DETECTION BY NUCLEIC ACID (DNA OR RNA); SEVERE ACUTE RESPIRATORY SYNDROME CORONAVIRUS 2 (SARS-COV-2) (CORONAVIRUS DISEASE [COVID-19]), AMPLIFIED PROBE TECHNIQUE, MAKING USE OF HIGH THROUGHPUT TECHNOLOGIES AS DESCRIBED BY CMS-2020-01-R: HCPCS

## 2021-07-13 PROCEDURE — U0005 INFEC AGEN DETEC AMPLI PROBE: HCPCS

## 2021-07-13 PROCEDURE — 76819 FETAL BIOPHYS PROFIL W/O NST: CPT | Mod: 26 | Performed by: OBSTETRICS & GYNECOLOGY

## 2021-07-13 NOTE — PROGRESS NOTES
The patient was seen for an ultrasound in the Maternal-Fetal Medicine Center at the [facility] today.  For a detailed report of the ultrasound examination, please see the ultrasound report which can be found under the imaging tab.    Nicole Pond MD  , OB/GYN  Maternal-Fetal Medicine  [phone] (Pager)

## 2021-07-14 LAB — SARS-COV-2 RNA RESP QL NAA+PROBE: NEGATIVE

## 2021-07-16 ENCOUNTER — HOSPITAL ENCOUNTER (OUTPATIENT)
Dept: ULTRASOUND IMAGING | Facility: CLINIC | Age: 36
End: 2021-07-16
Attending: OBSTETRICS & GYNECOLOGY
Payer: COMMERCIAL

## 2021-07-16 ENCOUNTER — OFFICE VISIT (OUTPATIENT)
Dept: MATERNAL FETAL MEDICINE | Facility: CLINIC | Age: 36
End: 2021-07-16
Attending: OBSTETRICS & GYNECOLOGY
Payer: COMMERCIAL

## 2021-07-16 ENCOUNTER — PRENATAL OFFICE VISIT (OUTPATIENT)
Dept: OBGYN | Facility: CLINIC | Age: 36
End: 2021-07-16
Payer: COMMERCIAL

## 2021-07-16 VITALS
BODY MASS INDEX: 34.35 KG/M2 | SYSTOLIC BLOOD PRESSURE: 125 MMHG | OXYGEN SATURATION: 99 % | WEIGHT: 206.4 LBS | DIASTOLIC BLOOD PRESSURE: 85 MMHG | HEART RATE: 85 BPM

## 2021-07-16 DIAGNOSIS — O24.013 TYPE 1 DIABETES MELLITUS DURING PREGNANCY IN THIRD TRIMESTER: Primary | ICD-10-CM

## 2021-07-16 DIAGNOSIS — O24.013 TYPE 1 DIABETES MELLITUS COMPLICATING PREGNANCY, ANTEPARTUM, THIRD TRIMESTER: Primary | ICD-10-CM

## 2021-07-16 DIAGNOSIS — O24.013 TYPE 1 DIABETES MELLITUS DURING PREGNANCY IN THIRD TRIMESTER: ICD-10-CM

## 2021-07-16 PROCEDURE — 76819 FETAL BIOPHYS PROFIL W/O NST: CPT | Mod: 26 | Performed by: OBSTETRICS & GYNECOLOGY

## 2021-07-16 PROCEDURE — 99207 PR PRENATAL VISIT: CPT | Performed by: OBSTETRICS & GYNECOLOGY

## 2021-07-16 PROCEDURE — 76819 FETAL BIOPHYS PROFIL W/O NST: CPT

## 2021-07-16 PROCEDURE — 76816 OB US FOLLOW-UP PER FETUS: CPT

## 2021-07-16 PROCEDURE — 76816 OB US FOLLOW-UP PER FETUS: CPT | Mod: 26 | Performed by: OBSTETRICS & GYNECOLOGY

## 2021-07-16 NOTE — PROGRESS NOTES
Please see the imaging tab for details of the ultrasound performed today.    Zara Gonzalez MD  Specialist in Maternal-Fetal Medicine

## 2021-07-17 ENCOUNTER — HOSPITAL ENCOUNTER (INPATIENT)
Facility: CLINIC | Age: 36
LOS: 4 days | Discharge: HOME | End: 2021-07-21
Attending: OBSTETRICS & GYNECOLOGY | Admitting: OBSTETRICS & GYNECOLOGY
Payer: COMMERCIAL

## 2021-07-17 ENCOUNTER — HOSPITAL ENCOUNTER (OUTPATIENT)
Dept: OBGYN | Facility: CLINIC | Age: 36
End: 2021-07-17
Attending: OBSTETRICS & GYNECOLOGY
Payer: COMMERCIAL

## 2021-07-17 DIAGNOSIS — Z98.891 S/P CESAREAN SECTION: Primary | ICD-10-CM

## 2021-07-17 DIAGNOSIS — Z34.90 SUPERVISION OF NORMAL PREGNANCY: ICD-10-CM

## 2021-07-17 LAB
ABO/RH(D): NORMAL
ANTIBODY SCREEN: NEGATIVE
BASOPHILS # BLD AUTO: 0 10E3/UL (ref 0–0.2)
BASOPHILS NFR BLD AUTO: 0 %
EOSINOPHIL # BLD AUTO: 0 10E3/UL (ref 0–0.7)
EOSINOPHIL NFR BLD AUTO: 0 %
ERYTHROCYTE [DISTWIDTH] IN BLOOD BY AUTOMATED COUNT: 13 % (ref 10–15)
GLUCOSE BLDC GLUCOMTR-MCNC: 104 MG/DL (ref 70–99)
GLUCOSE BLDC GLUCOMTR-MCNC: 49 MG/DL (ref 70–99)
GLUCOSE BLDC GLUCOMTR-MCNC: 81 MG/DL (ref 70–99)
GLUCOSE BLDC GLUCOMTR-MCNC: 88 MG/DL (ref 70–99)
GLUCOSE BLDC GLUCOMTR-MCNC: 92 MG/DL (ref 70–99)
HBA1C MFR BLD: 5.4 % (ref 0–5.6)
HCT VFR BLD AUTO: 35 % (ref 35–47)
HGB BLD-MCNC: 11.6 G/DL (ref 11.7–15.7)
HOLD SPECIMEN: NORMAL
IMM GRANULOCYTES # BLD: 0 10E3/UL
IMM GRANULOCYTES NFR BLD: 0 %
LYMPHOCYTES # BLD AUTO: 2 10E3/UL (ref 0.8–5.3)
LYMPHOCYTES NFR BLD AUTO: 23 %
MCH RBC QN AUTO: 28.8 PG (ref 26.5–33)
MCHC RBC AUTO-ENTMCNC: 33.1 G/DL (ref 31.5–36.5)
MCV RBC AUTO: 87 FL (ref 78–100)
MONOCYTES # BLD AUTO: 0.6 10E3/UL (ref 0–1.3)
MONOCYTES NFR BLD AUTO: 6 %
NEUTROPHILS # BLD AUTO: 6.2 10E3/UL (ref 1.6–8.3)
NEUTROPHILS NFR BLD AUTO: 71 %
NRBC # BLD AUTO: 0 10E3/UL
NRBC BLD AUTO-RTO: 0 /100
PLATELET # BLD AUTO: 307 10E3/UL (ref 150–450)
RBC # BLD AUTO: 4.03 10E6/UL (ref 3.8–5.2)
SPECIMEN EXPIRATION DATE: NORMAL
WBC # BLD AUTO: 8.9 10E3/UL (ref 4–11)

## 2021-07-17 PROCEDURE — 85025 COMPLETE CBC W/AUTO DIFF WBC: CPT | Performed by: STUDENT IN AN ORGANIZED HEALTH CARE EDUCATION/TRAINING PROGRAM

## 2021-07-17 PROCEDURE — 86900 BLOOD TYPING SEROLOGIC ABO: CPT | Performed by: STUDENT IN AN ORGANIZED HEALTH CARE EDUCATION/TRAINING PROGRAM

## 2021-07-17 PROCEDURE — 99222 1ST HOSP IP/OBS MODERATE 55: CPT | Performed by: PHYSICIAN ASSISTANT

## 2021-07-17 PROCEDURE — 250N000012 HC RX MED GY IP 250 OP 636 PS 637: Performed by: OBSTETRICS & GYNECOLOGY

## 2021-07-17 PROCEDURE — 36415 COLL VENOUS BLD VENIPUNCTURE: CPT | Performed by: STUDENT IN AN ORGANIZED HEALTH CARE EDUCATION/TRAINING PROGRAM

## 2021-07-17 PROCEDURE — 120N000002 HC R&B MED SURG/OB UMMC

## 2021-07-17 PROCEDURE — 83036 HEMOGLOBIN GLYCOSYLATED A1C: CPT | Performed by: STUDENT IN AN ORGANIZED HEALTH CARE EDUCATION/TRAINING PROGRAM

## 2021-07-17 PROCEDURE — 250N000013 HC RX MED GY IP 250 OP 250 PS 637: Performed by: STUDENT IN AN ORGANIZED HEALTH CARE EDUCATION/TRAINING PROGRAM

## 2021-07-17 PROCEDURE — 99232 SBSQ HOSP IP/OBS MODERATE 35: CPT | Mod: GC | Performed by: OBSTETRICS & GYNECOLOGY

## 2021-07-17 PROCEDURE — 86780 TREPONEMA PALLIDUM: CPT | Performed by: STUDENT IN AN ORGANIZED HEALTH CARE EDUCATION/TRAINING PROGRAM

## 2021-07-17 RX ORDER — OXYTOCIN 10 [USP'U]/ML
10 INJECTION, SOLUTION INTRAMUSCULAR; INTRAVENOUS
Status: DISCONTINUED | OUTPATIENT
Start: 2021-07-17 | End: 2021-07-19

## 2021-07-17 RX ORDER — METOCLOPRAMIDE HYDROCHLORIDE 5 MG/ML
10 INJECTION INTRAMUSCULAR; INTRAVENOUS EVERY 6 HOURS PRN
Status: DISCONTINUED | OUTPATIENT
Start: 2021-07-17 | End: 2021-07-19

## 2021-07-17 RX ORDER — MISOPROSTOL 100 UG/1
25 TABLET ORAL EVERY 4 HOURS PRN
Status: DISCONTINUED | OUTPATIENT
Start: 2021-07-17 | End: 2021-07-19

## 2021-07-17 RX ORDER — DEXTROSE MONOHYDRATE 25 G/50ML
25-50 INJECTION, SOLUTION INTRAVENOUS
Status: DISCONTINUED | OUTPATIENT
Start: 2021-07-17 | End: 2021-07-19

## 2021-07-17 RX ORDER — LEVOTHYROXINE SODIUM 25 UG/1
25 TABLET ORAL DAILY
Status: DISCONTINUED | OUTPATIENT
Start: 2021-07-17 | End: 2021-07-17

## 2021-07-17 RX ORDER — METHYLERGONOVINE MALEATE 0.2 MG/ML
200 INJECTION INTRAVENOUS
Status: DISCONTINUED | OUTPATIENT
Start: 2021-07-17 | End: 2021-07-19

## 2021-07-17 RX ORDER — CARBOPROST TROMETHAMINE 250 UG/ML
250 INJECTION, SOLUTION INTRAMUSCULAR
Status: DISCONTINUED | OUTPATIENT
Start: 2021-07-17 | End: 2021-07-19

## 2021-07-17 RX ORDER — KETOROLAC TROMETHAMINE 30 MG/ML
30 INJECTION, SOLUTION INTRAMUSCULAR; INTRAVENOUS
Status: DISCONTINUED | OUTPATIENT
Start: 2021-07-17 | End: 2021-07-19

## 2021-07-17 RX ORDER — METOCLOPRAMIDE 10 MG/1
10 TABLET ORAL EVERY 6 HOURS PRN
Status: DISCONTINUED | OUTPATIENT
Start: 2021-07-17 | End: 2021-07-19

## 2021-07-17 RX ORDER — TRANEXAMIC ACID 10 MG/ML
1 INJECTION, SOLUTION INTRAVENOUS EVERY 30 MIN PRN
Status: DISCONTINUED | OUTPATIENT
Start: 2021-07-17 | End: 2021-07-19

## 2021-07-17 RX ORDER — NALOXONE HYDROCHLORIDE 0.4 MG/ML
0.2 INJECTION, SOLUTION INTRAMUSCULAR; INTRAVENOUS; SUBCUTANEOUS
Status: DISCONTINUED | OUTPATIENT
Start: 2021-07-17 | End: 2021-07-19

## 2021-07-17 RX ORDER — TERBUTALINE SULFATE 1 MG/ML
0.25 INJECTION, SOLUTION SUBCUTANEOUS
Status: DISCONTINUED | OUTPATIENT
Start: 2021-07-17 | End: 2021-07-18

## 2021-07-17 RX ORDER — PROCHLORPERAZINE 25 MG
25 SUPPOSITORY, RECTAL RECTAL EVERY 12 HOURS PRN
Status: DISCONTINUED | OUTPATIENT
Start: 2021-07-17 | End: 2021-07-19

## 2021-07-17 RX ORDER — LEVOTHYROXINE SODIUM 25 UG/1
25 TABLET ORAL DAILY
Status: DISCONTINUED | OUTPATIENT
Start: 2021-07-18 | End: 2021-07-21 | Stop reason: HOSPADM

## 2021-07-17 RX ORDER — MISOPROSTOL 200 UG/1
800 TABLET ORAL
Status: DISCONTINUED | OUTPATIENT
Start: 2021-07-17 | End: 2021-07-19

## 2021-07-17 RX ORDER — NICOTINE POLACRILEX 4 MG
15-30 LOZENGE BUCCAL
Status: DISCONTINUED | OUTPATIENT
Start: 2021-07-17 | End: 2021-07-19

## 2021-07-17 RX ORDER — ONDANSETRON 2 MG/ML
4 INJECTION INTRAMUSCULAR; INTRAVENOUS EVERY 6 HOURS PRN
Status: DISCONTINUED | OUTPATIENT
Start: 2021-07-17 | End: 2021-07-19

## 2021-07-17 RX ORDER — FENTANYL CITRATE 50 UG/ML
50-100 INJECTION, SOLUTION INTRAMUSCULAR; INTRAVENOUS
Status: DISCONTINUED | OUTPATIENT
Start: 2021-07-17 | End: 2021-07-19

## 2021-07-17 RX ORDER — ONDANSETRON 4 MG/1
4 TABLET, ORALLY DISINTEGRATING ORAL EVERY 6 HOURS PRN
Status: DISCONTINUED | OUTPATIENT
Start: 2021-07-17 | End: 2021-07-19

## 2021-07-17 RX ORDER — OXYTOCIN/0.9 % SODIUM CHLORIDE 30/500 ML
100-340 PLASTIC BAG, INJECTION (ML) INTRAVENOUS CONTINUOUS PRN
Status: DISCONTINUED | OUTPATIENT
Start: 2021-07-17 | End: 2021-07-19

## 2021-07-17 RX ORDER — PROCHLORPERAZINE MALEATE 10 MG
10 TABLET ORAL EVERY 6 HOURS PRN
Status: DISCONTINUED | OUTPATIENT
Start: 2021-07-17 | End: 2021-07-19

## 2021-07-17 RX ORDER — OXYTOCIN/0.9 % SODIUM CHLORIDE 30/500 ML
340 PLASTIC BAG, INJECTION (ML) INTRAVENOUS CONTINUOUS PRN
Status: DISCONTINUED | OUTPATIENT
Start: 2021-07-17 | End: 2021-07-19

## 2021-07-17 RX ORDER — NALOXONE HYDROCHLORIDE 0.4 MG/ML
0.4 INJECTION, SOLUTION INTRAMUSCULAR; INTRAVENOUS; SUBCUTANEOUS
Status: DISCONTINUED | OUTPATIENT
Start: 2021-07-17 | End: 2021-07-19

## 2021-07-17 RX ORDER — MISOPROSTOL 200 UG/1
400 TABLET ORAL
Status: DISCONTINUED | OUTPATIENT
Start: 2021-07-17 | End: 2021-07-19

## 2021-07-17 RX ADMIN — INSULIN DETEMIR 5.5 UNITS: 100 INJECTION, SOLUTION SUBCUTANEOUS at 22:21

## 2021-07-17 RX ADMIN — Medication 25 MCG: at 16:49

## 2021-07-17 RX ADMIN — Medication 25 MCG: at 23:50

## 2021-07-17 RX ADMIN — Medication 25 MCG: at 11:57

## 2021-07-17 ASSESSMENT — ACTIVITIES OF DAILY LIVING (ADL)
PATIENT_/_FAMILY_COMMUNICATION_STYLE: SPOKEN LANGUAGE (ENGLISH OR BILINGUAL)
DIFFICULTY_COMMUNICATING: NO
WEAR_GLASSES_OR_BLIND: YES
VISION_MANAGEMENT: GLASSES FOR DRIVING
DOING_ERRANDS_INDEPENDENTLY_DIFFICULTY: NO
HEARING_DIFFICULTY_OR_DEAF: NO
DIFFICULTY_EATING/SWALLOWING: NO
WALKING_OR_CLIMBING_STAIRS_DIFFICULTY: NO
FALL_HISTORY_WITHIN_LAST_SIX_MONTHS: NO
DRESSING/BATHING_DIFFICULTY: NO
CONCENTRATING,_REMEMBERING_OR_MAKING_DECISIONS_DIFFICULTY: NO
TOILETING_ISSUES: NO

## 2021-07-17 NOTE — H&P
[facility]  OB History and Physical      Denia Montes    MRN# [id number]  YOB: 1985      CC:  IOL    HPI:  Ms. Denia Montes is a 35 year old  at 39w0d by LMP cw 8w4d US, who presents for IOL for T1DM.    Her diabetes has been well controlled in pregnancy, her baby is measuring normal sized and A1c has remained stable. Having a baby boy.      She reports normal fetal movement. She denies contractions, vaginal bleeding, and loss of fluid.  She denies HA, changes in vision, RUQ pain, N/V/D, or increased edema.    Pregnancy Complicated by:  - T1DM  - Hashimotos  - Obesity BMI 34  - AMA    Prenatal Labs:   Lab Results   Component Value Date    ABO O 01/15/2021    RH Pos 01/15/2021    AS Neg 01/15/2021    HEPBANG Nonreactive 2020    CHPCRT  2016     Negative   Negative for C. trachomatis rRNA by transcription mediated amplification.   A negative result by transcription mediated amplification does not preclude the   presence of C. trachomatis infection because results are dependent on proper   and adequate collection, absence of inhibitors, and sufficient rRNA to be   detected.      GCPCRT  2016     Negative   Negative for N. gonorrhoeae rRNA by transcription mediated amplification.   A negative result by transcription mediated amplification does not preclude the   presence of N. gonorrhoeae infection because results are dependent on proper   and adequate collection, absence of inhibitors, and sufficient rRNA to be   detected.      HGB 12.2 2021       GBS Status:   Lab Results   Component Value Date    GBS Negative 2021       Ultrasounds  1. 21: EFW 52%, AC 40%, MVP 8cm TANYA 16cm  2. 3: Anatomy wnl, anterior placenta, BRITTANI 8mm   3. 21: 15w4d, BRITTANI  4. 20: 9w2d dating    OB History  OB History    Para Term  AB Living   3 0 0 0 2 0   SAB TAB Ectopic Multiple Live Births   1 1 0 0 0      # Outcome Date  GA Lbr Alexandro/2nd Weight Sex Delivery Anes PTL Lv   3 Current            2 SAB 06/26/20 8w6d    SAB      1 TAB      TAB          PMHx:   Past Medical History:   Diagnosis Date     Cervical high risk HPV (human papillomavirus) test positive 08/2015 8/2015, 09/23/16, 12/14/18, 12/16/19, 12/29/20     Diabetes mellitus (H)      Hx of colposcopy with cervical biopsy 10/21/2016    [place] Bx: Neg NIL, ECC: Neg.     PSHx:   Past Surgical History:   Procedure Laterality Date     NO HISTORY OF SURGERY       Meds:   Medications Prior to Admission   Medication Sig Dispense Refill Last Dose     ACE/ARB NOT PRESCRIBED, INTENTIONAL, Please choose reason not prescribed, below (Patient not taking: Reported on 6/4/2021)        acetaminophen (TYLENOL) 325 MG tablet Take 2 tablets (650 mg) by mouth every 6 hours as needed for mild pain Start after Delivery. 100 tablet 0      acetone urine (KETOSTIX) test strip Use to check urine for ketones every morning. 50 strip 11      ASPIRIN PO Take 81 mg by mouth        blood glucose (JACI CONTOUR) test strip Check blood glucose 6 times a day (before and 2 hours after each meal) 600 strip 3      blood glucose (CONTOUR NEXT TEST) test strip Use to test blood sugar 6 times daily or as directed. 500 each 3      Continuous Blood Gluc  (FREESTYLE JERO 14 DAY READER) RAEANN Use to read blood sugars as per 's instructions. 1 each 0      Continuous Blood Gluc Sensor (ModuleQSTYLE JERO 14 DAY SENSOR) MISC Change every 14 days. 9 each 3      ibuprofen (ADVIL/MOTRIN) 600 MG tablet Take 1 tablet (600 mg) by mouth every 6 hours as needed for moderate pain Start after delivery 60 tablet 0      insulin aspart (NOVOLOG PEN) 100 UNIT/ML pen Take 1u:8g breakfast, 1u:8g lunch, 1u:12g dinner, + 1u/50mg/dL>120mg/dL +2u prime before each dose. Uses up to 40 units daily. 45 mL 3      insulin detemir (LEVEMIR FLEXTOUCH) 100 UNIT/ML pen Inject 24 units subcutaneous in the morning and 11 units before bed  45 mL 3      insulin pen needle (BD RUSSELL U/F) 32G X 4 MM miscellaneous USE 8 PEN NEEDLES DAILY 1000 each 11      levothyroxine (SYNTHROID/LEVOTHROID) 25 MCG tablet Take 1 tablet (25 mcg) by mouth daily 90 tablet 3      metFORMIN (GLUCOPHAGE) 500 MG tablet Take 500 mg by mouth daily (with breakfast) Currently pregnant so not taking it .        MICROLET LANCETS MISC TEST BLOOD SUGAR TWICE DAILY 200 each 11      Omega-3 Fatty Acids (FISH OIL) 500 MG CAPS         Prenatal Vit-Fe Fumarate-FA (PRENATAL VITAMINS) 28-0.8 MG TABS Take 1 tablet by mouth daily 100 tablet 3      senna-docusate (SENOKOT-S/PERICOLACE) 8.6-50 MG tablet Take 1 tablet by mouth daily Start after delivery. 100 tablet 0      vitamin B-12 (CYANOCOBALAMIN) 100 MCG tablet Take 1,000 mcg by mouth daily        Allergies:    Allergies   Allergen Reactions     Sulfa Drugs Hives      FmHx:   Family History   Problem Relation Age of Onset     Diabetes Mother      Hyperlipidemia Mother      Kidney Disease Mother      Thyroid Disease Mother      Hyperlipidemia Father      Hypertension Father      Kidney Disease Father      Substance Abuse Brother      SocHx: She denies any tobacco, alcohol, or other drug use during this pregnancy.    ROS:   Complete 10-point ROS negative except as noted in HPI.    PE:  Vit:   Patient Vitals for the past 4 hrs:   BP Temp Resp   21 1040 131/87 99.3  F (37.4  C) 16      Gen: Well-appearing, NAD, comfortable   CV: RRR  Pulm: Normal work of breathing   Abd: Soft, gravid, non-tender   Ext: No LE edema b/l  Cx: C/L/H    Pres:  Ceph by BSUS  EFW:  7lbs by leopold  Memb: Intact              FHT: Baseline 150, mod variability, pos accelerations, no decelerations   [place]: 0-3 contractions in 10 minutes, not feeling any contractions      Assessment  Ms. Denia Montes is a 35 year old , at 39w0d by LMP cw 8w4d US, who presents for IOL for T1DM, well controlled.    Plan  - Labor Induction    - Admit to L&D for IOL   - Fen/GI:  Regular diet early labor   - SVE: Closed   - Membranes: Intact   - GBS Neg   - Plan: Start ripening with vaginal misoprostol. Consider balloon if not progressing   - Pain management: Desires natural methods for analgesia    - T1DM   - PTA Levemir 24U/11U with 1U/5g breakfast, 1U/4.5g lunch, 1U/6.5g dinner   - She is s/p morning Levemir, continue carb coverage for meals    - Depending on labor progress tonight, will consider dosing of Levemir    - QID BG with MSSI    - In active labor switch to q1H BG with insulin gtt as needed    -Fetal Well Being   - Category 1 FHT. Reactive and reassuring   - Cephalic by BSUS. EFW 7lbs   - Continue EFM and [place]    - PNC:    - Rh pos. Rubella immune. GBS neg   - Placenta: anterior    The patient was discussed with Dr. South who is in agreement with the treatment plan.    Bárbara Pat MD PGY2  Obstetrics & Gynecology  07/17/21

## 2021-07-17 NOTE — PROGRESS NOTES
[facility]  Labor Progress Note    Subjective:  Denia Montes is doing well, not feeling much yet.    Objective:   No data found.  SVE: Deferred    FHT: Baseline 130, mod variability, pos accelerations, no decelerations  [place]: 3-4 contractions in 10 minutes    Assessment/Plan:  Ms. Denia Montes is a 35 year old  at 39w0d by LMP cw 8w4d US, who presents for IOL for T1DM, well controlled.    - Labor Induction                - Fen/GI: Regular diet early labor               - SVE: Closed               - Membranes: Intact               - GBS Neg               - Plan: S/p Miso x2. SVE when 3rd dose due to assess for balloon               - Pain management: Desires natural methods for analgesia     - T1DM               - PTA Levemir 24U/11U with 1U/5g breakfast, 1U/4.5g lunch, 1U/6.5g dinner               - She is s/p morning Levemir   - S/p Endocrine consult, recommend no Levemir for PM, and titrating mealtime dosing to 1U/10g CHO lunch and 12g CHO dinner               - QID BG with MSSI                - In active labor switch to q1H BG with insulin gtt as needed    -Fetal Well Being   - Category 1 FHT. Reactive and reassuring   - Continue EFM and [place]    Bárbara Pat MD PGY2  Obstetrics & Gynecology  21 4:08 PM

## 2021-07-17 NOTE — PROGRESS NOTES
S; feeling mild cramping on and off.  Had low BS prior to lunch, asymptomatic.  Adjustments to regimen made by endo    O: /75   Temp 99.3  F (37.4  C)   Resp 16   LMP 10/17/2020     FHT: 135, +accels, -decels, mod jhonatan  [place]: Q2-4min    A/P: IUP at 39w0d, IOL for T1DM   FWBR, cat 1 tracing   Cont miso IOL    DERRICK WILSON MD

## 2021-07-17 NOTE — CONSULTS
"NEW INPATIENT DIABETES MANAGEMENT CONSULT  Denia Montes  Age: 35 year old  MRN # [id number]   YOB: 1985    Chief Complaint: induction of labor   Reason for Consult: \"antepartum diabetes management for Type I, Type II, GDM admitted with uncontrolled hyperglycemia and/or ambulatory insulin pump\"  Consulting Provider: Bárbara Pat MD    History of Present Illness:   Deina is a 35 year old  female at 39 weeks, with a history of MAGDI (currently treated as Type 1, with insulin. She was admitted today for induction of labor.     Diabetes history: initially diagnosed at [facility], confirmed MAGDI 2-3 years ago, with positive ANNA Ab, most recent C-peptide remaining normal.     She has a history of early miscarriage <8 weeks pregnant 2020.  She also has Hashimoto thyroiditis, on Levothyroxine with last TSH 1.27 on 3/22/21.     Prior to pregnancy: following with Dr. Hansen, was on Levemir 12-14 units BID and Metformin. Novolog insulin was added just prior to this pregnancy to improve prandial control to pregnancy targets: 1:10g CHO with breakfast and 1:12g with lunch and dinner, plus a 1/50 correction scale >150. She reports that, prior to this, her BG were reasonably controlled to non-pregnant targets without the Novolog.    This pregnancy: following with Dr Hansen. Metformin was stopped early on in pregnancy.  Is on Levemir 24 units AM, 11 units PM, along with Novolog 1:5g with breakfast, 1:4.5g with lunch, and 1:6.5g with dinner, plus a 1/50 correction scale >120.     BG per most recent follow up 21 show most BG have been in target for pregnancy (above target values had been posptrandials). She has not been covering snacks lately, as this had caused hypoglycemia a couple times. She is symptomatic with lows, which were also occasional occurences, usually in between meals.     She  took her AM Levemir today.  She believes her fasting BG this AM was 72. BG was checked shortly after her " "admission, as she was feeling hypoglycemia sx, and was 42. Corrected now to 92 with juice and planning to eat lunch soon.     Diabetes Mellitus Type: MAGDI  Duration:  Diagnosed as \"T2DM\" about 6 years ago, confirmed as MAGDI 2-3 years ago  Diabetic Complications: none known   Prior to Admission Diabetes Regimen:      BG monitor: CGM, but recently went back to fingersticks, as CGM readings were innacurate    Prior to pregnancy: following with Dr. Hansen, was on Levemir 12-14 units BID and Metformin. Novolog insulin was added prior to pregnancy to improve control: 1:10g CHO with breakfast and 1:12g with lunch and dinner, plus a 1/50 correction scale >150.    This pregnancy: following with Dr Hansen. Metformin was stopped early on in pregnancy.  Is on Levemir 24 units AM, 11 units PM, along with Novolog 1:5g with breakfast, 1:4.5g with lunch, and 1:6.5g with dinner, plus a 1/50 correction scale >120.     Usual BG control PTA: well controlled in pregnancy based upon BG data; A1c is pending on this admission   History of DKA: no  Able to Detect Hypoglycemia: yes   Usual Diabetes Care Provider: Dr. Hansen, [facility] endocrinology   Primary Care Provider: Cynthia Gomez  Factors Impacting IP Glucose Control: induction of labor  Current Diet: Orders Placed This Encounter      Regular Diet Adult     10 point ROS completed with pertinent positives and negatives noted in the HPI  Past medical, family and social histories are reviewed and updated.    Social History    Tobacco: never    Alcohol: no    Marital Status: single     Place of Residence: [place], [place]    Family History   mother has MAGDI.    Physical Exam   /87   Temp 99.3  F (37.4  C)   Resp 16   LMP 10/17/2020   General: pleasant, in no distress, sitting up in bed   HEENT: normocephalic, atraumatic. Oral mucous membranes moist.   Lungs: unlabored respiration, no cough  ABD: gravid abdomen, did not palpate.  Skin: warm and dry, no obvious " lesions  MSK:  moves all extremities  Lymp:  no LE edema   Mental status:  alert, oriented to self, place, time  Psych:  bright affect, calm and appropriate interaction     Most Recent Laboratory Tests:  Recent Labs   Lab 07/17/21  1131   HGB 11.6*     No results for input(s): A1C in the last 168 hours.  No results for input(s): CR in the last 168 hours.  Recent Labs   Lab 07/17/21  1218 07/17/21  1202   GLC 92 49*       Assessment:   1) MAGDI (++ ANNA Ab, with normal C-peptide) treated on insulin, as T1DM  2) Third trimester pregnancy, admitted for IOL.     Hypoglycemia upon this admission, likely due to late meal, versus improved insulin sensitivity with induction of labor    Plan:    -Levemir 24 units daily is currently on board.    -NO PM Levemir ordered.    -recommend initiation of  IV insulin per OB high intensity protocol in active labor, should BG reach threshold >110   -aspart reduced to pre-pregnancy doses with impending induction of labor, and pre-meal hypoglycemia: to 1:10g CHO with lunch, 1:12g CHO with dinner   -custom 1/50 sliding scale >100 AC and >120 HS (pregnancy targets)   -BG monitoring TID AC, 2 hr PP, HS, 0200, then q1-2 hours if/when on IV insulin.    -our service will continue to follow and assist with postpartum insulin orders/ IV insulin transition postpartum    -hypoglycemia protocol   -consistent carb diet with carb counting protocol   -diabetes education needs are not identified   -on discharge, will recommend outpatient follow up with endocrinology service (Dr. Hansen or [facility]) within 2-3 weeks of discharge.     Discussed plan of care with patient, nursing, and primary team paged   Thank you for this consult; Inpatient Diabetes will continue to follow.     To contact Endocrine Diabetes service:   From 8AM-4PM: page inpatient diabetes provider that is following the patient  For questions or updates from 4PM-8AM: page the diabetes job code for on call fellow: [phone]    80  minutes spent on the date of the encounter doing chart review, history and exam, documentation and further activities per the note      Over 50% of my time on the unit was spent counseling the patient and/or coordinating care regarding acute hyperglycemia management.  See note for details.    Davina Waldorn PA-C  Inpatient Diabetes Management Service  Pager [phone]

## 2021-07-17 NOTE — PLAN OF CARE
Denia here for IOL due to Type 1.5 DM at 39 weeks.  Patient placed on monitor, vital signs taken.  Dr. Pat notified of patient arrival.  VSS (initial B/P elevated due to patient crossing her legs), AF and FHT's reactive.  Will continue to monitor patient and await plan of care from MD team.

## 2021-07-18 LAB
GLUCOSE BLDC GLUCOMTR-MCNC: 122 MG/DL (ref 70–99)
GLUCOSE BLDC GLUCOMTR-MCNC: 134 MG/DL (ref 70–99)
GLUCOSE BLDC GLUCOMTR-MCNC: 144 MG/DL (ref 70–99)
GLUCOSE BLDC GLUCOMTR-MCNC: 146 MG/DL (ref 70–99)
GLUCOSE BLDC GLUCOMTR-MCNC: 167 MG/DL (ref 70–99)
GLUCOSE BLDC GLUCOMTR-MCNC: 175 MG/DL (ref 70–99)
GLUCOSE BLDC GLUCOMTR-MCNC: 176 MG/DL (ref 70–99)
GLUCOSE BLDC GLUCOMTR-MCNC: 66 MG/DL (ref 70–99)
GLUCOSE BLDC GLUCOMTR-MCNC: 95 MG/DL (ref 70–99)
GLUCOSE BLDC GLUCOMTR-MCNC: 96 MG/DL (ref 70–99)
T PALLIDUM AB SER QL: NONREACTIVE

## 2021-07-18 PROCEDURE — 258N000003 HC RX IP 258 OP 636: Performed by: STUDENT IN AN ORGANIZED HEALTH CARE EDUCATION/TRAINING PROGRAM

## 2021-07-18 PROCEDURE — 258N000003 HC RX IP 258 OP 636: Performed by: OBSTETRICS & GYNECOLOGY

## 2021-07-18 PROCEDURE — 250N000009 HC RX 250: Performed by: OBSTETRICS & GYNECOLOGY

## 2021-07-18 PROCEDURE — 99232 SBSQ HOSP IP/OBS MODERATE 35: CPT

## 2021-07-18 PROCEDURE — 250N000012 HC RX MED GY IP 250 OP 636 PS 637: Performed by: PHYSICIAN ASSISTANT

## 2021-07-18 PROCEDURE — 250N000009 HC RX 250

## 2021-07-18 PROCEDURE — 120N000002 HC R&B MED SURG/OB UMMC

## 2021-07-18 PROCEDURE — 250N000013 HC RX MED GY IP 250 OP 250 PS 637: Performed by: STUDENT IN AN ORGANIZED HEALTH CARE EDUCATION/TRAINING PROGRAM

## 2021-07-18 RX ORDER — OXYTOCIN/0.9 % SODIUM CHLORIDE 30/500 ML
1-24 PLASTIC BAG, INJECTION (ML) INTRAVENOUS CONTINUOUS
Status: DISCONTINUED | OUTPATIENT
Start: 2021-07-18 | End: 2021-07-19

## 2021-07-18 RX ORDER — LIDOCAINE 40 MG/G
CREAM TOPICAL
Status: DISCONTINUED | OUTPATIENT
Start: 2021-07-18 | End: 2021-07-19

## 2021-07-18 RX ORDER — SODIUM CHLORIDE, SODIUM LACTATE, POTASSIUM CHLORIDE, CALCIUM CHLORIDE 600; 310; 30; 20 MG/100ML; MG/100ML; MG/100ML; MG/100ML
INJECTION, SOLUTION INTRAVENOUS CONTINUOUS
Status: DISCONTINUED | OUTPATIENT
Start: 2021-07-18 | End: 2021-07-19

## 2021-07-18 RX ORDER — TERBUTALINE SULFATE 1 MG/ML
0.25 INJECTION, SOLUTION SUBCUTANEOUS
Status: DISCONTINUED | OUTPATIENT
Start: 2021-07-18 | End: 2021-07-19

## 2021-07-18 RX ADMIN — Medication 2 MILLI-UNITS/MIN: at 19:17

## 2021-07-18 RX ADMIN — SODIUM CHLORIDE, POTASSIUM CHLORIDE, SODIUM LACTATE AND CALCIUM CHLORIDE 500 ML: 600; 310; 30; 20 INJECTION, SOLUTION INTRAVENOUS at 13:07

## 2021-07-18 RX ADMIN — HUMAN INSULIN 0.2 UNITS/HR: 100 INJECTION, SOLUTION SUBCUTANEOUS at 21:10

## 2021-07-18 RX ADMIN — DEXTROSE AND SODIUM CHLORIDE 1000 ML: 5; 900 INJECTION, SOLUTION INTRAVENOUS at 21:08

## 2021-07-18 RX ADMIN — LEVOTHYROXINE SODIUM 25 MCG: 25 TABLET ORAL at 09:02

## 2021-07-18 RX ADMIN — INSULIN ASPART 1 UNITS: 100 INJECTION, SOLUTION INTRAVENOUS; SUBCUTANEOUS at 15:22

## 2021-07-18 RX ADMIN — Medication 25 MCG: at 04:19

## 2021-07-18 RX ADMIN — SODIUM CHLORIDE, POTASSIUM CHLORIDE, SODIUM LACTATE AND CALCIUM CHLORIDE: 600; 310; 30; 20 INJECTION, SOLUTION INTRAVENOUS at 19:15

## 2021-07-18 NOTE — PROGRESS NOTES
Brief progress note    Reviewed FHT given category 2 about 1 hour ago. After 500cc IVF bolus initiated around 1310, variability improved from minimal to moderate and there were FHR accelerations. Some periods of minimal variability since 1400, with recent return to moderate variability. Late decels at 1411 and 1425 but not recurrent. Ctx regular, 4 in 10 min.     Will continue to monitor. Cisneros balloon still in place. Will hold off on any other augmentation at this time.    Discussed w/ Dr. Lisa Olsen MD MSc  OBGYN Resident, PGY3  July 18, 2021, 2:33 PM

## 2021-07-18 NOTE — PROGRESS NOTES
"Inpatient Diabetes consult service (IDS)    Assessment/plan  1. 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 Recommend to start IV insulin at very low rate.  I expect she will require 0.2-0.5 units/hour . This replaces the subcutaneous aspart correction . Check BS hourly.   The subcutaneous prandial insulin should also continue as already ordered.  This may continue until active labor at which time they can change to the active labor IV regular insulin protocol.     2.  Pregnancy 3rd trimester, labor     Kimmy Wade MD    Chief complaint/ HPI  Denia is a 35 year old  female at 39 weeks, with a history of MAGDI (currently treated as Type 1, with insulin.  She was admitted  for induction of labor    Total daily insulin:  : 15.5 units    Ordered DM relevant regimen  aspart 1unit/10 grams carb BF and lunch  aspart 1 units/12 grams of carb supper  Regular insulin IV  aspart correctoin 1/50 starting at 120 at HS and starting at 100 before meals  LT4 25 mcg/day      Active Diet Order  Orders Placed This Encounter      Regular Diet Adult      Recent Labs   Lab 21  1519 21  1330 21  1153 21  0926 21  0258 21  0241   * 134* 175* 95 96 66*         Past DM history:   Diabetes Mellitus Type: MAGDI  Duration:  Diagnosed as \"T2DM\" about 6 years ago, confirmed as MAGDI 2-3 years ago  Diabetic Complications: none known   Prior to Admission Diabetes Regimen:      BG monitor: CGM, but recently went " back to fingersticks, as CGM readings were innacurate     Prior to pregnancy: following with Dr. Hansen, was on Levemir 12-14 units BID and Metformin. Novolog insulin was added prior to pregnancy to improve control: 1:10g CHO with breakfast and 1:12g with lunch and dinner, plus a 1/50 correction scale >150.     This pregnancy: following with Dr Hansen. Metformin was stopped early on in pregnancy.  Is on Levemir 24 units AM, 11 units PM, along with Novolog 1:5g with breakfast, 1:4.5g with lunch, and 1:6.5g with dinner, plus a 1/50 correction scale >120.      History of DKA: no  Able to Detect Hypoglycemia: yes   Usual Diabetes Care Provider: Dr. Hansen, [facility] endocrinology   Primary Care Provider: Cynthia Gomez    ROS  Doing OK  Ate partial BS this AM    Exam  /84   Pulse 65   Temp 98.9  F (37.2  C) (Oral)   Resp 18   LMP 10/17/2020   GENERAL gravid, :lying on her side, in  no distress; FOB at bedside;   SKIN: Visible skin clear. No significant rash, abnormal pigmentation or lesions.  EYES: Eyes grossly normal to inspection.  RESP: No audible wheeze, cough, or visible cyanosis.  No visible retractions or increased work of breathing.    NEURO:Awake, alert, responds appropriately to questions.  Mentation and speech fluent.  PSYCH:affect normal, and appearance well-groomed.    DATA

## 2021-07-18 NOTE — PROGRESS NOTES
S; feeling mild cramping, not painful    O: /80   Temp 99.1  F (37.3  C) (Oral)   Resp 18   LMP 10/17/2020     FHT: 130, +accels, -decels, mod jhonatan  [place]: Q3-4min    SVE: 0/0/-1 medium consistency and posterior    A/P: IUP at 39w0d, IOL for T1DM   FWBR, cat 1 tracing   Cervix not ready for berumen yet, will continue with PV miso   We discussed insulin plan, as she is still eating and unless she kick in to active labor overnight, will likely eat breakfast.  Decided to give 1/2 dose of levemir tonight, 5.5u    DERRICK WILSON MD

## 2021-07-18 NOTE — PLAN OF CARE
Pt alert, active, and stable. No signs or symptoms of distress. VSS. Denies loss of fluid, bleeding, and pain. See flow sheet for FHR interpretation. No questions or concerns at this time. Will continue to monitor pt closely. See flowsheet for blood sugar results.

## 2021-07-18 NOTE — PROGRESS NOTES
[facility] PROGRESS NOTE:   2021 8:57 AM         SUBJECTIVE:   Patient complains of mild contractions.    Contractions:  q 2-4 minutes  Leakage of fluid:  No  Vaginal bleeding:  No  Pain controlled:  Yes           OBJECTIVE:     Vitals:    21 1923 21 2355 21 0538 21 0721   BP: (!) 143/87 121/68 131/85 (!) 141/83   Pulse:   65    Resp:    Temp:   98.3  F (36.8  C) 98.7  F (37.1  C)   TempSrc:   Oral Oral         NST:  Fetal Heart Rate Tracin bpm baseline, mod variability, + accels, no decels  Category 1    Tocometer: q 2-4 minutes    Gen: alert, oriented, no distress  Abdomen:  Gravid, nontender  Cervix:   Dilation: 1   Effacement: 50%   Station:-3   Consistency: average   Position: Posterior    Cisneros bulb placed by me at 0855, 80mL fluid instilled. Patient tolerated procedure well.          LABS:     Recent Results (from the past 12 hour(s))   Glucose by meter    Collection Time: 21  9:40 PM   Result Value Ref Range    GLUCOSE BY METER POCT 104 (H) 70 - 99 mg/dL   Glucose by meter    Collection Time: 21  2:41 AM   Result Value Ref Range    GLUCOSE BY METER POCT 66 (L) 70 - 99 mg/dL   Glucose by meter    Collection Time: 21  2:58 AM   Result Value Ref Range    GLUCOSE BY METER POCT 96 70 - 99 mg/dL              ASSESSMENT / PLAN:   35 year old  at 39w1d admitted for IOL for Type 1 DM.    Type 1 DM: appreciate endo recs. Patient given 1/2 her PM dose of insulin. Sliding scale ordered. EFW  3430g (7lb 9oz), 52%, AC 40%.  Labor: cervix unfavorable after 4 does of vaginal misoprostol. Cisneros bulb placed. Will continue misoprostol   Fetal well being: Category 1 tracing.   GBS: neg  Pain: expectant  Anticipate .    Silvia Gonzalez MD

## 2021-07-18 NOTE — PROVIDER NOTIFICATION
07/18/21 1533   Provider Notification   Provider Name/Title Dr. Wade    Method of Notification Electronic Page   Request Evaluate - Remote   Notification Reason Lab/Diagnostic Study     FYI of pre meal value 122

## 2021-07-18 NOTE — PROGRESS NOTES
Labor Progress Note    S; feeling mild cramping, not painful    O: /68   Temp 99.1  F (37.3  C) (Oral)   Resp 18   LMP 10/17/2020     FHT: 130, +accels, -decels, mod jhonatan  [place]: Q3-4 in 10 min    SVE: ft/0/-2, miso placed    A/P: Ms. Denia Montes is a 35 year old  at 39w0d by LMP cw 8w4d US, who presents for IOL for T1DM, well controlled.     - Labor Induction                - Fen/GI: Regular diet early labor               - SVE: Closed               - Membranes: Intact               - GBS Neg               - Plan: S/p Miso x4. Balloon when open               - Pain management: Desires natural methods for analgesia     - T1DM               - PTA Levemir 24U/11U with 1U/5g breakfast, 1U/4.5g lunch, 1U/6.5g dinner               - She is s/p morning Levemir full dose, with 1/2 dose PM Levemir   - Consider low dose of AM Levemir                - S/p Endocrine consult, recommend titrating mealtime dosing to 1U/10g CHO lunch and 12g CHO dinner               - QID BG with MSSI                - In active labor switch to q1H BG with insulin gtt as needed     -Fetal Well Being               - Category 1 FHT. Reactive and reassuring               - Continue EFM and [place]    Bárbara Pat MD PGY3  Obstetrics & Gynecology  21

## 2021-07-18 NOTE — PROGRESS NOTES
[facility] PROGRESS NOTE:   2021 6:03 PM         SUBJECTIVE:   Patient complains of mild contractions    Contractions:  q 3-5 minutes  Leakage of fluid:  No  Vaginal bleeding:  Yes, small bloody show  Pain controlled:  Yes           OBJECTIVE:     Vitals:    21 2355 21 0538 21 0721 21 1259   BP: 121/68 131/85 (!) 141/83 135/84   Pulse:  65     Resp:    Temp:  98.3  F (36.8  C) 98.7  F (37.1  C) 98.9  F (37.2  C)   TempSrc:  Oral Oral Oral         NST:  Fetal Heart Rate Tracing: 160 bpm baseline, mod variability, no accels, late decels that resolved with position change and fluid bolus  Category 2    Tocometer: q 3-5 minutes    Gen: alert, oriented, no distress  Abdomen:  Gravid, nontender  Cervix:   Dilation: 4   Effacement: 50%   Station:-2   Consistency: average   Position: Mid    Berumen balloon in the vagina, deflated and removed.         LABS:     Recent Results (from the past 12 hour(s))   Glucose by meter    Collection Time: 21  9:26 AM   Result Value Ref Range    GLUCOSE BY METER POCT 95 70 - 99 mg/dL   Glucose by meter    Collection Time: 21 11:53 AM   Result Value Ref Range    GLUCOSE BY METER POCT 175 (H) 70 - 99 mg/dL   Glucose by meter    Collection Time: 21  1:30 PM   Result Value Ref Range    GLUCOSE BY METER POCT 134 (H) 70 - 99 mg/dL   Glucose by meter    Collection Time: 21  3:19 PM   Result Value Ref Range    GLUCOSE BY METER POCT 122 (H) 70 - 99 mg/dL              ASSESSMENT / PLAN:   35 year old  at 39w1d admitted for IOL for type 1 DM.    Type 1 DM: appreciate management by endocrine, will switch to intrapartum insulin order set in active labor  Labor: s/p vaginal miso x4 and berumen balloon  Fetal well being: Category 2 tracing, improved with position change and IV fluid bolus, continue to monitor closely.  GBS: neg  Pain: expectant  Anticipate .    Silvia Gonzalez MD

## 2021-07-18 NOTE — PLAN OF CARE
VSS.  Blood glucose <70 with overnight check but increased to 96 with a snack after 15 minute recheck.  Patient asymptomatic.  IOL via vaginal miso.  Patient reports cramping but able to rest overnight.  Denies wanting medication for pain management at this time.

## 2021-07-18 NOTE — PROGRESS NOTES
Labor Progress Note    S:  Patient has intermittent mildly painful contractions. She just returned to the bed from soaking in the bath.     O:   No data found.  SVE: 2/50%/-3  Patient does have a berumen balloon in place, some bloody show     FHT: Baseline 155, moderate variability, intermittent episodes of mild variability that have since resolved, accelerations, intermittent late decelerations, less than 50%   [place]: approx 3 contractions every 10 minutes    A/P:  Ms. Denia Montes is a 35 year old  at 39w1d, admitted for IOL    # Type 1 DM: appreciate endo recs. Patient given 1/2 her PM dose of insulin last night. 2 hour post prandial elevated at 134. Sliding scale ordered. EFW  3430g (7lb 9oz), 52%, AC 40%.  Labor: now s/p miso x4, berumen balloon. Discussed tracing with Sergio Gonzalez and Raúl. Will administer 500 ml bolus of IVF and assess.   Pain: expectant   Anticipate     Mi Rao MD, MPH  Obstetrics and Gyncology, PGY-1  2021 1:13 PM

## 2021-07-18 NOTE — PROVIDER NOTIFICATION
07/18/21 1754   Provider Notification   Provider Name/Title Dr. Gonzalez   Method of Notification At Bedside   Request Evaluate in Person   Notification Reason SVE;Decels   Reviewed strip and interventions writer had completed. SVE performed by Dr. Gonzalez. Cisneros bulb out. Plan to start pitocin when FHR is appropriate.

## 2021-07-18 NOTE — PLAN OF CARE
VSS, afebrile. IOL via vaginal miso, 4th dose of miso was given at 0419. Pt denies LOF, bleeding or intense pain. Pt is experiencing cramping with contractions, but is able to sleep through them and states that she is coping well. Denies any pain medications at this time. See flowsheet for FHR interpretation. Continue with current plan of care and anticipate vaginal delivery.

## 2021-07-18 NOTE — PROVIDER NOTIFICATION
Aware of 2hr post prandial. Stated no interventions at this time and we will reassess with her next pre meal blood sugar.

## 2021-07-19 ENCOUNTER — ANESTHESIA (OUTPATIENT)
Dept: OBGYN | Facility: CLINIC | Age: 36
End: 2021-07-19
Payer: COMMERCIAL

## 2021-07-19 ENCOUNTER — ANESTHESIA EVENT (OUTPATIENT)
Dept: OBGYN | Facility: CLINIC | Age: 36
End: 2021-07-19
Payer: COMMERCIAL

## 2021-07-19 ENCOUNTER — ANCILLARY PROCEDURE (OUTPATIENT)
Dept: ULTRASOUND IMAGING | Facility: CLINIC | Age: 36
End: 2021-07-19
Payer: COMMERCIAL

## 2021-07-19 LAB
CREAT SERPL-MCNC: 0.68 MG/DL (ref 0.52–1.04)
GFR SERPL CREATININE-BSD FRML MDRD: >90 ML/MIN/1.73M2
GLUCOSE BLDC GLUCOMTR-MCNC: 103 MG/DL (ref 70–99)
GLUCOSE BLDC GLUCOMTR-MCNC: 106 MG/DL (ref 70–99)
GLUCOSE BLDC GLUCOMTR-MCNC: 109 MG/DL (ref 70–99)
GLUCOSE BLDC GLUCOMTR-MCNC: 112 MG/DL (ref 70–99)
GLUCOSE BLDC GLUCOMTR-MCNC: 115 MG/DL (ref 70–99)
GLUCOSE BLDC GLUCOMTR-MCNC: 117 MG/DL (ref 70–99)
GLUCOSE BLDC GLUCOMTR-MCNC: 118 MG/DL (ref 70–99)
GLUCOSE BLDC GLUCOMTR-MCNC: 120 MG/DL (ref 70–99)
GLUCOSE BLDC GLUCOMTR-MCNC: 121 MG/DL (ref 70–99)
GLUCOSE BLDC GLUCOMTR-MCNC: 124 MG/DL (ref 70–99)
GLUCOSE BLDC GLUCOMTR-MCNC: 127 MG/DL (ref 70–99)
GLUCOSE BLDC GLUCOMTR-MCNC: 132 MG/DL (ref 70–99)
GLUCOSE BLDC GLUCOMTR-MCNC: 134 MG/DL (ref 70–99)
GLUCOSE BLDC GLUCOMTR-MCNC: 155 MG/DL (ref 70–99)
GLUCOSE BLDC GLUCOMTR-MCNC: 173 MG/DL (ref 70–99)
GLUCOSE BLDC GLUCOMTR-MCNC: 236 MG/DL (ref 70–99)
GLUCOSE BLDC GLUCOMTR-MCNC: 74 MG/DL (ref 70–99)
GLUCOSE BLDC GLUCOMTR-MCNC: 92 MG/DL (ref 70–99)

## 2021-07-19 PROCEDURE — 10907ZC DRAINAGE OF AMNIOTIC FLUID, THERAPEUTIC FROM PRODUCTS OF CONCEPTION, VIA NATURAL OR ARTIFICIAL OPENING: ICD-10-PCS | Performed by: OBSTETRICS & GYNECOLOGY

## 2021-07-19 PROCEDURE — 250N000013 HC RX MED GY IP 250 OP 250 PS 637: Performed by: STUDENT IN AN ORGANIZED HEALTH CARE EDUCATION/TRAINING PROGRAM

## 2021-07-19 PROCEDURE — 99233 SBSQ HOSP IP/OBS HIGH 50: CPT | Mod: 25 | Performed by: CLINICAL NURSE SPECIALIST

## 2021-07-19 PROCEDURE — 258N000003 HC RX IP 258 OP 636: Performed by: STUDENT IN AN ORGANIZED HEALTH CARE EDUCATION/TRAINING PROGRAM

## 2021-07-19 PROCEDURE — 258N000003 HC RX IP 258 OP 636: Performed by: OBSTETRICS & GYNECOLOGY

## 2021-07-19 PROCEDURE — 250N000013 HC RX MED GY IP 250 OP 250 PS 637: Performed by: OBSTETRICS & GYNECOLOGY

## 2021-07-19 PROCEDURE — 120N000002 HC R&B MED SURG/OB UMMC

## 2021-07-19 PROCEDURE — 36415 COLL VENOUS BLD VENIPUNCTURE: CPT | Performed by: OBSTETRICS & GYNECOLOGY

## 2021-07-19 PROCEDURE — 88307 TISSUE EXAM BY PATHOLOGIST: CPT | Mod: TC | Performed by: OBSTETRICS & GYNECOLOGY

## 2021-07-19 PROCEDURE — 250N000011 HC RX IP 250 OP 636: Performed by: ANESTHESIOLOGY

## 2021-07-19 PROCEDURE — 250N000009 HC RX 250: Performed by: PHYSICIAN ASSISTANT

## 2021-07-19 PROCEDURE — 258N000003 HC RX IP 258 OP 636: Performed by: ANESTHESIOLOGY

## 2021-07-19 PROCEDURE — 250N000009 HC RX 250: Performed by: OBSTETRICS & GYNECOLOGY

## 2021-07-19 PROCEDURE — 710N000010 HC RECOVERY PHASE 1, LEVEL 2, PER MIN: Performed by: OBSTETRICS & GYNECOLOGY

## 2021-07-19 PROCEDURE — 59514 CESAREAN DELIVERY ONLY: CPT | Mod: 82 | Performed by: OBSTETRICS & GYNECOLOGY

## 2021-07-19 PROCEDURE — 272N000001 HC OR GENERAL SUPPLY STERILE: Performed by: OBSTETRICS & GYNECOLOGY

## 2021-07-19 PROCEDURE — 370N000017 HC ANESTHESIA TECHNICAL FEE, PER MIN: Performed by: OBSTETRICS & GYNECOLOGY

## 2021-07-19 PROCEDURE — 82565 ASSAY OF CREATININE: CPT | Performed by: OBSTETRICS & GYNECOLOGY

## 2021-07-19 PROCEDURE — 59510 CESAREAN DELIVERY: CPT | Performed by: OBSTETRICS & GYNECOLOGY

## 2021-07-19 PROCEDURE — 271N000001 HC OR GENERAL SUPPLY NON-STERILE: Performed by: OBSTETRICS & GYNECOLOGY

## 2021-07-19 PROCEDURE — 250N000009 HC RX 250

## 2021-07-19 PROCEDURE — C9290 INJ, BUPIVACAINE LIPOSOME: HCPCS | Performed by: ANESTHESIOLOGY

## 2021-07-19 PROCEDURE — 88307 TISSUE EXAM BY PATHOLOGIST: CPT | Mod: 26 | Performed by: PATHOLOGY

## 2021-07-19 PROCEDURE — 250N000011 HC RX IP 250 OP 636: Performed by: OBSTETRICS & GYNECOLOGY

## 2021-07-19 PROCEDURE — 250N000009 HC RX 250: Performed by: ANESTHESIOLOGY

## 2021-07-19 PROCEDURE — 360N000076 HC SURGERY LEVEL 3, PER MIN: Performed by: OBSTETRICS & GYNECOLOGY

## 2021-07-19 PROCEDURE — 999N000141 HC STATISTIC PRE-PROCEDURE NURSING ASSESSMENT: Performed by: OBSTETRICS & GYNECOLOGY

## 2021-07-19 PROCEDURE — 250N000009 HC RX 250: Performed by: STUDENT IN AN ORGANIZED HEALTH CARE EDUCATION/TRAINING PROGRAM

## 2021-07-19 RX ORDER — OXYTOCIN 10 [USP'U]/ML
10 INJECTION, SOLUTION INTRAMUSCULAR; INTRAVENOUS
Status: DISCONTINUED | OUTPATIENT
Start: 2021-07-19 | End: 2021-07-19

## 2021-07-19 RX ORDER — METHYLERGONOVINE MALEATE 0.2 MG/ML
200 INJECTION INTRAVENOUS
Status: DISCONTINUED | OUTPATIENT
Start: 2021-07-19 | End: 2021-07-19 | Stop reason: HOSPADM

## 2021-07-19 RX ORDER — PROCHLORPERAZINE MALEATE 10 MG
10 TABLET ORAL EVERY 6 HOURS PRN
Status: DISCONTINUED | OUTPATIENT
Start: 2021-07-19 | End: 2021-07-21 | Stop reason: HOSPADM

## 2021-07-19 RX ORDER — MORPHINE SULFATE 1 MG/ML
INJECTION, SOLUTION EPIDURAL; INTRATHECAL; INTRAVENOUS PRN
Status: DISCONTINUED | OUTPATIENT
Start: 2021-07-19 | End: 2021-07-19

## 2021-07-19 RX ORDER — OXYTOCIN 10 [USP'U]/ML
10 INJECTION, SOLUTION INTRAMUSCULAR; INTRAVENOUS
Status: DISCONTINUED | OUTPATIENT
Start: 2021-07-19 | End: 2021-07-21 | Stop reason: HOSPADM

## 2021-07-19 RX ORDER — MODIFIED LANOLIN
OINTMENT (GRAM) TOPICAL
Status: DISCONTINUED | OUTPATIENT
Start: 2021-07-19 | End: 2021-07-21 | Stop reason: HOSPADM

## 2021-07-19 RX ORDER — CITRIC ACID/SODIUM CITRATE 334-500MG
15 SOLUTION, ORAL ORAL
Status: DISCONTINUED | OUTPATIENT
Start: 2021-07-19 | End: 2021-07-19 | Stop reason: HOSPADM

## 2021-07-19 RX ORDER — FENTANYL CITRATE-0.9 % NACL/PF 10 MCG/ML
100 PLASTIC BAG, INJECTION (ML) INTRAVENOUS EVERY 5 MIN PRN
Status: DISCONTINUED | OUTPATIENT
Start: 2021-07-19 | End: 2021-07-19

## 2021-07-19 RX ORDER — DEXTROSE, SODIUM CHLORIDE, SODIUM LACTATE, POTASSIUM CHLORIDE, AND CALCIUM CHLORIDE 5; .6; .31; .03; .02 G/100ML; G/100ML; G/100ML; G/100ML; G/100ML
INJECTION, SOLUTION INTRAVENOUS CONTINUOUS
Status: DISCONTINUED | OUTPATIENT
Start: 2021-07-19 | End: 2021-07-19

## 2021-07-19 RX ORDER — SODIUM CHLORIDE, SODIUM LACTATE, POTASSIUM CHLORIDE, CALCIUM CHLORIDE 600; 310; 30; 20 MG/100ML; MG/100ML; MG/100ML; MG/100ML
INJECTION, SOLUTION INTRAVENOUS CONTINUOUS
Status: DISCONTINUED | OUTPATIENT
Start: 2021-07-19 | End: 2021-07-19 | Stop reason: HOSPADM

## 2021-07-19 RX ORDER — NALOXONE HYDROCHLORIDE 0.4 MG/ML
0.2 INJECTION, SOLUTION INTRAMUSCULAR; INTRAVENOUS; SUBCUTANEOUS
Status: DISCONTINUED | OUTPATIENT
Start: 2021-07-19 | End: 2021-07-21 | Stop reason: HOSPADM

## 2021-07-19 RX ORDER — ONDANSETRON 4 MG/1
4 TABLET, ORALLY DISINTEGRATING ORAL EVERY 30 MIN PRN
Status: DISCONTINUED | OUTPATIENT
Start: 2021-07-19 | End: 2021-07-19 | Stop reason: HOSPADM

## 2021-07-19 RX ORDER — PROCHLORPERAZINE 25 MG
25 SUPPOSITORY, RECTAL RECTAL EVERY 12 HOURS PRN
Status: DISCONTINUED | OUTPATIENT
Start: 2021-07-19 | End: 2021-07-21 | Stop reason: HOSPADM

## 2021-07-19 RX ORDER — NALBUPHINE HYDROCHLORIDE 10 MG/ML
2.5-5 INJECTION, SOLUTION INTRAMUSCULAR; INTRAVENOUS; SUBCUTANEOUS EVERY 6 HOURS PRN
Status: DISCONTINUED | OUTPATIENT
Start: 2021-07-19 | End: 2021-07-19

## 2021-07-19 RX ORDER — MISOPROSTOL 200 UG/1
400 TABLET ORAL
Status: DISCONTINUED | OUTPATIENT
Start: 2021-07-19 | End: 2021-07-21 | Stop reason: HOSPADM

## 2021-07-19 RX ORDER — ONDANSETRON 4 MG/1
4 TABLET, ORALLY DISINTEGRATING ORAL EVERY 6 HOURS PRN
Status: DISCONTINUED | OUTPATIENT
Start: 2021-07-19 | End: 2021-07-21 | Stop reason: HOSPADM

## 2021-07-19 RX ORDER — ACETAMINOPHEN 325 MG/1
975 TABLET ORAL ONCE
Status: COMPLETED | OUTPATIENT
Start: 2021-07-19 | End: 2021-07-19

## 2021-07-19 RX ORDER — METOCLOPRAMIDE HYDROCHLORIDE 5 MG/ML
10 INJECTION INTRAMUSCULAR; INTRAVENOUS EVERY 6 HOURS PRN
Status: DISCONTINUED | OUTPATIENT
Start: 2021-07-19 | End: 2021-07-21 | Stop reason: HOSPADM

## 2021-07-19 RX ORDER — MISOPROSTOL 200 UG/1
800 TABLET ORAL
Status: DISCONTINUED | OUTPATIENT
Start: 2021-07-19 | End: 2021-07-19 | Stop reason: HOSPADM

## 2021-07-19 RX ORDER — NALOXONE HYDROCHLORIDE 0.4 MG/ML
0.4 INJECTION, SOLUTION INTRAMUSCULAR; INTRAVENOUS; SUBCUTANEOUS
Status: DISCONTINUED | OUTPATIENT
Start: 2021-07-19 | End: 2021-07-21 | Stop reason: HOSPADM

## 2021-07-19 RX ORDER — KETOROLAC TROMETHAMINE 30 MG/ML
30 INJECTION, SOLUTION INTRAMUSCULAR; INTRAVENOUS EVERY 6 HOURS
Status: COMPLETED | OUTPATIENT
Start: 2021-07-19 | End: 2021-07-20

## 2021-07-19 RX ORDER — HYDROCORTISONE 2.5 %
CREAM (GRAM) TOPICAL 3 TIMES DAILY PRN
Status: DISCONTINUED | OUTPATIENT
Start: 2021-07-19 | End: 2021-07-21 | Stop reason: HOSPADM

## 2021-07-19 RX ORDER — FENTANYL CITRATE 50 UG/ML
INJECTION, SOLUTION INTRAMUSCULAR; INTRAVENOUS PRN
Status: DISCONTINUED | OUTPATIENT
Start: 2021-07-19 | End: 2021-07-19

## 2021-07-19 RX ORDER — OXYTOCIN 10 [USP'U]/ML
10 INJECTION, SOLUTION INTRAMUSCULAR; INTRAVENOUS
Status: DISCONTINUED | OUTPATIENT
Start: 2021-07-19 | End: 2021-07-19 | Stop reason: HOSPADM

## 2021-07-19 RX ORDER — MISOPROSTOL 200 UG/1
800 TABLET ORAL
Status: DISCONTINUED | OUTPATIENT
Start: 2021-07-19 | End: 2021-07-21 | Stop reason: HOSPADM

## 2021-07-19 RX ORDER — OXYTOCIN/0.9 % SODIUM CHLORIDE 30/500 ML
340 PLASTIC BAG, INJECTION (ML) INTRAVENOUS CONTINUOUS PRN
Status: DISCONTINUED | OUTPATIENT
Start: 2021-07-19 | End: 2021-07-19 | Stop reason: HOSPADM

## 2021-07-19 RX ORDER — METHYLERGONOVINE MALEATE 0.2 MG/ML
200 INJECTION INTRAVENOUS
Status: DISCONTINUED | OUTPATIENT
Start: 2021-07-19 | End: 2021-07-21 | Stop reason: HOSPADM

## 2021-07-19 RX ORDER — BUPIVACAINE HYDROCHLORIDE 7.5 MG/ML
INJECTION, SOLUTION INTRASPINAL PRN
Status: DISCONTINUED | OUTPATIENT
Start: 2021-07-19 | End: 2021-07-19

## 2021-07-19 RX ORDER — MAGNESIUM HYDROXIDE 1200 MG/15ML
LIQUID ORAL PRN
Status: DISCONTINUED | OUTPATIENT
Start: 2021-07-19 | End: 2021-07-19

## 2021-07-19 RX ORDER — CARBOPROST TROMETHAMINE 250 UG/ML
250 INJECTION, SOLUTION INTRAMUSCULAR
Status: DISCONTINUED | OUTPATIENT
Start: 2021-07-19 | End: 2021-07-21 | Stop reason: HOSPADM

## 2021-07-19 RX ORDER — ACETAMINOPHEN 325 MG/1
975 TABLET ORAL EVERY 6 HOURS
Status: DISCONTINUED | OUTPATIENT
Start: 2021-07-19 | End: 2021-07-21 | Stop reason: HOSPADM

## 2021-07-19 RX ORDER — FENTANYL CITRATE-0.9 % NACL/PF 10 MCG/ML
PLASTIC BAG, INJECTION (ML) INTRAVENOUS CONTINUOUS PRN
Status: DISCONTINUED | OUTPATIENT
Start: 2021-07-19 | End: 2021-07-19

## 2021-07-19 RX ORDER — AZITHROMYCIN 500 MG/5ML
500 INJECTION, POWDER, LYOPHILIZED, FOR SOLUTION INTRAVENOUS
Status: COMPLETED | OUTPATIENT
Start: 2021-07-19 | End: 2021-07-19

## 2021-07-19 RX ORDER — AMOXICILLIN 250 MG
2 CAPSULE ORAL 2 TIMES DAILY
Status: DISCONTINUED | OUTPATIENT
Start: 2021-07-19 | End: 2021-07-21 | Stop reason: HOSPADM

## 2021-07-19 RX ORDER — SIMETHICONE 80 MG
80 TABLET,CHEWABLE ORAL 4 TIMES DAILY PRN
Status: DISCONTINUED | OUTPATIENT
Start: 2021-07-19 | End: 2021-07-21 | Stop reason: HOSPADM

## 2021-07-19 RX ORDER — LIDOCAINE 40 MG/G
CREAM TOPICAL
Status: DISCONTINUED | OUTPATIENT
Start: 2021-07-19 | End: 2021-07-21 | Stop reason: HOSPADM

## 2021-07-19 RX ORDER — OXYTOCIN/0.9 % SODIUM CHLORIDE 30/500 ML
340 PLASTIC BAG, INJECTION (ML) INTRAVENOUS CONTINUOUS PRN
Status: DISCONTINUED | OUTPATIENT
Start: 2021-07-19 | End: 2021-07-21 | Stop reason: HOSPADM

## 2021-07-19 RX ORDER — OXYTOCIN/0.9 % SODIUM CHLORIDE 30/500 ML
100-340 PLASTIC BAG, INJECTION (ML) INTRAVENOUS CONTINUOUS PRN
Status: DISCONTINUED | OUTPATIENT
Start: 2021-07-19 | End: 2021-07-19

## 2021-07-19 RX ORDER — METOCLOPRAMIDE 10 MG/1
10 TABLET ORAL EVERY 6 HOURS PRN
Status: DISCONTINUED | OUTPATIENT
Start: 2021-07-19 | End: 2021-07-21 | Stop reason: HOSPADM

## 2021-07-19 RX ORDER — ONDANSETRON 2 MG/ML
4 INJECTION INTRAMUSCULAR; INTRAVENOUS EVERY 30 MIN PRN
Status: DISCONTINUED | OUTPATIENT
Start: 2021-07-19 | End: 2021-07-19 | Stop reason: HOSPADM

## 2021-07-19 RX ORDER — CEFAZOLIN SODIUM 2 G/100ML
2 INJECTION, SOLUTION INTRAVENOUS
Status: DISCONTINUED | OUTPATIENT
Start: 2021-07-19 | End: 2021-07-19 | Stop reason: HOSPADM

## 2021-07-19 RX ORDER — ONDANSETRON 2 MG/ML
4 INJECTION INTRAMUSCULAR; INTRAVENOUS EVERY 6 HOURS PRN
Status: DISCONTINUED | OUTPATIENT
Start: 2021-07-19 | End: 2021-07-21 | Stop reason: HOSPADM

## 2021-07-19 RX ORDER — CITRIC ACID/SODIUM CITRATE 334-500MG
30 SOLUTION, ORAL ORAL
Status: COMPLETED | OUTPATIENT
Start: 2021-07-19 | End: 2021-07-19

## 2021-07-19 RX ORDER — CARBOPROST TROMETHAMINE 250 UG/ML
250 INJECTION, SOLUTION INTRAMUSCULAR
Status: DISCONTINUED | OUTPATIENT
Start: 2021-07-19 | End: 2021-07-19 | Stop reason: HOSPADM

## 2021-07-19 RX ORDER — SODIUM CHLORIDE, SODIUM LACTATE, POTASSIUM CHLORIDE, CALCIUM CHLORIDE 600; 310; 30; 20 MG/100ML; MG/100ML; MG/100ML; MG/100ML
INJECTION, SOLUTION INTRAVENOUS CONTINUOUS
Status: DISCONTINUED | OUTPATIENT
Start: 2021-07-19 | End: 2021-07-21 | Stop reason: HOSPADM

## 2021-07-19 RX ORDER — TRANEXAMIC ACID 10 MG/ML
1 INJECTION, SOLUTION INTRAVENOUS EVERY 30 MIN PRN
Status: DISCONTINUED | OUTPATIENT
Start: 2021-07-19 | End: 2021-07-19 | Stop reason: HOSPADM

## 2021-07-19 RX ORDER — LIDOCAINE 40 MG/G
CREAM TOPICAL
Status: DISCONTINUED | OUTPATIENT
Start: 2021-07-19 | End: 2021-07-19 | Stop reason: HOSPADM

## 2021-07-19 RX ORDER — BISACODYL 10 MG
10 SUPPOSITORY, RECTAL RECTAL DAILY PRN
Status: DISCONTINUED | OUTPATIENT
Start: 2021-07-21 | End: 2021-07-21 | Stop reason: HOSPADM

## 2021-07-19 RX ORDER — IBUPROFEN 800 MG/1
800 TABLET, FILM COATED ORAL EVERY 6 HOURS
Status: DISCONTINUED | OUTPATIENT
Start: 2021-07-20 | End: 2021-07-21 | Stop reason: HOSPADM

## 2021-07-19 RX ORDER — NICOTINE POLACRILEX 4 MG
15-30 LOZENGE BUCCAL
Status: DISCONTINUED | OUTPATIENT
Start: 2021-07-19 | End: 2021-07-19

## 2021-07-19 RX ORDER — OXYCODONE HYDROCHLORIDE 5 MG/1
5 TABLET ORAL EVERY 4 HOURS PRN
Status: DISCONTINUED | OUTPATIENT
Start: 2021-07-19 | End: 2021-07-21 | Stop reason: HOSPADM

## 2021-07-19 RX ORDER — LIDOCAINE 40 MG/G
CREAM TOPICAL
Status: DISCONTINUED | OUTPATIENT
Start: 2021-07-19 | End: 2021-07-19

## 2021-07-19 RX ORDER — DEXTROSE MONOHYDRATE 25 G/50ML
25-50 INJECTION, SOLUTION INTRAVENOUS
Status: DISCONTINUED | OUTPATIENT
Start: 2021-07-19 | End: 2021-07-19

## 2021-07-19 RX ORDER — ONDANSETRON 2 MG/ML
INJECTION INTRAMUSCULAR; INTRAVENOUS PRN
Status: DISCONTINUED | OUTPATIENT
Start: 2021-07-19 | End: 2021-07-19

## 2021-07-19 RX ORDER — BUPIVACAINE HYDROCHLORIDE 2.5 MG/ML
INJECTION, SOLUTION EPIDURAL; INFILTRATION; INTRACAUDAL PRN
Status: DISCONTINUED | OUTPATIENT
Start: 2021-07-19 | End: 2021-07-19

## 2021-07-19 RX ORDER — AMOXICILLIN 250 MG
1 CAPSULE ORAL 2 TIMES DAILY
Status: DISCONTINUED | OUTPATIENT
Start: 2021-07-19 | End: 2021-07-21 | Stop reason: HOSPADM

## 2021-07-19 RX ORDER — CEFAZOLIN SODIUM 2 G/100ML
2 INJECTION, SOLUTION INTRAVENOUS SEE ADMIN INSTRUCTIONS
Status: DISCONTINUED | OUTPATIENT
Start: 2021-07-19 | End: 2021-07-19 | Stop reason: HOSPADM

## 2021-07-19 RX ORDER — MISOPROSTOL 200 UG/1
400 TABLET ORAL
Status: DISCONTINUED | OUTPATIENT
Start: 2021-07-19 | End: 2021-07-19 | Stop reason: HOSPADM

## 2021-07-19 RX ORDER — OXYTOCIN/0.9 % SODIUM CHLORIDE 30/500 ML
PLASTIC BAG, INJECTION (ML) INTRAVENOUS CONTINUOUS PRN
Status: DISCONTINUED | OUTPATIENT
Start: 2021-07-19 | End: 2021-07-19

## 2021-07-19 RX ORDER — TRANEXAMIC ACID 10 MG/ML
1 INJECTION, SOLUTION INTRAVENOUS EVERY 30 MIN PRN
Status: DISCONTINUED | OUTPATIENT
Start: 2021-07-19 | End: 2021-07-21 | Stop reason: HOSPADM

## 2021-07-19 RX ADMIN — HUMAN INSULIN 0.2 UNITS/HR: 100 INJECTION, SOLUTION SUBCUTANEOUS at 08:12

## 2021-07-19 RX ADMIN — SODIUM CHLORIDE, POTASSIUM CHLORIDE, SODIUM LACTATE AND CALCIUM CHLORIDE: 600; 310; 30; 20 INJECTION, SOLUTION INTRAVENOUS at 21:56

## 2021-07-19 RX ADMIN — SODIUM CHLORIDE, POTASSIUM CHLORIDE, SODIUM LACTATE AND CALCIUM CHLORIDE: 600; 310; 30; 20 INJECTION, SOLUTION INTRAVENOUS at 13:33

## 2021-07-19 RX ADMIN — LEVOTHYROXINE SODIUM 25 MCG: 25 TABLET ORAL at 09:24

## 2021-07-19 RX ADMIN — FENTANYL CITRATE 10 MCG: 50 INJECTION, SOLUTION INTRAMUSCULAR; INTRAVENOUS at 13:45

## 2021-07-19 RX ADMIN — Medication 2 G: at 13:35

## 2021-07-19 RX ADMIN — ONDANSETRON 4 MG: 2 INJECTION INTRAMUSCULAR; INTRAVENOUS at 14:23

## 2021-07-19 RX ADMIN — HUMAN INSULIN 0.2 UNITS/HR: 100 INJECTION, SOLUTION SUBCUTANEOUS at 13:33

## 2021-07-19 RX ADMIN — DOCUSATE SODIUM AND SENNOSIDES 1 TABLET: 8.6; 5 TABLET ORAL at 19:55

## 2021-07-19 RX ADMIN — Medication 8 MILLI-UNITS/MIN: at 10:09

## 2021-07-19 RX ADMIN — Medication 500 MG: at 13:35

## 2021-07-19 RX ADMIN — MORPHINE SULFATE 0.15 MG: 1 INJECTION EPIDURAL; INTRATHECAL; INTRAVENOUS at 13:45

## 2021-07-19 RX ADMIN — PHENYLEPHRINE HYDROCHLORIDE 100 MCG: 10 INJECTION INTRAVENOUS at 15:03

## 2021-07-19 RX ADMIN — TRANEXAMIC ACID 1 G: 10 INJECTION, SOLUTION INTRAVENOUS at 14:15

## 2021-07-19 RX ADMIN — Medication 50 MCG/MIN: at 13:45

## 2021-07-19 RX ADMIN — MISOPROSTOL 400 MCG: 200 TABLET ORAL at 14:21

## 2021-07-19 RX ADMIN — OXYTOCIN-SODIUM CHLORIDE 0.9% IV SOLN 30 UNIT/500ML 600 ML/HR: 30-0.9/5 SOLUTION at 14:01

## 2021-07-19 RX ADMIN — KETOROLAC TROMETHAMINE 30 MG: 30 INJECTION, SOLUTION INTRAMUSCULAR; INTRAVENOUS at 18:12

## 2021-07-19 RX ADMIN — BUPIVACAINE HYDROCHLORIDE 20 MG: 2.5 INJECTION, SOLUTION EPIDURAL; INFILTRATION; INTRACAUDAL at 14:58

## 2021-07-19 RX ADMIN — ACETAMINOPHEN 975 MG: 325 TABLET, FILM COATED ORAL at 21:56

## 2021-07-19 RX ADMIN — PHENYLEPHRINE HYDROCHLORIDE 100 MCG: 10 INJECTION INTRAVENOUS at 14:29

## 2021-07-19 RX ADMIN — SODIUM CITRATE AND CITRIC ACID MONOHYDRATE 30 ML: 500; 334 SOLUTION ORAL at 13:23

## 2021-07-19 RX ADMIN — ACETAMINOPHEN 975 MG: 325 TABLET, FILM COATED ORAL at 16:12

## 2021-07-19 RX ADMIN — BUPIVACAINE 20 ML: 13.3 INJECTION, SUSPENSION, LIPOSOMAL INFILTRATION at 14:58

## 2021-07-19 RX ADMIN — BUPIVACAINE HYDROCHLORIDE IN DEXTROSE 1.8 ML: 7.5 INJECTION, SOLUTION SUBARACHNOID at 13:45

## 2021-07-19 RX ADMIN — Medication 100 ML/HR: at 16:26

## 2021-07-19 RX ADMIN — SODIUM CHLORIDE, POTASSIUM CHLORIDE, SODIUM LACTATE AND CALCIUM CHLORIDE: 600; 310; 30; 20 INJECTION, SOLUTION INTRAVENOUS at 05:00

## 2021-07-19 NOTE — PROGRESS NOTES
Labor Progress Note    S:  Patient states over the past 20 minutes she has been feeling slightly more pain during contractions.     O:   Patient Vitals for the past 4 hrs:   BP Temp Temp src Resp   21 1002 107/69 98.6  F (37  C) Oral 18   21 0930 -- 98  F (36.7  C) Oral --   21 0824 (!) 141/85 98.9  F (37.2  C) Oral 18   ]  SVE:     5/70/-2 at 1210, meconium fluid leaking  IUPC was placed during cervical exam without resistance      FHT:     Baseline 155bpm, minimal, no accels, no decels  [place]:    unable to quantify due to poor tracing      A/P:  Denia Montes is a 35 year old  at 39w2d, admitted for IOL, currently in latent labor s/p AROM. 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     Latent autoimmune diabetes in adult, managed as Type 1:  - Managed by endocrine  -OB latent labor insulin protocol   - q1h BG checks    Induction of Labor:  - s/p miso x4 and berumen balloon  - Continue to titrate pitocin per protocol, currently at 8 mu/min.  IUPC placed to assist with pitocin titration.  - Membranes: s/p AROM, meconium stained fluid  - Pain: per maternal request  - Anticipate     Hashimoto's:  - PTA synthroid 25 mcg    FWB:  - Category 2, non-reactive.  Category 2 at this time due to minimal variability.    Plan for intrauterine resuscitation with position changes and 500 ml IV fluid bolus now.    Reviewed with patient that if persistent category 2 FHT despite intrauterine resuscitation, then  would be recommended.     Category 2 algorithm:  Moderate variability or accelerations: NO  Significant decels of >50% of contractions for 30 minutes: NO  =OBSERVE for 1 hour... If persistent pattern then    Reassess at 1315    Jessica Arellano MD

## 2021-07-19 NOTE — ANESTHESIA PROCEDURE NOTES
Intrathecal injection Procedure Note  Pre-Procedure   Staff -        Anesthesiologist:  Obinna Sosa MD       Resident/Fellow: Vivi Hamilton MD       Performed By: resident       Location: OR       Procedure Start/Stop Times: 2021 1:40 PM and 2021 1:45 PM       Pre-Anesthestic Checklist: patient identified, IV checked, risks and benefits discussed, informed consent, monitors and equipment checked, pre-op evaluation, at physician/surgeon's request and post-op pain management  Timeout:       Correct Patient: Yes        Correct Procedure: Yes        Correct Site: Yes        Correct Position: Yes   Procedure Documentation  Procedure: intrathecal injection       Diagnosis:        Patient Position: sitting       Skin prep: Chloraprep       Insertion Site: L4-5. (midline approach).       Needle Gauge: 27.        Needle Length (Inches): 3.5        Spinal Needle Type: Pencan       Introducer used       Introducer: 20 G       # of attempts: 1 and  # of redirects:  0    Assessment/Narrative         Paresthesias: No.       CSF fluid: clear.

## 2021-07-19 NOTE — PLAN OF CARE
Data: Denia Montes transferred to [facility] via wheelchair at 1705. Baby transferred via parent's arms.  Action: Receiving unit notified of transfer: Yes. Patient and family notified of room change. Report given to Roxana QUINONEZ at 1740. Belongings sent to receiving unit. Accompanied by Registered Nurse. Oriented patient to surroundings. Call light within reach. ID bands double-checked with receiving RN.  Response: Patient tolerated transfer and is stable.

## 2021-07-19 NOTE — PLAN OF CARE
Pitocin and Insulin drip titrated throughout shift per orders. Intermittent category 2 tracing requiring decreasing of pitocin rate. Pt resting comfortably throughout the shift and reports being unaware of contractions. Report given to DORI Agarwal RN @ [id number]

## 2021-07-19 NOTE — PLAN OF CARE
FHR tracing reviewed several times throughout the day with Dr. Arellano. Repositioned several times, fluid bolus given and pit turned off. Decision was made mutually between physician and pt to proceed with a C/S at this time. Will start to prep.

## 2021-07-19 NOTE — OP NOTE
[facility]   Operative Note     Name: Denia Montes  MRN: [id number]  : 1985  Date of Surgery: 2021     Pre-operative Diagnosis:   -35 year old  at 39w2d GA  -Latent autoimmune diabetes in adult, managed as Type 1 DM  -Category 2 FHT, remote from delivery  -hashimoto thyroiditis   Post-operative Diagnosis:  -same, s/p below listed procedure   Procedure(s): primary low transverse  section via pfannenstiel skin incision     Surgeon: Jessica Arellano MD   Assistants: Naila Power MS3     Anesthesia: spinal  QBL: is 2070ml, but this is likely an overestimation.  EBL= 1200ml  Urine Output: 300ml  Fluids: 600 mL crystalloid     Specimens: placenta, cord blood, cord gases    Complications: None apparent.    Indications: Denia Montes is a 35 year old  at 39w2d admitted for induction of labor for latent autoimmune diabetes in adult, managed as Type 1 DM.  During induction FHT became persistently category 2 despite intrauterine resuscitation.  Reviewed indications, alternatives, benefits, and risks (including bleeding, infection, injury to surrounding organs such as the bowel, bladder blood vessels, ureters, and nerves) reviewed.  Questions and concerns were addressed.  Consent signed (e-consent).       Complications: None apparent     Findings: A single, liveborn male weighing 2920g with apgars of 9 and 9. Normal appearing uterus, bilateral fallopian tubes, and bilateral ovaries.  No nuchal cord.  Thick meconium amniontic fluid.  No fascial adhesions.  No intraabdominal adhesions.      Procedure details:  After obtaining informed consent the patient was taken to the operating room where she received spinal anesthesia prior to the skin incision. She was placed in the dorsal supine position with a leftward tilt and prepped and draped in the usual sterile fashion. A patient safety time out was then performed. Following test of  adequate anesthesia, the abdomen was entered through a low transverse skin incision. The skin incision was made sharply and carried through the subcutaneous tissue to the fascia.  Fascia was incised in the midline and extended laterally with the Brenner scissors.  The facial incision was extended bluntly. The muscle was  in the midline.  The peritoneum was entered bluntly and the opening extended by blunt and sharp dissection with care to avoid the bladder.  The lower segment of the uterus was opened sharply in a transverse fashion, uterus entered bluntly, and extended with digital pressure. The infant's head was elevated to the level of the hysterotomy and was delivered atraumatically. The cord was doubly clamped and cut after one minute and the infant was handed off to the waiting NICU staff. A segment of the cord was cut and set aside for cord gases. The placenta was extracted with fundal massage and cord traction. The uterus was exteriorized from the abdomen and cleared of all clots and debris.  The uterus was massaged and was noted to be firm.  Oxytocin was given through the running IV.  With vigorous massage as well as administration of oxytocin, TXA, and buccal misoprostol good uterine tone was achieved. The hysterotomy was repaired with 0-vicryl suture in a running locked fashion. A 2nd layer of 0-monocryl was  used to imbricate the incision and good hemostasis was achieved.  A hematoma was noted inferior to the hysterotomy and was slowly expanding thus a figure of X was placed with good hemostasis and no further expansion of the hematoma.  The posterior cul-de-sac was suctioned and the uterus was returned to the abdomen. The bilateral pericolic gutters were suctioned.  The hysterotomy was again inspected and found to have no active sites of bleeding.  The abdominal wall was examined and found to have no active sites of bleeding.  The fascia was closed with a running suture of 0 looped PDS.   Subcutaneous tissue was irrigated. Areas that were oozing were controlled with cautery. The subcutaneous tissue was greater than 2cm in depth and did require reapproximation with 2-0 plain gut. The skin was closed with 4-0 vicryl.  Incision covered with steri strips, ABD and island dressing.   The patient tolerated the procedure well and was taken to the recovery room in stable condition. All sponge, needle and instrument counts were correct x2.     Jessica Arellano MD   Ob/Gyn  7/19/2021 3:04 PM    On the day of surgery there were no qualified surgical residents or fellows available due to residency didactics. Circumstances required the skills of Dr. Townsend to assist with PLTCS.

## 2021-07-19 NOTE — PROGRESS NOTES
Went back in to room and reviewed with patient and her partner that despite having IV fluid bolus, pitocin off, and repositioning the variability remains minimal without accelerations.  Late decelerations are more subtle.  Given this persistent category 2 fetal heart rate tracing,  section is recommended.  This would be considered Urgent and plan for OR within 30 minutes.  Reviewed indications, alternatives, benefits, and risks (including bleeding, infection, injury to surrounding organs such as the bowel, bladder blood vessels, ureters, and nerves) reviewed.  Questions and concerns were addressed.  Consent signed (e-consent).    -Anesthesia team alerted of plan.  Will go to OR 2  -NNP alerted of delivery plan  -ancef and azithromycin for IV antibiotics  -plan to send placenta to pathology and collect cord blood and cord gases  -discussed patient's increased risk of bleeding due to long induction   -post-op endocrine for blood sugar management assistance.      Jessica Arellano MD

## 2021-07-19 NOTE — PROGRESS NOTES
Labor Progress Note    S:  Doing well, still comfortable and was able to get some rest overnight.      O:   Patient Vitals for the past 4 hrs:   BP Temp Temp src Resp   21 0824 (!) 141/85 98.9  F (37.2  C) Oral 18   21 0600 103/62 -- -- 16   21 0505 117/67 -- -- 16   ]  SVE:     /-2, AROM meconium stained fluid performed at 0820     FHT:     Baseline 140, minimal to moderate variability (mod variability now), accelerations present, few subtle late decels 20 minutes prior to AROM, improved non decels  [place]:    2-3 ctx in 10 min, intermittently inverted    BSUS: prior to AROM, confirmed continued cephalic presentation     A/P:  Denia Montes is a 35 year old  at 39w2d, admitted for IOL, currently in latent labor s/p AROM. 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     Latent autoimmune diabetes in adult, managed as Type 1:  - Managed by endocrine  - Current management with latent labor is per endocrinology.  When patient enters active labor, will plan to switch to OB active labor insulin drip protocol  - q1h BG checks    Induction of Labor:  - s/p miso x4 and berumen balloon  - Continue to titrate pitocin per protocol, currently at 7 mu/min  - Membranes: s/p AROM, meconium stained fluid.  Meconium discussed with patient and continued plan for NICU at delivery  - Pain: per maternal request  - Anticipate     Hashimoto's:  - PTA synthroid 25 mcg    FWB:  - Currently Cat I FHT, reactive and reassuring  - Intermittent cat II FHT, if recurs then intrauterine resuscitation with maternal position changes, IVF bolus, decreasing pit prn. Consider IUPC placement prn for better assessment of contractions if needed.  This was also reviewed with patient.    - EFW  3430g (7lb 9oz), 52%, AC 40%.    Naila Driscoll MD  OB/GYN Resident PGY-2  8:33 AM 2021       Physician Attestation   I, Jessica Arellano MD, personally examined  and evaluated this patient.  I discussed the patient with the resident/fellow and care team, and agree with the assessment and plan of care as documented in the note of 21.      I personally reviewed vital signs, medications, labs and imaging.    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  Patient reports she is feeling some mild contractions.  She was able to get rest overnight.  On exam she is 5/70/-2.  Patient was verbally consented for AROM and discussed low risk of umbilical cord prolapse with rupture of membranes.  AROM at 0820 with meconium fluid observed.  Prior to AROM Baseline 140bpm, moderate variability, accelerations present, no decels.  [place] 2-3 contractions/ 10 minutes although difficult to trace.  Category 1, reactive.  Reviewed FHT with patient including intermittent category 2 overnight.  Discussed if recurrent decels or persistent category 2 FHT during labor that does not resolve with intrauterine resuscitation, then  would be the plan.  Currently endocrine is managing patient's insulin plan.  When patient starts to become more uncomfortable with cervical change in active labor, plan to transition to the OB active labor insulin drip protocol.    Jessica Arellano MD  Date of Service (when I saw the patient): 21

## 2021-07-19 NOTE — PROGRESS NOTES
Labor Progress Note     S:  Patient has mildly painful contractions.     O:   Vitals:    21 0721 21 1259 21 1808 21 2120   BP: (!) 141/83 135/84 114/57 116/74   Pulse:       Resp:    Temp: 98.7  F (37.1  C) 98.9  F (37.2  C) 99.3  F (37.4  C)    TempSrc: Oral Oral Oral      SVE:     Deferred      FHT:     Baseline 160, moderate variability, accelerations present, intermittent late decelerations that improved after fluids and position change  [place]:    approx 3 contractions every 10 minutes     A/P:  Ms. Denia Montes is a 35 year old  at 39w1d, admitted for IOL.      # Type 1 DM: OB high intensity insulin drip started with carb coverage by Novolog insulin pen, per endocrine management.   EFW  3430g (7lb 9oz), 52%, AC 40%.    # Labor: now s/p miso x4, berumen balloon. Patient did have intermittent late decelerations that improved with position change and an IV fluid bolus, will continue to monitor closely.   Pain: per maternal request  Anticipate      Raúl De Los Santos  MS3    Resident/Fellow Attestation   I, Liz De La Cruz, was present with the medical student who participated in the service and in the documentation of the note.  I have verified the history and personally performed the physical exam and medical decision making.  I agree with the assessment and plan of care as documented in the note.  I have reviewed the note and made changes as necessary.    Liz De La Cruz MD  OB/GYN, PGY-2  2021, 10:31 PM

## 2021-07-19 NOTE — PROGRESS NOTES
Labor Progress Note     S:  Per RN, pt is still resting, feeling very comfortable.     O:   Vitals:    21 1259 21 1808 21 2120 21 0215   BP: 135/84 114/57 116/74 105/60   Pulse:       Resp:    Temp: 98.9  F (37.2  C) 99.3  F (37.4  C)  98  F (36.7  C)   TempSrc: Oral Oral  Oral     SVE:     Deferred      FHT:     Baseline 130, minimal to moderate variability, accelerations present, no decels  [place]:    Not tracing well, but per RN, palpates 2-3 ctx in 10 min     A/P:  Ms. Denia Montes is a 35 year old  at 39w2d, admitted for IOL. Pregnancy c/b T2DM and Hashimoto's thyroiditis.     # Type 1 DM  - Managed by endocrine  - OB high intensity insulin drip started with carb coverage by Novolog insulin pen  - q1h BG checks    # Labor  - s/p miso x4 and  berumen balloon  - Continue to titrate pitocin per protocol, currently at 4 mu/min  - Membranes intact   - Plan for AROM PRN when head is well-applied  - Pain: per maternal request  - Anticipate     # Hashimoto's  - PTA synthroid 25 mcg    # FWB  - Cat I FHT, reactive and reassuring  - EFW  3430g (7lb 9oz), 52%, AC 40%.    Liz De La Cruz MD  OB/GYN, PGY-2  2021, 3:52 AM

## 2021-07-19 NOTE — PROGRESS NOTES
[facility] PROGRESS NOTE:   2021 6:11 AM         SUBJECTIVE:   Patient has been able to sleep, not really feeling contractions. No leaking or bleeding. Active fetal movement.           OBJECTIVE:     Vitals:    21 0100 21 0215 21 0400 21 0505   BP: 107/62 105/60 121/69 117/67   Pulse:       Resp: 18 16  16   Temp:  98  F (36.7  C)     TempSrc:  Oral         NST:  Fetal Heart Rate Tracin bpm baseline, mod variability, no accels, no decels  Category 1    Tocometer: q 5 minutes    Gen: alert, comfortable  Abdomen:  Gravid, nontender  Cervix:   Dilation: 5   Effacement: 50%   Station:-3    Pitocin 7mu/min         LABS:     Recent Results (from the past 12 hour(s))   Glucose by meter    Collection Time: 21  6:38 PM   Result Value Ref Range    GLUCOSE BY METER POCT 146 (H) 70 - 99 mg/dL   Glucose by meter    Collection Time: 21  7:58 PM   Result Value Ref Range    GLUCOSE BY METER POCT 144 (H) 70 - 99 mg/dL   Glucose by meter    Collection Time: 21  9:08 PM   Result Value Ref Range    GLUCOSE BY METER POCT 176 (H) 70 - 99 mg/dL   Glucose by meter    Collection Time: 21 10:05 PM   Result Value Ref Range    GLUCOSE BY METER POCT 167 (H) 70 - 99 mg/dL   Glucose by meter    Collection Time: 21 11:08 PM   Result Value Ref Range    GLUCOSE BY METER POCT 132 (H) 70 - 99 mg/dL   Glucose by meter    Collection Time: 21 11:56 PM   Result Value Ref Range    GLUCOSE BY METER POCT 134 (H) 70 - 99 mg/dL   Glucose by meter    Collection Time: 21  1:04 AM   Result Value Ref Range    GLUCOSE BY METER POCT 112 (H) 70 - 99 mg/dL   Glucose by meter    Collection Time: 21  2:16 AM   Result Value Ref Range    GLUCOSE BY METER POCT 106 (H) 70 - 99 mg/dL   Glucose by meter    Collection Time: 21  3:14 AM   Result Value Ref Range    GLUCOSE BY METER POCT 127 (H) 70 - 99 mg/dL   Glucose by meter    Collection Time: 21  4:04 AM    Result Value Ref Range    GLUCOSE BY METER POCT 120 (H) 70 - 99 mg/dL   Glucose by meter    Collection Time: 21  5:06 AM   Result Value Ref Range    GLUCOSE BY METER POCT 118 (H) 70 - 99 mg/dL              ASSESSMENT / PLAN:   35 year old  at 39w2d admitted for IOL for T1 DM.    T1 DM: continue hourly BG check, insulin gtt per endocrine's orders with sliding scale in early labor with plan to transition to intrapartum insulin order set when in active labor.  EFW  3430g (7lb 9oz), 52%, AC 40%.  Labor: s/p vaginal miso x4, berumen bulb. Slow progress since then. Continue to increase pitocin. AROM when able.  Fetal well being: Category 1 tracing.   GBS: neg  Pain: expectant    Silvia Gonzalez MD

## 2021-07-19 NOTE — ANESTHESIA CARE TRANSFER NOTE
Patient: Denia Montes    Procedure(s):   SECTION    Diagnosis: 39 weeks gestation of pregnancy [Z3A.39]  Diagnosis Additional Information: No value filed.    Anesthesia Type:   Spinal     Note:    Oropharynx: spontaneously breathing  Level of Consciousness: awake          Vital Signs Stable: post-procedure vital signs reviewed and stable  Report to RN Given: handoff report given  Patient transferred to: PACU    Handoff Report: Identifed the Patient, Identified the Reponsible Provider, Reviewed the pertinent medical history, Discussed the surgical course, Reviewed Intra-OP anesthesia mangement and issues during anesthesia, Set expectations for post-procedure period and Allowed opportunity for questions and acknowledgement of understanding      Vitals: (Last set prior to Anesthesia Care Transfer)  CRNA VITALS  2021 1429 - 2021 1506      2021             NIBP:  91/66    Pulse:  67    NIBP Mean:  77    Ht Rate:  67    SpO2:  97 %        Electronically Signed By: Vivi Hamilton MD  2021  3:06 PM

## 2021-07-19 NOTE — DISCHARGE SUMMARY
[facility] Discharge Summary    Denia Montes MRN# [id number]   Age: 35 year old YOB: 1985     Date of Admission:  2021  Date of Discharge::  2021  Admitting Physician:  Ellen South MD  Discharge Physician:  Minnie Lopez MD             Admission Diagnoses:   Intrauterine pregnancy at 39w2d  Latent autoimmune diabetes in adult, managed as Type 1 DM  Hashimoto thyroiditis         Discharge Diagnosis:     Same, delivered   gHTN   Category II tracing in labor.           Procedures:     Procedure(s): Primary low transverse  section with 2 layer closure via Pfannenstiel skin incision  Spinal anesthesia                Medications Prior to Admission:     Medications Prior to Admission   Medication Sig Dispense Refill Last Dose     ACE/ARB NOT PRESCRIBED, INTENTIONAL, Please choose reason not prescribed, below (Patient not taking: Reported on 2021)        acetone urine (KETOSTIX) test strip Use to check urine for ketones every morning. 50 strip 11      blood glucose (JACI CONTOUR) test strip Check blood glucose 6 times a day (before and 2 hours after each meal) 600 strip 3      blood glucose (CONTOUR NEXT TEST) test strip Use to test blood sugar 6 times daily or as directed. 500 each 3      Continuous Blood Gluc  (FREESTYLE JERO 14 DAY READER) RAEANN Use to read blood sugars as per 's instructions. 1 each 0      Continuous Blood Gluc Sensor (FREESTYLE JERO 14 DAY SENSOR) MISC Change every 14 days. 9 each 3      insulin aspart (NOVOLOG PEN) 100 UNIT/ML pen Take 1u:8g breakfast, 1u:8g lunch, 1u:12g dinner, + 1u/50mg/dL>120mg/dL +2u prime before each dose. Uses up to 40 units daily. 45 mL 3      insulin detemir (LEVEMIR FLEXTOUCH) 100 UNIT/ML pen Inject 24 units subcutaneous in the morning and 11 units before bed 45 mL 3      insulin pen needle (BD RUSSELL U/F) 32G X 4 MM miscellaneous USE 8 PEN NEEDLES DAILY 1000 each 11      levothyroxine  (SYNTHROID/LEVOTHROID) 25 MCG tablet Take 1 tablet (25 mcg) by mouth daily 90 tablet 3      MICROLET LANCETS MISC TEST BLOOD SUGAR TWICE DAILY 200 each 11      Omega-3 Fatty Acids (FISH OIL) 500 MG CAPS         vitamin B-12 (CYANOCOBALAMIN) 100 MCG tablet Take 1,000 mcg by mouth daily        [DISCONTINUED] acetaminophen (TYLENOL) 325 MG tablet Take 2 tablets (650 mg) by mouth every 6 hours as needed for mild pain Start after Delivery. 100 tablet 0      [DISCONTINUED] ASPIRIN PO Take 81 mg by mouth        [DISCONTINUED] ibuprofen (ADVIL/MOTRIN) 600 MG tablet Take 1 tablet (600 mg) by mouth every 6 hours as needed for moderate pain Start after delivery 60 tablet 0      [DISCONTINUED] metFORMIN (GLUCOPHAGE) 500 MG tablet Take 500 mg by mouth daily (with breakfast) Currently pregnant so not taking it .        [DISCONTINUED] Prenatal Vit-Fe Fumarate-FA (PRENATAL VITAMINS) 28-0.8 MG TABS Take 1 tablet by mouth daily 100 tablet 3      [DISCONTINUED] senna-docusate (SENOKOT-S/PERICOLACE) 8.6-50 MG tablet Take 1 tablet by mouth daily Start after delivery. 100 tablet 0              Discharge Medications:        Review of your medicines      CONTINUE these medicines which may have CHANGED, or have new prescriptions. If we are uncertain of the size of tablets/capsules you have at home, strength may be listed as something that might have changed.      Dose / Directions   acetaminophen 325 MG tablet  Commonly known as: TYLENOL  This may have changed:     how much to take    additional instructions  Used for: S/P  section      Dose: 325-650 mg  Take 1-2 tablets (325-650 mg) by mouth every 6 hours as needed for mild pain  Quantity: 100 tablet  Refills: 0     ibuprofen 600 MG tablet  Commonly known as: ADVIL/MOTRIN  This may have changed: additional instructions  Used for: S/P  section      Dose: 600 mg  Take 1 tablet (600 mg) by mouth every 6 hours as needed for moderate pain  Quantity: 100 tablet  Refills: 0      metFORMIN 500 MG tablet  Commonly known as: GLUCOPHAGE  This may have changed: additional instructions  Used for: S/P  section      Dose: 500 mg  Start taking on: 2021  Take 1 tablet (500 mg) by mouth daily (with breakfast)  Quantity: 30 tablet  Refills: 1     senna-docusate 8.6-50 MG tablet  Commonly known as: SENOKOT-S/PERICOLACE  This may have changed:     when to take this    reasons to take this    additional instructions  Used for: S/P  section      Dose: 1 tablet  Take 1 tablet by mouth 2 times daily as needed for constipation  Quantity: 100 tablet  Refills: 0        CONTINUE these medicines which have NOT CHANGED      Dose / Directions   ACE/ARB/ARNI NOT PRESCRIBED  Commonly known as: INTENTIONAL  Used for: Type 2 diabetes mellitus without complication, without long-term current use of insulin (H)      Please choose reason not prescribed, below  Refills: 0     acetone urine test strip  Commonly known as: KETOSTIX  Used for: Type 1 diabetes mellitus during pregnancy in first trimester      Use to check urine for ketones every morning.  Quantity: 50 strip  Refills: 11     BD RUSSELL U/F 32G X 4 MM miscellaneous  Used for: Positive ANNA antibody  Generic drug: insulin pen needle      USE 8 PEN NEEDLES DAILY  Quantity: 1000 each  Refills: 11     * Contour Next Test test strip  Used for: Type 1 diabetes mellitus without complication (H), Less than 8 weeks gestation of pregnancy  Generic drug: blood glucose      Use to test blood sugar 6 times daily or as directed.  Quantity: 500 each  Refills: 3     * blood glucose test strip  Commonly known as: JACI CONTOUR  Used for: Type 2 diabetes mellitus without complication, without long-term current use of insulin (H)      Check blood glucose 6 times a day (before and 2 hours after each meal)  Quantity: 600 strip  Refills: 3     fish oil-omega-3 fatty acids 500 MG capsule      Refills: 0     FreeStyle Cory 14 Day [place]  Used for: Type 1  diabetes mellitus without complication (H), Less than 8 weeks gestation of pregnancy      Use to read blood sugars as per 's instructions.  Quantity: 1 each  Refills: 0     FreeStyle Cory 14 Day Sensor Misc  Used for: Type 1 diabetes mellitus without complication (H), Less than 8 weeks gestation of pregnancy      Change every 14 days.  Quantity: 9 each  Refills: 3     insulin aspart 100 UNIT/ML pen  Commonly known as: NovoLOG PEN  Used for: Type 1 diabetes mellitus without complication (H), Less than 8 weeks gestation of pregnancy      Take 1u:8g breakfast, 1u:8g lunch, 1u:12g dinner, + 1u/50mg/dL>120mg/dL +2u prime before each dose. Uses up to 40 units daily.  Quantity: 45 mL  Refills: 3     insulin detemir 100 UNIT/ML pen  Commonly known as: Levemir FlexTouch  Used for: Type 1 diabetes mellitus without complication (H)      Inject 24 units subcutaneous in the morning and 11 units before bed  Quantity: 45 mL  Refills: 3     levothyroxine 25 MCG tablet  Commonly known as: SYNTHROID/LEVOTHROID  Used for: Hashimoto's thyroiditis, History of miscarriage      Dose: 25 mcg  Take 1 tablet (25 mcg) by mouth daily  Quantity: 90 tablet  Refills: 3     Microlet Lancets Misc  Used for: Type 2 diabetes mellitus without complication, without long-term current use of insulin (H)      TEST BLOOD SUGAR TWICE DAILY  Quantity: 200 each  Refills: 11     Prenatal Vitamins 28-0.8 MG Tabs  Used for: S/P  section      Dose: 1 tablet  Take 1 tablet by mouth daily  Quantity: 100 tablet  Refills: 3     vitamin B-12 100 MCG tablet  Commonly known as: CYANOCOBALAMIN      Dose: 1,000 mcg  Take 1,000 mcg by mouth daily  Refills: 0         * This list has 2 medication(s) that are the same as other medications prescribed for you. Read the directions carefully, and ask your doctor or other care provider to review them with you.            STOP taking    ASPIRIN PO              Where to get your medicines      These medications  were sent to [facility] #[id number] - [address] AT [address] [address]    Phone: [phone]     acetaminophen 325 MG tablet    ibuprofen 600 MG tablet    metFORMIN 500 MG tablet    Prenatal Vitamins 28-0.8 MG Tabs    senna-docusate 8.6-50 MG tablet               Consultations:   Anesthesia  NICU          Brief Admission History:     Denia Montes is a 35 year old  at 39w0d by LMP cw 8w4d US, who presents for IOL for T1DM. Her diabetes has been well controlled in pregnancy, her baby is measuring normal sized and A1c has remained stable. Having a baby boy. She reports normal fetal movement. She denies contractions, vaginal bleeding, and loss of fluid.  She denies HA, changes in vision, RUQ pain, N/V/D, or increased edema.       Intraoperative course   The procedure was uncomplicated.  QBL 2070ml, but this is likely an overestimation.  EBL= 1200ml.  See operative report for details.     Operative Findings:  A single, liveborn male weighing 2920g with apgars of 9 and 9. Normal appearing uterus, bilateral fallopian tubes, and bilateral ovaries.  No nuchal cord.  Thick meconium amniontic fluid.  No fascial adhesions.  No intraabdominal adhesions       Postpartum Course   The patient's hospital course was notable for development of gHTN. HELLP labs were normal and she did not require treatment with antihypertensives. She otherwise recovered as anticipated and experienced no post-operative complications. On discharge, her pain was well controlled. Vaginal bleeding is similar to peak menstrual flow. Voiding without difficulty.  Ambulating well and tolerating a normal diet.  No fever or significant wound drainage. Infant is stable. She was discharged on post-partum day #2.    Endocrinology was consulted during admission for T1DM and hypothyroidism. They recommended restarting her PTA metformin 500 mg daily and continuing levothyroxine 25 mcg  daily until follow up as an outpatient.     Post-partum hemoglobin:   Hemoglobin   Date Value Ref Range Status   07/20/2021 10.2 (L) 11.7 - 15.7 g/dL Final   06/25/2021 12.2 11.7 - 15.7 g/dL Final             Discharge Instructions and Follow-Up:     Discharge diet: Regular   Discharge activity: No lifting greater than 20 lbs, pushing, pulling, or other strenuous activity for 6 weeks. Pelvic rest for 6 weeks including no sexual intercourse, tampons, or douching. No driving until you can slam on the brakes without pain or while on narcotic pain medications.    Discharge follow-up: Follow up with primary OB for routine postpartum visit in 6 weeks and in 2 weeks for follow up of incision. She is to follow up with her endocrinologist for management of her T1DM and hypothyroidism.    Wound care: Keep incision clean and dry           Discharge Disposition:     Discharged to home        Naila Driscoll MD  OB/GYN Resident PGY-2  5:12 PM July 21, 2021

## 2021-07-19 NOTE — ANESTHESIA POSTPROCEDURE EVALUATION
Patient: Denia Montes    Procedure(s):   SECTION    Diagnosis:39 weeks gestation of pregnancy [Z3A.39]  Diagnosis Additional Information: No value filed.    Anesthesia Type:  Spinal, Peripheral Nerve Block    Note:  Disposition: Admission   Postop Pain Control: Uneventful            Sign Out: Well controlled pain   PONV:    Neuro/Psych: Uneventful            Sign Out: Acceptable/Baseline neuro status   Airway/Respiratory: Uneventful            Sign Out: Acceptable/Baseline resp. status   CV/Hemodynamics: Uneventful            Sign Out: Acceptable CV status; No obvious hypovolemia; No obvious fluid overload   Other NRE:    DID A NON-ROUTINE EVENT OCCUR?            Last vitals:  Vitals:    21 0930 21 1002 21 1218   BP:  107/69    Pulse:      Resp:  18    Temp: 36.7  C (98  F) 37  C (98.6  F) 36.9  C (98.5  F)       Last vitals prior to Anesthesia Care Transfer:  CRNA VITALS  2021 1429 - 2021 1510      2021             NIBP:  91/66    Pulse:  67    NIBP Mean:  77    Ht Rate:  67    SpO2:  97 %          Electronically Signed By: Obinna Sosa MD  2021  3:10 PM

## 2021-07-19 NOTE — PROGRESS NOTES
[facility] PROGRESS NOTE:   2021 11:56 PM         SUBJECTIVE:   Patient complains of mild contractions.    Contractions:  q 2-6 minutes  Leakage of fluid:  No  Vaginal bleeding:  No  Pain controlled:  Yes           OBJECTIVE:     Vitals:    21 0721 21 1259 21 1808 21 2120   BP: (!) 141/83 135/84 114/57 116/74   Pulse:       Resp:    Temp: 98.7  F (37.1  C) 98.9  F (37.2  C) 99.3  F (37.4  C)    TempSrc: Oral Oral Oral          NST:  Fetal Heart Rate Tracin bpm baseline, mod variability, no accels, no decels  Category 1    Tocometer: q 2-6 minutes    Gen: alert, oriented, no distress  Abdomen:  Gravid, nontender  Cervix:   Dilation: 5   Effacement: 50%   Station:-2, ballotable   Consistency: average   Position: Mid         LABS:     Recent Results (from the past 12 hour(s))   Glucose by meter    Collection Time: 21  1:30 PM   Result Value Ref Range    GLUCOSE BY METER POCT 134 (H) 70 - 99 mg/dL   Glucose by meter    Collection Time: 21  3:19 PM   Result Value Ref Range    GLUCOSE BY METER POCT 122 (H) 70 - 99 mg/dL   Glucose by meter    Collection Time: 21  6:38 PM   Result Value Ref Range    GLUCOSE BY METER POCT 146 (H) 70 - 99 mg/dL   Glucose by meter    Collection Time: 21  7:58 PM   Result Value Ref Range    GLUCOSE BY METER POCT 144 (H) 70 - 99 mg/dL   Glucose by meter    Collection Time: 21  9:08 PM   Result Value Ref Range    GLUCOSE BY METER POCT 176 (H) 70 - 99 mg/dL   Glucose by meter    Collection Time: 21 10:05 PM   Result Value Ref Range    GLUCOSE BY METER POCT 167 (H) 70 - 99 mg/dL              ASSESSMENT / PLAN:   35 year old  at 39w1d admitted for IOL for type 1 DM.    Type 1 DM: algorithm #1 plus sliding scale in early labor, plan to transition to intrapartum insulin drip orders when in active labor. EFW  3430g (7lb 9oz), 52%, AC 40%.  Labor: progressing slowly, continue pitocin  augmentation, currently 1mu/min  Fetal well being: Category 1 tracing. Intermittent category2, continue to monitor closely  GBS: neg  Pain: expectant  Anticipate .    Silvia Gonzalez MD

## 2021-07-19 NOTE — PLAN OF CARE
After several conversations between writer, Dr. Wade, Dr. Olsen, and Dr. Gonzalez it was decided to start OB high intensity insulin drip with specific instructions in MAR and continue carb coverage via Novolog insulin pen. Report given to Dianne POLLARD RN and she is aware of this plan. Nursing to page Endocrine fellow with any other concerns or questions moving forward.     Pt alert, active, and stable. No signs or symptoms of distress. Pt coping with labor well. Pt utilizing birthing ball and birthing sling. See flowsheet and MAR for interventions done while pt having recurrent late decelerations. Strip reviewed with Dr. Gonzalez throughout the day. Plan to continue to watch closely.

## 2021-07-19 NOTE — PLAN OF CARE
OR to PACU Transfer Note  Data: Pt to OB PACU at 1503 via cart. PIV infusing NS with oxytocin without complications, berumen with straw urine to gravity, VSS, pt does not complain of pain or nausea.   Interventions: IV to pump, monitors and alarms on, SCD on.  Response: stable.  Plan: Patient instructed to notify RN for pain or nausea, routine post op cares, initiate breastfeeding as soon as patient/infant able.

## 2021-07-19 NOTE — PROGRESS NOTES
"                          Diabetes Consult Daily  Progress Note          Assessment/Plan:     Denia is a 35 year old  female at 39 weeks, with a history of MAGDI (currently treated as Type 1,  Insulin). She was admitted  for induction of labor.        Insulin needs very low overnight, then spiking following breakfast covered with 1 unit per 10 grams     - high intensity OB IV insulin infusion, standard titration--> then,  to intrapartum drip per OB for active labor   - aspart 1:10g CHO with breakfast and 1:12g with lunch and dinner, ---> increase to 1 unit per 5 grams for lunch and 1 unit per 7 grams for supper and snacks  - anticipate transition off IV insulin post-partum, with 6-12 units detemir and aspart medium resistance correction  - Denia wants to resume metformin post-partum  - unlikely to need prandial aspart post-partum    - carb counting  - hypoglycemia protocol    Outpatient diabetes follow up: Dr. Hansen  Discussed with patient, support person Misha , bedside RN, and primary team.      Pt delivered via -- stop IV insulin.  Start detemir 6 units q24h at 1800, and aspart medium resistance correction qAC, HS           Interval History:     The last 24 hours progress and nursing notes reviewed.    Hypoglycemia on admission.  Then low again at 0200 with detemir \"5.5\" units at night Saturday (detemir pen doesn't deliver half units, so unclear)  No detemir yesterday and initiated on IV insulin last evening.  Note, there were instructions to titrate only in algorithm 1.  Overnight, 0.2-1 unit/h but mostly 0.5 units/h. Post-bfast glucose up to 236.    Denia was fairly comfortable, no issues eating.  Tolerating the frequent finger sticks pretty well, but will like to get off IV insulin soon after delivery.  Discussed last c-peptide checked in 2019 and estimated detemir dose to transition off IV.      Breastfeeding-- plans to.  Reviewed Metformin in breastfeeding.  Denia had discussed this somewhat " with outpt endo and would like to restart her metformin.  She was aware of the limitations in long-range data for infants.     Recent Labs   Lab 07/19/21  0811 07/19/21  0704 07/19/21  0602 07/19/21  0506 07/19/21  0404 07/19/21  0314   * 115* 121* 118* 120* 127*           Nutrition:     Orders Placed This Encounter      Regular Diet Adult    Supplements:         PTA Regimen:   ANNA +      Detemir 24 + 11 units  aspart 1:5g with breakfast, 1:4.5g with lunch, and 1:6.5g with dinner, plus a 1/50 correction scale >120.       Prior to pregnancy: following with Dr. Hansen, was on Levemir 12-14 units BID and Metformin. Novolog insulin was added just prior to this pregnancy to improve prandial control to pregnancy targets: 1:10g CHO with breakfast and 1:12g with lunch and dinner, plus a 1/50 correction scale >150. She reports that, prior to this, her BG were reasonably controlled to non-pregnant targets without the Novolog.             Review of Systems:   See interval hx          Medications:   no steroid         Physical Exam:     Gen: Alert, in NAD   HEENT: NC/AT hearing intact to conversational volume  Resp: Unlabored  Neuro: oriented x3, communicating clearly, well-informed  Psych: calm, even mood, easily engaged  BP (!) 141/85   Pulse 65   Temp 98.9  F (37.2  C) (Oral)   Resp 18   LMP 10/17/2020              Data:     Lab Results   Component Value Date    A1C 5.4 07/17/2021    A1C 5.5 12/21/2020    A1C 5.3 11/17/2020    A1C 6.1 06/29/2020    A1C 7.4 01/11/2019    A1C 6.3 09/28/2018          No results found for: HEBMP, [id number], CREAT      CBC RESULTS:   Recent Labs   Lab Test 07/17/21  1131   WBC 8.9   RBC 4.03   HGB 11.6*   HCT 35.0   MCV 87   MCH 28.8   MCHC 33.1   RDW 13.0        Recent Labs   Lab Test 07/19/21  0811 07/19/21  0704 01/15/21  2314 12/29/20  1509 12/16/19  1541 12/16/19  1541   NA  --   --  139  --   --  137   POTASSIUM  --   --  3.8  --   --  3.7   CHLORIDE  --   --  108  --    --  106   CO2  --   --  23  --   --  24   ANIONGAP  --   --  8  --   --  7   * 115* 114*  --    < > 112*   BUN  --   --  9  --   --  13   CR  --   --  0.56 0.59  --  0.57   CATALINA  --   --  8.7  --   --  9.0    < > = values in this interval not displayed.     Liver Function Studies -   Recent Labs   Lab Test 01/15/21  2314   PROTTOTAL 6.4*   ALBUMIN 3.3*   BILITOTAL 0.3   ALKPHOS 66   AST 22   ALT 28   I spent a total of 40 minutes bedside and on the inpatient unit managing the glycemic care of Denia Montes. Over 50% of my time on the unit was spent counseling the patient , care partner and/or coordinating care regarding acute glycemic management and planning for post-partum.  See note for details.            Zara Armando APRN -[phone]  To contact Endocrine Diabetes service:   from hospital phone: Dial 3 asterisks and 3 sevens and enter [phone] when prompted   Or may look up provider on-service in [facility].

## 2021-07-19 NOTE — BRIEF OP NOTE
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  EBL= 1200ml  Urine Output: 300ml  Fluids: 600 mL crystalloid    Specimens: placenta, cord blood, cord gases  Complications: None apparent.  Indications: Denia Montes is a 35 year old  at 39w2d admitted for induction of labor for latent autoimmune diabetes in adult, managed as Type 1 DM.  During induction FHT became persistently category 2 despite intrauterine resuscitation.  Reviewed indications, alternatives, benefits, and risks (including bleeding, infection, injury to surrounding organs such as the bowel, bladder blood vessels, ureters, and nerves) reviewed.  Questions and concerns were addressed.  Consent signed (e-consent).      Complications: None apparent    Findings: A single, liveborn male weighing 2920g with apgars of 9 and 9. Normal appearing uterus, bilateral fallopian tubes, and bilateral ovaries.  No nuchal cord.  Thick meconium amniontic fluid.  No fascial adhesions.  No intraabdominal adhesions.       Jessica Arellano MD   2021 2:50 PM

## 2021-07-19 NOTE — ANESTHESIA PREPROCEDURE EVALUATION
Anesthesia Pre-Procedure Evaluation    Patient: Denia Montes   MRN: [id number] : 1985        Preoperative Diagnosis: 39 weeks gestation of pregnancy [Z3A.39]   Procedure : Procedure(s):   SECTION     Past Medical History:   Diagnosis Date     Cervical high risk HPV (human papillomavirus) test positive 2015, 16, 18, 19, 20     Hashimoto's thyroiditis      Hx of colposcopy with cervical biopsy 10/21/2016    [place] Bx: Neg NIL, ECC: Neg.     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), managed as type 1 (H)      Miscarriage       Past Surgical History:   Procedure Laterality Date     NO HISTORY OF SURGERY        Allergies   Allergen Reactions     Sulfa Drugs Hives      Social History     Tobacco Use     Smoking status: Never Smoker     Smokeless tobacco: Never Used   Substance Use Topics     Alcohol use: No      Wt Readings from Last 1 Encounters:   21 93.6 kg (206 lb 6.4 oz)        Anesthesia Evaluation   Pt has not had prior anesthetic         ROS/MED HX  ENT/Pulmonary:  - neg pulmonary ROS     Neurologic:  - neg neurologic ROS     Cardiovascular:  - neg cardiovascular ROS     METS/Exercise Tolerance:     Hematologic:  - neg hematologic  ROS     Musculoskeletal:  - neg musculoskeletal ROS     GI/Hepatic:  - neg GI/hepatic ROS     Renal/Genitourinary:  - neg Renal ROS     Endo:     (+) type I DM, thyroid problem, hypothyroidism,     Psychiatric/Substance Use:  - neg psychiatric ROS     Infectious Disease:  - neg infectious disease ROS     Malignancy:  - neg malignancy ROS     Other: Comment: 35 year old  at 39w2d, admitted for IOL, currently in latent labor s/p AROM with category 2 FHT. 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           Physical Exam    Airway        Mallampati: II   TM distance: > 3 FB   Neck ROM: full   Mouth opening: > 3 cm    Respiratory Devices and Support         Dental   no notable dental history         Cardiovascular   cardiovascular exam normal          Pulmonary   pulmonary exam normal                OUTSIDE LABS:  CBC:   Lab Results   Component Value Date    WBC 8.9 07/17/2021    WBC 8.1 01/15/2021    HGB 11.6 (L) 07/17/2021    HGB 12.2 06/25/2021    HCT 35.0 07/17/2021    HCT 34.5 (L) 01/15/2021     07/17/2021     01/15/2021     BMP:   Lab Results   Component Value Date     01/15/2021     12/16/2019    POTASSIUM 3.8 01/15/2021    POTASSIUM 3.7 12/16/2019    CHLORIDE 108 01/15/2021    CHLORIDE 106 12/16/2019    CO2 23 01/15/2021    CO2 24 12/16/2019    BUN 9 01/15/2021    BUN 13 12/16/2019    CR 0.56 01/15/2021    CR 0.59 12/29/2020     (H) 07/19/2021     (H) 07/19/2021     COAGS: No results found for: PTT, INR, FIBR  POC:   Lab Results   Component Value Date     (H) 06/26/2020    HCG Negative 11/17/2020    HCGS Positive (A) 11/17/2020     HEPATIC:   Lab Results   Component Value Date    ALBUMIN 3.3 (L) 01/15/2021    PROTTOTAL 6.4 (L) 01/15/2021    ALT 28 01/15/2021    AST 22 01/15/2021    ALKPHOS 66 01/15/2021    BILITOTAL 0.3 01/15/2021     OTHER:   Lab Results   Component Value Date    A1C 5.4 07/17/2021    CATALINA 8.7 01/15/2021    TSH 1.27 03/22/2021       Anesthesia Plan    ASA Status:  2, emergent    NPO Status:  ELEVATED Aspiration Risk/Unknown    Anesthesia Type: Spinal.   Induction: N/a.   Maintenance: N/A.        Consents    Anesthesia Plan(s) and associated risks, benefits, and realistic alternatives discussed. Questions answered and patient/representative(s) expressed understanding.     - Discussed with:  Patient      - Extended Intubation/Ventilatory Support Discussed: No.      - Patient is DNR/DNI Status: No    Use of blood products discussed: Yes.     - Discussed with: Patient.     - Consented: consented to blood products            Reason for refusal: other.     Postoperative Care    Pain management: Peripheral nerve  block (Single Shot), Neuraxial analgesia, IV analgesics, Oral pain medications, Multi-modal analgesia.   PONV prophylaxis: Ondansetron (or other 5HT-3)     Comments:                Vivi Hamilton MD

## 2021-07-19 NOTE — ANESTHESIA PROCEDURE NOTES
TAP Procedure Note  Pre-Procedure   Staff -        Anesthesiologist:  Obinna Sosa MD       Resident/Fellow: Vivi Hamilton MD       Performed By: resident       Location: pre-op       Procedure Start/Stop Times: 7/19/2021 2:55 PM and 7/19/2021 2:58 PM       Pre-Anesthestic Checklist: patient identified, IV checked, site marked, risks and benefits discussed, informed consent, monitors and equipment checked, pre-op evaluation, at physician/surgeon's request and post-op pain management  Timeout:       Correct Patient: Yes        Correct Procedure: Yes        Correct Site: Yes        Correct Position: Yes        Correct Laterality: Yes        Site Marked: Yes  Procedure Documentation  Procedure: TAP       Diagnosis: POST OPERATIVE PAIN       Laterality: bilateral       Patient Position: supine       Patient Prep/Sterile Barriers: sterile gloves, mask       Skin prep: Chloraprep       Needle Type: short bevel       Needle Gauge: 21.        Needle Length (millimeters): 110        Ultrasound guided       1. Ultrasound was used to identify targeted nerve, plexus, vascular marker, or fascial plane and place a needle adjacent to it in real-time.       2. Ultrasound was used to visualize the spread of anesthetic in close proximity to the above referenced structure.       3. A permanent image is entered into the patient's record.    Assessment/Narrative         The placement was negative for: blood aspirated, painful injection and site bleeding       Paresthesias: No.     Bolus given via needle..        Secured via.        Insertion/Infusion Method: Single Shot       Complications: none       Injection made incrementally with aspirations every 5 mL.

## 2021-07-19 NOTE — PROGRESS NOTES
Labor Progress Note    S:  Patient reports she is feeling ok.     O:   Patient Vitals for the past 4 hrs:   BP Temp Temp src Resp   21 1218 -- 98.5  F (36.9  C) Oral --   21 1002 107/69 98.6  F (37  C) Oral 18   21 0930 -- 98  F (36.7  C) Oral --   ]  SVE:     5/70/-2 at 1210, meconium fluid leaking     FHT:     Baseline 165bpm, minimal variability, no accels, recurrent late decelerations  Bowring:   200 mvu's/10 mintues with IUPC in place      A/P:  Denia Montes is a 35 year old  at 39w2d, admitted for IOL, currently in latent labor s/p AROM with category 2 FHT. 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     Latent autoimmune diabetes in adult, managed as Type 1:  - Managed by endocrine  -OB latent labor insulin protocol   - q1h BG checks    Induction of Labor:  - s/p miso x4 and berumen balloon  - Turn pitocin OFF at 1244 due to category 2 FHT  - Membranes: s/p AROM, meconium stained fluid.  Contractions are adequate   - Pain: per maternal request    Hashimoto's:  - PTA synthroid 25 mcg    FWB:  - Category 2, non-reactive, due to minimal variability, fetal tachycardia and recurrent late decelerations.  Pitocin currently off.  Giving 500ml IV fluid bolus.  Repositioning.  Reviewed FHT with patient and category 2 status.  Discussed that if these interventions to not improve the FHT, then  would be recommended.  Patient's  stepped out for about 10 minutes.  Will return for further discussion at 1255.      Category 2 algorithm:  Moderate variability or accelerations: NO  Significant decels of >50% of contractions for 30 minutes: YES  = section    Jessica Arellano MD

## 2021-07-19 NOTE — PLAN OF CARE
Transfer to OR & C/S Delivery Note  Patient to OR at 1335 via bed.   Delivered viable Male via primary  section by Dr. Arellano.  To warmer, stimulated and dried by NICU. Brought to mother after bundling for bonding.

## 2021-07-20 LAB
ALT SERPL W P-5'-P-CCNC: 19 U/L (ref 0–50)
AST SERPL W P-5'-P-CCNC: 24 U/L (ref 0–45)
CREAT SERPL-MCNC: 0.59 MG/DL (ref 0.52–1.04)
ERYTHROCYTE [DISTWIDTH] IN BLOOD BY AUTOMATED COUNT: 13 % (ref 10–15)
GFR SERPL CREATININE-BSD FRML MDRD: >90 ML/MIN/1.73M2
GLUCOSE BLDC GLUCOMTR-MCNC: 116 MG/DL (ref 70–99)
GLUCOSE BLDC GLUCOMTR-MCNC: 130 MG/DL (ref 70–99)
GLUCOSE BLDC GLUCOMTR-MCNC: 155 MG/DL (ref 70–99)
GLUCOSE BLDC GLUCOMTR-MCNC: 162 MG/DL (ref 70–99)
GLUCOSE BLDC GLUCOMTR-MCNC: 186 MG/DL (ref 70–99)
GLUCOSE BLDC GLUCOMTR-MCNC: 88 MG/DL (ref 70–99)
HCT VFR BLD AUTO: 30.6 % (ref 35–47)
HGB BLD-MCNC: 10.2 G/DL (ref 11.7–15.7)
MCH RBC QN AUTO: 28.9 PG (ref 26.5–33)
MCHC RBC AUTO-ENTMCNC: 33.3 G/DL (ref 31.5–36.5)
MCV RBC AUTO: 87 FL (ref 78–100)
PLATELET # BLD AUTO: 232 10E3/UL (ref 150–450)
RBC # BLD AUTO: 3.53 10E6/UL (ref 3.8–5.2)
WBC # BLD AUTO: 13.1 10E3/UL (ref 4–11)

## 2021-07-20 PROCEDURE — 120N000002 HC R&B MED SURG/OB UMMC

## 2021-07-20 PROCEDURE — 250N000013 HC RX MED GY IP 250 OP 250 PS 637: Performed by: OBSTETRICS & GYNECOLOGY

## 2021-07-20 PROCEDURE — 84460 ALANINE AMINO (ALT) (SGPT): CPT | Performed by: STUDENT IN AN ORGANIZED HEALTH CARE EDUCATION/TRAINING PROGRAM

## 2021-07-20 PROCEDURE — 250N000011 HC RX IP 250 OP 636: Performed by: OBSTETRICS & GYNECOLOGY

## 2021-07-20 PROCEDURE — 84450 TRANSFERASE (AST) (SGOT): CPT | Performed by: STUDENT IN AN ORGANIZED HEALTH CARE EDUCATION/TRAINING PROGRAM

## 2021-07-20 PROCEDURE — 85014 HEMATOCRIT: CPT | Performed by: STUDENT IN AN ORGANIZED HEALTH CARE EDUCATION/TRAINING PROGRAM

## 2021-07-20 PROCEDURE — 99233 SBSQ HOSP IP/OBS HIGH 50: CPT | Performed by: CLINICAL NURSE SPECIALIST

## 2021-07-20 PROCEDURE — 36415 COLL VENOUS BLD VENIPUNCTURE: CPT | Performed by: CLINICAL NURSE SPECIALIST

## 2021-07-20 PROCEDURE — 82565 ASSAY OF CREATININE: CPT | Performed by: STUDENT IN AN ORGANIZED HEALTH CARE EDUCATION/TRAINING PROGRAM

## 2021-07-20 PROCEDURE — 36415 COLL VENOUS BLD VENIPUNCTURE: CPT | Performed by: STUDENT IN AN ORGANIZED HEALTH CARE EDUCATION/TRAINING PROGRAM

## 2021-07-20 PROCEDURE — 84681 ASSAY OF C-PEPTIDE: CPT | Performed by: CLINICAL NURSE SPECIALIST

## 2021-07-20 RX ADMIN — IBUPROFEN 800 MG: 800 TABLET, FILM COATED ORAL at 22:03

## 2021-07-20 RX ADMIN — ACETAMINOPHEN 975 MG: 325 TABLET, FILM COATED ORAL at 12:14

## 2021-07-20 RX ADMIN — KETOROLAC TROMETHAMINE 30 MG: 30 INJECTION, SOLUTION INTRAMUSCULAR; INTRAVENOUS at 00:15

## 2021-07-20 RX ADMIN — SENNOSIDES AND DOCUSATE SODIUM 2 TABLET: 8.6; 5 TABLET ORAL at 19:19

## 2021-07-20 RX ADMIN — LEVOTHYROXINE SODIUM 25 MCG: 25 TABLET ORAL at 08:38

## 2021-07-20 RX ADMIN — ENOXAPARIN SODIUM 40 MG: 40 INJECTION SUBCUTANEOUS at 12:14

## 2021-07-20 RX ADMIN — ACETAMINOPHEN 975 MG: 325 TABLET, FILM COATED ORAL at 03:59

## 2021-07-20 RX ADMIN — IBUPROFEN 800 MG: 800 TABLET, FILM COATED ORAL at 15:56

## 2021-07-20 RX ADMIN — KETOROLAC TROMETHAMINE 30 MG: 30 INJECTION, SOLUTION INTRAMUSCULAR; INTRAVENOUS at 08:38

## 2021-07-20 RX ADMIN — ACETAMINOPHEN 975 MG: 325 TABLET, FILM COATED ORAL at 18:21

## 2021-07-20 RX ADMIN — SENNOSIDES AND DOCUSATE SODIUM 2 TABLET: 8.6; 5 TABLET ORAL at 08:38

## 2021-07-20 NOTE — PROGRESS NOTES
Post  Anesthesia Follow Up Note    Patient: Denia Montes    Patient location: Postpartum floor.      Procedure(s) Performed:  Procedure(s):   SECTION    Anesthesia type: Spinal Block    Subjective:     Pain is well controlled     Additional ROS:  She does not complain of pruritis at this time. She denies weakness, denies paresthesia, denies difficulties breathing or voiding, denies nausea or vomiting. She is able to ambulate and tolerates regular diet.    Objective:  Vitals stable      Last Vitals: /75   Pulse 66   Temp 36.8  C (98.2  F) (Oral)   Resp 16   LMP 10/17/2020   SpO2 97%   Breastfeeding Unknown     Assessment and plan:   Denia Montes is a 35 year old female  POD #1 s/p   and single shot TAP nerve block injections. There is no evidence of adverse side effects associated with spinal and nerve block injections.    Pain is well controlled.    Thank you for including us in the care for this patient.    Kwadwo White MD  PGY-2  Anesthesia

## 2021-07-20 NOTE — PROGRESS NOTES
Post Partum Progress Note    Subjective:  Patient is doing well, pain well controlled. Tolerating PO, ambulating without any issues, lochia within normal limits. Has not passed gas. Unable to void. Denies any fever, chills, SOB, chest pain, N/V, headache, dizziness. Planning on breastfeeding. Declines birth control.     Objective:  Patient Vitals for the past 24 hrs:   BP Temp Temp src Pulse Resp SpO2   21 0352 130/62 98.4  F (36.9  C) Oral -- 20 97 %   21 0020 118/66 97.9  F (36.6  C) Oral -- 18 97 %   21 2154 102/84 98.5  F (36.9  C) Oral -- 18 98 %   21 2042 130/71 98.2  F (36.8  C) Oral 66 18 98 %   21 1952 133/84 -- -- -- 16 99 %   21 1842 120/75 -- -- -- 16 98 %   21 1812 124/62 -- -- -- 16 97 %   21 1738 119/72 98.3  F (36.8  C) Oral -- 16 96 %   21 1645 116/75 98.7  F (37.1  C) Oral 71 16 99 %   21 1630 99/82 -- -- 72 16 98 %   21 1618 117/67 -- -- 71 16 97 %   21 1615 (!) 75/57 -- -- 80 16 97 %   21 1600 98/80 -- -- 73 16 97 %   21 1545 (!) 119/95 -- -- 76 16 97 %   21 1530 120/59 98.2  F (36.8  C) Oral 72 16 97 %   21 1515 108/72 -- -- 75 16 97 %   21 1510 (!) 89/78 -- -- 85 16 98 %   21 1505 (!) 80/69 -- -- 73 16 98 %   21 1218 -- 98.5  F (36.9  C) Oral -- -- --   07/19/21 1002 107/69 98.6  F (37  C) Oral -- 18 --   21 0930 -- 98  F (36.7  C) Oral -- -- --   21 0824 (!) 141/85 98.9  F (37.2  C) Oral -- 18 --       General: NAD, resting comfortably  Resp: Normal rate and effort on room air  Cv: well perfused  Abd:  Soft, nontender, nondistended, fundus firm below the umbilicus  Incision: bandage clean/dry/intact  Ext:  No edema in bilateral LE    HGB  Recent Labs   Lab 21  1131   HGB 11.6*       Assessment and Plan:  35 year old old  POD#1 s/p PLTCS for cat II FHT. Doing well, starting to meet post-op goals.    # Postpartum Care  - Pain: Tylenol, ibuprofen, oxycodone  PRN.  - Heme: EBL 1200> AM Hemoglobin pending. Asymptomatic from blood loss anemia; will discharge with oral iron if <10.   - GI: Regular diet. Bowel regimen. Antiemetics PRN. Tolerating PO  - : post-op urinary retention. Initially had low UOP s/p bolus. Berumen removed at 2045. Straight cath x1 at 0300 for 400 mL. Plan was to see if she can void. Otherwise will replace berumen.   - Rh positive, Rubella immune  - breastfeeding  - declines birth control  - PPx: Encourage ambulation, IS, SCDs while confined to bed    # gHTN   - developed postpartum. Will continue serial blood pressures. Will treat elevated BP if needed  - no signs/symptoms of pre-E. AM pre-E labs ordered  - daily weights, strict I/O  - 1 week BP check     # T1DM  - endocrine following. Continue follow-up as outpatient   - postpartum reccs: Levemir 6U daily, aspart MSSI. Plan to start metformin after discharge  - prior to pregnancy: following with Dr. Hansen, was on Levemir 12-14 units BID and Metformin 500mg daily    # Hashimoto  - synthroid 25mcg started during pregnancy to decrease risk of complication. Will continue at this time.   - follow-up as outpatient    Anticipate discharge to home POD#3 pending glucose and BP control    Milton Luther MD  OB/GYN PGY-3  07/20/21 7:23 AM    Attestation:   This patient was seen and evaluated by me, separately from the house staff team. I have reviewed the note/plan above and agree.     Doing well. Routine cares discussed and answered questions. Plan to discontinue IV later today and shower. Probable discharge POD #3  Hemoglobin   Date Value Ref Range Status   07/20/2021 10.2 (L) 11.7 - 15.7 g/dL Final   06/25/2021 12.2 11.7 - 15.7 g/dL Final   ]      Ana Laura Cali MD

## 2021-07-20 NOTE — PROVIDER NOTIFICATION
07/19/21 2100   Provider Notification   Provider Name/Title Dr. De La Cruz, G2    Method of Notification Electronic Page   Request Evaluate-Remote   Notification Reason Other;Medication Request   Can I get an order for LR to follow patients pitocin? she has D5LR ordered. her urine output has been on the low side. Thank you.

## 2021-07-20 NOTE — PROGRESS NOTES
"                          Diabetes Consult Daily  Progress Note          Assessment/Plan:     Denia is a 35 year old  female at 39 weeks, with a history of MAGDI (currently treated as Type 1,  Insulin). She was admitted  for induction of labor, now s/p  .       Insulin needs very low post-partum    - will monitor change in need as more usual eating resumes   - detemir 6 units q24h at 1800  - nursing to please document carb intake  - aspart medium resistance correction  - Denia wants to resume metformin post-partum  - unlikely to need prandial aspart post-partum  - c-peptide level routine today  - carb counting  - hypoglycemia protocol    Outpatient diabetes follow up: Dr. Hansen  Discussed with patient, support person Misha , bedside RN, outpatient endocrinologist                 Interval History:     The last 24 hours progress and nursing notes reviewed.  Nursing doc, \"Pt reported feeling low blood sugar overnight. POCT test was 130 at that time. \"  Today, Denia reflects she was probably feeling effects of anesthesia.  Also had some nausea and vomiting.  Ate very little last night.  Had a  Normal breakfast today.  Feeling alright though tired.  Pain controlled.  Has been up.  Breastfeeding son Unique.  Hoping for discharge home tomorrow.  Discussed safety with continuing small dose insulin, unless have positive c-peptide.  She notes metformin alone wasn't enough even prior to prior to pregnancy.  And that weight had been stable during transition time to insulin start.  Reviewed change in insulin sensitivity w/ delivery and breastfeeding.    -  Discussed last c-peptide checked in 2019 and estimated detemir dose to transition off IV.      Breastfeeding-- plans to.  Reviewed Metformin in breastfeeding.  Denia had discussed this somewhat with rina ceballos and would like to restart her metformin.  She was aware of the limitations in long-range data for infants.     Recent Labs   Lab 21  0833 " 07/20/21  0242 07/20/21  0025 07/19/21  2205 07/19/21  1754 07/19/21  1505   GLC 88 116* 130* 124* 103* 74           Nutrition:     Orders Placed This Encounter      Advance Diet as Tolerated: Regular Diet Adult    Supplements:         PTA Regimen:   ANNA +      Detemir 24 + 11 units  aspart 1:5g with breakfast, 1:4.5g with lunch, and 1:6.5g with dinner, plus a 1/50 correction scale >120.       Prior to pregnancy: following with Dr. Hansen, was on Levemir 12-14 units BID and Metformin. Novolog insulin was added just prior to this pregnancy to improve prandial control to pregnancy targets: 1:10g CHO with breakfast and 1:12g with lunch and dinner, plus a 1/50 correction scale >150. She reports that, prior to this, her BG were reasonably controlled to non-pregnant targets without the Novolog.             Review of Systems:   See interval hx          Medications:   no steroid         Physical Exam:     Gen: Alert, in NAD , baby in arms, partner at bedside  HEENT: NC/AT hearing intact to conversational volume  Resp: Unlabored  Neuro: oriented x3, communicating clearly, well-informed  Psych: calm, even mood, easily engaged  /62   Pulse 66   Temp 98.4  F (36.9  C) (Oral)   Resp 20   LMP 10/17/2020   SpO2 97%   Breastfeeding Unknown              Data:     Lab Results   Component Value Date    A1C 5.4 07/17/2021    A1C 5.5 12/21/2020    A1C 5.3 11/17/2020    A1C 6.1 06/29/2020    A1C 7.4 01/11/2019    A1C 6.3 09/28/2018          No results found for: HEBMP, [id number], CREAT  Recent Labs   Lab Test 07/20/21  0833 07/20/21  0242 07/19/21  1755 01/15/21  2314 12/29/20  1509 12/16/19  1541   NA  --   --   --  139  --  137   POTASSIUM  --   --   --  3.8  --  3.7   CHLORIDE  --   --   --  108  --  106   CO2  --   --   --  23  --  24   ANIONGAP  --   --   --  8  --  7   GLC 88 116*  --  114*   < > 112*   BUN  --   --   --  9  --  13   CR  --   --  0.68 0.56  --  0.57   CATALINA  --   --   --  8.7  --  9.0    < > = values  in this interval not displayed.     CBC RESULTS: Recent Labs   Lab Test 07/20/21  0813   WBC 13.1*   RBC 3.53*   HGB 10.2*   HCT 30.6*   MCV 87   MCH 28.9   MCHC 33.3   RDW 13.0          I spent a total of 35 minutes bedside and on the inpatient unit managing the glycemic care of Denia Montes. Over 50% of my time on the unit was spent counseling the patient , care partner and/or coordinating care regarding acute glycemic management post-partum.  See note for details.            Zara Armando APRN -[id number]  To contact Endocrine Diabetes service:   from hospital phone: Dial 3 asterisks and 3 sevens and enter [phone] when prompted   Or may look up provider on-service in [facility].

## 2021-07-20 NOTE — PROGRESS NOTES
Patient arrived to [facility] unit via zoom cart at 1730,with belongings, accompanied by spouse/ significant other, with infant in arms. Received report from DEVI Dawkins and checked bands. Unit and room orientation completd. Call light given; no concerns present at this time. Fundus firm with scant amount of bleeding. Incision covered and clean, dry, and intact. Continue with plan of care.

## 2021-07-20 NOTE — PLAN OF CARE
Patients vitals have been stable. Postpartum assessment WDL. Declines headache, dizziness, and blurred vision. Fundus has been firm with scant amount of bleeding. Incision is covered and dressing is clean, dry, and intact. Is taking tylenol and toradol. Knows that she has oxycodone available if needed. Amelia came out around 8:30 pm. Blood sugars have been monitored per orders. Patient has not needed any sliding scale insulin. Will continue to monitor for adequate pain control and assist patient as needed.

## 2021-07-20 NOTE — PLAN OF CARE
VSS. Assessment WNL. Incision UTV dressing CDI.   Pain well managed with toradol and tylenol. Pt reports she has no pain.   Pt ambulating independently and up ad frances overnight.   Cisneros removed at 2045 and pt unable to void. St cath x1 for 400 mL. Pt had a void 400mL at 0720.   Pt reports mild itching and declines benadryl.   Pt reported feeling low blood sugar overnight. POCT test was 130 at that time. 0230 BG was 116.   BF with minimal assist for deeper latch mother has smooth nipples. LC Consult released.   No further concerns at this time. Spouse present and supportive.

## 2021-07-20 NOTE — PROVIDER NOTIFICATION
07/19/21 1906   Provider Notification   Provider Name/Title Dr. De La Cruz, G2   Method of Notification Electronic Page   Request Evaluate-Remote   Notification Reason Other  (see note)   when would you like berumen discontinued? the order in the computer says 12-24 hours after delivery and I was told by labor to pull it around 2000 tonight, which would be 6 hours from delivery? Thank you

## 2021-07-21 VITALS
TEMPERATURE: 98.5 F | DIASTOLIC BLOOD PRESSURE: 87 MMHG | HEART RATE: 77 BPM | SYSTOLIC BLOOD PRESSURE: 139 MMHG | RESPIRATION RATE: 16 BRPM | OXYGEN SATURATION: 100 %

## 2021-07-21 LAB
C PEPTIDE SERPL-MCNC: 3.3 NG/ML (ref 0.9–6.9)
GLUCOSE BLDC GLUCOMTR-MCNC: 138 MG/DL (ref 70–99)
GLUCOSE BLDC GLUCOMTR-MCNC: 84 MG/DL (ref 70–99)
GLUCOSE BLDC GLUCOMTR-MCNC: 94 MG/DL (ref 70–99)

## 2021-07-21 PROCEDURE — 99233 SBSQ HOSP IP/OBS HIGH 50: CPT | Performed by: CLINICAL NURSE SPECIALIST

## 2021-07-21 PROCEDURE — 250N000013 HC RX MED GY IP 250 OP 250 PS 637: Performed by: OBSTETRICS & GYNECOLOGY

## 2021-07-21 PROCEDURE — 250N000011 HC RX IP 250 OP 636: Performed by: OBSTETRICS & GYNECOLOGY

## 2021-07-21 RX ORDER — AMOXICILLIN 250 MG
1 CAPSULE ORAL 2 TIMES DAILY PRN
Qty: 100 TABLET | Refills: 0 | Status: SHIPPED | OUTPATIENT
Start: 2021-07-21 | End: 2022-10-12

## 2021-07-21 RX ORDER — DEXTROSE MONOHYDRATE 25 G/50ML
25-50 INJECTION, SOLUTION INTRAVENOUS
Status: DISCONTINUED | OUTPATIENT
Start: 2021-07-21 | End: 2021-07-21 | Stop reason: HOSPADM

## 2021-07-21 RX ORDER — ACETAMINOPHEN 325 MG/1
325-650 TABLET ORAL EVERY 6 HOURS PRN
Qty: 100 TABLET | Refills: 0 | Status: SHIPPED | OUTPATIENT
Start: 2021-07-21 | End: 2022-10-12

## 2021-07-21 RX ORDER — IBUPROFEN 600 MG/1
600 TABLET, FILM COATED ORAL EVERY 6 HOURS PRN
Qty: 100 TABLET | Refills: 0 | Status: SHIPPED | OUTPATIENT
Start: 2021-07-21 | End: 2022-10-12

## 2021-07-21 RX ORDER — PNV NO.95/FERROUS FUM/FOLIC AC 28MG-0.8MG
1 TABLET ORAL DAILY
Qty: 100 TABLET | Refills: 3 | Status: SHIPPED | OUTPATIENT
Start: 2021-07-21 | End: 2022-03-15

## 2021-07-21 RX ORDER — NICOTINE POLACRILEX 4 MG
15-30 LOZENGE BUCCAL
Status: DISCONTINUED | OUTPATIENT
Start: 2021-07-21 | End: 2021-07-21 | Stop reason: HOSPADM

## 2021-07-21 RX ADMIN — IBUPROFEN 800 MG: 800 TABLET, FILM COATED ORAL at 04:07

## 2021-07-21 RX ADMIN — ACETAMINOPHEN 975 MG: 325 TABLET, FILM COATED ORAL at 00:33

## 2021-07-21 RX ADMIN — SENNOSIDES AND DOCUSATE SODIUM 2 TABLET: 8.6; 5 TABLET ORAL at 08:44

## 2021-07-21 RX ADMIN — ACETAMINOPHEN 975 MG: 325 TABLET, FILM COATED ORAL at 06:42

## 2021-07-21 RX ADMIN — LEVOTHYROXINE SODIUM 25 MCG: 25 TABLET ORAL at 08:41

## 2021-07-21 RX ADMIN — ACETAMINOPHEN 975 MG: 325 TABLET, FILM COATED ORAL at 13:18

## 2021-07-21 RX ADMIN — IBUPROFEN 800 MG: 800 TABLET, FILM COATED ORAL at 13:18

## 2021-07-21 RX ADMIN — ENOXAPARIN SODIUM 40 MG: 40 INJECTION SUBCUTANEOUS at 13:18

## 2021-07-21 NOTE — PROGRESS NOTES
Diabetes Consult Daily  Progress Note          Assessment/Plan:     Denia is a 35 year old  female at 39 weeks, with a history of MAGDI (currently treated as Type 1,  Insulin). She was admitted  for induction of labor, now s/p  .       Fasting needs remain very low, while having PP hyperglycemia to 150-180       - detemir 6 units q24h at 1800--> change to every morning admin, so none tonight and starts tomorrow  - nursing to please document carb intake  - aspart medium resistance correction  - Denia wants to resume metformin post-partum-- start tomorrow w/ just 500 mg in AM (may need Rx for this)  - c-peptide level drawn yesterday 1340, in process  - carb counting  - hypoglycemia protocol    Outpatient diabetes follow up: Dr. Hansen  Discussed with patient, support person Misha , bedside RN, outpatient endocrinologist, primary team    Pasted into AVS:  DIABETES Plan-  Check glucose before meals and at bedtime.  Do not take Levemir tonight, Wednesday.  Starting , take Levemir 6 units in the mornings  Starting , take metformin 500 mg in the mornings    Novolog sliding scale is:  For Pre-Meal  - 189 give 1 unit.   For Pre-Meal  - 239 give 2 units.   For Pre-Meal  - 289 give 3 units.   For Pre-Meal  - 339 give 4 units.   For Pre-Meal  - 389 give 5 units.   For Pre-Meal  - 439 give 6 units.   For Pre-Meal BG = or > 440 give 7 units.   BEDTIME-  For  - 249 give 1 units.   For  - 299 give 2 units.   For  - 349 give 3 units.   For  - 399 give 4 units.   For BG = or > 400 give 5 units.         c-peptide level is in process.  Follow up with Dr. Hansen (she will also check your TSH and advise you on your Synthroid), within a couple weeks (needs to be scheduled), sooner via [employer] if your numbers are not mostly within 100-180 target for healing.    If you have questions regarding your diabetes  discharge treatment plan that develop after clinic hours,  the on-call endocrinologist can be paged by the hospital : [phone].                 Interval History:     The last 24 hours progress and nursing notes reviewed.  Denia feels well.  Anxious to discharge today. Son has been discharge.   Per RN, Denia doing very well, including with breastfeeding.      Has Levemir and Novolog and testing supplies  May need metformin?    Reviewed change in insulin sensitivity w/ delivery and breastfeeding.  Reviewed BG testing freq,  targets for healing and maintaining hydration 100-180    C-peptide drawn 1340 yesterday, when BG [id number], but still in process        7/19-  Discussed last c-peptide checked in 2019 and estimated detemir dose to transition off IV.      Breastfeeding-- plans to.  Reviewed Metformin in breastfeeding.  Denia had discussed this somewhat with outpt lin and would like to restart her metformin.  She was aware of the limitations in long-range data for infants.     Recent Labs   Lab 07/21/21  0838 07/21/21  0157 07/20/21  2206 07/20/21  1817 07/20/21  1301 07/20/21  0833   GLC 94 84 186* 155* 162* 88           Nutrition:     Orders Placed This Encounter      Advance Diet as Tolerated: Regular Diet Adult    Supplements: no        PTA Regimen:   ANNA +      Detemir 24 + 11 units  aspart 1:5g with breakfast, 1:4.5g with lunch, and 1:6.5g with dinner, plus a 1/50 correction scale >120.       Prior to pregnancy: following with Dr. Hansen, was on Levemir 12-14 units BID and Metformin. Novolog insulin was added just prior to this pregnancy to improve prandial control to pregnancy targets: 1:10g CHO with breakfast and 1:12g with lunch and dinner, plus a 1/50 correction scale >150. She reports that, prior to this, her BG were reasonably controlled to non-pregnant targets without the Novolog.             Review of Systems:   See interval hx          Medications:   no steroid         Physical Exam:      Gen: Alert, in NAD , baby in arms, partner at bedside  HEENT: NC/AT hearing intact to conversational volume  Resp: Unlabored  Neuro: oriented x3, communicating clearly, well-informed  Psych: calm, even mood, easily engaged  /87   Pulse 77   Temp 98.5  F (36.9  C) (Oral)   Resp 16   LMP 10/17/2020   SpO2 100%   Breastfeeding Unknown              Data:     Lab Results   Component Value Date    A1C 5.4 07/17/2021    A1C 5.5 12/21/2020    A1C 5.3 11/17/2020    A1C 6.1 06/29/2020    A1C 7.4 01/11/2019    A1C 6.3 09/28/2018          No results found for: HEBMP, [id number], CREAT  Recent Labs   Lab Test 07/20/21  0833 07/20/21  0242 07/19/21  1755 01/15/21  2314 12/29/20  1509 12/16/19  1541   NA  --   --   --  139  --  137   POTASSIUM  --   --   --  3.8  --  3.7   CHLORIDE  --   --   --  108  --  106   CO2  --   --   --  23  --  24   ANIONGAP  --   --   --  8  --  7   GLC 88 116*  --  114*   < > 112*   BUN  --   --   --  9  --  13   CR  --   --  0.68 0.56  --  0.57   CATALINA  --   --   --  8.7  --  9.0    < > = values in this interval not displayed.     CBC RESULTS: Recent Labs   Lab Test 07/20/21  0813   WBC 13.1*   RBC 3.53*   HGB 10.2*   HCT 30.6*   MCV 87   MCH 28.9   MCHC 33.3   RDW 13.0          I spent a total of 40 minutes bedside and on the inpatient unit managing the glycemic care of Denia Montes. Over 50% of my time on the unit was spent counseling the patient , care partner and/or coordinating care regarding acute glycemic management post-partum, discharge plan.  See note for details.            Zara Armando APRN -[id number]  To contact Endocrine Diabetes service:   from hospital phone: Dial 3 asterisks and 3 sevens and enter [phone] when prompted   Or may look up provider on-service in [facility].

## 2021-07-21 NOTE — PLAN OF CARE
Data: Vital signs within normal limits. Postpartum checks within normal limits - see flow record. Patient eating and drinking normally. Patient able to empty bladder independently and is up ambulating. Patient is breastfeeding on demand.  No apparent signs of infection. Incision healing well. Patient performing self cares and is able to care for infant with assistance from spouse.  Pain Is controlled with Toradol and acetaminophen.  Patient/family is excited to go home.  Patient has a pump at home.  Provider requested medication be sent from   Action: Patient medicated during the shift for pain and cramping. See MAR. Patient reassessed within 1 hour after each medication and pain was improved - patient stated she was comfortable. Patient education done about discharging. See flow record.  Response: Positive attachment behaviors observed with infant. Support person was present and attentive at bedside to patient and infant.     Plan: Anticipate discharge today 7/21/21.    Patient discharge instructions given.  Patient denies any further questions.  Discharge medications sent to home pharmacy - [facility].  Patient escorted to car in wheelchair accompanied by RN.

## 2021-07-21 NOTE — DISCHARGE INSTRUCTIONS
DIABETES Plan-  Check glucose before meals and at bedtime.  Do not take Levemir tonight, Wednesday.  Starting , take Levemir 6 units in the mornings  Starting , take metformin 500 mg in the mornings    Novolog sliding scale is:  For Pre-Meal  - 189 give 1 unit.   For Pre-Meal  - 239 give 2 units.   For Pre-Meal  - 289 give 3 units.   For Pre-Meal  - 339 give 4 units.   For Pre-Meal  - 389 give 5 units.   For Pre-Meal  - 439 give 6 units.   For Pre-Meal BG = or > 440 give 7 units.   BEDTIME-  For  - 249 give 1 units.   For  - 299 give 2 units.   For  - 349 give 3 units.   For  - 399 give 4 units.   For BG = or > 400 give 5 units.         C-peptide level is in process.  Follow up with Dr. Hansen  (she will also check your TSH and advise you on your Synthroid), within a couple weeks (needs to be scheduled), sooner via [employer] if your numbers are not mostly within 100-180 target for healing.    If you have questions regarding your diabetes discharge treatment plan that develop after clinic hours,  the on-call endocrinologist can be paged by the hospital : [phone].      Postop  Birth Instructions    Activity       Do not lift more than 10 pounds for 6 weeks after surgery.  Ask family and friends for help when you need it.    No driving until you have stopped taking your pain medications (usually two weeks after surgery).    No heavy exercise or activity for 6 weeks.  Don't do anything that will put a strain on your surgery site.    Don't strain when using the toilet.  Your care team may prescribe a stool softener if you have problems with your bowel movements.     To care for your incision:       Keep the incision clean and dry.    Do not soak your incision in water. No swimming or hot tubs until it has fully healed. You may soak in the bathtub if the water level is below your incision.    Do not use peroxide,  gel, cream, lotion, or ointment on your incision.    Adjust your clothes to avoid pressure on your surgery site (check the elastic in your underwear for example).     You may see a small amount of clear or pink drainage and this is normal.  Check with your health care provider:       If the drainage increases or has an odor.    If the incision reddens, you have swelling, or develop a rash.    If you have increased pain and the medicine we prescribed doesn't help.    If you have a fever above 100.4 F (38 C) with or without chills when placing thermometer under your tongue.   The area around your incision (surgery wound), will feel numb.  This is normal. The numbness should go away in less than a year.     Keep your hands clean:  Always wash your hands before touching your incision (surgery wound). This helps reduce your risk of infection. If your hands aren't dirty, you may use an alcohol hand-rub to clean your hands. Keep your nails clean and short.    Call your healthcare provider if you have any of these symptoms:       You soak a sanitary pad with blood within 1 hour, or you see blood clots larger than a golf ball.    Bleeding that lasts more than 6 weeks.    Vaginal discharge that smells bad.    Severe pain, cramping or tenderness in your lower belly area.    A need to urinate more frequently (use the toilet more often), more urgently (use the toilet very quickly), or it burns when you urinate.    Nausea and vomiting.    Redness, swelling or pain around a vein in your leg.    Problems breastfeeding or a red or painful area on your breast.    Chest pain and cough or are gasping for air.    Problems with coping with sadness, anxiety or depression. If you have concerns about hurting yourself or the baby, call your provider immediately.      You have questions or concerns after you return home.

## 2021-07-21 NOTE — PLAN OF CARE
Data: Vital signs stable, postpartum checks within normal limits - see flow record. Patient eating and drinking normally. Patient able to empty bladder independently and is up ambulating.  Incision has steri strips on and open to air, no drainage noted, no signs of infection. Patient performing self cares and is able to care for infant.  Action: Patient medicated during the shift for pain with Tylenol and Ibuprofen. See MAR. Patient reports adequate pain management  Response: Positive attachment behaviors observed with infant. Support persons is present at bedside  Plan: Continue with plan of care.

## 2021-07-21 NOTE — PROGRESS NOTES
Post Partum Progress Note    Subjective:  Patient is doing well, pain well controlled. Tolerating PO, ambulating without any issues, lochia within normal limits, passed gas, voiding spontaneously. Denies any fever, chills, SOB, chest pain, N/V, headache, dizziness. She is  breastfeeding. Declines birth control.     Objective:  Patient Vitals for the past 24 hrs:   BP Temp Temp src Pulse Resp SpO2   21 0038 116/76 98.3  F (36.8  C) Oral 71 16 --   21 1600 119/67 98.3  F (36.8  C) Oral 74 16 100 %   21 1214 125/75 98.2  F (36.8  C) Oral -- 16 --   21 0838 120/69 98.6  F (37  C) Oral -- 16 100 %       General: NAD, resting comfortably  Resp: Normal rate and effort on room air  Cv: well perfused  Abd:  Soft, nontender, nondistended, fundus firm below the umbilicus  Incision: no erythema, drainage, induration  Ext:  No edema in bilateral LE    HGB  Recent Labs   Lab 21  0813 21  1131   HGB 10.2* 11.6*       Assessment and Plan:  35 year old old  POD#2 s/p PLTCS for cat II FHT. Doing well, starting to meet post-op goals.    # Postpartum Care  - Pain: Tylenol, ibuprofen, oxycodone PRN.  - Heme: EBL 1200> hemoglobin stable after blood loss. Asymptomatic from blood loss anemia   - GI: Regular diet. Bowel regimen. Antiemetics PRN. Tolerating PO  - : post-op urinary retention. Initially had low UOP s/p bolus. Cisneros removed at . Straight cath x1 at 0300 for 400 mL. Now voiding spontaneously with adequate UOP  - Rh positive, Rubella immune  - breastfeeding  - declines birth control  - PPx: Encourage ambulation, IS, SCDs while confined to bed    # gHTN   - developed postpartum. Will continue serial blood pressures. Will treat elevated BP if needed  - no signs/symptoms of pre-E. Pre-E labs wnl  - daily weights, strict I/O  - 1 week BP check     # T1DM  - endocrine following. Continue follow-up as outpatient   - postpartum reccs: Levemir 6U daily, aspart MSSI. Plan to start metformin  after discharge  - prior to pregnancy: following with Dr. Hansen, was on Levemir 12-14 units BID and Metformin 500mg daily    # Hashimoto  - synthroid 25mcg started during pregnancy to decrease risk of complication. Will continue at this time.   - follow-up as outpatient    Milton Luther MD  OB/GYN PGY-3  07/21/21 7:40 AM      Physician Attestation   I, Minnie Lopez, personally saw and evaluated Denia.    She is doing well. Has no questions regarding delivery.  Very anxious to go home this evening. She has met with the endocrine team and has a  Plan for GLC management in place.   Hemoglobin   Date Value Ref Range Status   07/20/2021 10.2 (L) 11.7 - 15.7 g/dL Final   07/17/2021 11.6 (L) 11.7 - 15.7 g/dL Final   06/25/2021 12.2 11.7 - 15.7 g/dL Final   03/22/2021 12.2 11.7 - 15.7 g/dL Final      Recent Labs   Lab 07/21/21  1315 07/21/21  0838 07/21/21  0157 07/20/21  2206 07/20/21  1817 07/20/21  1301   * 94 84 186* 155* 162*     discussed normal expectations for post c/s care. Discussed postpartum instructions/restrictions and follow up.    Plan discharge home this afternoon.     Minnie Lopez MD  Date of Service (when I saw the patient): July 21, 2021

## 2021-07-21 NOTE — PROVIDER NOTIFICATION
07/21/21 1600   Provider Notification   Provider Name/Title Dr Driscoll   Method of Notification Electronic Page   Request Evaluate-Remote   Notification Reason Medication Request;Other       Can you please order discharge medications for 7139 to her home pharmacy [facility]  *Ibuprofen,  APAP,  Metformin, Senna, Prenatal - per dr Barcenas?  She is discharging soon. THANK YOU.

## 2021-07-21 NOTE — PLAN OF CARE
Data: Vital signs within normal limits. Postpartum checks within normal limits - see flow record. Patient eating and drinking normally. Patient able to empty bladder independently and is up ambulating. No apparent signs of infection. Incision healing well. Patient performing self cares and is able to care for infant.  Action: Patient medicated during the shift for pain. See MAR. Patient reassessed within 1 hour after each medication and pain was improved - patient stated she was comfortable. Patient education done about pain control, 24 hour screens, discharge, paperwork, videos. See flow record.  Response: Positive attachment behaviors observed with infant. Support persons are present.   Plan: Anticipate discharge on Wednesday.

## 2021-07-21 NOTE — PROGRESS NOTES
Post  Anesthesia Follow Up Note    Patient: Denia Montes    Patient location: Postpartum floor.      Procedure(s) Performed:  Procedure(s):   SECTION    Anesthesia type: Spinal Block    Subjective:     Pain is well controlled     Additional ROS:  She does not complain of pruritis at this time. She denies weakness, denies paresthesia, denies difficulties breathing or voiding, denies nausea or vomiting. She is able to ambulate and tolerates regular diet.    Objective:  Vitals stable      Last Vitals: /87   Pulse 77   Temp 36.9  C (98.5  F) (Oral)   Resp 16   LMP 10/17/2020   SpO2 100%   Breastfeeding Unknown     Assessment and plan:   Denia Montes is a 35 year old female  POD #2 s/p   and single shot TAP nerve block injections. There is no evidence of adverse side effects associated with spinal and nerve block injections.    Pain is well controlled.    Thank you for including us in the care for this patient.    Kwadwo White MD  PGY-2  Anesthesia

## 2021-08-02 ENCOUNTER — MYC MEDICAL ADVICE (OUTPATIENT)
Dept: ENDOCRINOLOGY | Facility: CLINIC | Age: 36
End: 2021-08-02

## 2021-08-03 NOTE — TELEPHONE ENCOUNTER
Denia,     Increase Levemir dose to 10 units in the morning and will plan on adding Levemir @ bedtime  if fasting BG above the target of  after making the above changes. I would like to change your basal insulin (Levemir) to other form of Basal insulin that lasts 24 hours in the body. Please let me know once you are done with your current supply of Levemir at home.      Thank you,   Yuri Hansen MD

## 2021-08-09 ENCOUNTER — MYC MEDICAL ADVICE (OUTPATIENT)
Dept: ENDOCRINOLOGY | Facility: CLINIC | Age: 36
End: 2021-08-09

## 2021-08-09 DIAGNOSIS — O24.013 TYPE 1 DIABETES MELLITUS DURING PREGNANCY IN THIRD TRIMESTER: Primary | ICD-10-CM

## 2021-08-10 ENCOUNTER — OFFICE VISIT (OUTPATIENT)
Dept: OBGYN | Facility: CLINIC | Age: 36
End: 2021-08-10
Payer: COMMERCIAL

## 2021-08-10 VITALS
HEART RATE: 88 BPM | WEIGHT: 184.3 LBS | BODY MASS INDEX: 30.71 KG/M2 | SYSTOLIC BLOOD PRESSURE: 133 MMHG | DIASTOLIC BLOOD PRESSURE: 86 MMHG | HEIGHT: 65 IN

## 2021-08-10 DIAGNOSIS — Z98.891 S/P CESAREAN SECTION: Primary | ICD-10-CM

## 2021-08-10 DIAGNOSIS — O13.9 GESTATIONAL HYPERTENSION, ANTEPARTUM: ICD-10-CM

## 2021-08-10 PROCEDURE — 99212 OFFICE O/P EST SF 10 MIN: CPT | Mod: 24 | Performed by: OBSTETRICS & GYNECOLOGY

## 2021-08-10 ASSESSMENT — ANXIETY QUESTIONNAIRES
1. FEELING NERVOUS, ANXIOUS, OR ON EDGE: NOT AT ALL
5. BEING SO RESTLESS THAT IT IS HARD TO SIT STILL: NOT AT ALL
7. FEELING AFRAID AS IF SOMETHING AWFUL MIGHT HAPPEN: NOT AT ALL
2. NOT BEING ABLE TO STOP OR CONTROL WORRYING: NOT AT ALL
GAD7 TOTAL SCORE: 0
3. WORRYING TOO MUCH ABOUT DIFFERENT THINGS: NOT AT ALL
6. BECOMING EASILY ANNOYED OR IRRITABLE: NOT AT ALL

## 2021-08-10 ASSESSMENT — PATIENT HEALTH QUESTIONNAIRE - PHQ9
SUM OF ALL RESPONSES TO PHQ QUESTIONS 1-9: 0
5. POOR APPETITE OR OVEREATING: NOT AT ALL

## 2021-08-10 ASSESSMENT — MIFFLIN-ST. JEOR: SCORE: 1526.86

## 2021-08-10 NOTE — PROGRESS NOTES
"[facility]    CC: blood pressure and incision check     S:Denia Montes is a 35 year old  s/p PLTCS for category 2 FHT remote from delivery who presents today for postpartum blood pressure and incision check.  Patient reports she has been recovering well. Her pain is not bad.  She has not needed pain medication for the past week.  Bleeding is light.  Baby is doing well and she has no breast feeding concerns.  She states mood is great.  She is sleeping well.    Denies any headache, changes in vision, chest pain, chest pressure, shortness of breath, right upper quadrant pain, or edema.    O: Patient Vitals for the past 24 hrs:   BP Pulse Height Weight   08/10/21 1455 133/86 88 1.651 m (5' 5\") 83.6 kg (184 lb 4.8 oz)   ]  Exam:  General- awake, alert, answering questions appropriately, appears comfortable  CV- regular rate  Lung- breathing comfortably on room air  Abd- soft, non-tender, non-distended, pfannenstiel skin incision with steri strips removed during visit.  Is clean, dry, intact and healing well without any surrounding erythema or induration   Ext- bilateral lower extremities with trace edema    A&P: Denia Montes is a 35 year old  s/p PLTCS for category 2 FHT remote from delivery who presents today for postpartum blood pressure and incision check.    (Z98.891) S/P  section  (primary encounter diagnosis)  Comment: Patient with well healed incision.  Pain minimal.  Steri strips removed at today's visit  Plan: Routine pp visit at 6 weeks pp.    (O13.9) Gestational hypertension, antepartum  Comment: Normal BP today.  Plan: reviewed s/sx of pre-e w/ SF and return precautions    Jessica Arellano MD      "

## 2021-08-10 NOTE — PATIENT INSTRUCTIONS
Patient Education     After a   It can take time to recover fully after a . It s important to take care of yourself--both for your own sake and because your new baby needs you.   Incision care  Tips for taking care of your incision include:    You will likely be able to shower and pat the incision dry.    Watch your incision for signs of infection. These include redness that gets worse or fluid draining from it.    Hold a pillow against the incision when you get up from a lying or sitting position. Also do this when you laugh or cough.    Don't do any heavy lifting. Don t lift anything heavier than your baby until your healthcare provider tells you otherwise.  When to call your healthcare provider  Call your healthcare provider if you have:    A fever of 100.4  F ( 38 C) or higher, or as directed by your provider    Redness, pain, or discharge at the incision site that gets worse    Vaginal bleeding that soaks through a pad per hour or large blood clots    Severe pain in your stomach    No bowel movement within 1 week after the birth of your baby    Vaginal discharge that has a foul odor    Swollen, red, and painful area in the leg    Burning when urinating or blood in the urine    A rash or hives    Sore, red, painful area on the breasts (may also have flu-like symptoms)    Feelings of anxiety, panic, and depression, or trouble bonding with your baby  Stay[employer] last reviewed this educational content on 2020-2021 [employer]. All rights reserved. This information is not intended as a substitute for professional medical care. Always follow your healthcare professional's instructions.

## 2021-08-11 ASSESSMENT — ANXIETY QUESTIONNAIRES: GAD7 TOTAL SCORE: 0

## 2021-09-09 ENCOUNTER — PRENATAL OFFICE VISIT (OUTPATIENT)
Dept: OBGYN | Facility: CLINIC | Age: 36
End: 2021-09-09
Payer: COMMERCIAL

## 2021-09-09 VITALS
BODY MASS INDEX: 30.45 KG/M2 | WEIGHT: 183 LBS | SYSTOLIC BLOOD PRESSURE: 117 MMHG | HEART RATE: 71 BPM | DIASTOLIC BLOOD PRESSURE: 79 MMHG | OXYGEN SATURATION: 100 %

## 2021-09-09 PROCEDURE — 99207 PR POST PARTUM EXAM: CPT | Performed by: OBSTETRICS & GYNECOLOGY

## 2021-09-09 NOTE — PROGRESS NOTES
Denia Montes is a 35 year old   who is status-post spontaneous  section  on 2021 (Cat II FHTs).  She reports no significant problems and is experiencing no abnormal bleeding.  She is breast-feeding.  Birth control plans are declined.   The is in contact with her endocrinologist who monitors her diabetes.  Pap was normal 2020.     OBJECTIVE:  General appearance was that of a healthy, well-nourished female in [facility].  /79   Pulse 71   Wt 83 kg (183 lb)   LMP 10/17/2020   SpO2 100%   BMI 30.45 kg/m  .    Abdomen was soft, non-tender.  Incision was healing well.    Pelvic exam:  Deferred.      IMP: Normal post-partum exam.    PLAN:  Normal activity.  RTC routinely or prn.

## 2021-09-13 LAB
PATH REPORT.COMMENTS IMP SPEC: NORMAL
PATH REPORT.COMMENTS IMP SPEC: NORMAL
PATH REPORT.FINAL DX SPEC: NORMAL
PATH REPORT.GROSS SPEC: NORMAL
PATH REPORT.MICROSCOPIC SPEC OTHER STN: NORMAL
PATH REPORT.RELEVANT HX SPEC: NORMAL
PHOTO IMAGE: NORMAL

## 2021-09-14 ENCOUNTER — MYC MEDICAL ADVICE (OUTPATIENT)
Dept: ENDOCRINOLOGY | Facility: CLINIC | Age: 36
End: 2021-09-14

## 2021-09-15 ENCOUNTER — VIRTUAL VISIT (OUTPATIENT)
Dept: ENDOCRINOLOGY | Facility: CLINIC | Age: 36
End: 2021-09-15
Payer: COMMERCIAL

## 2021-09-15 DIAGNOSIS — E13.9 LATENT AUTOIMMUNE DIABETES MELLITUS IN ADULTS (LADA) (H): Primary | ICD-10-CM

## 2021-09-15 DIAGNOSIS — Z3A.01 LESS THAN 8 WEEKS GESTATION OF PREGNANCY: ICD-10-CM

## 2021-09-15 DIAGNOSIS — E10.9 TYPE 1 DIABETES MELLITUS WITHOUT COMPLICATION (H): ICD-10-CM

## 2021-09-15 DIAGNOSIS — E06.3 HASHIMOTO'S THYROIDITIS: ICD-10-CM

## 2021-09-15 DIAGNOSIS — Z98.891 S/P CESAREAN SECTION: ICD-10-CM

## 2021-09-15 PROCEDURE — 99214 OFFICE O/P EST MOD 30 MIN: CPT | Mod: 95 | Performed by: INTERNAL MEDICINE

## 2021-09-15 NOTE — PROGRESS NOTES
dm 1  Merrill Castorena CMA    Outcome for 09/15/21 7:25 AM :Glucose data attachment located on Weblio message     Denia is a 35 year old who is being evaluated via a billable video visit.      How would you like to obtain your AVS? [email or web address]  If the video visit is dropped, the invitation should be resent by: Send to e-mail at: [email or web address]  Will anyone else be joining your video visit? No

## 2021-09-15 NOTE — PATIENT INSTRUCTIONS
Rodo Loza 12 units daily.     Metformin 500 mg twice daily.     Carbohydrate coverage 1 unit for every 15 grams of carbohydrates for meals with high carbohydrates and correction scale.     Orders Placed This Encounter   Procedures     TSH with free T4 reflex     Hemoglobin A1c     Lipid panel reflex to direct LDL Fasting     Lab scheduling to schedule at any [place] lab locations: [phone] select option 1

## 2021-09-15 NOTE — PROGRESS NOTES
Endocrinology virtual Visit    Chief Complaint: Video Visit (dm 1)     Information obtained from:Patient      Assessment/Treatment Plan:      MAGDI/treated as Type 1 diabetes currently - (H) Positive ANNA antibody/C-peptide WNL   Postpartum 8 weeks    BG readings reviewed.  Overall within the target range. To simplify her regimen; the following adjustment in medication was made. She understands that metformin is present in breast milk.     Patient Instructions       Denia,       Lantus 12 units daily.     Metformin 500 mg twice daily.     Carbohydrate coverage 1 unit for every 15 grams of carbohydrates for meals with high carbohydrates and correction scale.     Orders Placed This Encounter   Procedures     TSH with free T4 reflex     Hemoglobin A1c     Lipid panel reflex to direct LDL Fasting     Lab scheduling to schedule at any [place] lab locations: [phone]) select option 1        Hashimoto thyroiditis: check TSH and if normal; discontinue levothyroxine 25 mcg daily which was started as a precaution during pregnancy.         Yuri Hansen MD  Staff Endocrinologist    Division of Endocrinology and Diabetes      Subjective:         HPI: Denia Montes is a 35 year old female with history of MAGDI/treated as Type 1 diabetes currently - (H) Positive ANNA antibody/C-peptide WNL currently postpartum 8 weeks.     Currently on:   Lantus 12  Metformin 500 mg daily  CHO 1:15 for meals high in CHO  Correction 1 for every 50 above 140 with meals and above 200 at bedtime.   No recent hypoglycemia symptoms or sig lows.   BG as follows.         Allergies   Allergen Reactions     Sulfa Drugs Hives       Current Outpatient Medications   Medication Sig Dispense Refill     acetone urine (KETOSTIX) test strip Use to check urine for ketones every morning. 50 strip 11     blood glucose (JACI CONTOUR) test strip Check blood glucose 6 times a day (before and 2 hours after each meal) 600 strip 3     blood  glucose (CONTOUR NEXT TEST) test strip Use to test blood sugar 6 times daily or as directed. 500 each 3     Continuous Blood Gluc  (FREESTYLE JERO 14 DAY READER) RAEANN Use to read blood sugars as per 's instructions. 1 each 0     Continuous Blood Gluc Sensor (FREESTYLE JERO 14 DAY SENSOR) MISC Change every 14 days. 9 each 3     insulin aspart (NOVOLOG PEN) 100 UNIT/ML pen 1 unit for every 15 grams of carbohydrates and correction scale as directed. Uses up to 20 units daily. 45 mL 3     insulin glargine (LANTUS PEN) 100 UNIT/ML pen Inject 12 Units Subcutaneous every morning 15 mL 1     insulin pen needle (BD RUSSELL U/F) 32G X 4 MM miscellaneous USE 8 PEN NEEDLES DAILY 1000 each 11     levothyroxine (SYNTHROID/LEVOTHROID) 25 MCG tablet Take 1 tablet (25 mcg) by mouth daily 90 tablet 3     metFORMIN (GLUCOPHAGE) 500 MG tablet Take 1 tablet (500 mg) by mouth 2 times daily (with meals) 180 tablet 3     MICROLET LANCETS MISC TEST BLOOD SUGAR TWICE DAILY 200 each 11     Omega-3 Fatty Acids (FISH OIL) 500 MG CAPS        Prenatal Vit-Fe Fumarate-FA (PRENATAL VITAMINS) 28-0.8 MG TABS Take 1 tablet by mouth daily 100 tablet 3     vitamin B-12 (CYANOCOBALAMIN) 100 MCG tablet Take 1,000 mcg by mouth daily       ACE/ARB NOT PRESCRIBED, INTENTIONAL, Please choose reason not prescribed, below (Patient not taking: Reported on 6/4/2021)       acetaminophen (TYLENOL) 325 MG tablet Take 1-2 tablets (325-650 mg) by mouth every 6 hours as needed for mild pain (Patient not taking: Reported on 8/10/2021) 100 tablet 0     ibuprofen (ADVIL/MOTRIN) 600 MG tablet Take 1 tablet (600 mg) by mouth every 6 hours as needed for moderate pain (Patient not taking: Reported on 8/10/2021) 100 tablet 0     senna-docusate (SENOKOT-S/PERICOLACE) 8.6-50 MG tablet Take 1 tablet by mouth 2 times daily as needed for constipation (Patient not taking: Reported on 8/10/2021) 100 tablet 0       Review of Systems   as per HPI  above      Objective:     GENERAL: Healthy, alert and no distress  EYES: Eyes grossly normal to inspection.    RESP: No audible wheeze, cough, or visible cyanosis.    NEURO: Alert and oriented. mentation and speech and mood stable.    In House Labs:   Hemoglobin A1C   Date Value Ref Range Status   07/17/2021 5.4 0.0 - 5.6 % Final     Comment:     Normal <5.7%   Prediabetes 5.7-6.4%    Diabetes 6.5% or higher     Note: Adopted from ADA consensus guidelines.   12/21/2020 5.5 0 - 5.6 % Final     Comment:     Normal <5.7% Prediabetes 5.7-6.4%  Diabetes 6.5% or higher - adopted from ADA   consensus guidelines.     11/17/2020 5.3 0 - 5.6 % Final     Comment:     Normal <5.7% Prediabetes 5.7-6.4%  Diabetes 6.5% or higher - adopted from ADA   consensus guidelines.     06/29/2020 6.1 (H) 0 - 5.6 % Final     Comment:     Normal <5.7% Prediabetes 5.7-6.4%  Diabetes 6.5% or higher - adopted from ADA   consensus guidelines.     03/11/2020 6.9 (A) 4.3 - 6 % Final   09/11/2019 6.1 (A) 4.3 - 6 % Final   05/29/2019 6.1 (A) 4.3 - 6 % Final       TSH   Date Value Ref Range Status   03/22/2021 1.27 0.40 - 4.00 mU/L Final   01/15/2021 2.19 0.40 - 4.00 mU/L Final   12/21/2020 1.62 0.40 - 4.00 mU/L Final   12/21/2020 1.62 0.40 - 4.00 mU/L Final   11/17/2020 1.86 0.40 - 4.00 mU/L Final       Creatinine   Date Value Ref Range Status   07/20/2021 0.59 0.52 - 1.04 mg/dL Final   01/15/2021 0.56 0.52 - 1.04 mg/dL Final       Video-Visit Details    Type of service:  Video Visit  All times are in your local time  All times are in your local time  1st VideoStart: 09/15/2021 07:45 am  Stop: 09/15/2021 07:54 am  Originating Location (pt. Location): Home  Platform used for Video Visit: Luisito

## 2021-09-15 NOTE — LETTER
9/15/2021       RE: Denia Montes  [address]  [facility] [address]     Dear Colleague,    Thank you for referring your patient, Denia Montes, to the [facility] [place] at [facility]. Please see a copy of my visit note below.    dm 1  Merrill Castorena CMA    Outcome for 09/15/21 7:25 AM :Glucose data attachment located on Sojern message     Denia is a 35 year old who is being evaluated via a billable video visit.      How would you like to obtain your AVS? [email or web address]  If the video visit is dropped, the invitation should be resent by: Send to e-mail at: [email or web address]  Will anyone else be joining your video visit? No          Endocrinology virtual Visit    Chief Complaint: Video Visit (dm 1)     Information obtained from:Patient      Assessment/Treatment Plan:      MAGDI/treated as Type 1 diabetes currently - (H) Positive ANNA antibody/C-peptide WNL   Postpartum 8 weeks    BG readings reviewed.  Overall within the target range. To simplify her regimen; the following adjustment in medication was made. She understands that metformin is present in breast milk.     Patient Instructions       Crockett,       Lantus 12 units daily.     Metformin 500 mg twice daily.     Carbohydrate coverage 1 unit for every 15 grams of carbohydrates for meals with high carbohydrates and correction scale.     Orders Placed This Encounter   Procedures     TSH with free T4 reflex     Hemoglobin A1c     Lipid panel reflex to direct LDL Fasting     Lab scheduling to schedule at any [place] lab locations: [phone] select option 1        Hashimoto thyroiditis: check TSH and if normal; discontinue levothyroxine 25 mcg daily which was started as a precaution during pregnancy.         Yuri Hansen MD  Staff Endocrinologist    Division of Endocrinology and Diabetes      Subjective:         HPI: Denia Montes is a 35 year old  female with history of MAGDI/treated as Type 1 diabetes currently - (H) Positive ANNA antibody/C-peptide WNL currently postpartum 8 weeks.     Currently on:   Lantus 12  Metformin 500 mg daily  CHO 1:15 for meals high in CHO  Correction 1 for every 50 above 140 with meals and above 200 at bedtime.   No recent hypoglycemia symptoms or sig lows.   BG as follows.         Allergies   Allergen Reactions     Sulfa Drugs Hives       Current Outpatient Medications   Medication Sig Dispense Refill     acetone urine (KETOSTIX) test strip Use to check urine for ketones every morning. 50 strip 11     blood glucose (JACI CONTOUR) test strip Check blood glucose 6 times a day (before and 2 hours after each meal) 600 strip 3     blood glucose (CONTOUR NEXT TEST) test strip Use to test blood sugar 6 times daily or as directed. 500 each 3     Continuous Blood Gluc  (FREESTYLE JERO 14 DAY READER) RAEANN Use to read blood sugars as per 's instructions. 1 each 0     Continuous Blood Gluc Sensor (CapstorySTYLE JERO 14 DAY SENSOR) MISC Change every 14 days. 9 each 3     insulin aspart (NOVOLOG PEN) 100 UNIT/ML pen 1 unit for every 15 grams of carbohydrates and correction scale as directed. Uses up to 20 units daily. 45 mL 3     insulin glargine (LANTUS PEN) 100 UNIT/ML pen Inject 12 Units Subcutaneous every morning 15 mL 1     insulin pen needle (BD RUSSELL U/F) 32G X 4 MM miscellaneous USE 8 PEN NEEDLES DAILY 1000 each 11     levothyroxine (SYNTHROID/LEVOTHROID) 25 MCG tablet Take 1 tablet (25 mcg) by mouth daily 90 tablet 3     metFORMIN (GLUCOPHAGE) 500 MG tablet Take 1 tablet (500 mg) by mouth 2 times daily (with meals) 180 tablet 3     MICROLET LANCETS MISC TEST BLOOD SUGAR TWICE DAILY 200 each 11     Omega-3 Fatty Acids (FISH OIL) 500 MG CAPS        Prenatal Vit-Fe Fumarate-FA (PRENATAL VITAMINS) 28-0.8 MG TABS Take 1 tablet by mouth daily 100 tablet 3     vitamin B-12 (CYANOCOBALAMIN) 100 MCG tablet Take 1,000 mcg by  mouth daily       ACE/ARB NOT PRESCRIBED, INTENTIONAL, Please choose reason not prescribed, below (Patient not taking: Reported on 6/4/2021)       acetaminophen (TYLENOL) 325 MG tablet Take 1-2 tablets (325-650 mg) by mouth every 6 hours as needed for mild pain (Patient not taking: Reported on 8/10/2021) 100 tablet 0     ibuprofen (ADVIL/MOTRIN) 600 MG tablet Take 1 tablet (600 mg) by mouth every 6 hours as needed for moderate pain (Patient not taking: Reported on 8/10/2021) 100 tablet 0     senna-docusate (SENOKOT-S/PERICOLACE) 8.6-50 MG tablet Take 1 tablet by mouth 2 times daily as needed for constipation (Patient not taking: Reported on 8/10/2021) 100 tablet 0       Review of Systems   as per HPI above      Objective:     GENERAL: Healthy, alert and no distress  EYES: Eyes grossly normal to inspection.    RESP: No audible wheeze, cough, or visible cyanosis.    NEURO: Alert and oriented. mentation and speech and mood stable.    In House Labs:   Hemoglobin A1C   Date Value Ref Range Status   07/17/2021 5.4 0.0 - 5.6 % Final     Comment:     Normal <5.7%   Prediabetes 5.7-6.4%    Diabetes 6.5% or higher     Note: Adopted from ADA consensus guidelines.   12/21/2020 5.5 0 - 5.6 % Final     Comment:     Normal <5.7% Prediabetes 5.7-6.4%  Diabetes 6.5% or higher - adopted from ADA   consensus guidelines.     11/17/2020 5.3 0 - 5.6 % Final     Comment:     Normal <5.7% Prediabetes 5.7-6.4%  Diabetes 6.5% or higher - adopted from ADA   consensus guidelines.     06/29/2020 6.1 (H) 0 - 5.6 % Final     Comment:     Normal <5.7% Prediabetes 5.7-6.4%  Diabetes 6.5% or higher - adopted from ADA   consensus guidelines.     03/11/2020 6.9 (A) 4.3 - 6 % Final   09/11/2019 6.1 (A) 4.3 - 6 % Final   05/29/2019 6.1 (A) 4.3 - 6 % Final       TSH   Date Value Ref Range Status   03/22/2021 1.27 0.40 - 4.00 mU/L Final   01/15/2021 2.19 0.40 - 4.00 mU/L Final   12/21/2020 1.62 0.40 - 4.00 mU/L Final   12/21/2020 1.62 0.40 - 4.00 mU/L  Final   11/17/2020 1.86 0.40 - 4.00 mU/L Final       Creatinine   Date Value Ref Range Status   07/20/2021 0.59 0.52 - 1.04 mg/dL Final   01/15/2021 0.56 0.52 - 1.04 mg/dL Final     Video-Visit Details    Type of service:  Video Visit  All times are in your local time  All times are in your local time  1st VideoStart: 09/15/2021 07:45 am  Stop: 09/15/2021 07:54 am  Originating Location (pt. Location): Home  Platform used for Video Visit: Well

## 2021-09-24 ENCOUNTER — LAB (OUTPATIENT)
Dept: LAB | Facility: CLINIC | Age: 36
End: 2021-09-24
Payer: COMMERCIAL

## 2021-09-24 DIAGNOSIS — E13.9 LATENT AUTOIMMUNE DIABETES MELLITUS IN ADULTS (LADA) (H): ICD-10-CM

## 2021-09-24 DIAGNOSIS — E06.3 HASHIMOTO'S THYROIDITIS: ICD-10-CM

## 2021-09-24 LAB
CHOLEST SERPL-MCNC: 263 MG/DL
FASTING STATUS PATIENT QL REPORTED: YES
HBA1C MFR BLD: 6.3 % (ref 0–5.6)
HDLC SERPL-MCNC: 70 MG/DL
LDLC SERPL CALC-MCNC: 183 MG/DL
NONHDLC SERPL-MCNC: 193 MG/DL
TRIGL SERPL-MCNC: 52 MG/DL
TSH SERPL DL<=0.005 MIU/L-ACNC: 1.14 MU/L (ref 0.4–4)

## 2021-09-24 PROCEDURE — 84443 ASSAY THYROID STIM HORMONE: CPT

## 2021-09-24 PROCEDURE — 36415 COLL VENOUS BLD VENIPUNCTURE: CPT

## 2021-09-24 PROCEDURE — 83036 HEMOGLOBIN GLYCOSYLATED A1C: CPT

## 2021-09-24 PROCEDURE — 80061 LIPID PANEL: CPT

## 2021-09-27 NOTE — RESULT ENCOUNTER NOTE
Denia,     #1 hemoglobin A1c is within the desired range.2.  The LDL cholesterol also known as bad cholesterol is significantly higher than desired.  This cholesterol level was previously within a reasonable range previously.  Consider reducing high cholesterol, high saturated fat and simple carbohydrates from your diet.  I recommend checking cholesterol panel again in a few months after dietary changes and if it is persistently high at that time then medication to lower cholesterol might be needed.  #3 your thyroid blood work results are within the normal limits.  It might be okay to stop the levothyroxine dose at this time and recheck labs again in 2 months.    Please let me know if any questions regarding these results.    Yuri Hansen MD

## 2021-10-20 DIAGNOSIS — O24.013 TYPE 1 DIABETES MELLITUS DURING PREGNANCY IN THIRD TRIMESTER: ICD-10-CM

## 2021-12-10 ENCOUNTER — PATIENT OUTREACH (OUTPATIENT)
Dept: FAMILY MEDICINE | Facility: CLINIC | Age: 36
End: 2021-12-10
Payer: COMMERCIAL

## 2022-01-14 ENCOUNTER — OFFICE VISIT (OUTPATIENT)
Dept: FAMILY MEDICINE | Facility: CLINIC | Age: 37
End: 2022-01-14
Payer: COMMERCIAL

## 2022-01-14 VITALS
DIASTOLIC BLOOD PRESSURE: 82 MMHG | RESPIRATION RATE: 16 BRPM | BODY MASS INDEX: 31.65 KG/M2 | WEIGHT: 190 LBS | HEART RATE: 78 BPM | HEIGHT: 65 IN | TEMPERATURE: 98.1 F | OXYGEN SATURATION: 99 % | SYSTOLIC BLOOD PRESSURE: 116 MMHG

## 2022-01-14 DIAGNOSIS — E10.9 TYPE 1 DIABETES MELLITUS WITHOUT COMPLICATION (H): ICD-10-CM

## 2022-01-14 DIAGNOSIS — E06.3 HASHIMOTO'S THYROIDITIS: ICD-10-CM

## 2022-01-14 DIAGNOSIS — E78.5 HYPERLIPIDEMIA LDL GOAL <70: Primary | ICD-10-CM

## 2022-01-14 DIAGNOSIS — Z00.00 ROUTINE GENERAL MEDICAL EXAMINATION AT A HEALTH CARE FACILITY: ICD-10-CM

## 2022-01-14 DIAGNOSIS — M72.2 PLANTAR FASCIITIS: ICD-10-CM

## 2022-01-14 PROBLEM — O24.011 TYPE 1 DIABETES MELLITUS DURING PREGNANCY IN FIRST TRIMESTER: Status: RESOLVED | Noted: 2020-12-29 | Resolved: 2022-01-14

## 2022-01-14 PROBLEM — Z34.90 ENCOUNTER FOR INDUCTION OF LABOR: Status: RESOLVED | Noted: 2021-07-17 | Resolved: 2022-01-14

## 2022-01-14 PROBLEM — Z23 NEED FOR TDAP VACCINATION: Status: RESOLVED | Noted: 2020-12-14 | Resolved: 2022-01-14

## 2022-01-14 PROBLEM — Z3A.13 13 WEEKS GESTATION OF PREGNANCY: Status: RESOLVED | Noted: 2021-01-19 | Resolved: 2022-01-14

## 2022-01-14 LAB
CHOLEST SERPL-MCNC: 172 MG/DL
CREAT UR-MCNC: 27 MG/DL
FASTING STATUS PATIENT QL REPORTED: NO
HBA1C MFR BLD: 6.5 % (ref 0–5.6)
HCV AB SERPL QL IA: NONREACTIVE
HDLC SERPL-MCNC: 68 MG/DL
LDLC SERPL CALC-MCNC: 94 MG/DL
MICROALBUMIN UR-MCNC: 8 MG/L
MICROALBUMIN/CREAT UR: 29.63 MG/G CR (ref 0–25)
NONHDLC SERPL-MCNC: 104 MG/DL
TRIGL SERPL-MCNC: 50 MG/DL
TSH SERPL DL<=0.005 MIU/L-ACNC: 1.87 MU/L (ref 0.4–4)

## 2022-01-14 PROCEDURE — 86803 HEPATITIS C AB TEST: CPT | Performed by: STUDENT IN AN ORGANIZED HEALTH CARE EDUCATION/TRAINING PROGRAM

## 2022-01-14 PROCEDURE — 83036 HEMOGLOBIN GLYCOSYLATED A1C: CPT | Performed by: STUDENT IN AN ORGANIZED HEALTH CARE EDUCATION/TRAINING PROGRAM

## 2022-01-14 PROCEDURE — 84443 ASSAY THYROID STIM HORMONE: CPT | Performed by: STUDENT IN AN ORGANIZED HEALTH CARE EDUCATION/TRAINING PROGRAM

## 2022-01-14 PROCEDURE — 36415 COLL VENOUS BLD VENIPUNCTURE: CPT | Performed by: STUDENT IN AN ORGANIZED HEALTH CARE EDUCATION/TRAINING PROGRAM

## 2022-01-14 PROCEDURE — 80061 LIPID PANEL: CPT | Performed by: STUDENT IN AN ORGANIZED HEALTH CARE EDUCATION/TRAINING PROGRAM

## 2022-01-14 PROCEDURE — 82043 UR ALBUMIN QUANTITATIVE: CPT | Performed by: STUDENT IN AN ORGANIZED HEALTH CARE EDUCATION/TRAINING PROGRAM

## 2022-01-14 PROCEDURE — 99395 PREV VISIT EST AGE 18-39: CPT | Mod: GC | Performed by: STUDENT IN AN ORGANIZED HEALTH CARE EDUCATION/TRAINING PROGRAM

## 2022-01-14 PROCEDURE — 99213 OFFICE O/P EST LOW 20 MIN: CPT | Mod: 25 | Performed by: STUDENT IN AN ORGANIZED HEALTH CARE EDUCATION/TRAINING PROGRAM

## 2022-01-14 PROCEDURE — G0145 SCR C/V CYTO,THINLAYER,RESCR: HCPCS | Performed by: STUDENT IN AN ORGANIZED HEALTH CARE EDUCATION/TRAINING PROGRAM

## 2022-01-14 PROCEDURE — 87624 HPV HI-RISK TYP POOLED RSLT: CPT | Performed by: STUDENT IN AN ORGANIZED HEALTH CARE EDUCATION/TRAINING PROGRAM

## 2022-01-14 RX ORDER — INFLUENZA VIRUS VACCINE 15; 15; 15; 15 UG/.5ML; UG/.5ML; UG/.5ML; UG/.5ML
SUSPENSION INTRAMUSCULAR
COMMUNITY
Start: 2021-09-24 | End: 2023-03-10

## 2022-01-14 ASSESSMENT — MIFFLIN-ST. JEOR: SCORE: 1547.71

## 2022-01-14 NOTE — PATIENT INSTRUCTIONS
- Will send results via dianboom + plans if any.   - Continue meds as prescribed.   - Get an eye exam when able.   - Revisit conversation about ACEi (such as lisinopril) in a couple of months. This is used in patients with diabetes to protect the kidneys and eyes, but it is dangerous to use during pregnancy.  - Look at AAOS plantar fascitis exercises  - Follow-up with endocrine        Preventive Health Recommendations  Female Ages 26 - 39  Yearly exam:   See your health care provider every year in order to    Review health changes.     Discuss preventive care.      Review your medicines if you your doctor has prescribed any.    Until age 30: Get a Pap test every three years (more often if you have had an abnormal result).    After age 30: Talk to your doctor about whether you should have a Pap test every 3 years or have a Pap test with HPV screening every 5 years.   You do not need a Pap test if your uterus was removed (hysterectomy) and you have not had cancer.  You should be tested each year for STDs (sexually transmitted diseases), if you're at risk.   Talk to your provider about how often to have your cholesterol checked.  If you are at risk for diabetes, you should have a diabetes test (fasting glucose).  Shots: Get a flu shot each year. Get a tetanus shot every 10 years.   Nutrition:     Eat at least 5 servings of fruits and vegetables each day.    Eat whole-grain bread, whole-wheat pasta and brown rice instead of white grains and rice.    Get adequate Calcium and Vitamin D.     Lifestyle    Exercise at least 150 minutes a week (30 minutes a day, 5 days of the week). This will help you control your weight and prevent disease.    Limit alcohol to one drink per day.    No smoking.     Wear sunscreen to prevent skin cancer.    See your dentist every six months for an exam and cleaning.

## 2022-01-14 NOTE — PROGRESS NOTES
"Female New Patient Note     Assessment and Plan     Health maintenance  Here to establish care and follow-up on several chronic issues.  Screened for HepC-negative     1. Hyperlipidemia   in Sept 2021. Now 89 in Jan 2022. Appears she's not on a statin. Unclear how she had this much improvement in her triglycerides, but it's reassuring!     2. H/o Hashimoto's thyroiditis  Had mild hypothyroidism during pregnancy and was started on synthroid. Discontinued post-partum and had TSH recheck ordered to ensure thyroid stayed WNL w/o synthroid. TSH today normal.     3. HR HPV+  Repeated co-testing today. ASCCP guidelines suggests to use \"clinical judgement.\" Plan on routing result to colp-gyn pool, but will likely continue co-testing annually at least in near future.  8/14/15: NIL pap, + HPV (not 16 or 18).  Plan cotest pap & HPV in 1 year.    09/23/16: NIL pap, + HR HPV (not 16 or 18). Plan [place].  10/21/16: [place] Bx: Neg NIL, ECC: Neg. Plan cotest in 1 year per provider.  10/27/17 ASCUS/+ HR HPV (not 16/18). Plan: colposcopy by 1/27/18  12/15/17 [place]- Normal Plan: Cotest in 1 yr due by 12/15/18  12/14/18 NIL Pap, + HR HPV (Neg 16/18). Plan 1 year cotest per provider  12/16/19 NIL pap with + HR HPV (not 16 or 18). Plan [facility].   01/14/20: [place] ECC benign. Plan cotest in 1 year.   12/29/20 NIL Pap, + HR HPV (not 16 or 18). Plan cotest in 1 year due 12/29/21.     4. Type 1 DM  Managed by endocrine. Not on ACEi/ARB. Discussed utility of ACEi in diabetes for renal and retinal protection; but contraindicated in pregnancy. She's currently using breastfeeding only as contraception. Prior to her pregnancy, she and partner used \"calendar method\" for contraception. Microalbumin today WNL.  - Bring up ACEi again in future; consider further contraception conversation  - Get a diabetic eye exam annually. Due for one!  - Follow-up with endocrine    5. Plantar fascitis   Continue w/ insoles. Look at AAOS plantar fascitis " exercises  Options for treatment and follow-up care were reviewed with the patient. Denia Montes engaged in the decision making process and verbalized understanding of the options discussed and agreed with the final plan.    Olive Murillo MD              HPI         Hashimoto thyroiditis: check TSH and if normal; discontinue levothyroxine 25 mcg daily which was started as a precaution during pregnancy.    Concerns today: Period hasn't returned. Still exclusively breastfeeding healthy 6 month old. Consider pregnancy test if any concern pregnant. Otherwise menstruation may just be suppressed by breastfeeding.         Patient Active Problem List   Diagnosis     History of kidney stones     Hyperlipidemia LDL goal <70     Cervical high risk HPV (human papillomavirus) test positive     Plantar warts     Diabetes mellitus type 1.5 (H)     Hashimoto's thyroiditis     Plantar fasciitis       Past Medical History:   Diagnosis Date     Cervical high risk HPV (human papillomavirus) test positive 08/2015 8/2015, 09/23/16, 12/14/18, 12/16/19, 12/29/20     Hashimoto's thyroiditis      Hx of colposcopy with cervical biopsy 10/21/2016    [place] Bx: Neg NIL, ECC: Neg.     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), managed as type 1 (H)      Miscarriage          Family History   Problem Relation Age of Onset     Diabetes Mother      Hyperlipidemia Mother      Kidney Disease Mother      Thyroid Disease Mother      Hyperlipidemia Father      Hypertension Father      Kidney Disease Father      Substance Abuse Brother               Review of Systems:     Review of Systems:  CONSTITUTIONAL: NEGATIVE for fever, chills, change in weight  INTEGUMENTARY/SKIN: NEGATIVE for worrisome rashes, moles or lesions  EYES: NEGATIVE for vision changes or irritation  ENT/MOUTH: NEGATIVE for ear, mouth and throat problems  RESP: NEGATIVE for significant cough or SOB  BREAST: NEGATIVE for masses, tenderness or discharge  CV: NEGATIVE for  "chest pain, palpitations or peripheral edema  GI: NEGATIVE for nausea, abdominal pain, heartburn, or change in bowel habits  : NEGATIVE for frequency, dysuria, or hematuria  MUSCULOSKELETAL: NEGATIVE for significant arthralgias or myalgia  NEURO: NEGATIVE for weakness, dizziness or paresthesias  ENDOCRINE: NEGATIVE for temperature intolerance, skin/hair changes  HEME/ALLERGY: NEGATIVE for bleeding problems  PSYCHIATRIC: NEGATIVE for changes in mood or affect  Sleep:   Do you snore most or the night (as reported by a family member)? No  Do you feel sleepy or extremely tired during most of the day? No             Social History     Social History     Social History Narrative    Social history 1/14/22:     Starting to get back into exercising again. Feels good.     Wears a helmet when biking. Wears a seatbelt when driving, etc. Feels safe at home. Has had 3 pregnancies; one delicery. Lives with herself, her partner, their son, and one housemate. Works for [employer] Nickname \"Bud.\"        Has anyone hurt you physically, for example by pushing, hitting, slapping or kicking you or forcing you to have sex? Denies  Do you feel threatened or controlled by a partner, ex-partner or anyone in your life? Denies  No sexual concerns.            Physical Exam:     Vitals: /82   Pulse 78   Temp 98.1  F (36.7  C) (Oral)   Resp 16   Ht 1.651 m (5' 5\")   Wt 86.2 kg (190 lb)   SpO2 99%   BMI 31.62 kg/m    BMI= Body mass index is 31.62 kg/m .  GENERAL: healthy, alert and no distress  EYES: Eyes grossly normal to inspection, extraocular movements - intact, and PERRL  HENT: ear canals- normal; TMs- normal; Nose- normal;   NECK: no tenderness, no adenopathy, no asymmetry, no masses, no stiffness; thyroid- normal to palpation  RESP: lungs clear to auscultation - no rales, no rhonchi, no wheezes  BREAST:deferred  CV: regular rates and rhythm, normal S1 S2, no S3 or S4 and no murmur, no click or rub " -  ABDOMEN: soft, no tenderness, no  hepatosplenomegaly, no masses, normal bowel sounds  MS: extremities- no gross deformities noted, no edema  SKIN: no suspicious lesions, no rashes  NEURO: strength and tone- normal, sensory exam- grossly normal, mentation- intact, speech- normal, reflexes- symmetric  : vaginal introitus erythematous and appears mildly irritated (but asymptomatic). Vaginal walls muscular and normal. Cervix w/ small nabothian cyst. Otherwise appears normal. Cervix quite posterior w/ anteverted uterus.   PSYCH: Alert and oriented times 3; speech- coherent , normal rate and volume; able to articulate logical thoughts, able to abstract reason, no tangential thoughts, no hallucinations or delusions, affect- normal

## 2022-01-15 NOTE — PROGRESS NOTES
Preceptor Attestation:   Patient seen, evaluated and discussed with the resident. I have verified the content of the note, which accurately reflects my assessment of the patient and the plan of care.   Supervising Physician:  Param Yun MD

## 2022-01-18 LAB
BKR LAB AP GYN ADEQUACY: NORMAL
BKR LAB AP GYN INTERPRETATION: NORMAL
BKR LAB AP HPV REFLEX: NORMAL
BKR LAB AP PREVIOUS ABNL DX: NORMAL
BKR LAB AP PREVIOUS ABNORMAL: NORMAL
PATH REPORT.COMMENTS IMP SPEC: NORMAL
PATH REPORT.COMMENTS IMP SPEC: NORMAL
PATH REPORT.RELEVANT HX SPEC: NORMAL

## 2022-01-19 LAB
HUMAN PAPILLOMA VIRUS 16 DNA: NEGATIVE
HUMAN PAPILLOMA VIRUS 18 DNA: NEGATIVE
HUMAN PAPILLOMA VIRUS FINAL DIAGNOSIS: NORMAL
HUMAN PAPILLOMA VIRUS OTHER HR: NEGATIVE

## 2022-02-09 NOTE — TELEPHONE ENCOUNTER
01/14/22 NIL Pap, - HR HPV. Plan: cotest in 3 years due 01/14/25 (done at [facility])

## 2022-02-24 ENCOUNTER — TELEPHONE (OUTPATIENT)
Dept: ENDOCRINOLOGY | Facility: CLINIC | Age: 37
End: 2022-02-24
Payer: COMMERCIAL

## 2022-02-24 DIAGNOSIS — E13.9 DIABETES MELLITUS TYPE 1.5 (H): Primary | ICD-10-CM

## 2022-02-24 RX ORDER — INSULIN GLARGINE-YFGN 100 [IU]/ML
12 INJECTION, SOLUTION SUBCUTANEOUS DAILY
Qty: 15 ML | Refills: 1 | Status: SHIPPED | OUTPATIENT
Start: 2022-02-24 | End: 2022-05-05

## 2022-02-24 NOTE — TELEPHONE ENCOUNTER
Liaison received voicemail 02/23/22 112pm from [employer] asking for a call back. Liaison called back 02/24 1107am, spoke to Janice.     Lantus is no longer covered, need new rx for semglee or insulin glargine.

## 2022-02-24 NOTE — TELEPHONE ENCOUNTER
Formulary change.     RE    Liaison received voicemail 02/23/22 112pm from [employer] [facility] asking for a call back. Liaison called back 02/24 1107am, spoke to Janice.      Lantus is no longer covered, need new rx for semglee or insulin glargine.

## 2022-03-04 ENCOUNTER — E-VISIT (OUTPATIENT)
Dept: URGENT CARE | Facility: CLINIC | Age: 37
End: 2022-03-04
Payer: COMMERCIAL

## 2022-03-04 ENCOUNTER — NURSE TRIAGE (OUTPATIENT)
Dept: NURSING | Facility: CLINIC | Age: 37
End: 2022-03-04

## 2022-03-04 DIAGNOSIS — N30.00 ACUTE CYSTITIS WITHOUT HEMATURIA: Primary | ICD-10-CM

## 2022-03-04 DIAGNOSIS — N39.0 ACUTE UTI (URINARY TRACT INFECTION): Primary | ICD-10-CM

## 2022-03-04 PROCEDURE — 99421 OL DIG E/M SVC 5-10 MIN: CPT | Performed by: NURSE PRACTITIONER

## 2022-03-04 RX ORDER — NITROFURANTOIN 25; 75 MG/1; MG/1
100 CAPSULE ORAL 2 TIMES DAILY
Qty: 14 CAPSULE | Refills: 0 | Status: SHIPPED | OUTPATIENT
Start: 2022-03-04 | End: 2022-10-12

## 2022-03-04 RX ORDER — NITROFURANTOIN 25; 75 MG/1; MG/1
100 CAPSULE ORAL 2 TIMES DAILY
Qty: 10 CAPSULE | Refills: 0 | Status: SHIPPED | OUTPATIENT
Start: 2022-03-04 | End: 2022-03-09

## 2022-03-04 NOTE — PATIENT INSTRUCTIONS
Dear Denia Montes    After reviewing your responses, I've been able to diagnose you with a urinary tract infection, which is a common infection of the bladder with bacteria.  This is not a sexually transmitted infection, though urinating immediately after intercourse can help prevent infections.  Drinking lots of fluids is also helpful to clear your current infection and prevent the next one.      I have sent a prescription for antibiotics to your pharmacy to treat this infection.    It is important that you take all of your prescribed medication even if your symptoms are improving after a few doses.  Taking all of your medicine helps prevent the symptoms from returning.     If your symptoms worsen, you develop pain in your back or stomach, develop fevers, or are not improving in 5 days, please contact your primary care provider for an appointment or visit any of our convenient Walk-in or Urgent Care Centers to be seen, which can be found on our website here.    Thanks again for choosing us as your health care partner,    Gloria Terry CNP    Urinary Tract Infections in Women  Urinary tract infections (UTIs) are most often caused by bacteria. These bacteria enter the urinary tract. The bacteria may come from inside the body. Or they may travel from the skin outside the rectum or vagina into the urethra. Female anatomy makes it easy for bacteria from the bowel to enter a woman s urinary tract, which is the most common source of UTI. This means women develop UTIs more often than men. Pain in or around the urinary tract is a common UTI symptom. But the only way to know for sure if you have a UTI for the healthcare provider to test your urine. The two tests that may be done are the urinalysis and urine culture.     Types of UTIs    Cystitis. A bladder infection (cystitis) is the most common UTI in women. You may have urgent or frequent need to pee. You may also have pain, burning when you pee, and bloody  urine.    Urethritis. This is an inflamed urethra, which is the tube that carries urine from the bladder to outside the body. You may have lower stomach or back pain. You may also have urgent or frequent need to pee.    Pyelonephritis. This is a kidney infection. If not treated, it can be serious and damage your kidneys. In severe cases, you may need to stay in the hospital. You may have a fever and lower back pain.    Medicines to treat a UTI  Most UTIs are treated with antibiotics. These kill the bacteria. The length of time you need to take them depends on the type of infection. It may be as short as 3 days. If you have repeated UTIs, you may need a low-dose antibiotic for several months. Take antibiotics exactly as directed. Don t stop taking them until all of the medicine is gone. If you stop taking the antibiotic too soon, the infection may not go away. You may also develop a resistance to the antibiotic. This can make it much harder to treat.   Lifestyle changes to treat and prevent UTIs   The lifestyle changes below will help get rid of your UTI. They may also help prevent future UTIs.     Drink plenty of fluids. This includes water, juice, or other caffeine-free drinks. Fluids help flush bacteria out of your body.    Empty your bladder. Always empty your bladder when you feel the urge to pee. And always pee before going to sleep. Urine that stays in your bladder can lead to infection. Try to pee before and after sex as well.    Practice good personal hygiene. Wipe yourself from front to back after using the toilet. This helps keep bacteria from getting into the urethra.    Use condoms during sex. These help prevent UTIs caused by sexually transmitted bacteria. Also don't use spermicides during sex. These can increase the risk for UTIs. Choose other forms of birth control instead. For women who tend to get UTIs after sex, a low-dose of a preventive antibiotic may be used. Be sure to discuss this option with  your healthcare provider.    Follow up with your healthcare provider as directed. He or she may test to make sure the infection has cleared. If needed, more treatment may be started.  [place] last reviewed this educational content on 7/1/2019 2000-2021 [employer]. All rights reserved. This information is not intended as a substitute for professional medical care. Always follow your healthcare professional's instructions.

## 2022-03-04 NOTE — TELEPHONE ENCOUNTER
Pt is requesting prescription for Macrobid be sent to alternate pharmacy as pharmacy where prescription was sent is temporarily closed.     Pharmacy updated. Please let pt know when sent.     Reason for Disposition    Prescription not at pharmacy and was prescribed today by PCP    Protocols used: MEDICATION QUESTION CALL-A-[place]

## 2022-03-14 DIAGNOSIS — Z98.891 S/P CESAREAN SECTION: ICD-10-CM

## 2022-03-14 NOTE — TELEPHONE ENCOUNTER
Received refill request for prenatal vitamins from patient's pharmacy. Does not appear that patient has benefit coverage that covers prenatal vitamins and would likely be less expensive for her to purchase them over the counter. I have reached out to the patient asking if she would still like a prescription sent. Awaiting response.     Elyssa FRAIRE RN

## 2022-03-15 RX ORDER — PNV NO.95/FERROUS FUM/FOLIC AC 28MG-0.8MG
1 TABLET ORAL DAILY
Qty: 100 TABLET | Refills: 3 | Status: SHIPPED | OUTPATIENT
Start: 2022-03-15 | End: 2022-10-12

## 2022-04-01 DIAGNOSIS — E10.9 TYPE 1 DIABETES MELLITUS WITHOUT COMPLICATION (H): ICD-10-CM

## 2022-04-01 DIAGNOSIS — Z98.891 S/P CESAREAN SECTION: ICD-10-CM

## 2022-04-08 ENCOUNTER — TRANSFERRED RECORDS (OUTPATIENT)
Dept: HEALTH INFORMATION MANAGEMENT | Facility: CLINIC | Age: 37
End: 2022-04-08
Payer: COMMERCIAL

## 2022-04-08 LAB — RETINOPATHY: NEGATIVE

## 2022-05-02 ENCOUNTER — TELEPHONE (OUTPATIENT)
Dept: ENDOCRINOLOGY | Facility: CLINIC | Age: 37
End: 2022-05-02
Payer: COMMERCIAL

## 2022-05-02 DIAGNOSIS — E10.9 TYPE 1 DIABETES MELLITUS WITHOUT COMPLICATION (H): Primary | ICD-10-CM

## 2022-05-02 RX ORDER — AMOXICILLIN 250 MG
1 CAPSULE ORAL 2 TIMES DAILY PRN
Qty: 100 TABLET | Refills: 0 | Status: CANCELLED | OUTPATIENT
Start: 2022-05-02

## 2022-05-02 NOTE — TELEPHONE ENCOUNTER
[facility] Call Center    Phone Message    May a detailed message be left on voicemail: yes     Reason for Call: Medication Refill Request    Has the patient contacted the pharmacy for the refill? Yes   Name of medication being requested:Zachery Sanford from [facility] patient is requesting a refill request for   Accu Check fast click lancets.   Provider who prescribed the medication: Dr. Hansen   Pharmacy: [facility] orders can be faxed [phone]  Date medication is needed: asap         Action Taken: Other: ENDO    Travel Screening: Not Applicable

## 2022-05-02 NOTE — TELEPHONE ENCOUNTER
Lancets    9/15/2021  [facility] [place]   Yuri Hansen MD    Endocrinology, Diabetes, and Metabolism

## 2022-05-05 DIAGNOSIS — E13.9 DIABETES MELLITUS TYPE 1.5 (H): ICD-10-CM

## 2022-05-05 DIAGNOSIS — Z98.891 S/P CESAREAN SECTION: ICD-10-CM

## 2022-05-05 DIAGNOSIS — E10.9 TYPE 1 DIABETES MELLITUS WITHOUT COMPLICATION (H): ICD-10-CM

## 2022-05-05 DIAGNOSIS — Z3A.01 LESS THAN 8 WEEKS GESTATION OF PREGNANCY: ICD-10-CM

## 2022-05-05 RX ORDER — INSULIN GLARGINE-YFGN 100 [IU]/ML
12 INJECTION, SOLUTION SUBCUTANEOUS DAILY
Qty: 15 ML | Refills: 1 | Status: SHIPPED | OUTPATIENT
Start: 2022-05-05 | End: 2022-10-12

## 2022-05-05 NOTE — TELEPHONE ENCOUNTER
insulin aspart (NOVOLOG PEN) 100 UNIT/ML pen      Last Written Prescription Date:  9-15-21   No print out  Last Office Visit : 9-15-21  Future Office visit:  none    Routing refill request to provider for review/approval because:  Insulin - refilled per clinic  No print out    Insulin Glargine-yfgn 100 UNIT/ML SOPN      Last Written Prescription Date:  2-24-22  Last Fill Quantity: 15 ml,   # refills: 1  Requesting new pharmacy    Routing refill request to provider for review/approval because:  Insulin - refilled per clinic

## 2022-05-05 NOTE — TELEPHONE ENCOUNTER
LCV: 9/15/2021  [facility] [place]  -blood glucose (CONTOUR NEXT TEST) test strip      -metFORMIN (GLUCOPHAGE) 500 MG tablet: pharm transfer, remaining RF sent to requesting pharm   metFORMIN (GLUCOPHAGE) 500 MG tablet   180 tablet 3 9/15/2021  No  Sig - Route: Take 1 tablet (500 mg) by mouth 2 times daily (with meals) - Oral  [id number]  Yuri Hansen

## 2022-05-05 NOTE — TELEPHONE ENCOUNTER
Short Acting Insulin Protocol Failed 05/05/2022 02:07 PM   Protocol Details  Recent (6 mo) or future (30 days) visit within the authorizing provider's specialty

## 2022-06-13 DIAGNOSIS — O24.013 TYPE 1 DIABETES MELLITUS DURING PREGNANCY IN THIRD TRIMESTER: ICD-10-CM

## 2022-06-14 ENCOUNTER — TELEPHONE (OUTPATIENT)
Dept: ENDOCRINOLOGY | Facility: CLINIC | Age: 37
End: 2022-06-14
Payer: COMMERCIAL

## 2022-06-14 DIAGNOSIS — E10.9 TYPE 1 DIABETES MELLITUS WITHOUT COMPLICATION (H): ICD-10-CM

## 2022-06-14 DIAGNOSIS — Z3A.01 LESS THAN 8 WEEKS GESTATION OF PREGNANCY: ICD-10-CM

## 2022-06-14 NOTE — TELEPHONE ENCOUNTER
[facility] Call Center    Phone Message    May a detailed message be left on voicemail: yes     Reason for Call: Other: Per patient needs notes sent to pharmacy that she can accept generic brand of insulin  and that the new increase dosages are correct.      [id number] - [place], [place] - [address]      Action Taken: Other: ENDO    Travel Screening: Not Applicable

## 2022-06-16 ENCOUNTER — TELEPHONE (OUTPATIENT)
Dept: ENDOCRINOLOGY | Facility: CLINIC | Age: 37
End: 2022-06-16
Payer: COMMERCIAL

## 2022-06-16 NOTE — TELEPHONE ENCOUNTER
[facility] Call Center    Phone Message    May a detailed message be left on voicemail: yes     Reason for Call: Medication Question or concern regarding medication   Prescription Clarification  Name of Medication: insulin glargine (LANTUS PEN) 100 UNIT/ML pen  Prescribing Provider: Dr. Hansen.      Pharmacy: [id number] - [place], [place] - [address] [phone]   What on the order needs clarification?Pharmacy needs to know which orders are correct 12 units or 18 units daily ? Please reach out to [phone]          Action Taken: Other: ENDO    Travel Screening: Not Applicable

## 2022-06-20 DIAGNOSIS — O24.013 TYPE 1 DIABETES MELLITUS DURING PREGNANCY IN THIRD TRIMESTER: ICD-10-CM

## 2022-07-25 DIAGNOSIS — E10.9 TYPE 1 DIABETES MELLITUS WITHOUT COMPLICATION (H): ICD-10-CM

## 2022-07-25 DIAGNOSIS — Z3A.01 LESS THAN 8 WEEKS GESTATION OF PREGNANCY: ICD-10-CM

## 2022-07-25 NOTE — TELEPHONE ENCOUNTER
[facility] Call Center    Phone Message    May a detailed message be left on voicemail: yes     Reason for Call: Medication Refill Request    Has the patient contacted the pharmacy for the refill? Yes   Name of medication being requested: blood glucose (CONTOUR NEXT TEST) test strip    Provider who prescribed the medication: Jade    Pharmacy: [id number] - [place], [place] - [address]  Date medication is needed: whenever possible      Action Taken: Message routed to:  [facility] endo    Travel Screening: Not Applicable

## 2022-07-26 NOTE — TELEPHONE ENCOUNTER
blood glucose (CONTOUR NEXT TEST) test strip  Last Written Prescription Date:  5/5/2022  Last Fill Quantity: 500,   # refills: 0  Last Office Visit :  9/15/2021  Future Office visit:   10/12/2022  500 Strips, 1 Refill sent to alberto Jha RN  Central Triage Red Flags/Med Refills

## 2022-09-11 ENCOUNTER — HEALTH MAINTENANCE LETTER (OUTPATIENT)
Age: 37
End: 2022-09-11

## 2022-09-29 ENCOUNTER — TELEPHONE (OUTPATIENT)
Dept: ENDOCRINOLOGY | Facility: CLINIC | Age: 37
End: 2022-09-29

## 2022-09-29 DIAGNOSIS — E10.9 TYPE 1 DIABETES MELLITUS WITHOUT COMPLICATION (H): Primary | ICD-10-CM

## 2022-09-29 NOTE — TELEPHONE ENCOUNTER
[facility] Call Center    Phone Message    May a detailed message be left on voicemail: yes     Reason for Call: Other: Patient called to ask if Dr. Hansen would put through lab orders to be done ahead  Of her upcoming visit on 10/12/22 so that results can be discussed at the appt. Please follow up.Thank you.     Action Taken: Other: Endo    Travel Screening: Not Applicable

## 2022-10-03 NOTE — TELEPHONE ENCOUNTER
Orders Placed This Encounter   Procedures     Basic metabolic panel     Hemoglobin A1c     TSH with free T4 reflex     Albumin Random Urine Quantitative with Creat Ratio     Lipid panel reflex to direct LDL Fasting   order placed.   Yuri Hansen MD

## 2022-10-05 NOTE — PROGRESS NOTES
Denia Montes  is being evaluated via a billable video visit.      How would you like to obtain your AVS? [employer]  For the video visit, send the invitation by: Send to e-mail at: [email or web address]  Will anyone else be joining your video visit? No      Outcome for 10/05/22 12:11 PM: [employer] message sent  LOLITA Hummel  Outcome for 10/10/22 1:54 PM: Patient states she is not using [employer]anu. Patient will send BG readings via [employer].   LOLITA Hummel  Outcome for 10/10/22 3:19 PM: Glucose Readings sent via [employer]  LOLITA Hummel

## 2022-10-06 ENCOUNTER — LAB (OUTPATIENT)
Dept: LAB | Facility: CLINIC | Age: 37
End: 2022-10-06
Payer: COMMERCIAL

## 2022-10-06 DIAGNOSIS — E10.9 TYPE 1 DIABETES MELLITUS WITHOUT COMPLICATION (H): ICD-10-CM

## 2022-10-06 LAB
ANION GAP SERPL CALCULATED.3IONS-SCNC: 10 MMOL/L (ref 7–15)
BUN SERPL-MCNC: 16.8 MG/DL (ref 6–20)
CALCIUM SERPL-MCNC: 9 MG/DL (ref 8.6–10)
CHLORIDE SERPL-SCNC: 103 MMOL/L (ref 98–107)
CHOLEST SERPL-MCNC: 200 MG/DL
CREAT SERPL-MCNC: 0.65 MG/DL (ref 0.51–0.95)
CREAT UR-MCNC: 67.3 MG/DL
DEPRECATED HCO3 PLAS-SCNC: 23 MMOL/L (ref 22–29)
GFR SERPL CREATININE-BSD FRML MDRD: >90 ML/MIN/1.73M2
GLUCOSE SERPL-MCNC: 121 MG/DL (ref 70–99)
HBA1C MFR BLD: 6.2 % (ref 0–5.6)
HDLC SERPL-MCNC: 67 MG/DL
LDLC SERPL CALC-MCNC: 121 MG/DL
MICROALBUMIN UR-MCNC: <12 MG/L
MICROALBUMIN/CREAT UR: NORMAL MG/G{CREAT}
NONHDLC SERPL-MCNC: 133 MG/DL
POTASSIUM SERPL-SCNC: 4.4 MMOL/L (ref 3.4–5.3)
SODIUM SERPL-SCNC: 136 MMOL/L (ref 136–145)
TRIGL SERPL-MCNC: 59 MG/DL
TSH SERPL DL<=0.005 MIU/L-ACNC: 2.56 UIU/ML (ref 0.3–4.2)

## 2022-10-06 PROCEDURE — 82043 UR ALBUMIN QUANTITATIVE: CPT

## 2022-10-06 PROCEDURE — 36415 COLL VENOUS BLD VENIPUNCTURE: CPT

## 2022-10-06 PROCEDURE — 80048 BASIC METABOLIC PNL TOTAL CA: CPT

## 2022-10-06 PROCEDURE — 84443 ASSAY THYROID STIM HORMONE: CPT

## 2022-10-06 PROCEDURE — 80061 LIPID PANEL: CPT

## 2022-10-06 PROCEDURE — 83036 HEMOGLOBIN GLYCOSYLATED A1C: CPT

## 2022-10-12 ENCOUNTER — VIRTUAL VISIT (OUTPATIENT)
Dept: ENDOCRINOLOGY | Facility: CLINIC | Age: 37
End: 2022-10-12
Payer: COMMERCIAL

## 2022-10-12 DIAGNOSIS — Z98.891 S/P CESAREAN SECTION: ICD-10-CM

## 2022-10-12 DIAGNOSIS — O24.013 TYPE 1 DIABETES MELLITUS DURING PREGNANCY IN THIRD TRIMESTER: ICD-10-CM

## 2022-10-12 DIAGNOSIS — E13.9 LATENT AUTOIMMUNE DIABETES MELLITUS IN ADULTS (LADA) (H): Primary | ICD-10-CM

## 2022-10-12 PROCEDURE — 99215 OFFICE O/P EST HI 40 MIN: CPT | Mod: 95 | Performed by: INTERNAL MEDICINE

## 2022-10-12 RX ORDER — PROCHLORPERAZINE 25 MG/1
SUPPOSITORY RECTAL
Qty: 3 EACH | Refills: 11 | Status: SHIPPED | OUTPATIENT
Start: 2022-10-12 | End: 2022-10-12

## 2022-10-12 RX ORDER — PROCHLORPERAZINE 25 MG/1
SUPPOSITORY RECTAL
Qty: 1 EACH | Refills: 3 | Status: SHIPPED | OUTPATIENT
Start: 2022-10-12 | End: 2022-10-12

## 2022-10-12 RX ORDER — PROCHLORPERAZINE 25 MG/1
SUPPOSITORY RECTAL
Qty: 1 EACH | Refills: 0 | Status: SHIPPED | OUTPATIENT
Start: 2022-10-12 | End: 2022-10-12

## 2022-10-12 RX ORDER — IBUPROFEN 600 MG/1
1 TABLET ORAL DAILY PRN
Qty: 1 KIT | Refills: 3 | Status: SHIPPED | OUTPATIENT
Start: 2022-10-12 | End: 2022-10-21

## 2022-10-12 NOTE — LETTER
10/12/2022       RE: Denia Montes  [address]  [facility] [address]     Dear Colleague,    Thank you for referring your patient, Denia Montes, to the [facility] ENDOCRINOLOG[facility] [place] at [facility]. Please see a copy of my visit note below.    Denia Montes  is being evaluated via a billable video visit.      How would you like to obtain your AVS? [employer]  For the video visit, send the invitation by: Send to e-mail at: [email or web address]  Will anyone else be joining your video visit? No      Outcome for 10/05/22 12:11 PM: Geotender message sent  LOLITA Hummel  Outcome for 10/10/22 1:54 PM: Patient states she is not using shantanu. Patient will send BG readings via [employer].   LOLITA Hummel  Outcome for 10/10/22 3:19 PM: Glucose Readings sent via Geotender  LOLITA Hummel            Endocrinology virtual Visit    Chief Complaint: Video Visit     Information obtained from:Patient      Assessment/Treatment Plan:      MAGDI/treated as Type 1 diabetes currently - (H) Positive ANNA antibody/C-peptide WNL     BG readings reviewed.  BG fluctuation as documented in HPI. Postprandial hyperglycemia and lows at times. Fasting slightly higher than target.   Plan      Lantus 22 units daily.     Metformin 1000 mg twice daily.     Carbohydrate coverage 1 unit for every 15 grams of carbohydrates with breakfast and 1:12 for lunch and dinner.     Please follow correction scale 1 unit for every 50 above 140 three times per day with meals and at bedtime.     DEXCOM CGM sent to pharmacy.      Please let us know if any lows below 70.       Hashimoto thyroiditis: TSH stable off of treatment. Check TSH q 1-2 times per year and as needed.       Yuri aHnsen MD  Staff Endocrinologist    Division of Endocrinology and Diabetes      Subjective:         HPI: Denia Montes is a 37 year old female with history of MAGDI/treated as Type 1  diabetes currently - (H) Positive ANNA antibody/C-peptide WNL here for a follow up.      Past few weeks; numbers up and down.   Forgets to have snack; and BG drops after breastfeeding.   Currently on:   Lantus 20 am  Metformin 500 mg daily  CHO 1:15 for breakfast and dinner and lunch 1:9   Correction 1 for every 50 above 140 with meals and above 200 at bedtime.   No recent hypoglycemia symptoms or sig lows.   BG as follows.  Fastin, 125, 140, 166, 140, 135, 130, 139, 111, 107, 120, 126                 Allergies   Allergen Reactions     Sulfa Drugs Hives       Current Outpatient Medications   Medication Sig Dispense Refill     acetone urine (KETOSTIX) test strip Use to check urine for ketones every morning. 50 strip 11     blood glucose (JACI CONTOUR) test strip Check blood glucose 6 times a day (before and 2 hours after each meal) 600 strip 3     blood glucose (CONTOUR NEXT TEST) test strip Use to test blood sugar 6 times daily or as directed.(See you at your visit on 10/12/2022) 500 strip 1     blood glucose (NO BRAND SPECIFIED) lancets standard Check blood glucose 6 times a day (before and 2 hours after each meal)   Accu Check fast click lancets. 200 lancet 4     Continuous Blood Gluc  (DEXCOM G6 ) RAEANN Use to read blood sugars as per 's instructions. 1 each 0     Continuous Blood Gluc Sensor (DEXCOM G6 SENSOR) MISC Change every 10 days. 3 each 11     Continuous Blood Gluc Transmit (DEXCOM G6 TRANSMITTER) MISC Change every 3 months. 1 each 3     Glucagon, rDNA, (GLUCAGON EMERGENCY) 1 MG KIT Inject 1 mg into the muscle daily as needed (Severe Hypoglycemia) 1 kit 3     insulin aspart (NOVOLOG PEN) 100 UNIT/ML pen 1 unit for every 10 grams of carbohydrates and correction scale as directed. Uses up to 30 units daily. 30 mL 3     insulin glargine (LANTUS PEN) 100 UNIT/ML pen Inject 22 Units Subcutaneous every morning 30 mL 3     Insulin Glargine-yfgn 100 UNIT/ML SOPN Inject 12 Units  Subcutaneous daily Inject 12 Units Subcutaneous every morning 15 mL 1     metFORMIN (GLUCOPHAGE) 500 MG tablet Take 2 tablets (1,000 mg) by mouth 2 times daily (with meals) 360 tablet 3     MICROLET LANCETS MISC TEST BLOOD SUGAR TWICE DAILY 200 each 11     Omega-3 Fatty Acids (FISH OIL) 500 MG CAPS        ACE/ARB NOT PRESCRIBED, INTENTIONAL, Please choose reason not prescribed, below (Patient not taking: No sig reported)       acetaminophen (TYLENOL) 325 MG tablet Take 1-2 tablets (325-650 mg) by mouth every 6 hours as needed for mild pain (Patient not taking: No sig reported) 100 tablet 0     Continuous Blood Gluc  (FREESTYLE JERO 14 DAY READER) RAEANN Use to read blood sugars as per 's instructions. (Patient not taking: Reported on 10/12/2022) 1 each 0     Continuous Blood Gluc Sensor (FREESTYLE JERO 14 DAY SENSOR) MISC Change every 14 days. (Patient not taking: Reported on 10/12/2022) 9 each 3     FLUARIX QUADRIVALENT 0.5 ML injection  (Patient not taking: Reported on 10/12/2022)       ibuprofen (ADVIL/MOTRIN) 600 MG tablet Take 1 tablet (600 mg) by mouth every 6 hours as needed for moderate pain (Patient not taking: No sig reported) 100 tablet 0     insulin pen needle (BD RUSSELL U/F) 32G X 4 MM miscellaneous USE 8 PEN NEEDLES DAILY 1000 each 11     levothyroxine (SYNTHROID/LEVOTHROID) 25 MCG tablet Take 1 tablet (25 mcg) by mouth daily (Patient not taking: Reported on 10/12/2022) 90 tablet 3     nitroFURantoin macrocrystal-monohydrate (MACROBID) 100 MG capsule Take 1 capsule (100 mg) by mouth 2 times daily (Patient not taking: Reported on 10/12/2022) 14 capsule 0     Prenatal Vit-Fe Fumarate-FA (PRENATAL VITAMINS) 28-0.8 MG TABS Take 1 tablet by mouth daily (Patient not taking: Reported on 10/12/2022) 100 tablet 3     senna-docusate (SENOKOT-S/PERICOLACE) 8.6-50 MG tablet Take 1 tablet by mouth 2 times daily as needed for constipation (Patient not taking: No sig reported) 100 tablet 0      vitamin B-12 (CYANOCOBALAMIN) 100 MCG tablet Take 1,000 mcg by mouth daily (Patient not taking: Reported on 10/12/2022)         Review of Systems   as per HPI above      Objective:     GENERAL: Healthy, alert and no distress  EYES: Eyes grossly normal to inspection.    RESP: No audible wheeze, cough, or visible cyanosis.    NEURO: Alert and oriented. mentation and speech and mood stable.    In House Labs:   Hemoglobin A1C   Date Value Ref Range Status   10/06/2022 6.2 (H) 0.0 - 5.6 % Final     Comment:     Normal <5.7%   Prediabetes 5.7-6.4%    Diabetes 6.5% or higher     Note: Adopted from ADA consensus guidelines.   01/14/2022 6.5 (H) 0.0 - 5.6 % Final     Comment:     Normal <5.7%   Prediabetes 5.7-6.4%    Diabetes 6.5% or higher     Note: Adopted from ADA consensus guidelines.   09/24/2021 6.3 (H) 0.0 - 5.6 % Final     Comment:     Normal <5.7%   Prediabetes 5.7-6.4%    Diabetes 6.5% or higher     Note: Adopted from ADA consensus guidelines.   12/21/2020 5.5 0 - 5.6 % Final     Comment:     Normal <5.7% Prediabetes 5.7-6.4%  Diabetes 6.5% or higher - adopted from ADA   consensus guidelines.     11/17/2020 5.3 0 - 5.6 % Final     Comment:     Normal <5.7% Prediabetes 5.7-6.4%  Diabetes 6.5% or higher - adopted from ADA   consensus guidelines.     06/29/2020 6.1 (H) 0 - 5.6 % Final     Comment:     Normal <5.7% Prediabetes 5.7-6.4%  Diabetes 6.5% or higher - adopted from ADA   consensus guidelines.       Hemoglobin A1C POCT   Date Value Ref Range Status   03/11/2020 6.9 (A) 4.3 - 6 % Final   09/11/2019 6.1 (A) 4.3 - 6 % Final   05/29/2019 6.1 (A) 4.3 - 6 % Final       TSH   Date Value Ref Range Status   10/06/2022 2.56 0.30 - 4.20 uIU/mL Final   01/14/2022 1.87 0.40 - 4.00 mU/L Final   09/24/2021 1.14 0.40 - 4.00 mU/L Final   03/22/2021 1.27 0.40 - 4.00 mU/L Final   01/15/2021 2.19 0.40 - 4.00 mU/L Final   12/21/2020 1.62 0.40 - 4.00 mU/L Final   12/21/2020 1.62 0.40 - 4.00 mU/L Final   11/17/2020 1.86 0.40 - 4.00  mU/L Final       Creatinine   Date Value Ref Range Status   10/06/2022 0.65 0.51 - 0.95 mg/dL Final   01/15/2021 0.56 0.52 - 1.04 mg/dL Final       Video-Visit Details  Type of service:  Video Visit  Video Start Time: 8:05 AM  Video End Time:8:26 AM  Platform used for Video Visit: AmWell  40 minutes spent on the date of the encounter doing chart and BG review, history, documentation and further activities per the note.

## 2022-10-12 NOTE — PATIENT INSTRUCTIONS
[place],     Lantus 22 units daily.   Metformin 1000 mg twice daily.   Carbohydrate coverage 1 unit for every 15 grams of carbohydrates with breakfast and 1:12 for lunch and dinner.   Please follow correction scale 1 unit for every 50 above 140 three times per day with meals and at bedtime.   DEXCOM CGM sent to pharmacy.    Please let us know if any lows below 70.

## 2022-10-12 NOTE — PROGRESS NOTES
Endocrinology virtual Visit    Chief Complaint: Video Visit     Information obtained from:Patient      Assessment/Treatment Plan:      MAGDI/treated as Type 1 diabetes currently - (H) Positive ANNA antibody/C-peptide WNL     BG readings reviewed.  BG fluctuation as documented in HPI. Postprandial hyperglycemia and lows at times. Fasting slightly higher than target.   Plan      Lantus 22 units daily.     Metformin 1000 mg twice daily.     Carbohydrate coverage 1 unit for every 15 grams of carbohydrates with breakfast and 1:12 for lunch and dinner.     Please follow correction scale 1 unit for every 50 above 140 three times per day with meals and at bedtime.     DEXCOM CGM sent to pharmacy.      Please let us know if any lows below 70.       Hashimoto thyroiditis: TSH stable off of treatment. Check TSH q 1-2 times per year and as needed.       Yuri Hansen MD  Staff Endocrinologist    Division of Endocrinology and Diabetes      Subjective:         HPI: Denia Montes is a 37 year old female with history of MAGDI/treated as Type 1 diabetes currently - (H) Positive ANNA antibody/C-peptide WNL here for a follow up.      Past few weeks; numbers up and down.   Forgets to have snack; and BG drops after breastfeeding.   Currently on:   Lantus 20 am  Metformin 500 mg daily  CHO 1:15 for breakfast and dinner and lunch 1:9   Correction 1 for every 50 above 140 with meals and above 200 at bedtime.   No recent hypoglycemia symptoms or sig lows.   BG as follows.  Fastin, 125, 140, 166, 140, 135, 130, 139, 111, 107, 120, 126                 Allergies   Allergen Reactions     Sulfa Drugs Hives       Current Outpatient Medications   Medication Sig Dispense Refill     acetone urine (KETOSTIX) test strip Use to check urine for ketones every morning. 50 strip 11     blood glucose (JACI CONTOUR) test strip Check blood glucose 6 times a day (before and 2 hours after each meal) 600 strip 3     blood glucose (CONTOUR NEXT  TEST) test strip Use to test blood sugar 6 times daily or as directed.(See you at your visit on 10/12/2022) 500 strip 1     blood glucose (NO BRAND SPECIFIED) lancets standard Check blood glucose 6 times a day (before and 2 hours after each meal)   Accu Check fast click lancets. 200 lancet 4     Continuous Blood Gluc  (DEXCOM G6 ) RAEANN Use to read blood sugars as per 's instructions. 1 each 0     Continuous Blood Gluc Sensor (DEXCOM G6 SENSOR) MISC Change every 10 days. 3 each 11     Continuous Blood Gluc Transmit (DEXCOM G6 TRANSMITTER) MISC Change every 3 months. 1 each 3     Glucagon, rDNA, (GLUCAGON EMERGENCY) 1 MG KIT Inject 1 mg into the muscle daily as needed (Severe Hypoglycemia) 1 kit 3     insulin aspart (NOVOLOG PEN) 100 UNIT/ML pen 1 unit for every 10 grams of carbohydrates and correction scale as directed. Uses up to 30 units daily. 30 mL 3     insulin glargine (LANTUS PEN) 100 UNIT/ML pen Inject 22 Units Subcutaneous every morning 30 mL 3     Insulin Glargine-yfgn 100 UNIT/ML SOPN Inject 12 Units Subcutaneous daily Inject 12 Units Subcutaneous every morning 15 mL 1     metFORMIN (GLUCOPHAGE) 500 MG tablet Take 2 tablets (1,000 mg) by mouth 2 times daily (with meals) 360 tablet 3     MICROLET LANCETS MISC TEST BLOOD SUGAR TWICE DAILY 200 each 11     Omega-3 Fatty Acids (FISH OIL) 500 MG CAPS        ACE/ARB NOT PRESCRIBED, INTENTIONAL, Please choose reason not prescribed, below (Patient not taking: No sig reported)       acetaminophen (TYLENOL) 325 MG tablet Take 1-2 tablets (325-650 mg) by mouth every 6 hours as needed for mild pain (Patient not taking: No sig reported) 100 tablet 0     Continuous Blood Gluc  (FREESTYLE JERO 14 DAY READER) RAEANN Use to read blood sugars as per 's instructions. (Patient not taking: Reported on 10/12/2022) 1 each 0     Continuous Blood Gluc Sensor (FREESTYLE JERO 14 DAY SENSOR) MISC Change every 14 days. (Patient not  taking: Reported on 10/12/2022) 9 each 3     FLUARIX QUADRIVALENT 0.5 ML injection  (Patient not taking: Reported on 10/12/2022)       ibuprofen (ADVIL/MOTRIN) 600 MG tablet Take 1 tablet (600 mg) by mouth every 6 hours as needed for moderate pain (Patient not taking: No sig reported) 100 tablet 0     insulin pen needle (BD RUSSELL U/F) 32G X 4 MM miscellaneous USE 8 PEN NEEDLES DAILY 1000 each 11     levothyroxine (SYNTHROID/LEVOTHROID) 25 MCG tablet Take 1 tablet (25 mcg) by mouth daily (Patient not taking: Reported on 10/12/2022) 90 tablet 3     nitroFURantoin macrocrystal-monohydrate (MACROBID) 100 MG capsule Take 1 capsule (100 mg) by mouth 2 times daily (Patient not taking: Reported on 10/12/2022) 14 capsule 0     Prenatal Vit-Fe Fumarate-FA (PRENATAL VITAMINS) 28-0.8 MG TABS Take 1 tablet by mouth daily (Patient not taking: Reported on 10/12/2022) 100 tablet 3     senna-docusate (SENOKOT-S/PERICOLACE) 8.6-50 MG tablet Take 1 tablet by mouth 2 times daily as needed for constipation (Patient not taking: No sig reported) 100 tablet 0     vitamin B-12 (CYANOCOBALAMIN) 100 MCG tablet Take 1,000 mcg by mouth daily (Patient not taking: Reported on 10/12/2022)         Review of Systems   as per HPI above      Objective:     GENERAL: Healthy, alert and no distress  EYES: Eyes grossly normal to inspection.    RESP: No audible wheeze, cough, or visible cyanosis.    NEURO: Alert and oriented. mentation and speech and mood stable.    In House Labs:   Hemoglobin A1C   Date Value Ref Range Status   10/06/2022 6.2 (H) 0.0 - 5.6 % Final     Comment:     Normal <5.7%   Prediabetes 5.7-6.4%    Diabetes 6.5% or higher     Note: Adopted from ADA consensus guidelines.   01/14/2022 6.5 (H) 0.0 - 5.6 % Final     Comment:     Normal <5.7%   Prediabetes 5.7-6.4%    Diabetes 6.5% or higher     Note: Adopted from ADA consensus guidelines.   09/24/2021 6.3 (H) 0.0 - 5.6 % Final     Comment:     Normal <5.7%   Prediabetes 5.7-6.4%     Diabetes 6.5% or higher     Note: Adopted from ADA consensus guidelines.   12/21/2020 5.5 0 - 5.6 % Final     Comment:     Normal <5.7% Prediabetes 5.7-6.4%  Diabetes 6.5% or higher - adopted from ADA   consensus guidelines.     11/17/2020 5.3 0 - 5.6 % Final     Comment:     Normal <5.7% Prediabetes 5.7-6.4%  Diabetes 6.5% or higher - adopted from ADA   consensus guidelines.     06/29/2020 6.1 (H) 0 - 5.6 % Final     Comment:     Normal <5.7% Prediabetes 5.7-6.4%  Diabetes 6.5% or higher - adopted from ADA   consensus guidelines.       Hemoglobin A1C POCT   Date Value Ref Range Status   03/11/2020 6.9 (A) 4.3 - 6 % Final   09/11/2019 6.1 (A) 4.3 - 6 % Final   05/29/2019 6.1 (A) 4.3 - 6 % Final       TSH   Date Value Ref Range Status   10/06/2022 2.56 0.30 - 4.20 uIU/mL Final   01/14/2022 1.87 0.40 - 4.00 mU/L Final   09/24/2021 1.14 0.40 - 4.00 mU/L Final   03/22/2021 1.27 0.40 - 4.00 mU/L Final   01/15/2021 2.19 0.40 - 4.00 mU/L Final   12/21/2020 1.62 0.40 - 4.00 mU/L Final   12/21/2020 1.62 0.40 - 4.00 mU/L Final   11/17/2020 1.86 0.40 - 4.00 mU/L Final       Creatinine   Date Value Ref Range Status   10/06/2022 0.65 0.51 - 0.95 mg/dL Final   01/15/2021 0.56 0.52 - 1.04 mg/dL Final       Video-Visit Details  Type of service:  Video Visit  Video Start Time: 8:05 AM  Video End Time:8:26 AM  Platform used for Video Visit: AmWell  40 minutes spent on the date of the encounter doing chart and BG review, history, documentation and further activities per the note.

## 2022-10-12 NOTE — NURSING NOTE
Patient is not taking tylenol, levothyroxine, macrobid, ibuprofen, prenatal or senokot. Patient states she is not currently using shantanu sensors. Gita Champagne on 10/12/2022 at 7:57 AM

## 2022-10-13 DIAGNOSIS — E10.9 TYPE 1 DIABETES MELLITUS WITHOUT COMPLICATION (H): ICD-10-CM

## 2022-10-13 DIAGNOSIS — E13.9 DIABETES MELLITUS TYPE 1.5 (H): Primary | ICD-10-CM

## 2022-10-13 DIAGNOSIS — O24.013 TYPE 1 DIABETES MELLITUS DURING PREGNANCY IN THIRD TRIMESTER: ICD-10-CM

## 2022-10-13 DIAGNOSIS — Z3A.01 LESS THAN 8 WEEKS GESTATION OF PREGNANCY: ICD-10-CM

## 2022-10-13 NOTE — TELEPHONE ENCOUNTER
Orders pended to provider for review and signature.   Perlita Castillo RN on 10/13/2022 at 10:48 AM       RE    Medication Question  (Newest Message First)  Alesha Cheung RN  [facility] Endo Triage- 18 hours ago (4:26 PM)     RANDAL  Request is for change of MD orders   * request Generic    thank you      DM  Dexcom orders placed. Please see Rx orders.         [facility] Call Center     Phone Message     May a detailed message be left on voicemail: yes      Reason for Call: Medication Question or concern regarding medication   Prescription Clarification     Name of Medication:   * insulin aspart (NOVOLOG PEN) 100 UNIT/ML pen  * insulin glargine (LANTUS PEN) 100 UNIT/ML pen  * Glucagon, rDNA, (GLUCAGON EMERGENCY) 1 MG KIT  * Dexcom        Prescribing Provider: Jade                 Pharmacy: [facility] [address]                 What on the order needs clarification? Per Patient states the prescriptions Lantus and Novol needs to be written for 93 days and patient is needing it to be written as the generic in order for patient to be able to receive the prescriptions due to insurance purposes. Please advise.      Patient states the medicaiton Glucagon, rDNA, (GLUCAGON EMERGENCY) 1 MG KIT is missing Authorization code, and it needs to state can be filled with the generic and needs to be for 93 days. Please advise.      Patient states she is needing a Prior Authorization for all the Dexcom Equipment and would need to be written for 93 days as well. Please advise

## 2022-10-14 ENCOUNTER — TELEPHONE (OUTPATIENT)
Dept: ENDOCRINOLOGY | Facility: CLINIC | Age: 37
End: 2022-10-14

## 2022-10-14 RX ORDER — INSULIN ASPART 100 [IU]/ML
30 INJECTION, SOLUTION INTRAVENOUS; SUBCUTANEOUS SEE ADMIN INSTRUCTIONS
Qty: 30 ML | Refills: 3 | Status: SHIPPED | OUTPATIENT
Start: 2022-10-14 | End: 2024-03-14

## 2022-10-14 RX ORDER — INSULIN GLARGINE-YFGN 100 [IU]/ML
22 INJECTION, SOLUTION SUBCUTANEOUS SEE ADMIN INSTRUCTIONS
Qty: 30 ML | Refills: 3 | Status: SHIPPED | OUTPATIENT
Start: 2022-10-14 | End: 2023-01-05

## 2022-10-20 DIAGNOSIS — R76.8 POSITIVE GAD ANTIBODY: ICD-10-CM

## 2022-10-20 RX ORDER — PEN NEEDLE, DIABETIC 32GX 5/32"
NEEDLE, DISPOSABLE MISCELLANEOUS
Qty: 800 EACH | Refills: 3 | Status: SHIPPED | OUTPATIENT
Start: 2022-10-20 | End: 2023-03-09

## 2022-10-21 DIAGNOSIS — E13.9 LATENT AUTOIMMUNE DIABETES MELLITUS IN ADULTS (LADA) (H): ICD-10-CM

## 2022-10-21 RX ORDER — IBUPROFEN 600 MG/1
1 TABLET ORAL DAILY PRN
Qty: 1 KIT | Refills: 3 | Status: SHIPPED | OUTPATIENT
Start: 2022-10-21 | End: 2022-12-02

## 2022-11-20 DIAGNOSIS — E10.9 TYPE 1 DIABETES MELLITUS WITHOUT COMPLICATION (H): ICD-10-CM

## 2022-11-25 NOTE — TELEPHONE ENCOUNTER
Pharmacy Tech Ledy calls confirming testing supplies for 6 times daily.   Verbal order as written.   Perlita Castillo RN on 11/25/2022 at 2:44 PM

## 2022-12-02 DIAGNOSIS — E13.9 LATENT AUTOIMMUNE DIABETES MELLITUS IN ADULTS (LADA) (H): ICD-10-CM

## 2022-12-02 RX ORDER — IBUPROFEN 600 MG/1
1 TABLET ORAL DAILY PRN
Qty: 1 KIT | Refills: 3 | Status: SHIPPED | OUTPATIENT
Start: 2022-12-02

## 2022-12-20 ENCOUNTER — MYC MEDICAL ADVICE (OUTPATIENT)
Dept: ENDOCRINOLOGY | Facility: CLINIC | Age: 37
End: 2022-12-20

## 2022-12-20 DIAGNOSIS — O24.013 TYPE 1 DIABETES MELLITUS DURING PREGNANCY IN THIRD TRIMESTER: ICD-10-CM

## 2022-12-21 ENCOUNTER — TELEPHONE (OUTPATIENT)
Dept: ENDOCRINOLOGY | Facility: CLINIC | Age: 37
End: 2022-12-21

## 2022-12-21 NOTE — TELEPHONE ENCOUNTER
Pharmacy Contact    Telephone Fax   [phone]      [facility] [employer] [facility]   Message sent in error.   Perlita Castillo RN on 12/23/2022 at 2:40 PM       [facility] Call Center    Phone Message    May a detailed message be left on voicemail: no     Reason for Call: Medication Question or concern regarding medication   Prescription Clarification  Name of Medication: insulin glargine (LANTUS PEN) 100 UNIT/ML pen  Prescribing Provider: Yuri Hansen MD   Pharmacy: [facility] [address]   What on the order needs clarification? [facility] is requesting clarification in the switch from Insulin Glargine-yfgn 100 UNIT/ML SOPN to insulin glargine (LANTUS PEN) 100 UNIT/ML pen. Please follow up. Thank you.          Action Taken: Other: Endo    Travel Screening: Not Applicable

## 2023-01-05 DIAGNOSIS — E13.9 DIABETES MELLITUS TYPE 1.5 (H): ICD-10-CM

## 2023-01-05 RX ORDER — INSULIN GLARGINE-YFGN 100 [IU]/ML
22 INJECTION, SOLUTION SUBCUTANEOUS SEE ADMIN INSTRUCTIONS
Qty: 30 ML | Refills: 3 | Status: SHIPPED | OUTPATIENT
Start: 2023-01-05 | End: 2023-01-11

## 2023-01-11 DIAGNOSIS — E13.9 DIABETES MELLITUS TYPE 1.5 (H): ICD-10-CM

## 2023-01-11 RX ORDER — INSULIN GLARGINE-YFGN 100 [IU]/ML
30 INJECTION, SOLUTION SUBCUTANEOUS SEE ADMIN INSTRUCTIONS
Qty: 30 ML | Refills: 3 | Status: SHIPPED | OUTPATIENT
Start: 2023-01-11 | End: 2023-01-17

## 2023-01-17 DIAGNOSIS — E13.9 DIABETES MELLITUS TYPE 1.5 (H): ICD-10-CM

## 2023-01-17 RX ORDER — INSULIN GLARGINE-YFGN 100 [IU]/ML
30 INJECTION, SOLUTION SUBCUTANEOUS DAILY
Qty: 30 ML | Refills: 3 | Status: SHIPPED | OUTPATIENT
Start: 2023-01-17 | End: 2023-03-13

## 2023-03-09 DIAGNOSIS — O24.013 TYPE 1 DIABETES MELLITUS DURING PREGNANCY IN THIRD TRIMESTER: ICD-10-CM

## 2023-03-09 DIAGNOSIS — R76.8 POSITIVE GAD ANTIBODY: ICD-10-CM

## 2023-03-09 DIAGNOSIS — Z98.891 S/P CESAREAN SECTION: ICD-10-CM

## 2023-03-09 DIAGNOSIS — E10.9 TYPE 1 DIABETES MELLITUS WITHOUT COMPLICATION (H): ICD-10-CM

## 2023-03-09 DIAGNOSIS — Z3A.01 LESS THAN 8 WEEKS GESTATION OF PREGNANCY: ICD-10-CM

## 2023-03-09 RX ORDER — PEN NEEDLE, DIABETIC 32GX 5/32"
NEEDLE, DISPOSABLE MISCELLANEOUS
Qty: 800 EACH | Refills: 3 | Status: SHIPPED | OUTPATIENT
Start: 2023-03-09 | End: 2024-05-03

## 2023-03-09 ASSESSMENT — ENCOUNTER SYMPTOMS
ARTHRALGIAS: 0
FEVER: 0
BREAST MASS: 0
HEARTBURN: 0
MYALGIAS: 0
DIARRHEA: 0
FREQUENCY: 0
NERVOUS/ANXIOUS: 0
WEAKNESS: 0
JOINT SWELLING: 0
HEMATOCHEZIA: 0
COUGH: 0
HEMATURIA: 0
DYSURIA: 0
NAUSEA: 0
DIZZINESS: 0
SORE THROAT: 0
ABDOMINAL PAIN: 0
PARESTHESIAS: 0
EYE PAIN: 0
CONSTIPATION: 0
PALPITATIONS: 0
SHORTNESS OF BREATH: 1
HEADACHES: 0
CHILLS: 0

## 2023-03-10 ENCOUNTER — OFFICE VISIT (OUTPATIENT)
Dept: FAMILY MEDICINE | Facility: CLINIC | Age: 38
End: 2023-03-10
Payer: COMMERCIAL

## 2023-03-10 VITALS
DIASTOLIC BLOOD PRESSURE: 84 MMHG | TEMPERATURE: 98.5 F | OXYGEN SATURATION: 98 % | HEART RATE: 90 BPM | RESPIRATION RATE: 16 BRPM | SYSTOLIC BLOOD PRESSURE: 129 MMHG

## 2023-03-10 DIAGNOSIS — L98.9 FACIAL SKIN LESION: ICD-10-CM

## 2023-03-10 DIAGNOSIS — Z00.00 ROUTINE GENERAL MEDICAL EXAMINATION AT A HEALTH CARE FACILITY: Primary | ICD-10-CM

## 2023-03-10 DIAGNOSIS — M79.645 PAIN IN FINGER OF BOTH HANDS: ICD-10-CM

## 2023-03-10 DIAGNOSIS — R07.0 THROAT PAIN: ICD-10-CM

## 2023-03-10 DIAGNOSIS — M79.644 PAIN IN FINGER OF BOTH HANDS: ICD-10-CM

## 2023-03-10 DIAGNOSIS — R06.02 SHORTNESS OF BREATH AT REST: ICD-10-CM

## 2023-03-10 DIAGNOSIS — E13.9 LATENT AUTOIMMUNE DIABETES MELLITUS IN ADULTS (LADA) (H): ICD-10-CM

## 2023-03-10 DIAGNOSIS — J34.89 STUFFY AND RUNNY NOSE: ICD-10-CM

## 2023-03-10 PROBLEM — E10.65 TYPE 1 DIABETES MELLITUS WITH HYPERGLYCEMIA (H): Status: ACTIVE | Noted: 2023-03-10

## 2023-03-10 LAB
BASOPHILS # BLD AUTO: 0.1 10E3/UL (ref 0–0.2)
BASOPHILS NFR BLD AUTO: 1 %
CHOLEST SERPL-MCNC: 200 MG/DL
EOSINOPHIL # BLD AUTO: 0.1 10E3/UL (ref 0–0.7)
EOSINOPHIL NFR BLD AUTO: 1 %
ERYTHROCYTE [DISTWIDTH] IN BLOOD BY AUTOMATED COUNT: 13.3 % (ref 10–15)
FASTING STATUS PATIENT QL REPORTED: ABNORMAL
FLUAV AG SPEC QL IA: NEGATIVE
FLUBV AG SPEC QL IA: NEGATIVE
GLUCOSE SERPL-MCNC: 147 MG/DL (ref 70–99)
HCT VFR BLD AUTO: 42.9 % (ref 35–47)
HDLC SERPL-MCNC: 60 MG/DL
HGB BLD-MCNC: 13.2 G/DL (ref 11.7–15.7)
IMM GRANULOCYTES # BLD: 0 10E3/UL
IMM GRANULOCYTES NFR BLD: 0 %
LDLC SERPL CALC-MCNC: 119 MG/DL
LYMPHOCYTES # BLD AUTO: 2.7 10E3/UL (ref 0.8–5.3)
LYMPHOCYTES NFR BLD AUTO: 36 %
MCH RBC QN AUTO: 26.6 PG (ref 26.5–33)
MCHC RBC AUTO-ENTMCNC: 30.8 G/DL (ref 31.5–36.5)
MCV RBC AUTO: 86 FL (ref 78–100)
MONOCYTES # BLD AUTO: 0.5 10E3/UL (ref 0–1.3)
MONOCYTES NFR BLD AUTO: 6 %
NEUTROPHILS # BLD AUTO: 4.2 10E3/UL (ref 1.6–8.3)
NEUTROPHILS NFR BLD AUTO: 56 %
NONHDLC SERPL-MCNC: 140 MG/DL
NRBC # BLD AUTO: 0 10E3/UL
NRBC BLD AUTO-RTO: 0 /100
NT-PROBNP SERPL-MCNC: 33 PG/ML (ref 0–450)
PLATELET # BLD AUTO: 432 10E3/UL (ref 150–450)
RBC # BLD AUTO: 4.97 10E6/UL (ref 3.8–5.2)
TRIGL SERPL-MCNC: 103 MG/DL
TSH SERPL DL<=0.005 MIU/L-ACNC: 2.04 UIU/ML (ref 0.3–4.2)
WBC # BLD AUTO: 7.4 10E3/UL (ref 4–11)

## 2023-03-10 PROCEDURE — 85025 COMPLETE CBC W/AUTO DIFF WBC: CPT | Performed by: STUDENT IN AN ORGANIZED HEALTH CARE EDUCATION/TRAINING PROGRAM

## 2023-03-10 PROCEDURE — U0003 INFECTIOUS AGENT DETECTION BY NUCLEIC ACID (DNA OR RNA); SEVERE ACUTE RESPIRATORY SYNDROME CORONAVIRUS 2 (SARS-COV-2) (CORONAVIRUS DISEASE [COVID-19]), AMPLIFIED PROBE TECHNIQUE, MAKING USE OF HIGH THROUGHPUT TECHNOLOGIES AS DESCRIBED BY CMS-2020-01-R: HCPCS | Performed by: STUDENT IN AN ORGANIZED HEALTH CARE EDUCATION/TRAINING PROGRAM

## 2023-03-10 PROCEDURE — 36415 COLL VENOUS BLD VENIPUNCTURE: CPT | Performed by: STUDENT IN AN ORGANIZED HEALTH CARE EDUCATION/TRAINING PROGRAM

## 2023-03-10 PROCEDURE — U0005 INFEC AGEN DETEC AMPLI PROBE: HCPCS | Performed by: STUDENT IN AN ORGANIZED HEALTH CARE EDUCATION/TRAINING PROGRAM

## 2023-03-10 PROCEDURE — 83880 ASSAY OF NATRIURETIC PEPTIDE: CPT | Performed by: STUDENT IN AN ORGANIZED HEALTH CARE EDUCATION/TRAINING PROGRAM

## 2023-03-10 PROCEDURE — 99214 OFFICE O/P EST MOD 30 MIN: CPT | Mod: CS | Performed by: STUDENT IN AN ORGANIZED HEALTH CARE EDUCATION/TRAINING PROGRAM

## 2023-03-10 PROCEDURE — 84681 ASSAY OF C-PEPTIDE: CPT | Performed by: STUDENT IN AN ORGANIZED HEALTH CARE EDUCATION/TRAINING PROGRAM

## 2023-03-10 PROCEDURE — 80061 LIPID PANEL: CPT | Performed by: STUDENT IN AN ORGANIZED HEALTH CARE EDUCATION/TRAINING PROGRAM

## 2023-03-10 PROCEDURE — 82947 ASSAY GLUCOSE BLOOD QUANT: CPT | Performed by: STUDENT IN AN ORGANIZED HEALTH CARE EDUCATION/TRAINING PROGRAM

## 2023-03-10 PROCEDURE — 99395 PREV VISIT EST AGE 18-39: CPT | Mod: GC | Performed by: STUDENT IN AN ORGANIZED HEALTH CARE EDUCATION/TRAINING PROGRAM

## 2023-03-10 PROCEDURE — 84443 ASSAY THYROID STIM HORMONE: CPT | Performed by: STUDENT IN AN ORGANIZED HEALTH CARE EDUCATION/TRAINING PROGRAM

## 2023-03-10 PROCEDURE — 87804 INFLUENZA ASSAY W/OPTIC: CPT | Performed by: STUDENT IN AN ORGANIZED HEALTH CARE EDUCATION/TRAINING PROGRAM

## 2023-03-10 ASSESSMENT — ENCOUNTER SYMPTOMS
NAUSEA: 0
FREQUENCY: 0
DIZZINESS: 0
COUGH: 0
ABDOMINAL PAIN: 0
EYE PAIN: 0
FEVER: 0
ARTHRALGIAS: 0
HEMATURIA: 0
CONSTIPATION: 0
DIARRHEA: 0
NERVOUS/ANXIOUS: 0
SORE THROAT: 0
WEAKNESS: 0
PALPITATIONS: 0
MYALGIAS: 0
CHILLS: 0
JOINT SWELLING: 0
HEADACHES: 0
PARESTHESIAS: 0
BREAST MASS: 0
SHORTNESS OF BREATH: 1
HEARTBURN: 0
DYSURIA: 0
HEMATOCHEZIA: 0

## 2023-03-10 NOTE — PATIENT INSTRUCTIONS
Please call, [facility] , to arrange lung function tests.      Preventive Health Recommendations  Female Ages 26 - 39  Yearly exam:   See your health care provider every year in order to  Review health changes.   Discuss preventive care.    Review your medicines if you your doctor has prescribed any.    Until age 30: Get a Pap test every three years (more often if you have had an abnormal result).    After age 30: Talk to your doctor about whether you should have a Pap test every 3 years or have a Pap test with HPV screening every 5 years.   You do not need a Pap test if your uterus was removed (hysterectomy) and you have not had cancer.  You should be tested each year for STDs (sexually transmitted diseases), if you're at risk.   Talk to your provider about how often to have your cholesterol checked.  If you are at risk for diabetes, you should have a diabetes test (fasting glucose).  Shots: Get a flu shot each year. Get a tetanus shot every 10 years.   Nutrition:   Eat at least 5 servings of fruits and vegetables each day.  Eat whole-grain bread, whole-wheat pasta and brown rice instead of white grains and rice.  Get adequate Calcium and Vitamin D.     Lifestyle  Exercise at least 150 minutes a week (30 minutes a day, 5 days of the week). This will help you control your weight and prevent disease.  Limit alcohol to one drink per day.  No smoking.   Wear sunscreen to prevent skin cancer.  See your dentist every six months for an exam and cleaning.

## 2023-03-10 NOTE — PROGRESS NOTES
"   SUBJECTIVE:   CC: Denia is an 37 year old who presents for preventive health visit.        Pt w/ hx of Hashimoto's and adult-onset type 1 DM, here for annual physical.    She has several acute concerns. She reports SOB at rest for past month \"I feel like I haven't breathed in deeply enough, it's a struggle to take a deep enough breath.\" No hx asthma or environmental allergies. No chemical exposures - she works from home and has not changed cleaning products etc. Denies major life changes, anxiety, feeling of panic, chest tightness, palpitations, rapid heart rate, swelling in legs, dyspnea on exertion. She says nothing has changed except that sometimes she can't catch her breath.    Pt also c/o chronic hand tingling and morning stiffness, worse in her middle fingers (R>L). Does not report any swelling or external changes. 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    She also c/o mild scratchy throat and runny nose for less than a week. Her son recently began . No fevers.    Denia also has a spot on her nose that she would like assessed. Doesn't think it's changed recently, but notes it's bigger than it was a few years ago. She grew up in [place] and has had lots of sun exposure.    For her DM and Hashimoto's she follows regularly with endocrinology. Currently stable on metformin and insulin.    Patient has been advised of split billing requirements and indicates understanding: Yes  Healthy Habits:     Getting at least 3 servings of Calcium per day:  Yes    Bi-annual eye exam:  Yes    Dental care twice a year:  Yes    Sleep apnea or symptoms of sleep apnea:  None    Diet:  Diabetic and Vegetarian/vegan    Frequency of exercise:  1 day/week    Duration of exercise:  Less than 15 minutes    Taking medications regularly:  Yes    Medication side effects:  Not applicable    PHQ-2 Total Score: 0    Additional concerns today:  Yes      Today's PHQ-2 Score:   PHQ-2 ( 1999 [employer]) 3/10/2023   Q1: Little interest or pleasure " in doing things 0   Q2: Feeling down, depressed or hopeless 0   PHQ-2 Score 0   PHQ-2 Total Score (12-17 Years)- Positive if 3 or more points; Administer PHQ-A if positive -   Q1: Little interest or pleasure in doing things -   Q2: Feeling down, depressed or hopeless -   PHQ-2 Score -       Have you ever done Advance Care Planning? (For example, a Health Directive, POLST, or a discussion with a medical provider or your loved ones about your wishes): No, advance care planning information given to patient to review.  Patient declined advance care planning discussion at this time.    Social History     Tobacco Use     Smoking status: Never     Passive exposure: Never     Smokeless tobacco: Never   Substance Use Topics     Alcohol use: No     Alcohol Use 3/9/2023   Prescreen: >3 drinks/day or >7 drinks/week? Not Applicable     Reviewed orders with patient.  Reviewed health maintenance and updated orders accordingly - Yes  Lab work is in process    Breast Cancer Screening:  Any new diagnosis of family breast, ovarian, or bowel cancer? No    Patient under 40 years of age: Routine Mammogram Screening not recommended.     History of abnormal Pap smear:   Last 3 Pap Results:   PAP (no units)   Date Value   12/29/2020 NIL   12/16/2019 NIL   12/14/2018 NIL     PAP / HPV Latest Ref Rng & Units 1/14/2022 12/29/2020 12/16/2019   PAP   Negative for Intraepithelial Lesion or Malignancy (NILM) - -   PAP (Historical) - - NIL NIL   HPV16 Negative Negative Negative Negative   HPV18 Negative Negative Negative Negative   HRHPV Negative Negative Positive(A) Positive(A)     Reviewed and updated as needed this visit by clinical staff    Allergies   Problems           Reviewed and updated as needed this visit by Provider      Problems            Past Medical History:   Diagnosis Date     Cervical high risk HPV (human papillomavirus) test positive 08/2015 8/2015, 09/23/16, 12/14/18, 12/16/19, 12/29/20     Hashimoto's thyroiditis      Hx  of colposcopy with cervical biopsy 10/21/2016    [place] Bx: Neg NIL, ECC: Neg.     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), managed as type 1 (H)      Miscarriage       Past Surgical History:   Procedure Laterality Date      SECTION N/A 2021    Procedure:  SECTION;  Surgeon: Jessica Arellano MD;  Location: [facility]     NO HISTORY OF SURGERY       OB History    Para Term  AB Living   3 1 1 0 2 1   SAB IAB Ectopic Multiple Live Births   1 1 0 0 1      # Outcome Date GA Lbr Alexandro/2nd Weight Sex Delivery Anes PTL Lv   3 Term 21 39w2d  2.92 kg (6 lb 7 oz) M CS-LTranv Spinal N VITALIY      Complications: Fetal Intolerance      Name: NATHALIE VICENTE      Apgar1: 9  Apgar5: 9   2 SAB 20 8w6d    SAB      1 IAB      IAB          Review of Systems   Constitutional: Negative for chills and fever.   HENT: Negative for congestion, ear pain, hearing loss and sore throat.    Eyes: Negative for pain and visual disturbance.   Respiratory: Positive for shortness of breath. Negative for cough.    Cardiovascular: Negative for chest pain, palpitations and peripheral edema.   Gastrointestinal: Negative for abdominal pain, constipation, diarrhea, heartburn, hematochezia and nausea.   Breasts:  Negative for tenderness, breast mass and discharge.   Genitourinary: Negative for dysuria, frequency, genital sores, hematuria, pelvic pain, urgency, vaginal bleeding and vaginal discharge.   Musculoskeletal: Negative for arthralgias, joint swelling and myalgias.   Skin: Negative for rash.   Neurological: Negative for dizziness, weakness, headaches and paresthesias.   Psychiatric/Behavioral: Negative for mood changes. The patient is not nervous/anxious.       OBJECTIVE:   /84 (BP Location: Left arm, Patient Position: Sitting, Cuff Size: Adult Large)   Pulse 90   Temp 98.5  F (36.9  C) (Oral)   Resp 16   LMP 2023 (Approximate)   SpO2 98%   Breastfeeding Yes   Physical Exam  GENERAL:  healthy, alert and no distress  EYES: Eyes grossly normal to inspection, PERRL and conjunctivae and sclerae normal  HENT: ear canals and TM's normal, nose and mouth without ulcers or lesions  NECK: no adenopathy, no asymmetry, masses, or scars and thyroid normal to palpation  RESP: lungs clear to auscultation - no rales, rhonchi or wheezes  CV: regular rate and rhythm, normal S1 S2, no S3 or S4, no murmur, click or rub, no peripheral edema and peripheral pulses strong  ABDOMEN: soft, nontender, no hepatosplenomegaly, no masses and bowel sounds normal  MS: no gross musculoskeletal defects noted, no edema  SKIN :variegated pigmented lesion on left nares  NEURO: Normal strength and tone, mentation intact and speech normal  PSYCH: mentation appears normal, affect normal/bright    ASSESSMENT/PLAN:   (M79.645,  M79.644) Pain in finger of both hands  Comment: Given her autoimmune hx and sx of morning stiffness, there is some concern for possible autoimmune etiology - will want to r/o joint space changes, erosions. Her sx are not in median nerve distribution, so not concerned for carpal tunnel. Similarly, low suspicion for osteoarthritis as it would not be expected in all fingers, especially at her age and with her lack of joint trauma.   Plan: XR Hand Bilateral G/E 3 Views    (R06.02) Shortness of breath at rest  Comment: Wide differential including anxiety, PE, asthma, cardiomyopathy, allergies, acute illness. Her vitals are stable and she has no signs of DVT or hypercoagulable risk factors (no OCP, non-smoker), so PE seems less likely. Similarly, without edema, CONTRERAS, or clear cardiomyopathic etiology, low suspicion for HF. While she does not endorse anxiety, her son did start  at the same time these sx presented, so it's possible that life changes may be contributing to her presentation. It could also be 2/2 to viral illness or underlying lung disease.   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  Plan: Adult Eye  Referral, Lipid panel         reflex to direct LDL Non-fasting    (Z00.00) Routine general medical examination at a health care facility  Comment: pt presented for a physical exam  Plan: as above    COUNSELING:    She reports that she has never smoked. She has never been exposed to tobacco smoke. She has never used smokeless tobacco.      Rigo Stevens MS3    I was present with the medical student who participated in the service and in the documentation of this note. I have verified the history and personally performed the physical exam and medical decision making, and have verified the content of the note, which accurately reflects my assessment of the patient and the plan of care.   Mattie Barboza [facility]

## 2023-03-10 NOTE — PROGRESS NOTES
Preceptor Attestation:   Patient seen, evaluated and discussed with the resident. I have verified the content of the note, which accurately reflects my assessment of the patient and the plan of care.   Supervising Physician:  Musa Al MD

## 2023-03-11 LAB — SARS-COV-2 RNA RESP QL NAA+PROBE: NEGATIVE

## 2023-03-13 ENCOUNTER — MYC MEDICAL ADVICE (OUTPATIENT)
Dept: ENDOCRINOLOGY | Facility: CLINIC | Age: 38
End: 2023-03-13
Payer: COMMERCIAL

## 2023-03-13 DIAGNOSIS — E13.9 DIABETES MELLITUS TYPE 1.5 (H): ICD-10-CM

## 2023-03-13 LAB — C PEPTIDE SERPL-MCNC: 0.8 NG/ML (ref 0.9–6.9)

## 2023-03-13 RX ORDER — INSULIN GLARGINE-YFGN 100 [IU]/ML
30 INJECTION, SOLUTION SUBCUTANEOUS DAILY
Qty: 30 ML | Refills: 3 | Status: SHIPPED | OUTPATIENT
Start: 2023-03-13 | End: 2023-04-26

## 2023-03-13 NOTE — RESULT ENCOUNTER NOTE
Dear Denia,     Here are your recent results which shows low C-peptide. Based on ANNA antibody and low c-peptide; you have a diagnosis of Type 1 diabetes.     Please let us know if you have any questions or concerns.     Regards,  Yuri Hansen MD

## 2023-03-15 ENCOUNTER — LAB (OUTPATIENT)
Dept: LAB | Facility: CLINIC | Age: 38
End: 2023-03-15
Payer: COMMERCIAL

## 2023-03-15 ENCOUNTER — ANCILLARY PROCEDURE (OUTPATIENT)
Dept: GENERAL RADIOLOGY | Facility: CLINIC | Age: 38
End: 2023-03-15
Attending: FAMILY MEDICINE
Payer: COMMERCIAL

## 2023-03-15 DIAGNOSIS — R06.02 SHORTNESS OF BREATH AT REST: ICD-10-CM

## 2023-03-15 DIAGNOSIS — M79.645 PAIN IN FINGER OF BOTH HANDS: ICD-10-CM

## 2023-03-15 DIAGNOSIS — M79.644 PAIN IN FINGER OF BOTH HANDS: ICD-10-CM

## 2023-03-15 PROCEDURE — 71046 X-RAY EXAM CHEST 2 VIEWS: CPT | Mod: FY | Performed by: RADIOLOGY

## 2023-03-15 PROCEDURE — 36415 COLL VENOUS BLD VENIPUNCTURE: CPT

## 2023-03-15 PROCEDURE — 86481 TB AG RESPONSE T-CELL SUSP: CPT

## 2023-03-15 PROCEDURE — 73130 X-RAY EXAM OF HAND: CPT | Mod: FY | Performed by: RADIOLOGY

## 2023-03-16 LAB
GAMMA INTERFERON BACKGROUND BLD IA-ACNC: 0.04 IU/ML
M TB IFN-G BLD-IMP: NEGATIVE
M TB IFN-G CD4+ BCKGRND COR BLD-ACNC: 9.96 IU/ML
MITOGEN IGNF BCKGRD COR BLD-ACNC: 0 IU/ML
MITOGEN IGNF BCKGRD COR BLD-ACNC: 0 IU/ML
QUANTIFERON MITOGEN: 10 IU/ML
QUANTIFERON NIL TUBE: 0.04 IU/ML
QUANTIFERON TB1 TUBE: 0.04 IU/ML
QUANTIFERON TB2 TUBE: 0.04

## 2023-03-17 DIAGNOSIS — R06.02 SHORTNESS OF BREATH AT REST: ICD-10-CM

## 2023-03-17 PROCEDURE — 94729 DIFFUSING CAPACITY: CPT | Performed by: INTERNAL MEDICINE

## 2023-03-17 PROCEDURE — 94375 RESPIRATORY FLOW VOLUME LOOP: CPT | Performed by: INTERNAL MEDICINE

## 2023-03-17 PROCEDURE — 94726 PLETHYSMOGRAPHY LUNG VOLUMES: CPT | Performed by: INTERNAL MEDICINE

## 2023-03-21 LAB
DLCOCOR-%PRED-PRE: 120 %
DLCOCOR-PRE: 27.46 ML/MIN/MMHG
DLCOUNC-%PRED-PRE: 119 %
DLCOUNC-PRE: 27.29 ML/MIN/MMHG
DLCOUNC-PRED: 22.77 ML/MIN/MMHG
ERV-%PRED-PRE: 127 %
ERV-PRE: 0.97 L
ERV-PRED: 0.76 L
EXPTIME-PRE: 6.94 SEC
FEF2575-%PRED-PRE: 104 %
FEF2575-PRE: 3.3 L/SEC
FEF2575-PRED: 3.16 L/SEC
FEFMAX-%PRED-PRE: 110 %
FEFMAX-PRE: 7.86 L/SEC
FEFMAX-PRED: 7.15 L/SEC
FEV1-%PRED-PRE: 105 %
FEV1-PRE: 3.12 L
FEV1FEV6-PRE: 83 %
FEV1FEV6-PRED: 84 %
FEV1FVC-PRE: 84 %
FEV1FVC-PRED: 84 %
FEV1SVC-PRE: 82 %
FEV1SVC-PRED: 76 %
FIFMAX-PRE: 5.55 L/SEC
FRCPLETH-%PRED-PRE: 92 %
FRCPLETH-PRE: 2.54 L
FRCPLETH-PRED: 2.74 L
FVC-%PRED-PRE: 104 %
FVC-PRE: 3.72 L
FVC-PRED: 3.54 L
IC-%PRED-PRE: 91 %
IC-PRE: 2.83 L
IC-PRED: 3.1 L
RVPLETH-%PRED-PRE: 98 %
RVPLETH-PRE: 1.57 L
RVPLETH-PRED: 1.6 L
TLCPLETH-%PRED-PRE: 105 %
TLCPLETH-PRE: 5.36 L
TLCPLETH-PRED: 5.11 L
VA-%PRED-PRE: 97 %
VA-PRE: 5 L
VC-%PRED-PRE: 98 %
VC-PRE: 3.79 L
VC-PRED: 3.86 L

## 2023-03-24 ENCOUNTER — MYC MEDICAL ADVICE (OUTPATIENT)
Dept: FAMILY MEDICINE | Facility: CLINIC | Age: 38
End: 2023-03-24
Payer: COMMERCIAL

## 2023-03-27 NOTE — CONFIDENTIAL NOTE
"Patient contacted provider via [employer] regarding recommendation for OT for hand pain bilaterally. \"Hi Dr. Barboza - I want to hold off on OT as I'm wondering if what's going with my hands could be rheumatoid arthritis (worsening stiffness of both hands, pain/tenderness of knuckles on both middle fingers). I have a follow up with you on 4/24 - is this something you would refer me to a specialist or order more tests for, or should i just wait to discuss on the 24th?  Thanks,   [place]\"    Recommending further interview/evaluation prior to considering work-up/referral. Patient messaged as such.  Mattie Barboza, DO    "

## 2023-04-12 ENCOUNTER — OFFICE VISIT (OUTPATIENT)
Dept: FAMILY MEDICINE | Facility: CLINIC | Age: 38
End: 2023-04-12
Payer: COMMERCIAL

## 2023-04-12 VITALS
BODY MASS INDEX: 34.85 KG/M2 | TEMPERATURE: 98.4 F | DIASTOLIC BLOOD PRESSURE: 88 MMHG | WEIGHT: 209.2 LBS | OXYGEN SATURATION: 100 % | HEIGHT: 65 IN | RESPIRATION RATE: 16 BRPM | HEART RATE: 93 BPM | SYSTOLIC BLOOD PRESSURE: 138 MMHG

## 2023-04-12 DIAGNOSIS — M79.644 PAIN IN FINGER OF BOTH HANDS: Primary | ICD-10-CM

## 2023-04-12 DIAGNOSIS — M79.645 PAIN IN FINGER OF BOTH HANDS: Primary | ICD-10-CM

## 2023-04-12 LAB
CRP SERPL-MCNC: 12.3 MG/L
ERYTHROCYTE [SEDIMENTATION RATE] IN BLOOD BY WESTERGREN METHOD: 13 MM/HR (ref 0–20)

## 2023-04-12 PROCEDURE — 36415 COLL VENOUS BLD VENIPUNCTURE: CPT

## 2023-04-12 PROCEDURE — 86618 LYME DISEASE ANTIBODY: CPT

## 2023-04-12 PROCEDURE — 86039 ANTINUCLEAR ANTIBODIES (ANA): CPT

## 2023-04-12 PROCEDURE — 86038 ANTINUCLEAR ANTIBODIES: CPT

## 2023-04-12 PROCEDURE — 86140 C-REACTIVE PROTEIN: CPT

## 2023-04-12 PROCEDURE — 80048 BASIC METABOLIC PNL TOTAL CA: CPT

## 2023-04-12 PROCEDURE — 99214 OFFICE O/P EST MOD 30 MIN: CPT | Mod: GC

## 2023-04-12 PROCEDURE — 86225 DNA ANTIBODY NATIVE: CPT

## 2023-04-12 PROCEDURE — 86200 CCP ANTIBODY: CPT

## 2023-04-12 PROCEDURE — 86431 RHEUMATOID FACTOR QUANT: CPT

## 2023-04-12 PROCEDURE — 85652 RBC SED RATE AUTOMATED: CPT

## 2023-04-12 NOTE — PROGRESS NOTES
"  Assessment & Plan     Pain in finger of both hands  Bilateral polyarthritis  Patient presenting with bilateral symmetric polyarthritis with broad differential including: RA, SLE, reactive arthritis, lyme, gout (less likely given joints involved). Currently, inflammatory etiology still considered most likely with elevated CRP and moderately positive DINORA (although antinuclear antibody (DINORA) tests are positive in 5 to 10 percent of the general population, so not clearly pathologic at this time). Although symptoms of morning stiffness >1hr, MCP/PIP bilateral joint involvement in patient with history of autoimmune suggestive of possible RA negative RF and CCP make this less likely. SLE workup with ds-DNA still pending, but seems less likely without rash or other systemic signs. Other etiologies of arthralgias such as reactive arthritis d/t G/C/treponema unlikely given patient stated sexual history. Lyme serology also negative. However, given patient with elevated CRP, symptoms ongoing for many months, and strong autoimmune history with T1D and Hashimoto's would benefit from Rheumatology consultation for further workup/treatment versus reassurance.  - Rheumatology consult  - Anti Nuclear Maira IgG by IFA with Reflex  - Erythrocyte sedimentation rate auto  - CRP inflammation  - Cyclic Citrullinated Peptide Antibody IgG  - Lyme Disease Total Abs Bld with Reflex to Confirm CLIA  - Rheumatoid factor  - Basic metabolic panel  - Double stranded DNA     BMI:   Estimated body mass index is 34.81 kg/m  as calculated from the following:    Height as of this encounter: 1.651 m (5' 5\").    Weight as of this encounter: 94.9 kg (209 lb 3.2 oz).   Follow up as planned on 4/24/23     Nova Aaron MD  [facility] ESA Farrell is a 37 year old, presenting for the following health issues:  Arthritis (Hand problems, pain scale: comes and goes, stiffness/Knuckle and Joint pain )        3/10/2023    11:12 AM " "    HPI     PMH sig for T1DM, hypothyroidism  patient presents for chief complaint of \"arthritis\" per chart review patient was recently seen  3/10/23 with Dr. Barboza and complaing of \"chronic hand tingling and morning stiffness, worse in her middle fingers (R>L). Does not report any swelling or external changes. 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 Every finger and whole hand. Feels mostly the joint. Hard to make a fist  Has joint pain in: middle finger (PIP  & DIP) both hands but none in the wrists or other joints    Doesn't hurt at rest but any engagement  (eg, opening a boittle) or bumping of affected  parts of the fingers causes pain.  Patient denies loss of sensation, erythema, skin changes, discoloration.  Patient does state some tingling when waking she attributes to sleeping positions.  Trauma? Horse stepped on right hand a long time ago, but sxs feel symmetric.      Endorses morning stiffness lasts for about one hour  Endorses arthritis of three or more joint areas - patient expresses especially in stiffness in 3 digit PIP & MCP joints bilaterally. But does state all PIP joints stiff in the morning with soft tissue swelling.   Denies family history of RA.  Endorses arthritis of hand joints   Endorses symmetric arthritis     Serum rheumatoid factor: negative    Radiographic changes? Absent. No erosions    Additional ROS:  No rashes elsewhere  No major dietary changes  No fever, chills, night sweats, rash  Can't  for long periods without starting to have pain    Patient states does camp and hike not so much in the past year. Patient states last went camping last summer. Sexual partner one male monogamous = for 15 years partnership. Had HPV many years ago and need colpo but everything clear " "now. No history STIs      Objective    /88   Pulse 93   Temp 98.4  F (36.9  C) (Oral)   Resp 16   Ht 1.651 m (5' 5\")   Wt 94.9 kg (209 lb 3.2 oz)   LMP 04/01/2023 (Approximate)   SpO2 100%   BMI 34.81 kg/m    Body mass index is 34.81 kg/m .  Physical Exam   General: Resting on the bed.  Head: No obvious trauma to head.  CV: Regular rate and rhythm.  No murmurs.      Respiratory: Effort normal and breath sounds normal.  No wheezing or crackles.   Gastrointestinal: Soft.  No distension. There is no tenderness.  There is no rigidity, no rebound and no guarding.   Musculoskeletal: Normal range of motion.  No tenderness to palpation over wrists, DIP, PIP, MCP bilaterally. Hands with 5/5 abduction, adduction, finger flexion/extension bilaterally. 5/5 thumb opposition. Normal sensation to light touch, vibration, and pinprick in bilaterally hands and wristsl.. No obvious deformity. No overlying skin change.  Patient states pain greatest in middle finger (PIP  & DIP) both hands when present.  Neuro: Alert. Moving all extremities appropriately.  Normal speech.    Skin: Skin is warm and dry.  No rash noted.   Psych: Normal mood and affect. Behavior is normal.         Rheumaoid Arthritis Criteria  Morning stiffness  present   Morning stiffness in and around the joints, lasting at least one hour before maximal improvement.:  Arthritis of three or more joint areas  present  At least three joint areas (out of 14 possible areas; right or left PIP, MCP, wrist, elbow, knee, ankle, MTP joints) simultaneously have had soft tissue swelling or fluid (not bony overgrowth alone) as observed by a physician.  Arthritis of hand joints At least one area swollen (as defined above) in a wrist, MCP, or PIP joint.    Symmetric arthritis  present  Simultaneous involvement of the same joint areas (as defined above) on both sides of the body (bilateral involvement of PIPs, MCPs, or MTPs, without absolute symmetry is " acceptable).  Rheumatoid nodules Subcutaneous nodules over bony prominences or extensor surfaces, or in juxta-articular regions as observed by a physician.    Serum rheumatoid factor  negative  Demonstration of abnormal amounts of serum rheumatoid factor by any method for which the result has been positive in less than 5 percent of normal control subjects.    Radiographic changes  absent   Radiographic changes typical of rheumatoid arthritis on posteroanterior hand or wrist radiographs, which must include erosions or unequivocal bony decalcification localised in, or most marked adjacent to, the involved joints (osteoarthritis changes alone do not qualify).

## 2023-04-12 NOTE — PATIENT INSTRUCTIONS
Patient Education   Here is the plan from today's visit    1. Pain in finger of both hands  Workup for inflammatory causes of finger pain  - Anti Nuclear Maira IgG by IFA with Reflex; Future  - Erythrocyte sedimentation rate auto; Future  - CRP inflammation; Future  - Cyclic Citrullinated Peptide Antibody IgG; Future  - Lyme serology    Please call or return to clinic if your symptoms don't go away.    Follow up plan  2-3 weeks    Thank you for coming to [facility] today.  Lab Testing:  **If you had lab testing today and your results are reassuring or normal they will be mailed to you or sent through [employer] within 7 days.   **If the lab tests need quick action we will call you with the results.  **If you are having labs done on a different day, please call [phone] to schedule at [facility] or [phone] for other [facility] locations. Labs do not offer walk-in appointments.  The phone number we will call with results is # [phone] (home) . If this is not the best number please call our clinic and change the number.  Medication Refills:  If you need any refills please call your pharmacy and they will contact us.   If you need to  your refill at a new pharmacy, please contact the new pharmacy directly. The new pharmacy will help you get your medications transferred faster.   Scheduling:  If you have any concerns about today's visit or wish to schedule another appointment please call our office during normal business hours [phone] (8-5:00 M-F). If you can no longer make a scheduled visit, please cancel via [employer] or call us to cancel.   If a referral was made to an [facility] specialty provider and you do not get a call from central scheduling, please refer to directions on your visit summary or call our office during normal business hours for assistance.   If a Mammogram was ordered for you at the [facility] call [phone] to schedule or change your  appointment.  If you had an XRay/CT/Ultrasound/MRI ordered the number is [phone] to schedule or change your radiology appointment.   [facility] has limited ultrasound appointments available on Wednesdays, if you would like your ultrasound at [facility], please call [phone] to schedule.   Medical Concerns:  If you have urgent medical concerns please call [phone] at any time of the day.    Nova Aaron MD

## 2023-04-13 LAB
ANA PAT SER IF-IMP: ABNORMAL
ANA SER QL IF: ABNORMAL
ANA TITR SER IF: ABNORMAL {TITER}
B BURGDOR IGG+IGM SER QL: 0.29
CCP AB SER IA-ACNC: 0.8 U/ML

## 2023-04-14 DIAGNOSIS — M79.645 PAIN IN FINGER OF BOTH HANDS: Primary | ICD-10-CM

## 2023-04-14 DIAGNOSIS — M79.644 PAIN IN FINGER OF BOTH HANDS: Primary | ICD-10-CM

## 2023-04-15 LAB
ANION GAP SERPL CALCULATED.3IONS-SCNC: 18 MMOL/L (ref 7–15)
BUN SERPL-MCNC: 12.2 MG/DL (ref 6–20)
CALCIUM SERPL-MCNC: 9.7 MG/DL (ref 8.6–10)
CHLORIDE SERPL-SCNC: 101 MMOL/L (ref 98–107)
CREAT SERPL-MCNC: 0.73 MG/DL (ref 0.51–0.95)
DEPRECATED HCO3 PLAS-SCNC: 18 MMOL/L (ref 22–29)
GFR SERPL CREATININE-BSD FRML MDRD: >90 ML/MIN/1.73M2
GLUCOSE SERPL-MCNC: 129 MG/DL (ref 70–99)
POTASSIUM SERPL-SCNC: 4.8 MMOL/L (ref 3.4–5.3)
SODIUM SERPL-SCNC: 137 MMOL/L (ref 136–145)

## 2023-04-17 LAB — RHEUMATOID FACT SER NEPH-ACNC: <7 IU/ML

## 2023-04-19 LAB — DSDNA AB SER-ACNC: 1.9 IU/ML

## 2023-04-19 NOTE — PROGRESS NOTES
Outcome for 04/19/23 11:06 AM: [employer] message sent  Gita Champagne MA  Outcome for 04/19/23 11:19 AM: Replied to [employer] message  Gita Champagne MA  Outcome for 04/24/23 3:01 PM: Per patient, will upload device before appointment  Gita Champagne MA  Outcome for 04/25/23 11:07 AM: Data obtained via [employer] website  Gita Champagne MA

## 2023-04-23 NOTE — PROGRESS NOTES
"  Assessment & Plan     Pain in finger of both hands  Patient presenting with bilateral symmetric polyarthritis with broad differential. Since last evaluation, no change in mechanical symptoms. RA & SLE less likely based on 4/12 blood work. Pt declines STI testing when considering reactive arthritis. Lyme & gout also considered though less likely given joints involved. Lyme serology also negative. With elevated CRP, symptoms ongoing for many months, and strong autoimmune history with T1D and Hashimoto's would benefit from rheumatology consultation for further workup/treatment versus reassurance. Reviewed possibility that CRP falsely elevated given presentations to clinic co-incide with her kiddo who is in day care.   -repeat crp, get TSH     Need for vaccination  Patient due for updated pneumonia booster which was administered.     Type 1 DM with hyperglycemia  A1c obtained in anticipation of endocrinology referral.     Db Farrell is a 37 year old, presenting for the following health issues:  RECHECK        3/10/2023    11:12 AM   Finger joint pain follow-up  Since last evaluation 4/12/23 sympotms no different, no worsening nor improvement  Stiffness in AM  Usually goes away  Pain in joints isn't ongoing (as in persistent through the day,  Just intermittent)  Will notice it with jar opening  Pressure on joint is sharp when opening jars  Types at work has not adjusted that  No new meds  No new adaptations at work  No big diff in frequency based on day of the week (like when off work on weekend still intermittently happening.       Per last HPI 4/12/23    \"No repetitive movements more than typical typing at work    Today patient elaborates stating: has been going on for perhaps several years  Stating that lately has had stiffness and swelling in hands in the morning. Every finger and whole hand. Feels mostly the joint. Hard to make a fist  Has joint pain in: middle finger (PIP  & DIP) both hands but none in " "the wrists or other joints  Doesn't hurt at rest but any engagement  (eg, opening a boittle) or bumping of affected  parts of the fingers causes pain.  Patient denies loss of sensation, erythema, skin changes, discoloration.  Patient does state some tingling when waking she attributes to sleeping positions.  Trauma? Horse stepped on right hand a long time ago, but sxs feel symmetric.  Endorses morning stiffness lasts for about one hour  Endorses arthritis of three or more joint areas - patient expresses especially in stiffness in 3 digit PIP & MCP joints bilaterally. But does state all PIP joints stiff in the morning with soft tissue swelling.   Denies family history of RA.  Endorses arthritis of hand joints   Endorses symmetric arthritis   Serum rheumatoid factor: negative  Radiographic changes? Absent. No erosions  Additional ROS:  No rashes elsewhere  No major dietary changes  No fever, chills, night sweats, rash  Can't  for long periods without starting to have pain  Patient states does camp and hike not so much in the past year. Patient states last went camping last summer. Sexual partner one male monogamous = for 15 years partnership. Had HPV many years ago and need colpo but everything clear now. No history STIs.\"          Objective    /84   Pulse 96   Temp 97.7  F (36.5  C)   Resp 19   Ht 1.651 m (5' 5\")   Wt 95.3 kg (210 lb 3.2 oz)   LMP 04/01/2023 (Approximate)   SpO2 98%   BMI 34.98 kg/m    Body mass index is 34.98 kg/m .  Physical Exam  Vitals reviewed.   Constitutional:       General: She is not in acute distress.     Appearance: Normal appearance. She is not diaphoretic.   HENT:      Head: Normocephalic and atraumatic.      Right Ear: External ear normal.      Left Ear: External ear normal.   Eyes:      General: No scleral icterus.     Extraocular Movements: Extraocular movements intact.   Cardiovascular:      Rate and Rhythm: Normal rate.   Pulmonary:      Effort: Pulmonary effort is " normal.   Musculoskeletal:         General: Normal range of motion.      Cervical back: Normal range of motion.      Comments: Normal range of motion.  No tenderness to palpation over wrists, DIP, PIP, MCP bilaterally. Hands with 5/5 abduction, adduction, finger flexion/extension bilaterally. 5/5 thumb opposition. Normal sensation to light touch, vibration, and pinprick in bilaterally hands and wristsl.. No obvious deformity. No overlying skin change.  Patient states pain greatest in middle finger (PIP  & DIP) both hands when present.   Skin:     Comments: No rash at exposed skin   Neurological:      General: No focal deficit present.      Mental Status: She is alert.   Psychiatric:         Mood and Affect: Mood normal.         Behavior: Behavior normal.         Thought Content: Thought content normal.               Rheumaoid Arthritis Criteria  Morning stiffness  present   Morning stiffness in and around the joints, lasting at least one hour before maximal improvement.:  Arthritis of three or more joint areas  present  At least three joint areas (out of 14 possible areas; right or left PIP, MCP, wrist, elbow, knee, ankle, MTP joints) simultaneously have had soft tissue swelling or fluid (not bony overgrowth alone) as observed by a physician.  Arthritis of hand joints At least one area swollen (as defined above) in a wrist, MCP, or PIP joint.    Symmetric arthritis  present  Simultaneous involvement of the same joint areas (as defined above) on both sides of the body (bilateral involvement of PIPs, MCPs, or MTPs, without absolute symmetry is acceptable).  Rheumatoid nodules Subcutaneous nodules over bony prominences or extensor surfaces, or in juxta-articular regions as observed by a physician.    Serum rheumatoid factor  negative  Demonstration of abnormal amounts of serum rheumatoid factor by any method for which the result has been positive in less than 5 percent of normal control subjects.    Radiographic  changes  absent   Radiographic changes typical of rheumatoid arthritis on posteroanterior hand or wrist radiographs, which must include erosions or unequivocal bony decalcification localised in, or most marked adjacent to, the involved joints (osteoarthritis changes alone do not qualify).

## 2023-04-24 ENCOUNTER — OFFICE VISIT (OUTPATIENT)
Dept: FAMILY MEDICINE | Facility: CLINIC | Age: 38
End: 2023-04-24
Payer: COMMERCIAL

## 2023-04-24 ENCOUNTER — TELEPHONE (OUTPATIENT)
Dept: ENDOCRINOLOGY | Facility: CLINIC | Age: 38
End: 2023-04-24

## 2023-04-24 VITALS
DIASTOLIC BLOOD PRESSURE: 84 MMHG | OXYGEN SATURATION: 98 % | RESPIRATION RATE: 19 BRPM | HEIGHT: 65 IN | WEIGHT: 210.2 LBS | SYSTOLIC BLOOD PRESSURE: 122 MMHG | BODY MASS INDEX: 35.02 KG/M2 | TEMPERATURE: 97.7 F | HEART RATE: 96 BPM

## 2023-04-24 DIAGNOSIS — Z23 NEED FOR VACCINATION: ICD-10-CM

## 2023-04-24 DIAGNOSIS — M79.645 PAIN IN FINGER OF BOTH HANDS: Primary | ICD-10-CM

## 2023-04-24 DIAGNOSIS — M79.644 PAIN IN FINGER OF BOTH HANDS: Primary | ICD-10-CM

## 2023-04-24 DIAGNOSIS — E10.65 TYPE 1 DIABETES MELLITUS WITH HYPERGLYCEMIA (H): ICD-10-CM

## 2023-04-24 LAB
CRP SERPL-MCNC: <3 MG/L
HBA1C MFR BLD: 6.1 % (ref 0–5.6)

## 2023-04-24 PROCEDURE — 86140 C-REACTIVE PROTEIN: CPT | Performed by: STUDENT IN AN ORGANIZED HEALTH CARE EDUCATION/TRAINING PROGRAM

## 2023-04-24 PROCEDURE — 99213 OFFICE O/P EST LOW 20 MIN: CPT | Mod: 25 | Performed by: STUDENT IN AN ORGANIZED HEALTH CARE EDUCATION/TRAINING PROGRAM

## 2023-04-24 PROCEDURE — 90732 PPSV23 VACC 2 YRS+ SUBQ/IM: CPT | Performed by: STUDENT IN AN ORGANIZED HEALTH CARE EDUCATION/TRAINING PROGRAM

## 2023-04-24 PROCEDURE — 90471 IMMUNIZATION ADMIN: CPT | Performed by: STUDENT IN AN ORGANIZED HEALTH CARE EDUCATION/TRAINING PROGRAM

## 2023-04-24 PROCEDURE — 86431 RHEUMATOID FACTOR QUANT: CPT | Performed by: STUDENT IN AN ORGANIZED HEALTH CARE EDUCATION/TRAINING PROGRAM

## 2023-04-24 PROCEDURE — 83036 HEMOGLOBIN GLYCOSYLATED A1C: CPT | Performed by: STUDENT IN AN ORGANIZED HEALTH CARE EDUCATION/TRAINING PROGRAM

## 2023-04-24 PROCEDURE — 36415 COLL VENOUS BLD VENIPUNCTURE: CPT | Performed by: STUDENT IN AN ORGANIZED HEALTH CARE EDUCATION/TRAINING PROGRAM

## 2023-04-24 NOTE — PROGRESS NOTES
Preceptor Attestation:    I discussed the patient with the resident and evaluated the patient in person. I have verified the content of the note, which accurately reflects my assessment of the patient and the plan of care.   Supervising Physician:  Jyoti Lazo MD.

## 2023-04-24 NOTE — TELEPHONE ENCOUNTER
Outcome for 04/24/23 3:04 PM: Per patient, will upload device before appointment  Gita Champagne MA

## 2023-04-25 LAB — RHEUMATOID FACT SER NEPH-ACNC: <7 IU/ML

## 2023-04-26 ENCOUNTER — TELEPHONE (OUTPATIENT)
Dept: ENDOCRINOLOGY | Facility: CLINIC | Age: 38
End: 2023-04-26

## 2023-04-26 ENCOUNTER — MYC MEDICAL ADVICE (OUTPATIENT)
Dept: FAMILY MEDICINE | Facility: CLINIC | Age: 38
End: 2023-04-26

## 2023-04-26 ENCOUNTER — VIRTUAL VISIT (OUTPATIENT)
Dept: ENDOCRINOLOGY | Facility: CLINIC | Age: 38
End: 2023-04-26
Payer: COMMERCIAL

## 2023-04-26 DIAGNOSIS — E10.9 TYPE 1 DIABETES MELLITUS WITHOUT COMPLICATION (H): Primary | ICD-10-CM

## 2023-04-26 DIAGNOSIS — E10.65 TYPE 1 DIABETES MELLITUS WITH HYPERGLYCEMIA (H): ICD-10-CM

## 2023-04-26 DIAGNOSIS — E06.3 HASHIMOTO'S THYROIDITIS: ICD-10-CM

## 2023-04-26 PROCEDURE — 99214 OFFICE O/P EST MOD 30 MIN: CPT | Mod: VID | Performed by: INTERNAL MEDICINE

## 2023-04-26 RX ORDER — INSULIN GLARGINE-YFGN 100 [IU]/ML
33 INJECTION, SOLUTION SUBCUTANEOUS DAILY
Qty: 45 ML | Refills: 3 | Status: SHIPPED | OUTPATIENT
Start: 2023-04-26 | End: 2024-05-05

## 2023-04-26 NOTE — LETTER
4/26/2023       RE: Denia Montes  [address]  [facility] [address]     Dear Colleague,    Thank you for referring your patient, Denia Montes, to the [facility] [place] at [facility]. Please see a copy of my visit note below.    Outcome for 04/19/23 11:06 AM: [employer] message sent  Gita Champagne MA  Outcome for 04/19/23 11:19 AM: Replied to [employer] message  Gita Champagne MA  Outcome for 04/24/23 3:01 PM: Per patient, will upload device before appointment  Gita Champagne MA  Outcome for 04/25/23 11:07 AM: Data obtained via Dexcom website  Gita Champagne MA            Endocrinology virtual Visit    Chief Complaint: Follow Up and Video Visit     Information obtained from:Patient      Assessment/Treatment Plan:      Type 1 diabetes     CGM readings reviewed. Within target range 90% of the time and no lows.     Plan    Lantus 30 units daily.   Metformin 1000 mg twice daily (helping with insulin action).   Carbohydrate coverage 1 unit for every 15 grams of carbohydrates with breakfast and and dinner. 1:10 for lunch   Please follow correction scale 1 unit for every 50 above 140 three times per day with meals and at bedtime.  CDE referral for insulin pump       Hashimoto thyroiditis: TSH stable off of treatment. Check TSH q 1-2 times per year and as needed.       Yuri Hansen MD  Staff Endocrinologist    Division of Endocrinology and Diabetes      Subjective:         HPI: Denia Montes is a 37 year old female with history of MAGDI/treated as Type 1 diabetes currently - (H) Positive ANNA antibody/C-peptide WNL here for a follow up.  Now c-peptide is low.     Currently on:   Lantus 30 units daily    Metformin 2000 mg daily  CHO 1:15 for breakfast and dinner and lunch 1:10   Correction 1 for every 50 above 140 with meals and above 200 at bedtime.   No recent hypoglycemia symptoms or sig lows.   BG as  follows.               Allergies   Allergen Reactions    Sulfa Antibiotics Hives       Current Outpatient Medications   Medication Sig Dispense Refill    ACE/ARB NOT PRESCRIBED, INTENTIONAL, Please choose reason not prescribed, below (Patient not taking: Reported on 6/4/2021)      Continuous Blood Gluc  (DEXCOM G6 ) RAEANN Use to read blood sugars as per 's instructions. 1 each 0    Continuous Blood Gluc Sensor (DEXCOM G6 SENSOR) MISC Change every 10 days. 10 each 3    Continuous Blood Gluc Transmit (DEXCOM G6 TRANSMITTER) MISC Change every 3 months. 1 each 3    glucagon 1 MG kit Inject 1 mg into the muscle once for 1 dose Use in the event of severe hypoglycemic episode. 1 each 11    Glucagon, rDNA, (GLUCAGON EMERGENCY) 1 MG KIT Inject 1 mg into the muscle daily as needed (Severe Hypoglycemia) 1 kit 3    insulin aspart (NOVOLOG PEN) 100 UNIT/ML pen Inject subcu 1 unit for every 15 grams of carbohydrates with breakfast and 1:12 for lunch and dinner. Correction scale 1 unit for every 50 above 140 three times per day with meals and at bedtime. Up to 30 units daily. Generic OK 30 mL 3    Insulin Aspart FlexPen 100 UNIT/ML SOPN Inject 30 Units Subcutaneous See Admin Instructions 1 unit for every 10 grams of carbohydrates and correction scale as directed. Uses up to 30 units daily. 30 mL 3    insulin glargine (LANTUS PEN) 100 UNIT/ML pen Inject 22 Units Subcutaneous every morning 30 mL 3    Insulin Glargine-yfgn 100 UNIT/ML SOPN Inject 30 Units Subcutaneous daily 30 mL 3    insulin pen needle (BD RUSSELL U/F) 32G X 4 MM miscellaneous USE 8 PEN NEEDLES DAILY 800 each 3    metFORMIN (GLUCOPHAGE) 500 MG tablet Take 2 tablets (1,000 mg) by mouth 2 times daily (with meals) 360 tablet 3    Omega-3 Fatty Acids (FISH OIL) 500 MG CAPS          Review of Systems   as per HPI above      Objective:     GENERAL: Healthy, alert and no distress  EYES: Eyes grossly normal to inspection.    RESP: No audible wheeze,  cough, or visible cyanosis.    NEURO: Alert and oriented. mentation and speech and mood stable.    In House Labs:   Hemoglobin A1C   Date Value Ref Range Status   04/24/2023 6.1 (H) 0.0 - 5.6 % Final     Comment:     Normal <5.7%   Prediabetes 5.7-6.4%    Diabetes 6.5% or higher     Note: Adopted from ADA consensus guidelines.   10/06/2022 6.2 (H) 0.0 - 5.6 % Final     Comment:     Normal <5.7%   Prediabetes 5.7-6.4%    Diabetes 6.5% or higher     Note: Adopted from ADA consensus guidelines.   01/14/2022 6.5 (H) 0.0 - 5.6 % Final     Comment:     Normal <5.7%   Prediabetes 5.7-6.4%    Diabetes 6.5% or higher     Note: Adopted from ADA consensus guidelines.   12/21/2020 5.5 0 - 5.6 % Final     Comment:     Normal <5.7% Prediabetes 5.7-6.4%  Diabetes 6.5% or higher - adopted from ADA   consensus guidelines.     11/17/2020 5.3 0 - 5.6 % Final     Comment:     Normal <5.7% Prediabetes 5.7-6.4%  Diabetes 6.5% or higher - adopted from ADA   consensus guidelines.     06/29/2020 6.1 (H) 0 - 5.6 % Final     Comment:     Normal <5.7% Prediabetes 5.7-6.4%  Diabetes 6.5% or higher - adopted from ADA   consensus guidelines.       Hemoglobin A1C POCT   Date Value Ref Range Status   03/11/2020 6.9 (A) 4.3 - 6 % Final   09/11/2019 6.1 (A) 4.3 - 6 % Final   05/29/2019 6.1 (A) 4.3 - 6 % Final       TSH   Date Value Ref Range Status   03/10/2023 2.04 0.30 - 4.20 uIU/mL Final   10/06/2022 2.56 0.30 - 4.20 uIU/mL Final   01/14/2022 1.87 0.40 - 4.00 mU/L Final   09/24/2021 1.14 0.40 - 4.00 mU/L Final   03/22/2021 1.27 0.40 - 4.00 mU/L Final   01/15/2021 2.19 0.40 - 4.00 mU/L Final   12/21/2020 1.62 0.40 - 4.00 mU/L Final   12/21/2020 1.62 0.40 - 4.00 mU/L Final   11/17/2020 1.86 0.40 - 4.00 mU/L Final      Latest Reference Range & Units 03/10/23 12:08   C-Peptide 0.9 - 6.9 ng/mL 0.8 (L)   Patient Fasting?  Unknown   Glucose 70 - 99 mg/dL 147 (H)     Creatinine   Date Value Ref Range Status   04/12/2023 0.73 0.51 - 0.95 mg/dL Final    01/15/2021 0.56 0.52 - 1.04 mg/dL Final       Video-Visit Details    Type of service:  Video Visit    Video Start Time: 8:33 AM    Video End Time:8:53 AM  Distant Location (provider location):  Off-site.     Platform used for Video Visit: Luisito Hansen MD

## 2023-04-26 NOTE — TELEPHONE ENCOUNTER
LVM Pt needs 7 month follow up scheduled with Dr. Hansen. Also needs a CDE visit. Left endo number to schedule.   Taylor Myhre, S[facility]

## 2023-04-26 NOTE — NURSING NOTE
Is the patient currently in the state of [place]? YES    Visit mode:VIDEO    If the visit is dropped, the patient can be reconnected by: VIDEO VISIT: Send to e-mail at: [email or web address]    Will anyone else be joining the visit? NO      How would you like to obtain your AVS? MyChart    Are changes needed to the allergy or medication list? Remove duplicate medications    Reason for visit: Follow Up and Video Visit

## 2023-04-26 NOTE — PROGRESS NOTES
Endocrinology virtual Visit    Chief Complaint: Follow Up and Video Visit     Information obtained from:Patient      Assessment/Treatment Plan:      Type 1 diabetes     CGM readings reviewed. Within target range 90% of the time and no lows.     Plan      Lantus 30 units daily.     Metformin 1000 mg twice daily (helping with insulin action).     Carbohydrate coverage 1 unit for every 15 grams of carbohydrates with breakfast and and dinner. 1:10 for lunch     Please follow correction scale 1 unit for every 50 above 140 three times per day with meals and at bedtime.    CDE referral for insulin pump       Hashimoto thyroiditis: TSH stable off of treatment. Check TSH q 1-2 times per year and as needed.       Yuri Hansen MD  Staff Endocrinologist    Division of Endocrinology and Diabetes      Subjective:         HPI: Denia Montes is a 37 year old female with history of MAGDI/treated as Type 1 diabetes currently - (H) Positive ANNA antibody/C-peptide WNL here for a follow up.  Now c-peptide is low.     Currently on:   Lantus 30 units daily    Metformin 2000 mg daily  CHO 1:15 for breakfast and dinner and lunch 1:10   Correction 1 for every 50 above 140 with meals and above 200 at bedtime.   No recent hypoglycemia symptoms or sig lows.   BG as follows.               Allergies   Allergen Reactions     Sulfa Antibiotics Hives       Current Outpatient Medications   Medication Sig Dispense Refill     ACE/ARB NOT PRESCRIBED, INTENTIONAL, Please choose reason not prescribed, below (Patient not taking: Reported on 6/4/2021)       Continuous Blood Gluc  (DEXCOM G6 ) RAEANN Use to read blood sugars as per 's instructions. 1 each 0     Continuous Blood Gluc Sensor (DEXCOM G6 SENSOR) MISC Change every 10 days. 10 each 3     Continuous Blood Gluc Transmit (DEXCOM G6 TRANSMITTER) MISC Change every 3 months. 1 each 3     glucagon 1 MG kit Inject 1 mg into the muscle once for 1 dose Use in the event  of severe hypoglycemic episode. 1 each 11     Glucagon, rDNA, (GLUCAGON EMERGENCY) 1 MG KIT Inject 1 mg into the muscle daily as needed (Severe Hypoglycemia) 1 kit 3     insulin aspart (NOVOLOG PEN) 100 UNIT/ML pen Inject subcu 1 unit for every 15 grams of carbohydrates with breakfast and 1:12 for lunch and dinner. Correction scale 1 unit for every 50 above 140 three times per day with meals and at bedtime. Up to 30 units daily. Generic OK 30 mL 3     Insulin Aspart FlexPen 100 UNIT/ML SOPN Inject 30 Units Subcutaneous See Admin Instructions 1 unit for every 10 grams of carbohydrates and correction scale as directed. Uses up to 30 units daily. 30 mL 3     insulin glargine (LANTUS PEN) 100 UNIT/ML pen Inject 22 Units Subcutaneous every morning 30 mL 3     Insulin Glargine-yfgn 100 UNIT/ML SOPN Inject 30 Units Subcutaneous daily 30 mL 3     insulin pen needle (BD RUSSELL U/F) 32G X 4 MM miscellaneous USE 8 PEN NEEDLES DAILY 800 each 3     metFORMIN (GLUCOPHAGE) 500 MG tablet Take 2 tablets (1,000 mg) by mouth 2 times daily (with meals) 360 tablet 3     Omega-3 Fatty Acids (FISH OIL) 500 MG CAPS          Review of Systems   as per HPI above      Objective:     GENERAL: Healthy, alert and no distress  EYES: Eyes grossly normal to inspection.    RESP: No audible wheeze, cough, or visible cyanosis.    NEURO: Alert and oriented. mentation and speech and mood stable.    In House Labs:   Hemoglobin A1C   Date Value Ref Range Status   04/24/2023 6.1 (H) 0.0 - 5.6 % Final     Comment:     Normal <5.7%   Prediabetes 5.7-6.4%    Diabetes 6.5% or higher     Note: Adopted from ADA consensus guidelines.   10/06/2022 6.2 (H) 0.0 - 5.6 % Final     Comment:     Normal <5.7%   Prediabetes 5.7-6.4%    Diabetes 6.5% or higher     Note: Adopted from ADA consensus guidelines.   01/14/2022 6.5 (H) 0.0 - 5.6 % Final     Comment:     Normal <5.7%   Prediabetes 5.7-6.4%    Diabetes 6.5% or higher     Note: Adopted from ADA consensus guidelines.    12/21/2020 5.5 0 - 5.6 % Final     Comment:     Normal <5.7% Prediabetes 5.7-6.4%  Diabetes 6.5% or higher - adopted from ADA   consensus guidelines.     11/17/2020 5.3 0 - 5.6 % Final     Comment:     Normal <5.7% Prediabetes 5.7-6.4%  Diabetes 6.5% or higher - adopted from ADA   consensus guidelines.     06/29/2020 6.1 (H) 0 - 5.6 % Final     Comment:     Normal <5.7% Prediabetes 5.7-6.4%  Diabetes 6.5% or higher - adopted from ADA   consensus guidelines.       Hemoglobin A1C POCT   Date Value Ref Range Status   03/11/2020 6.9 (A) 4.3 - 6 % Final   09/11/2019 6.1 (A) 4.3 - 6 % Final   05/29/2019 6.1 (A) 4.3 - 6 % Final       TSH   Date Value Ref Range Status   03/10/2023 2.04 0.30 - 4.20 uIU/mL Final   10/06/2022 2.56 0.30 - 4.20 uIU/mL Final   01/14/2022 1.87 0.40 - 4.00 mU/L Final   09/24/2021 1.14 0.40 - 4.00 mU/L Final   03/22/2021 1.27 0.40 - 4.00 mU/L Final   01/15/2021 2.19 0.40 - 4.00 mU/L Final   12/21/2020 1.62 0.40 - 4.00 mU/L Final   12/21/2020 1.62 0.40 - 4.00 mU/L Final   11/17/2020 1.86 0.40 - 4.00 mU/L Final      Latest Reference Range & Units 03/10/23 12:08   C-Peptide 0.9 - 6.9 ng/mL 0.8 (L)   Patient Fasting?  Unknown   Glucose 70 - 99 mg/dL 147 (H)     Creatinine   Date Value Ref Range Status   04/12/2023 0.73 0.51 - 0.95 mg/dL Final   01/15/2021 0.56 0.52 - 1.04 mg/dL Final       Video-Visit Details    Type of service:  Video Visit    Video Start Time: 8:33 AM    Video End Time:8:53 AM  Distant Location (provider location):  Off-site.     Platform used for Video Visit: [employer]

## 2023-04-26 NOTE — PATIENT INSTRUCTIONS
Rodo Loza 30 units daily; 32 units during premenstrual period.     Metformin 1000 mg twice daily.   Carbohydrate coverage 1 unit for every 15 grams of carbohydrates with breakfast and and dinner. 1:10 for lunch  Please follow correction scale 1 unit for every 50 above 140 three times per day with meals and at bedtime.

## 2023-06-05 ENCOUNTER — OFFICE VISIT (OUTPATIENT)
Dept: FAMILY MEDICINE | Facility: CLINIC | Age: 38
End: 2023-06-05
Payer: COMMERCIAL

## 2023-06-05 VITALS
SYSTOLIC BLOOD PRESSURE: 123 MMHG | RESPIRATION RATE: 18 BRPM | OXYGEN SATURATION: 99 % | DIASTOLIC BLOOD PRESSURE: 83 MMHG | HEART RATE: 80 BPM | TEMPERATURE: 98.6 F

## 2023-06-05 DIAGNOSIS — R07.0 THROAT PAIN: Primary | ICD-10-CM

## 2023-06-05 PROBLEM — E10.9 TYPE 1 DIABETES MELLITUS WITHOUT COMPLICATION (H): Status: RESOLVED | Noted: 2023-04-26 | Resolved: 2023-06-05

## 2023-06-05 PROCEDURE — 87635 SARS-COV-2 COVID-19 AMP PRB: CPT | Performed by: STUDENT IN AN ORGANIZED HEALTH CARE EDUCATION/TRAINING PROGRAM

## 2023-06-05 PROCEDURE — 99213 OFFICE O/P EST LOW 20 MIN: CPT | Mod: GC | Performed by: STUDENT IN AN ORGANIZED HEALTH CARE EDUCATION/TRAINING PROGRAM

## 2023-06-05 RX ORDER — LORATADINE 10 MG/1
10 TABLET ORAL DAILY
Qty: 30 TABLET | Refills: 0 | Status: SHIPPED | OUTPATIENT
Start: 2023-06-05 | End: 2024-06-25

## 2023-06-05 RX ORDER — FLUTICASONE PROPIONATE 50 MCG
1 SPRAY, SUSPENSION (ML) NASAL DAILY
Qty: 9.9 ML | Refills: 0 | Status: SHIPPED | OUTPATIENT
Start: 2023-06-05 | End: 2024-03-14

## 2023-06-05 NOTE — PROGRESS NOTES
Assessment & Plan     Throat pain  Patient with a waxing and waning sore throat x2 weeks with associated cough and congestion.  Has a son at ; however son is not sick.  Wonders if she has allergies attempted loratadine for couple days with no effect.  Physical exam revealed no sinus tenderness, no erythematous TM, clear lung sounds, no cervical adenopathy, no thyroid megaly, mild cobblestoning of her tonsils, but no erythema or exudate.  Possible mild enlargement of her turbinates, but no erythema.  Overall reassuring against infectious etiology.  Possible that she has some kind of viral URI that has been lingering.  Will obtain COVID test.  Suspect allergies given pollen count and air quality as of late.  We will attempt a 2-week course of Flonase and Claritin.    - fluticasone (FLONASE) 50 MCG/ACT nasal spray  Dispense: 9.9 mL; Refill: 0  - loratadine (CLARITIN) 10 MG tablet  Dispense: 30 tablet; Refill: 0  - Symptomatic COVID-19 Virus (Coronavirus) by PCR Nose        Return if symptoms worsen or fail to improve.    Nhung Leal MD  [facility] ESA Farrell is a 37 year old, presenting for the following health issues:  Pharyngitis (Persistent sore throat 2 weeks  )        6/5/2023     4:16 PM   Additional Questions   Roomed by osmel   Accompanied by self     HPI     Sore throat on and off - thought it was gone a week ago, but then it came back  Ebbs and flows  Left is worse than right  Some associate ear pain  Cough has resolved  Congestion  No fever  Has a son in , but he doesn't seem sick    Initially thought it was allergies, but isn't sure    Has taking ibuprofen, which helped with pain  Cough drops, which has helped some  Tried Claritin and wasn't sure if it made a difference    No history of asthma (wonders if she has allergy induced asthma)  Never smoker        Objective    /83 (BP Location: Left arm, Patient Position: Sitting, Cuff Size: Adult Regular)    Pulse 80   Temp 98.6  F (37  C) (Oral)   Resp 18   LMP 04/01/2023 (Approximate)   SpO2 99%   There is no height or weight on file to calculate BMI.  Physical Exam  Vitals reviewed.   Constitutional:       General: She is not in acute distress.     Appearance: Normal appearance. She is not ill-appearing, toxic-appearing or diaphoretic.   HENT:      Head: Normocephalic and atraumatic.      Comments: Nontender to palpation over sinuses     Right Ear: Tympanic membrane, ear canal and external ear normal.      Left Ear: Tympanic membrane, ear canal and external ear normal.      Nose: Nose normal. No congestion or rhinorrhea.      Right Turbinates: Enlarged (mild).      Left Turbinates: Enlarged (mild).      Mouth/Throat:      Lips: Pink.      Mouth: Mucous membranes are moist.      Pharynx: Oropharynx is clear. No oropharyngeal exudate or posterior oropharyngeal erythema.      Tonsils: No tonsillar exudate or tonsillar abscesses.      Comments: Cobblestoning  Eyes:      Extraocular Movements: Extraocular movements intact.      Conjunctiva/sclera: Conjunctivae normal.      Pupils: Pupils are equal, round, and reactive to light.   Neck:      Thyroid: No thyromegaly or thyroid tenderness.   Cardiovascular:      Rate and Rhythm: Normal rate and regular rhythm.      Heart sounds: Normal heart sounds. No murmur heard.  Pulmonary:      Effort: Pulmonary effort is normal. No respiratory distress.      Breath sounds: No stridor. No wheezing or rhonchi.   Musculoskeletal:      Cervical back: Normal range of motion and neck supple.   Lymphadenopathy:      Cervical: No cervical adenopathy.   Skin:     General: Skin is warm and dry.      Capillary Refill: Capillary refill takes less than 2 seconds.   Neurological:      Mental Status: She is alert.   Psychiatric:         Mood and Affect: Mood normal.         Behavior: Behavior normal.         Thought Content: Thought content normal.         Judgment: Judgment normal.

## 2023-06-05 NOTE — PATIENT INSTRUCTIONS
Patient Education   Here is the plan from today's visit    1. Throat pain  Lets give a allergy treatment to try with both Flonase and Claritin for about 2 weeks.  At the same time if you do not mind trying to not eat 3 hours before bed to see if there is any component of acid reflux worsening this.  I will let you know about your COVID test through [employer].    If things are not getting better after a couple of weeks, please come back.      - fluticasone (FLONASE) 50 MCG/ACT nasal spray; Spray 1 spray into both nostrils daily  Dispense: 9.9 mL; Refill: 0  - loratadine (CLARITIN) 10 MG tablet; Take 1 tablet (10 mg) by mouth daily  Dispense: 30 tablet; Refill: 0  - Symptomatic COVID-19 Virus (Coronavirus) by PCR Nose      Follow up plan  Return if symptoms worsen or fail to improve.    Thank you for coming to [facility] today.  Lab Testing:  **If you had lab testing today and your results are reassuring or normal they will be mailed to you or sent through [employer] within 7 days.   **If the lab tests need quick action we will call you with the results.  **If you are having labs done on a different day, please call [phone] to schedule at [facility] or [phone] for other [facility] locations. Labs do not offer walk-in appointments.  The phone number we will call with results is # [phone] (home) . If this is not the best number please call our clinic and change the number.  Medication Refills:  If you need any refills please call your pharmacy and they will contact us.   If you need to  your refill at a new pharmacy, please contact the new pharmacy directly. The new pharmacy will help you get your medications transferred faster.   Scheduling:  If you have any concerns about today's visit or wish to schedule another appointment please call our office during normal business hours [phone] (8-5:00 M-F). If you can no longer make a scheduled visit, please cancel via [employer]  or call us to cancel.  Medical Concerns:  If you have urgent medical concerns please call [phone] at any time of the day.    Nhung Leal MD

## 2023-06-05 NOTE — PROGRESS NOTES
Preceptor Attestation:   Patient seen, evaluated and discussed with the resident. I have verified the content of the note, which accurately reflects my assessment of the patient and the plan of care.   Supervising Physician:  Phillip Castano MD

## 2023-06-06 LAB — SARS-COV-2 RNA RESP QL NAA+PROBE: NEGATIVE

## 2023-06-06 NOTE — TELEPHONE ENCOUNTER
The patient would like to see about getting a referral to [facility] fax [phone]. The patient stated that they have appt sooner the January of 2024.

## 2023-06-07 ENCOUNTER — DOCUMENTATION ONLY (OUTPATIENT)
Dept: FAMILY MEDICINE | Facility: CLINIC | Age: 38
End: 2023-06-07
Payer: COMMERCIAL

## 2023-06-07 NOTE — PROGRESS NOTES
6/7/2023    Patient wanted a referral sent over to [facility] so they could get seen sooner than their appointment on 1/3/2024.   Care Coordinator successfully faxed a rheumatology referral to [facility] ([phone]) including a face sheet and recent labs. CC called [facility] ([phone]) to confirm that was the correct process in regards to getting a new patient scheduled.      Beatriz Warner  Care Coordinator

## 2023-07-06 ENCOUNTER — TRANSFERRED RECORDS (OUTPATIENT)
Dept: HEALTH INFORMATION MANAGEMENT | Facility: CLINIC | Age: 38
End: 2023-07-06
Payer: COMMERCIAL

## 2023-08-15 ENCOUNTER — TELEPHONE (OUTPATIENT)
Dept: RHEUMATOLOGY | Facility: CLINIC | Age: 38
End: 2023-08-15

## 2023-08-15 NOTE — TELEPHONE ENCOUNTER
Called pt and LVM regarding appt on 1/2/2024. Yenny Rodriguez has had a change in her schedule and will no longer be working at the [place] location. Pt will need to reschedule their appt with her at the [facility], or with another provider.

## 2023-09-08 ENCOUNTER — OFFICE VISIT (OUTPATIENT)
Dept: DERMATOLOGY | Facility: CLINIC | Age: 38
End: 2023-09-08
Attending: FAMILY MEDICINE
Payer: COMMERCIAL

## 2023-09-08 DIAGNOSIS — L70.0 ACNE VULGARIS: Primary | ICD-10-CM

## 2023-09-08 DIAGNOSIS — L98.9 FACIAL SKIN LESION: ICD-10-CM

## 2023-09-08 DIAGNOSIS — D22.9 MULTIPLE NEVI: ICD-10-CM

## 2023-09-08 DIAGNOSIS — D23.72 DERMATOFIBROMA OF LEFT LOWER EXTREMITY: ICD-10-CM

## 2023-09-08 DIAGNOSIS — D18.01 CHERRY ANGIOMA: ICD-10-CM

## 2023-09-08 PROCEDURE — 99204 OFFICE O/P NEW MOD 45 MIN: CPT | Mod: GC | Performed by: DERMATOLOGY

## 2023-09-08 RX ORDER — TRETINOIN 0.25 MG/G
CREAM TOPICAL
Qty: 45 G | Refills: 11 | Status: SHIPPED | OUTPATIENT
Start: 2023-09-08 | End: 2024-07-31

## 2023-09-08 RX ORDER — HYDROXYCHLOROQUINE SULFATE 200 MG/1
400 TABLET, FILM COATED ORAL DAILY
COMMUNITY
Start: 2023-07-07 | End: 2024-07-08

## 2023-09-08 ASSESSMENT — PAIN SCALES - GENERAL: PAINLEVEL: NO PAIN (0)

## 2023-09-08 NOTE — LETTER
9/8/2023       RE: Denia Montes  [address]  [facility] [address]     Dear Colleague,    Thank you for referring your patient, Denia Montes, to the [facility] [place] [facility]. Please see a copy of my visit note below.    [facility] Dermatology Note  Encounter Date: Sep 8, 2023  Office Visit     Dermatology Problem List:  # Suspect benign nevus, left nare, photo 9/9/23  # Dermatofibroma, L calf  # Acne vulgaris, mild    Soc hx: grew up in [place]. Moved to [place] for school many years ago.  ____________________________________________    Assessment & Plan:     # Benign nevus, left nare  Ddx fibrous papule. No concerning features under dermoscopy.  - Photodocumentation and measurements today  - Reassurance provided and benign nature of condition discussed  - Monitor for changes and notify us if these occur prior to visit    # Dermatofibroma, left calf  # Benign lesions (cherry angiomas, lentigines)  - Benign, no treatment required    # Multiple benign nevi.   - Monitor for ABCDEs of melanoma   - Continue sun protection - recommend SPF 30 or higher with frequent application   - Return sooner if noticing changing or symptomatic lesions    # Acne vulgaris, mild  - start tretinoin 0.025% cream at bedtime: apply pea-sized amount to the entire face then follow with moisturizer; start using a few nights per week then gradually increase frequency to goal of nightly use as tolerated  - topical retinoid side effects including dryness/redness/irritation/possible increase in outbreaks in the beginning of therapy reviewed  - hand out provided     Procedures Performed:   None    Follow-up: 6 month(s) in-person, or earlier for new or changing lesions    Staff and Resident:     Attending: Dr. Molina staffed the patient.    Resident: Herve Agrawal MD (PGY-4)    Staff Physician Comments:   I saw and evaluated the patient with  the resident and I agree with the assessment and plan.  I was present for the examination.    Tori Molina MD    Department of Dermatology  [facility]: Phone: [phone], Fax: [phone]  9/9/2023       ____________________________________________    CC: Derm Problem (The patient reports lesions of concern on their face. The patient also reports mole of concern on their lower leg. The patient would like a full body skin check. )    HPI:  Ms. Denia Montes is a(n) 37 year old female who presents today as a new patient for a skin check and several lesions of concern.  - Reports red spot on left nare that has been present for a few years. Asymptomatic but thinks it might be slowly growing  - Reports a raised mole on the left calf, also present for a few years. Asymptomatic. No recent changes  - Patient overall just wants to be careful and vigilant since she's had quite a bit of sun exposure living in [place].  - Lastly reports some hormonal acne that generally isn't too bad. Uses some over the counter products but no prescriptions    Labs Reviewed:  N/A    Physical Exam:  Vitals: There were no vitals taken for this visit.  SKIN: Total skin excluding the undergarment areas was performed. The exam included the head/face, neck, both arms, chest, back, abdomen, both legs, digits and/or nails.   - L nare with pink fleshy ~3-4 mm papule with brown pseudonetwork. There are several smaller similar appearing papules nearby  - There are a few acneiform papules and pustules on the face.   - There are dome shaped bright red papules on the trunk.   - There is a firm hyperpigmented papule that dimples with lateral pressure on the left calf.  - Few scattered regular brown pigmented macules and papules are identified on the trunk and extremities.   - No other lesions of concern on areas examined.     Medications:  Current  Outpatient Medications   Medication    ACE/ARB NOT PRESCRIBED, INTENTIONAL,    Continuous Blood Gluc  (DEXCOM G6 ) RAEANN    Continuous Blood Gluc Sensor (DEXCOM G6 SENSOR) MISC    Continuous Blood Gluc Transmit (DEXCOM G6 TRANSMITTER) MISC    fluticasone (FLONASE) 50 MCG/ACT nasal spray    Glucagon, rDNA, (GLUCAGON EMERGENCY) 1 MG KIT    hydroxychloroquine (PLAQUENIL) 200 MG tablet    insulin aspart (NOVOLOG PEN) 100 UNIT/ML pen    Insulin Aspart FlexPen 100 UNIT/ML SOPN    Insulin Glargine-yfgn 100 UNIT/ML SOPN    insulin pen needle (BD RUSSELL U/F) 32G X 4 MM miscellaneous    loratadine (CLARITIN) 10 MG tablet    metFORMIN (GLUCOPHAGE) 500 MG tablet    Omega-3 Fatty Acids (FISH OIL) 500 MG CAPS    glucagon 1 MG kit     No current facility-administered medications for this visit.      Past Medical History:   Patient Active Problem List   Diagnosis    History of kidney stones    Hyperlipidemia LDL goal <70    Cervical high risk HPV (human papillomavirus) test positive    Plantar warts    Diabetes mellitus type 1.5 (H)    Hashimoto's thyroiditis    Plantar fasciitis    Type 1 diabetes mellitus with hyperglycemia (H)     Past Medical History:   Diagnosis Date    Cervical high risk HPV (human papillomavirus) test positive 08/2015 8/2015, 09/23/16, 12/14/18, 12/16/19, 12/29/20    Hashimoto's thyroiditis     Hx of colposcopy with cervical biopsy 10/21/2016    [place] Bx: Neg NIL, ECC: Neg.    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

## 2023-09-08 NOTE — PATIENT INSTRUCTIONS
"Topical retinoids (tretinoin, adapalene, or tazarotene)  - Retinoids unplug the pores, so they will help with the acne you have and also to prevent new pimples from forming.   - Topical retinoids are very, very good for acne. However, they do take 6-8 weeks to work. Give them their chance and keep at it. Do not stop if you don't see any results right away.   - After washing your face at nighttime, apply a small pea-sized thinly and evenly across the face (similar amount for other areas). Do not just put it on the pimples; put it all over, and leave it on (do not rinse off). Do not apply during the day as this medication is inactivated by the sunlight and also make you more sensitive to the sun.   - This medication can cause redness and irritation, however, this will get better as you continue using the product. Start by using this medication 1-3 times per night, and then gradually build up to every other night and then every night as you tolerate. You can also use more moisturizer (either apply on first, or mix in with the medicated cream). Make sure the moisturizer does not contain any benzoyl peroxide or other \"active\" ingredients. Cerave, cetaphil, and vanicream are great options.   - Retinoids make your skin more sensitive, so if you have another skin treatment (like a facial or eyebrow or other waxing), be sure to let the salon person know.   - There are many different types and strengths of retinoids lotions, so if you are having trouble tolerating yours, paying for yours, etc, please let your doctor know.   - Do not use if you are pregnant or breastfeeding.     "

## 2023-09-08 NOTE — PROGRESS NOTES
[facility] Dermatology Note  Encounter Date: Sep 8, 2023  Office Visit     Dermatology Problem List:  # Suspect benign nevus, left nare, photo 9/9/23  # Dermatofibroma, L calf  # Acne vulgaris, mild    Soc hx: grew up in [place]. Moved to [place] for school many years ago.  ____________________________________________    Assessment & Plan:     # Benign nevus, left nare  Ddx fibrous papule. No concerning features under dermoscopy.  - Photodocumentation and measurements today  - Reassurance provided and benign nature of condition discussed  - Monitor for changes and notify us if these occur prior to visit    # Dermatofibroma, left calf  # Benign lesions (cherry angiomas, lentigines)  - Benign, no treatment required    # Multiple benign nevi.   - Monitor for ABCDEs of melanoma   - Continue sun protection - recommend SPF 30 or higher with frequent application   - Return sooner if noticing changing or symptomatic lesions    # Acne vulgaris, mild  - start tretinoin 0.025% cream at bedtime: apply pea-sized amount to the entire face then follow with moisturizer; start using a few nights per week then gradually increase frequency to goal of nightly use as tolerated  - topical retinoid side effects including dryness/redness/irritation/possible increase in outbreaks in the beginning of therapy reviewed  - hand out provided     Procedures Performed:   None    Follow-up: 6 month(s) in-person, or earlier for new or changing lesions    Staff and Resident:     Attending: Dr. Molina staffed the patient.    Resident: Herve Agrawal MD (PGY-4)    Staff Physician Comments:   I saw and evaluated the patient with the resident and I agree with the assessment and plan.  I was present for the examination.    Tori Molina MD    Department of Dermatology  [facility]: Phone: [phone], Fax: [phone]  9/9/2023        ____________________________________________    CC: Derm Problem (The patient reports lesions of concern on their face. The patient also reports mole of concern on their lower leg. The patient would like a full body skin check. )    HPI:  Ms. Denia Montes is a(n) 37 year old female who presents today as a new patient for a skin check and several lesions of concern.  - Reports red spot on left nare that has been present for a few years. Asymptomatic but thinks it might be slowly growing  - Reports a raised mole on the left calf, also present for a few years. Asymptomatic. No recent changes  - Patient overall just wants to be careful and vigilant since she's had quite a bit of sun exposure living in [place].  - Lastly reports some hormonal acne that generally isn't too bad. Uses some over the counter products but no prescriptions    Labs Reviewed:  N/A    Physical Exam:  Vitals: There were no vitals taken for this visit.  SKIN: Total skin excluding the undergarment areas was performed. The exam included the head/face, neck, both arms, chest, back, abdomen, both legs, digits and/or nails.   - L nare with pink fleshy ~3-4 mm papule with brown pseudonetwork. There are several smaller similar appearing papules nearby  - There are a few acneiform papules and pustules on the face.   - There are dome shaped bright red papules on the trunk.   - There is a firm hyperpigmented papule that dimples with lateral pressure on the left calf.  - Few scattered regular brown pigmented macules and papules are identified on the trunk and extremities.   - No other lesions of concern on areas examined.     Medications:  Current Outpatient Medications   Medication    ACE/ARB NOT PRESCRIBED, INTENTIONAL,    Continuous Blood Gluc  (DEXCOM G6 ) RAEANN    Continuous Blood Gluc Sensor (DEXCOM G6 SENSOR) MISC    Continuous Blood Gluc Transmit (DEXCOM G6 TRANSMITTER) MISC    fluticasone (FLONASE) 50 MCG/ACT nasal spray     Glucagon, rDNA, (GLUCAGON EMERGENCY) 1 MG KIT    hydroxychloroquine (PLAQUENIL) 200 MG tablet    insulin aspart (NOVOLOG PEN) 100 UNIT/ML pen    Insulin Aspart FlexPen 100 UNIT/ML SOPN    Insulin Glargine-yfgn 100 UNIT/ML SOPN    insulin pen needle (BD RUSSELL U/F) 32G X 4 MM miscellaneous    loratadine (CLARITIN) 10 MG tablet    metFORMIN (GLUCOPHAGE) 500 MG tablet    Omega-3 Fatty Acids (FISH OIL) 500 MG CAPS    glucagon 1 MG kit     No current facility-administered medications for this visit.      Past Medical History:   Patient Active Problem List   Diagnosis    History of kidney stones    Hyperlipidemia LDL goal <70    Cervical high risk HPV (human papillomavirus) test positive    Plantar warts    Diabetes mellitus type 1.5 (H)    Hashimoto's thyroiditis    Plantar fasciitis    Type 1 diabetes mellitus with hyperglycemia (H)     Past Medical History:   Diagnosis Date    Cervical high risk HPV (human papillomavirus) test positive 08/2015 8/2015, 09/23/16, 12/14/18, 12/16/19, 12/29/20    Hashimoto's thyroiditis     Hx of colposcopy with cervical biopsy 10/21/2016    [place] Bx: Neg NIL, ECC: Neg.    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

## 2023-09-08 NOTE — NURSING NOTE
Dermatology Rooming Note    Denia Montes's goals for this visit include:   Chief Complaint   Patient presents with    Derm Problem     The patient reports lesions of concern on their face. The patient also reports mole of concern on their lower leg. The patient would like a full body skin check.      Luciana Ivan LPN

## 2023-10-03 ENCOUNTER — LAB (OUTPATIENT)
Dept: LAB | Facility: CLINIC | Age: 38
End: 2023-10-03
Payer: COMMERCIAL

## 2023-10-03 DIAGNOSIS — M79.645 PAIN IN FINGER OF BOTH HANDS: ICD-10-CM

## 2023-10-03 DIAGNOSIS — E06.3 HASHIMOTO'S THYROIDITIS: ICD-10-CM

## 2023-10-03 DIAGNOSIS — M79.644 PAIN IN FINGER OF BOTH HANDS: ICD-10-CM

## 2023-10-03 DIAGNOSIS — E10.9 TYPE 1 DIABETES MELLITUS WITHOUT COMPLICATION (H): ICD-10-CM

## 2023-10-03 LAB
ERYTHROCYTE [DISTWIDTH] IN BLOOD BY AUTOMATED COUNT: 13.2 % (ref 10–15)
HBA1C MFR BLD: 6.6 % (ref 0–5.6)
HCT VFR BLD AUTO: 38.8 % (ref 35–47)
HGB BLD-MCNC: 12.6 G/DL (ref 11.7–15.7)
HOLD SPECIMEN: NORMAL
MCH RBC QN AUTO: 26.2 PG (ref 26.5–33)
MCHC RBC AUTO-ENTMCNC: 32.5 G/DL (ref 31.5–36.5)
MCV RBC AUTO: 81 FL (ref 78–100)
PLATELET # BLD AUTO: 438 10E3/UL (ref 150–450)
RBC # BLD AUTO: 4.81 10E6/UL (ref 3.8–5.2)
WBC # BLD AUTO: 9.8 10E3/UL (ref 4–11)

## 2023-10-03 PROCEDURE — 85027 COMPLETE CBC AUTOMATED: CPT

## 2023-10-03 PROCEDURE — 36415 COLL VENOUS BLD VENIPUNCTURE: CPT

## 2023-10-03 PROCEDURE — 83036 HEMOGLOBIN GLYCOSYLATED A1C: CPT

## 2023-10-03 PROCEDURE — 82728 ASSAY OF FERRITIN: CPT

## 2023-10-03 PROCEDURE — 83550 IRON BINDING TEST: CPT

## 2023-10-03 PROCEDURE — 84443 ASSAY THYROID STIM HORMONE: CPT

## 2023-10-03 PROCEDURE — 83540 ASSAY OF IRON: CPT

## 2023-10-04 LAB
FERRITIN SERPL-MCNC: 16 NG/ML (ref 6–175)
IRON BINDING CAPACITY (ROCHE): 332 UG/DL (ref 240–430)
IRON SATN MFR SERPL: 18 % (ref 15–46)
IRON SERPL-MCNC: 60 UG/DL (ref 37–145)
TSH SERPL DL<=0.005 MIU/L-ACNC: 1.41 UIU/ML (ref 0.3–4.2)

## 2023-10-05 ENCOUNTER — TRANSFERRED RECORDS (OUTPATIENT)
Dept: HEALTH INFORMATION MANAGEMENT | Facility: CLINIC | Age: 38
End: 2023-10-05
Payer: COMMERCIAL

## 2023-10-05 LAB
ALT SERPL-CCNC: 11 U/L (ref 6–29)
AST SERPL-CCNC: 11 U/L (ref 10–30)
CREATININE (EXTERNAL): 0.77 MG/DL (ref 0.5–0.97)
GFR ESTIMATED (EXTERNAL): 101 ML/MIN/1.73M2

## 2023-10-06 DIAGNOSIS — E13.9 LATENT AUTOIMMUNE DIABETES MELLITUS IN ADULTS (LADA) (H): ICD-10-CM

## 2023-10-09 RX ORDER — PROCHLORPERAZINE 25 MG/1
SUPPOSITORY RECTAL
Qty: 1 EACH | Refills: 0 | Status: SHIPPED | OUTPATIENT
Start: 2023-10-09 | End: 2024-01-08

## 2023-10-09 RX ORDER — PROCHLORPERAZINE 25 MG/1
SUPPOSITORY RECTAL
Qty: 9 EACH | Refills: 0 | Status: SHIPPED | OUTPATIENT
Start: 2023-10-09 | End: 2024-01-08

## 2023-10-09 NOTE — TELEPHONE ENCOUNTER
Continuous Blood Gluc Sensor (DEXCOM G6 SENSOR) MISC 10 each 3 10/12/2022     Continuous Blood Gluc Transmit (DEXCOM G6 TRANSMITTER) MISC 1 each 3 10/12/2022       Last Office Visit: 4/26/23  Future Office visit:   1/10/24      Refill protocol passed  Elizabeth Shirley RN

## 2023-10-25 ENCOUNTER — TELEPHONE (OUTPATIENT)
Dept: OPHTHALMOLOGY | Facility: CLINIC | Age: 38
End: 2023-10-25
Payer: COMMERCIAL

## 2023-10-25 NOTE — TELEPHONE ENCOUNTER
Spoke with patient regarding rescheduling for sooner appointment from the wait-list. Patient declined scheduling as offered.-Per Patient

## 2023-11-10 ENCOUNTER — ALLIED HEALTH/NURSE VISIT (OUTPATIENT)
Dept: EDUCATION SERVICES | Facility: CLINIC | Age: 38
End: 2023-11-10
Attending: INTERNAL MEDICINE
Payer: COMMERCIAL

## 2023-11-10 DIAGNOSIS — E10.9 TYPE 1 DIABETES MELLITUS WITHOUT COMPLICATION (H): ICD-10-CM

## 2023-11-10 PROCEDURE — G0108 DIAB MANAGE TRN  PER INDIV: HCPCS | Performed by: DIETITIAN, REGISTERED

## 2023-11-10 NOTE — PATIENT INSTRUCTIONS
Sreekanth Loza,    It was nice speaking with you today.     We briefly talked about insulin pumps:  - Tandem t:slim  - Omnipod 5 (no tubing)    You can also consider the InPen which is a smart insulin pen.    Try not to correct more often than every 4 hours. Add the correctional insulin to your meal dose before eating.    For bedtime, use correction scale of 1 unit for every 50 over 200.    Reach out if you have any questions/concerns.    Blanca ALEXANDRE  Diabetes Education  [facility] [address] [address]  Phone: [phone]  Email: [email or web address]

## 2023-11-10 NOTE — PROGRESS NOTES
Diabetes Self-Management Education & Support:  Pre Pump Education    SUBJECTIVE:  Denia Montes presents today for education related to planning for an insulin pump related to MAGDI/treated as Type 1 diabetes     Patient is being treated with:  metformin, insulin, diet exercise  She is accompanied by self      Referring provider:  Yuri Hansen MD  Living Situation: with toddler, family  Employment: works from home    SUBJECTIVE/OBJECTIVE:  Diabetes education in the past 24mo: No  Diabetes type: Type 1  Disease course: Getting harder to manage  How confident are you filling out medical forms by yourself:: Extremely  Cultural Influences/Ethnic Background:   or       Diabetes Symptoms & Complications:  Fatigue: No  Neuropathy: No  Polydipsia: No  Polyphagia: No  Polyuria: No  Visual change: No  Slow healing wounds: No  Autonomic neuropathy: No  CVA: No  Heart disease: No  Nephropathy: No  Peripheral neuropathy: No  Peripheral Vascular Disease: No  Retinopathy: No  Sexual dysfunction: No    Healthy Eating:  Cultural/Mandaen diet restrictions?: Yes  Meal planning/habits: Low carb, Heart healthy, Frequent snacking  How many times a week on average do you eat food made away from home (restaurant/take-out)?: 2  Meals include: Breakfast, Lunch, Dinner, Morning Snack, Afternoon Snack, Evening Snack  Beverages: Water, Tea, Coffee    Being Active:  Barrier to exercise: Time    Monitoring:  Blood Glucose Meter: ContourNext, CGM  Times checking blood sugar at home (number): Other  Blood glucose trend: Increasing        Taking Medications:    Current Treatments: Insulin Injections, Oral Medication (taken by mouth)    CURRENT INSULIN REGIMEN:  Long Acting Insulin:  28 - 33 units of lantus  Rapid Acting Insulin: Novolog  20- 30 units , 3-4 at breakfast, snack 10 units for lunch, dinner varies  Total Daily Dose: ~ 50 units    Reducing Risks:  CAD Risks: Diabetes Mellitus, Family history  Has dilated eye  exam at least once a year?: Yes  Sees dentist every 6 months?: Yes  Feet checked by healthcare provider in the last year?: Yes    Healthy Coping:  Informal Support system:: Family, Friends, Partner        RAPID-ACTING INSULIN CALCULATIONS:  Does patient currently count carbs? yes, counts in grams  Is instruction indicated? NO  How is insulin determined for correction: uses correction factor or follows sliding scale    PROBLEM SOLVING:    Frequency and treatment of hypoglycemia:  a few times a week  Hospitalizations for hyper or hypoglycemia: No  What is considered a high blood sugar? Over 180, 200-210    How is high BG treated: insulin  Does Patient test for ketones? previously  At what level of blood glucose are ketones tested? over 250  If ketones are present, what action is taken? Insulin, hydration, go to ER if vomiting    Physical Activity:    Work from home, has toddler  Does Walking  Used to bike to work but now is working from home    What accomodations are made for exercising:      Diabetes knowledge and skills assessment:   Patient is knowledgeable in diabetes management concepts related to: Healthy Eating, Being Active, Monitoring, Taking Medication, Problem Solving, Reducing Risks, and Healthy Coping    Patient needs further education on the following diabetes management concepts: Insulin Pump Concepts Balancing glucose and insulin  Problem solving with insulin pump therapy (BG monitoring; hypoglycemia signs/symptoms, treatment (glucagon) and prevention; hyperglycemia signs/symptoms, treatment and prevention; ketones, DKA signs/symptoms and prevention)  Hands on practice with basic pump button use    Based on learning assessment above, most appropriate setting for further diabetes education would be: Individual setting.    EDUCATION PROVIDED TODAY  Explained the way an insulin pump works, in terms that described the function of a basal rate and a bolus calculator.    Demonstrated the operation of the  main pumps on the market right now: Omnipod 5, Tandem t:slim X2 with Control-IQ, Beta Oxford Immunotecs iLet, and Medtronic MiniMed 780G.    Explained the unique features of each pump.      Explained that having a pump does not take the place of checking their blood glucoses, counting carbs, or remembering to push the button to deliver the bolus.   Described how the insulin delivers insulin through a cannula inserted under the skin and attached to the pump itself, or in the case of Omnipod, controlled wirelessly via blue tooth pairing with the Personal Diabetes Manager (PDM) / Controller.     Reviewed some of the increased risks associated with using a pump, i.e. DKA, especially if not checking BG at least 3-4 times daily or using a continuous glucose monitor (CGM).    Explained in general terms the costs associated with using an insulin pump, including the disposables and insulin.    Denia is not interested in transitioning to insulin pump therapy at this time. She is managing well on multiple daily injections (MDI). Her c-peptide was 0.8 on 3/10/223 so now has type 1 diabetes. CGM report reviewed. Time in Range is 89%, 2% lows. Discussed CGM report, she is correcting after meals which is leading to some lows prior to next meal. Recommended that she add correctional insulin to meal dose prior to eating. Recommended not correcting more often than every 4 hours to avoid insulin stacking. Recommended starting correction scale at 200 at bedtime to avoid hypoglycemia.  She will consider pump therapy in the future if making adjustments to current regimen do not help.     PLAN:  Adjust Lantus dose based on hormonal changes  Use correction scale at meal-times. Do not correct more than every 4 hours to avoid insulin stacking.   Use correction scale of 1 unit for every 50 over 200 at bedtime to avoid overnight hypoglycemia  Follow up with Diabetes Education as needed      Follow-up:   As needed    Time Spent: 30 minutes  Encounter  Type: Individual     Any diabetes medication dose changes were made via the CDE Protocol Collaborative Practice Agreement with referring provider.  A copy of this encounter was provided to patient's referring provider.

## 2024-01-02 NOTE — PROGRESS NOTES
Outcome for 01/02/24 3:13 PM: [employer] message sent  Apolonia Jurado LPN   Outcome for 01/08/24 10:45 AM: Per patient, will upload device before appointment  Apolonia Jurado LPN   Outcome for 01/08/24 1:56 PM: Data obtained via Dexcom website  Apolonia Jurado LPN

## 2024-01-03 DIAGNOSIS — E13.9 LATENT AUTOIMMUNE DIABETES MELLITUS IN ADULTS (LADA) (H): ICD-10-CM

## 2024-01-08 RX ORDER — PROCHLORPERAZINE 25 MG/1
SUPPOSITORY RECTAL
Qty: 3 EACH | Refills: 1 | Status: SHIPPED | OUTPATIENT
Start: 2024-01-08 | End: 2024-01-11

## 2024-01-08 RX ORDER — PROCHLORPERAZINE 25 MG/1
SUPPOSITORY RECTAL
Qty: 1 EACH | Refills: 0 | Status: SHIPPED | OUTPATIENT
Start: 2024-01-08 | End: 2024-01-25

## 2024-01-10 ENCOUNTER — VIRTUAL VISIT (OUTPATIENT)
Dept: ENDOCRINOLOGY | Facility: CLINIC | Age: 39
End: 2024-01-10
Payer: COMMERCIAL

## 2024-01-10 DIAGNOSIS — E10.9 TYPE 1 DIABETES MELLITUS WITHOUT COMPLICATION (H): Primary | ICD-10-CM

## 2024-01-10 PROBLEM — E10.65 TYPE 1 DIABETES MELLITUS WITH HYPERGLYCEMIA (H): Status: RESOLVED | Noted: 2023-03-10 | Resolved: 2024-01-10

## 2024-01-10 PROCEDURE — 99214 OFFICE O/P EST MOD 30 MIN: CPT | Mod: 95 | Performed by: INTERNAL MEDICINE

## 2024-01-10 NOTE — LETTER
1/10/2024       RE: Denia Montes  [address]  [facility] [address]     Dear Colleague,    Thank you for referring your patient, Denia Montes, to the [facility] ENDOCRINOLOGY CLINIC [place] at [facility]. Please see a copy of my visit note below.    Outcome for 01/02/24 3:13 PM: [employer] message sent  Apolonia Jurado LPN   Outcome for 01/08/24 10:45 AM: Per patient, will upload device before appointment  Apolonia Jurado LPN   Outcome for 01/08/24 1:56 PM: Data obtained via Dexcom website  Apolonia Jurado LPN                 Endocrinology virtual Visit    Chief Complaint: RECHECK (Follow up )     Information obtained from:Patient      Assessment/Treatment Plan:      Type 1 diabetes     CGM readings reviewed. Within target range 84% of the time and no significant lows.     Plan    Lantus 30-33 units daily    Metformin 2000 mg daily  CHO 1:15 for breakfast and dinner and lunch 1:8   Correction 1 for every 50 above 140 with meals and above 200 at bedtime.       Hashimoto thyroiditis: TSH stable off of treatment. Check TSH q 1-2 times per year and as needed.       Yuri Hansen MD  Staff Endocrinologist    Division of Endocrinology and Diabetes      Subjective:         HPI: Denia Montes is a 38 year old female with history of MAGDI/treated as Type 1 diabetes currently - (H) Positive ANNA antibody/C-peptide WNL here for a follow up.      Currently on:   Lantus 30-33 units daily    Metformin 2000 mg daily  CHO 1:15 for breakfast and dinner and lunch 1:8   Correction 1 for every 50 above 140 with meals and above 200 at bedtime.                  Allergies   Allergen Reactions    Sulfa Antibiotics Hives       Current Outpatient Medications   Medication Sig Dispense Refill    ACE/ARB NOT PRESCRIBED, INTENTIONAL, Please choose reason not prescribed, below      Continuous Blood Gluc  (DEXCOM G6 ) RAEANN Use to read blood sugars as per  's instructions. 1 each 0    Continuous Blood Gluc Sensor (DEXCOM G6 SENSOR) MISC CHANGE EVERY 10 DAYS. 3 each 1    Continuous Blood Gluc Transmit (DEXCOM G6 TRANSMITTER) MISC CHANGE EVERY 3 MONTHS. 1 each 0    fluticasone (FLONASE) 50 MCG/ACT nasal spray Spray 1 spray into both nostrils daily 9.9 mL 0    glucagon 1 MG kit Inject 1 mg into the muscle once for 1 dose Use in the event of severe hypoglycemic episode. 1 each 11    Glucagon, rDNA, (GLUCAGON EMERGENCY) 1 MG KIT Inject 1 mg into the muscle daily as needed (Severe Hypoglycemia) 1 kit 3    hydroxychloroquine (PLAQUENIL) 200 MG tablet Take 400 mg by mouth daily      insulin aspart (NOVOLOG PEN) 100 UNIT/ML pen Inject subcu 1 unit for every 15 grams of carbohydrates with breakfast and dinner and 1:10 for lunch. Correction scale 1 unit for every 50 above 140 three times per day with meals and at bedtime. Up to 30 units daily. Generic OK 45 mL 3    Insulin Aspart FlexPen 100 UNIT/ML SOPN Inject 30 Units Subcutaneous See Admin Instructions 1 unit for every 10 grams of carbohydrates and correction scale as directed. Uses up to 30 units daily. 30 mL 3    Insulin Glargine-yfgn 100 UNIT/ML SOPN Inject 33 Units Subcutaneous daily 45 mL 3    insulin pen needle (BD RUSSELL U/F) 32G X 4 MM miscellaneous USE 8 PEN NEEDLES DAILY 800 each 3    loratadine (CLARITIN) 10 MG tablet Take 1 tablet (10 mg) by mouth daily 30 tablet 0    metFORMIN (GLUCOPHAGE) 500 MG tablet Take 2 tablets (1,000 mg) by mouth 2 times daily (with meals) 360 tablet 3    Omega-3 Fatty Acids (FISH OIL) 500 MG CAPS       tretinoin (RETIN-A) 0.025 % external cream Start tretinoin 0.025% cream at bedtime: apply pea-sized amount to the entire face then follow with moisturizer; start using a few nights per week then gradually increase to goal of nightly use as tolerated 45 g 11       Review of Systems   as per HPI above      Objective:     GENERAL: Healthy, alert and no distress  EYES: Eyes grossly  normal to inspection.    RESP: No audible wheeze, cough, or visible cyanosis.    NEURO: Alert and oriented. mentation and speech and mood stable.    In House Labs:   Hemoglobin A1C   Date Value Ref Range Status   10/03/2023 6.6 (H) 0.0 - 5.6 % Final     Comment:     Normal <5.7%   Prediabetes 5.7-6.4%    Diabetes 6.5% or higher     Note: Adopted from ADA consensus guidelines.   04/24/2023 6.1 (H) 0.0 - 5.6 % Final     Comment:     Normal <5.7%   Prediabetes 5.7-6.4%    Diabetes 6.5% or higher     Note: Adopted from ADA consensus guidelines.   10/06/2022 6.2 (H) 0.0 - 5.6 % Final     Comment:     Normal <5.7%   Prediabetes 5.7-6.4%    Diabetes 6.5% or higher     Note: Adopted from ADA consensus guidelines.   12/21/2020 5.5 0 - 5.6 % Final     Comment:     Normal <5.7% Prediabetes 5.7-6.4%  Diabetes 6.5% or higher - adopted from ADA   consensus guidelines.     11/17/2020 5.3 0 - 5.6 % Final     Comment:     Normal <5.7% Prediabetes 5.7-6.4%  Diabetes 6.5% or higher - adopted from ADA   consensus guidelines.     06/29/2020 6.1 (H) 0 - 5.6 % Final     Comment:     Normal <5.7% Prediabetes 5.7-6.4%  Diabetes 6.5% or higher - adopted from ADA   consensus guidelines.       Hemoglobin A1C POCT   Date Value Ref Range Status   03/11/2020 6.9 (A) 4.3 - 6 % Final   09/11/2019 6.1 (A) 4.3 - 6 % Final   05/29/2019 6.1 (A) 4.3 - 6 % Final       TSH   Date Value Ref Range Status   10/03/2023 1.41 0.30 - 4.20 uIU/mL Final   03/10/2023 2.04 0.30 - 4.20 uIU/mL Final   10/06/2022 2.56 0.30 - 4.20 uIU/mL Final   01/14/2022 1.87 0.40 - 4.00 mU/L Final   09/24/2021 1.14 0.40 - 4.00 mU/L Final   03/22/2021 1.27 0.40 - 4.00 mU/L Final   01/15/2021 2.19 0.40 - 4.00 mU/L Final   12/21/2020 1.62 0.40 - 4.00 mU/L Final   12/21/2020 1.62 0.40 - 4.00 mU/L Final   11/17/2020 1.86 0.40 - 4.00 mU/L Final       Creatinine   Date Value Ref Range Status   04/12/2023 0.73 0.51 - 0.95 mg/dL Final   01/15/2021 0.56 0.52 - 1.04 mg/dL Final        Video-Visit Details    Type of service:  Video Visit  Joined the call at 1/10/2024, 1:38:16 pm.  Left the call at 1/10/2024, 1:44:41 pm.  You were on the call for 6 minutes 24 seconds .Distant Location (provider location):  Off-site.     Platform used for Video Visit: [employer]

## 2024-01-10 NOTE — NURSING NOTE
Is the patient currently in the state of [place]? YES    Visit mode:VIDEO    If the visit is dropped, the patient can be reconnected by: VIDEO VISIT: Text to cell phone:   Telephone Information:   Mobile [phone]       Will anyone else be joining the visit? NO  (If patient encounters technical issues they should call [phone]    How would you like to obtain your AVS? MyChart    Are changes needed to the allergy or medication list? No patient reported making no changes to her e-check in since it was completed for visit. VF did not review this information again with patient due to this.    Reason for visit: RECHECK (Follow up )    Penny DIA

## 2024-01-10 NOTE — PATIENT INSTRUCTIONS
Orders Placed This Encounter   Procedures    Basic metabolic panel    TSH with free T4 reflex    Albumin Random Urine Quantitative with Creat Ratio    Lipid panel reflex to direct LDL Fasting

## 2024-01-10 NOTE — PROGRESS NOTES
Endocrinology virtual Visit    Chief Complaint: RECHECK (Follow up )     Information obtained from:Patient      Assessment/Treatment Plan:      Type 1 diabetes     CGM readings reviewed. Within target range 84% of the time and no significant lows.     Plan    Lantus 30-33 units daily    Metformin 2000 mg daily  CHO 1:15 for breakfast and dinner and lunch 1:8   Correction 1 for every 50 above 140 with meals and above 200 at bedtime.       Hashimoto thyroiditis: TSH stable off of treatment. Check TSH q 1-2 times per year and as needed.       Yuri Hansen MD  Staff Endocrinologist    Division of Endocrinology and Diabetes      Subjective:         HPI: Denia Montes is a 38 year old female with history of MAGDI/treated as Type 1 diabetes currently - (H) Positive ANNA antibody/C-peptide WNL here for a follow up.      Currently on:   Lantus 30-33 units daily    Metformin 2000 mg daily  CHO 1:15 for breakfast and dinner and lunch 1:8   Correction 1 for every 50 above 140 with meals and above 200 at bedtime.                  Allergies   Allergen Reactions    Sulfa Antibiotics Hives       Current Outpatient Medications   Medication Sig Dispense Refill    ACE/ARB NOT PRESCRIBED, INTENTIONAL, Please choose reason not prescribed, below      Continuous Blood Gluc  (DEXCOM G6 ) RAEANN Use to read blood sugars as per 's instructions. 1 each 0    Continuous Blood Gluc Sensor (DEXCOM G6 SENSOR) MISC CHANGE EVERY 10 DAYS. 3 each 1    Continuous Blood Gluc Transmit (DEXCOM G6 TRANSMITTER) MISC CHANGE EVERY 3 MONTHS. 1 each 0    fluticasone (FLONASE) 50 MCG/ACT nasal spray Spray 1 spray into both nostrils daily 9.9 mL 0    glucagon 1 MG kit Inject 1 mg into the muscle once for 1 dose Use in the event of severe hypoglycemic episode. 1 each 11    Glucagon, rDNA, (GLUCAGON EMERGENCY) 1 MG KIT Inject 1 mg into the muscle daily as needed (Severe Hypoglycemia) 1 kit 3    hydroxychloroquine (PLAQUENIL) 200  MG tablet Take 400 mg by mouth daily      insulin aspart (NOVOLOG PEN) 100 UNIT/ML pen Inject subcu 1 unit for every 15 grams of carbohydrates with breakfast and dinner and 1:10 for lunch. Correction scale 1 unit for every 50 above 140 three times per day with meals and at bedtime. Up to 30 units daily. Generic OK 45 mL 3    Insulin Aspart FlexPen 100 UNIT/ML SOPN Inject 30 Units Subcutaneous See Admin Instructions 1 unit for every 10 grams of carbohydrates and correction scale as directed. Uses up to 30 units daily. 30 mL 3    Insulin Glargine-yfgn 100 UNIT/ML SOPN Inject 33 Units Subcutaneous daily 45 mL 3    insulin pen needle (BD RUSSELL U/F) 32G X 4 MM miscellaneous USE 8 PEN NEEDLES DAILY 800 each 3    loratadine (CLARITIN) 10 MG tablet Take 1 tablet (10 mg) by mouth daily 30 tablet 0    metFORMIN (GLUCOPHAGE) 500 MG tablet Take 2 tablets (1,000 mg) by mouth 2 times daily (with meals) 360 tablet 3    Omega-3 Fatty Acids (FISH OIL) 500 MG CAPS       tretinoin (RETIN-A) 0.025 % external cream Start tretinoin 0.025% cream at bedtime: apply pea-sized amount to the entire face then follow with moisturizer; start using a few nights per week then gradually increase to goal of nightly use as tolerated 45 g 11       Review of Systems   as per HPI above      Objective:     GENERAL: Healthy, alert and no distress  EYES: Eyes grossly normal to inspection.    RESP: No audible wheeze, cough, or visible cyanosis.    NEURO: Alert and oriented. mentation and speech and mood stable.    In House Labs:   Hemoglobin A1C   Date Value Ref Range Status   10/03/2023 6.6 (H) 0.0 - 5.6 % Final     Comment:     Normal <5.7%   Prediabetes 5.7-6.4%    Diabetes 6.5% or higher     Note: Adopted from ADA consensus guidelines.   04/24/2023 6.1 (H) 0.0 - 5.6 % Final     Comment:     Normal <5.7%   Prediabetes 5.7-6.4%    Diabetes 6.5% or higher     Note: Adopted from ADA consensus guidelines.   10/06/2022 6.2 (H) 0.0 - 5.6 % Final     Comment:      Normal <5.7%   Prediabetes 5.7-6.4%    Diabetes 6.5% or higher     Note: Adopted from ADA consensus guidelines.   12/21/2020 5.5 0 - 5.6 % Final     Comment:     Normal <5.7% Prediabetes 5.7-6.4%  Diabetes 6.5% or higher - adopted from ADA   consensus guidelines.     11/17/2020 5.3 0 - 5.6 % Final     Comment:     Normal <5.7% Prediabetes 5.7-6.4%  Diabetes 6.5% or higher - adopted from ADA   consensus guidelines.     06/29/2020 6.1 (H) 0 - 5.6 % Final     Comment:     Normal <5.7% Prediabetes 5.7-6.4%  Diabetes 6.5% or higher - adopted from ADA   consensus guidelines.       Hemoglobin A1C POCT   Date Value Ref Range Status   03/11/2020 6.9 (A) 4.3 - 6 % Final   09/11/2019 6.1 (A) 4.3 - 6 % Final   05/29/2019 6.1 (A) 4.3 - 6 % Final       TSH   Date Value Ref Range Status   10/03/2023 1.41 0.30 - 4.20 uIU/mL Final   03/10/2023 2.04 0.30 - 4.20 uIU/mL Final   10/06/2022 2.56 0.30 - 4.20 uIU/mL Final   01/14/2022 1.87 0.40 - 4.00 mU/L Final   09/24/2021 1.14 0.40 - 4.00 mU/L Final   03/22/2021 1.27 0.40 - 4.00 mU/L Final   01/15/2021 2.19 0.40 - 4.00 mU/L Final   12/21/2020 1.62 0.40 - 4.00 mU/L Final   12/21/2020 1.62 0.40 - 4.00 mU/L Final   11/17/2020 1.86 0.40 - 4.00 mU/L Final       Creatinine   Date Value Ref Range Status   04/12/2023 0.73 0.51 - 0.95 mg/dL Final   01/15/2021 0.56 0.52 - 1.04 mg/dL Final       Video-Visit Details    Type of service:  Video Visit  Joined the call at 1/10/2024, 1:38:16 pm.  Left the call at 1/10/2024, 1:44:41 pm.  You were on the call for 6 minutes 24 seconds .Distant Location (provider location):  Off-site.     Platform used for Video Visit: [employer]

## 2024-01-11 DIAGNOSIS — E13.9 LATENT AUTOIMMUNE DIABETES MELLITUS IN ADULTS (LADA) (H): ICD-10-CM

## 2024-01-11 RX ORDER — PROCHLORPERAZINE 25 MG/1
SUPPOSITORY RECTAL
Qty: 9 EACH | Refills: 3 | Status: SHIPPED | OUTPATIENT
Start: 2024-01-11

## 2024-01-11 NOTE — TELEPHONE ENCOUNTER
Pt is requesting that we get for a three month supply so we need it to dispense 9 with refills can we please get this sent over please and thank you

## 2024-01-16 ENCOUNTER — MYC MEDICAL ADVICE (OUTPATIENT)
Dept: ENDOCRINOLOGY | Facility: CLINIC | Age: 39
End: 2024-01-16
Payer: COMMERCIAL

## 2024-01-16 NOTE — TELEPHONE ENCOUNTER
Left Voicemail (1st Attempt) and Sent Mychart (1st Attempt) for the patient to call back and schedule the following:    Appointment type: Return diabetes  Provider: Jade  Return date: one year (around 1/10/2025)  Specialty phone number: [phone]  Additional appointment(s) needed: NA  Additonal Notes: NA

## 2024-01-23 NOTE — TELEPHONE ENCOUNTER
DIAGNOSIS: TRIGGER FINGER LEFT HAND/SELF/BCBS/NO IMGS/ORTHO CONS     APPOINTMENT DATE: 1.24.24   NOTES STATUS DETAILS   OFFICE NOTE from other specialist Internal 10.5.23, 7.6.23  [facility]    4.24.23, 3.10.23  Tamra DASILVA    4.12.23  Galen DASILVA   MEDICATION LIST Internal    XRAYS (IMAGES & REPORTS) Internal 3.15.23  [facility]

## 2024-01-24 ENCOUNTER — PRE VISIT (OUTPATIENT)
Dept: ORTHOPEDICS | Facility: CLINIC | Age: 39
End: 2024-01-24

## 2024-01-24 ENCOUNTER — OFFICE VISIT (OUTPATIENT)
Dept: ORTHOPEDICS | Facility: CLINIC | Age: 39
End: 2024-01-24
Payer: COMMERCIAL

## 2024-01-24 DIAGNOSIS — M65.332 TRIGGER MIDDLE FINGER OF LEFT HAND: Primary | ICD-10-CM

## 2024-01-24 PROCEDURE — 99204 OFFICE O/P NEW MOD 45 MIN: CPT | Performed by: FAMILY MEDICINE

## 2024-01-24 RX ORDER — DICLOFENAC SODIUM 75 MG/1
75 TABLET, DELAYED RELEASE ORAL 2 TIMES DAILY
Qty: 40 TABLET | Refills: 0 | Status: SHIPPED | OUTPATIENT
Start: 2024-01-24 | End: 2024-03-14

## 2024-01-24 NOTE — LETTER
2024      RE: Denia Montes  [address]  [facility] [address]     Dear Colleague,    Thank you for referring your patient, Denia Montes, to the [facility] SPORTS MEDICINE CLINIC [place]. Please see a copy of my visit note below.     Subjective:   Denia Montes is a 38 year old female who presents to clinic for left hand pain. States that she has locking in her left middle finger, first started about 9 months ago without injury. Patient reports worsening symptoms and locking now. Denies any medication she is taking for this. She uses ice and splints at night. She is right hand dominant.     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  She  has a past surgical history that includes  section (N/A, 2021).  Her family history includes Diabetes in her mother; Hyperlipidemia in her father and mother; Hypertension in her father; Kidney Disease in her father and mother; Skin Cancer in her father and maternal grandfather; Substance Abuse in her brother; Thyroid Disease in her mother.  She reports that she has never smoked. She has never been exposed to tobacco smoke. She has never used smokeless tobacco. She reports that she does not drink alcohol and does not use drugs.  ALLERGIES: She is allergic to sulfa antibiotics.    CURRENT MEDICATIONS: She has a current medication list which includes the following prescription(s): ace/arb/arni not  prescribed, dexcom g6 , dexcom g6 sensor, dexcom g6 sensor, dexcom g6 transmitter, fluticasone, glucagon emergency, hydroxychloroquine, insulin aspart, insulin aspart flexpen, insulin glargine-yfgn, bd alvaro u/f, loratadine, metformin, fish oil-omega-3 fatty acids, and tretinoin.     REVIEW OF SYSTEMS: 6 point review of systems is negative except as noted above.     Exam:   There were no vitals taken for this visit.           CONSTITUTIONAL: healthy, alert, and no distress    SKIN: no suspicious lesions or rashes    NEUROLOGIC: Non-focal  PSYCHIATRIC: affect normal/bright and mentation appears normal.    MUSCULOSKELETAL:   L wrist/hand:  FROM and strength 5/5  +mild ttp over the A1 pulley of the middle finger at the flexor crease  Nttp over the CMC joint, neg grind test  No triggering noted today on any finger.     X-[facility] B hands obtained at her [facility] and reviewed by me:  Mild oa of the CMC    Narrative & Impression   3 views bilateral hand radiographs 3/15/2023 9:44 AM     History: Pain in finger of both hands; Pain in finger of both hands      Comparison: None     Findings:     PA, oblique and lateral view(s) of the hands were obtained.      Left:     No acute osseous abnormality.  No erosion.     Mild degenerative changes of the first carpometacarpal and triscaphe  joints.       Soft tissue is unremarkable.     Right:     No acute osseous abnormality.  No erosion.     Mild degenerative changes of the first carpometacarpal and triscaphe  joints.       Soft tissue is unremarkable.                                                                      Impression:  1. No acute osseous abnormality.  2. No substantial degenerative change.     LIZ GARZON MD (Joe)          Assessment/Plan:   L middle finger trigger finger    PLAN:  Two week trial of diclofenac 75 mg bid with meals.  Side effects discussed.  Avoid other NSAIDS.  Ok to take Tylenol.     Will schedule a follow-up at the end of Feb with enough  time for a MSK US trigger finger CSI if needed.  If doing well post the diclofenac, cancel the appt.     Mindy Duarte MD, CAQ, FACSM, CCD  [facility]  Sports Medicine and Bone Health  Team Physician;  Athletics

## 2024-01-24 NOTE — PROGRESS NOTES
Subjective:   Denia Montes is a 38 year old female who presents to clinic for left hand pain. States that she has locking in her left middle finger, first started about 9 months ago without injury. Patient reports worsening symptoms and locking now. Denies any medication she is taking for this. She uses ice and splints at night. She is right hand dominant.     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  She  has a past surgical history that includes  section (N/A, 2021).  Her family history includes Diabetes in her mother; Hyperlipidemia in her father and mother; Hypertension in her father; Kidney Disease in her father and mother; Skin Cancer in her father and maternal grandfather; Substance Abuse in her brother; Thyroid Disease in her mother.  She reports that she has never smoked. She has never been exposed to tobacco smoke. She has never used smokeless tobacco. She reports that she does not drink alcohol and does not use drugs.  ALLERGIES: She is allergic to sulfa antibiotics.    CURRENT MEDICATIONS: She has a current medication list which includes the following prescription(s): ace/arb/arni not prescribed, dexcom g6 , dexcom g6 sensor, dexcom g6 sensor, dexcom g6 transmitter, fluticasone, glucagon emergency, hydroxychloroquine, insulin aspart, insulin aspart flexpen, insulin glargine-yfgn, bd alvaro u/f, loratadine, metformin, fish oil-omega-3 fatty  acids, and tretinoin.     REVIEW OF SYSTEMS: 6 point review of systems is negative except as noted above.     Exam:   There were no vitals taken for this visit.           CONSTITUTIONAL: healthy, alert, and no distress    SKIN: no suspicious lesions or rashes    NEUROLOGIC: Non-focal  PSYCHIATRIC: affect normal/bright and mentation appears normal.    MUSCULOSKELETAL:   L wrist/hand:  FROM and strength 5/5  +mild ttp over the A1 pulley of the middle finger at the flexor crease  Nttp over the CMC joint, neg grind test  No triggering noted today on any finger.     X-rays B hands obtained at her [facility] and reviewed by me:  Mild oa of the CMC    Narrative & Impression   3 views bilateral hand radiographs 3/15/2023 9:44 AM     History: Pain in finger of both hands; Pain in finger of both hands      Comparison: None     Findings:     PA, oblique and lateral view(s) of the hands were obtained.      Left:     No acute osseous abnormality.  No erosion.     Mild degenerative changes of the first carpometacarpal and triscaphe  joints.       Soft tissue is unremarkable.     Right:     No acute osseous abnormality.  No erosion.     Mild degenerative changes of the first carpometacarpal and triscaphe  joints.       Soft tissue is unremarkable.                                                                      Impression:  1. No acute osseous abnormality.  2. No substantial degenerative change.     LIZ (Valerie GARZON MD          Assessment/Plan:   L middle finger trigger finger    PLAN:  Two week trial of diclofenac 75 mg bid with meals.  Side effects discussed.  Avoid other NSAIDS.  Ok to take Tylenol.     Will schedule a follow-up at the end of Feb with enough time for a MSK US trigger finger CSI if needed.  If doing well post the diclofenac, cancel the appt.     Mindy Duarte MD, CAQ, FACSM, CCD  [facility]  Sports Medicine and Bone Health  Team Physician;  Athletics

## 2024-01-25 DIAGNOSIS — E13.9 LATENT AUTOIMMUNE DIABETES MELLITUS IN ADULTS (LADA) (H): ICD-10-CM

## 2024-01-25 RX ORDER — PROCHLORPERAZINE 25 MG/1
SUPPOSITORY RECTAL
Qty: 1 EACH | Refills: 3 | Status: SHIPPED | OUTPATIENT
Start: 2024-01-25

## 2024-01-29 ENCOUNTER — OFFICE VISIT (OUTPATIENT)
Dept: FAMILY MEDICINE | Facility: CLINIC | Age: 39
End: 2024-01-29
Payer: COMMERCIAL

## 2024-01-29 VITALS
SYSTOLIC BLOOD PRESSURE: 118 MMHG | DIASTOLIC BLOOD PRESSURE: 86 MMHG | OXYGEN SATURATION: 98 % | HEIGHT: 65 IN | HEART RATE: 95 BPM | TEMPERATURE: 98.6 F | BODY MASS INDEX: 34.98 KG/M2 | RESPIRATION RATE: 18 BRPM

## 2024-01-29 DIAGNOSIS — H10.89 OTHER CONJUNCTIVITIS OF BOTH EYES: Primary | ICD-10-CM

## 2024-01-29 PROCEDURE — 99213 OFFICE O/P EST LOW 20 MIN: CPT | Mod: GC | Performed by: STUDENT IN AN ORGANIZED HEALTH CARE EDUCATION/TRAINING PROGRAM

## 2024-01-29 RX ORDER — POLYMYXIN B SULFATE AND TRIMETHOPRIM 1; 10000 MG/ML; [USP'U]/ML
1 SOLUTION OPHTHALMIC
Qty: 10 ML | Refills: 0 | Status: SHIPPED | OUTPATIENT
Start: 2024-01-29 | End: 2024-02-05

## 2024-01-29 NOTE — PROGRESS NOTES
"  Assessment & Plan     Other conjunctivitis of both eyes  5 days eye redness and discharge, with reported worsening yesterday.  Story and exam below suggest viral conjunctivitis.  Wonder if worsening yesterday was from environmental factors leading to more irritation (warmer temps, dense fog).  With shared decision making, decided to order drops for bacterial conjunctivitis if her symptoms do worsen.  Recommended that she not use these with her current symptoms, though, only if she was to experience more persistent purulent discharge and/or develop a fever or increased eye pain.  - polymixin b-trimethoprim (POLYTRIM) 10587-5.1 UNIT/ML-% ophthalmic solution; Place 1 drop into both eyes every 3 hours for 7 days      Return if symptoms worsen or fail to improve.    Db Farrell is a 38 year old, presenting for the following health issues:  Conjunctivitis (Bilateral pink eye. Patient contracted it from child. ) and URI (Cough and sore throat onset Saturday 01/27. )        1/29/2024     1:46 PM   Additional Questions   Roomed by Evens   Accompanied by Self     HPI     Bilateral eye issue  - started Wednesday, but worsened yesterday  - associated with coughing, sore throat  - no fevers  - improves during the day  - eyes glued shut in the AM  - pus-like discharge throughout the day  - no eye drops used  - does not think any chemical exposure  - small amount of itchiness throughout the day  - blurred vision -- seems like it is the discharge  - wonders if sore throat is from poor air quality  - throat feels tight and constrited   - toddler son was sick with pink eye/URI last week, now better          Objective    /86   Pulse 95   Temp 98.6  F (37  C) (Oral)   Resp 18   Ht 1.651 m (5' 5\")   LMP 01/18/2024 (Exact Date)   SpO2 98%   Breastfeeding No   BMI 34.98 kg/m    Body mass index is 34.98 kg/m .  Physical Exam  Constitutional:       General: She is not in acute distress.     Appearance: She is " ill-appearing. She is not toxic-appearing.   HENT:      Mouth/Throat:      Mouth: Mucous membranes are moist.      Pharynx: Oropharynx is clear. Posterior oropharyngeal erythema (mild) present. No pharyngeal swelling.      Tonsils: No tonsillar exudate.   Eyes:      General:         Right eye: Discharge (scant, clear) present.         Left eye: Discharge (scant, clear) present.     Conjunctiva/sclera:      Right eye: Right conjunctiva is injected. No chemosis, exudate or hemorrhage.     Left eye: Left conjunctiva is injected. No chemosis, exudate or hemorrhage.  Neurological:      General: No focal deficit present.      Mental Status: She is alert.            Signed Electronically by: Sulma Merlos MD

## 2024-02-23 ENCOUNTER — OFFICE VISIT (OUTPATIENT)
Dept: ORTHOPEDICS | Facility: CLINIC | Age: 39
End: 2024-02-23
Payer: COMMERCIAL

## 2024-02-23 DIAGNOSIS — M65.332 TRIGGER MIDDLE FINGER OF LEFT HAND: Primary | ICD-10-CM

## 2024-02-23 PROCEDURE — 99207 PR DROP WITH A PROCEDURE: CPT | Mod: GC | Performed by: FAMILY MEDICINE

## 2024-02-23 PROCEDURE — 20550 NJX 1 TENDON SHEATH/LIGAMENT: CPT | Mod: F2 | Performed by: FAMILY MEDICINE

## 2024-02-23 PROCEDURE — 76942 ECHO GUIDE FOR BIOPSY: CPT | Performed by: FAMILY MEDICINE

## 2024-02-23 RX ORDER — TRIAMCINOLONE ACETONIDE 40 MG/ML
40 INJECTION, SUSPENSION INTRA-ARTICULAR; INTRAMUSCULAR
Status: SHIPPED | OUTPATIENT
Start: 2024-02-23

## 2024-02-23 RX ORDER — LIDOCAINE HYDROCHLORIDE 10 MG/ML
1 INJECTION, SOLUTION EPIDURAL; INFILTRATION; INTRACAUDAL; PERINEURAL
Status: SHIPPED | OUTPATIENT
Start: 2024-02-23

## 2024-02-23 RX ADMIN — LIDOCAINE HYDROCHLORIDE 1 ML: 10 INJECTION, SOLUTION EPIDURAL; INFILTRATION; INTRACAUDAL; PERINEURAL at 13:51

## 2024-02-23 RX ADMIN — TRIAMCINOLONE ACETONIDE 40 MG: 40 INJECTION, SUSPENSION INTRA-ARTICULAR; INTRAMUSCULAR at 13:51

## 2024-02-23 NOTE — PROGRESS NOTES
Denia Montes is a 38 year old presenting for L middle finger trigger finger.    Seen by Dr. Duarte on 1/24/24 (1 month ago). Had some relief w/ NSAIDs and exercises but not as much as desired, so scheduled appt today for injection with upcoming trip.      PROCEDURE NOTE: L Middle Finger A1 Pulley  Risks, benefits and complications of trigger finger injection with cortisone discussed with the patient. Common cosmetic effects associated with cortisone noted such as skin hypopigmentation and fragility and subcutaneous atrophy.  Generalized systemic effects of cortisone noted as well, including but not limited to anxiety, agitation, hypertension, hyperglycemia, bone density loss.  She elected to proceed.  Using sterile technique, the area was first prepped with Chloroprep.  A 25 gauge, 1 1/2 inch needle was used to inject 40 mg triamcinolone and 1cc 1% lidocaine injected at the tendon sheath using the ultrasound. Ultrasound guidance was needed to ensure location of medication was placed correctly and for patient tolerance of procedure. Images were stored in the ultrasound machine Pt tolerated well. Dressed with a bandaid. Discussed s/s of common adverse effects.  Await 1-2 weeks to determine full effect of injection. If recurrent could reinject, try hand therapy, or see hand surgery.      Options for treatment and/or follow-up care were reviewed with the patient was actively involved in the decision making process. Patient verbalized understanding and was in agreement with the plan.    The patient was seen by and discussed with Dr. Mindy Duarte MD, CAQ, CCD    Monty Dai MD  Primary Care Sports Medicine Fellow  [facility]    Hand / Upper Extremity Injection/Arthrocentesis: L long A1    Date/Time: 2/23/2024 1:51 PM    Performed by: Mindy Duarte MD  Authorized by: Mindy Duarte MD    Indications:  Therapeutic  Needle Size:  25 G  Guidance: ultrasound     Approach:  Dorsal  Condition: trigger finger    Location:  Long finger    Site:  L long A1  Medications:  40 mg triamcinolone 40 MG/ML; 1 mL lidocaine (PF) 1 %  Outcome:  Tolerated well, no immediate complications  Procedure discussed: discussed risks, benefits, and alternatives    Consent Given by:  Patient  Timeout: timeout called immediately prior to procedure    Prep: patient was prepped and draped in usual sterile fashion

## 2024-02-23 NOTE — NURSING NOTE
[address] [address]  Dept: [phone]  ______________________________________________________________________________    Patient: Denia Montes   : 1985   MRN: [id number]   2024    INVASIVE PROCEDURE SAFETY CHECKLIST    Date: 2024   Procedure: Left middle trigger finger CSI   Patient Name: Denia Montes  MRN: [id number]  YOB: 1985    Action: Complete sections as appropriate. Any discrepancy results in a HARD COPY until resolved.     PRE PROCEDURE:  Patient ID verified with 2 identifiers (name and  or MRN): Yes  Procedure and site verified with patient/designee (when able): Yes  Accurate consent documentation in medical record: Yes  H&P (or appropriate assessment) documented in medical record: Yes  H&P must be up to 20 days prior to procedure and updates within 24 hours of procedure as applicable: NA  Relevant diagnostic and radiology test results appropriately labeled and displayed as applicable: Yes  Procedure site(s) marked with provider initials: NA    TIMEOUT:  Time-Out performed immediately prior to starting procedure, including verbal and active participation of all team members addressing the following:Yes  * Correct patient identify  * Confirmed that the correct side and site are marked  * An accurate procedure consent form  * Agreement on the procedure to be done  * Correct patient position  * Relevant images and results are properly labeled and appropriately displayed  * The need to administer antibiotics or fluids for irrigation purposes during the procedure as applicable   * Safety precautions based on patient history or medication use    DURING PROCEDURE: Verification of correct person, site, and procedures any time the responsibility for care of the patient is transferred to another member of the care team.       Prior to injection, verified patient identity using patient's  name and date of birth.  Due to injection administration, patient instructed to remain in clinic for 15 minutes  afterwards, and to report any adverse reaction to me immediately.    Tendon sheath injection was performed.     Drug Amount Wasted:  Yes: 4 mg/ml   Vial/Syringe: Single dose vial  Expiration Date:  05/27      [facility]  February 23, 2024

## 2024-02-23 NOTE — LETTER
2/23/2024      RE: Denia Montes  [address]  [facility] [address]     Dear Colleague,    Thank you for referring your patient, Denia Montes, to the [facility] [place]. Please see a copy of my visit note below.    Denia Montes is a 38 year old presenting for L middle finger trigger finger.    Seen by Dr. Duarte on 1/24/24 (1 month ago). Had some relief w/ NSAIDs and exercises but not as much as desired, so scheduled appt today for injection with upcoming trip.      PROCEDURE NOTE: L Middle Finger A1 Pulley  Risks, benefits and complications of trigger finger injection with cortisone discussed with the patient. Common cosmetic effects associated with cortisone noted such as skin hypopigmentation and fragility and subcutaneous atrophy.  Generalized systemic effects of cortisone noted as well, including but not limited to anxiety, agitation, hypertension, hyperglycemia, bone density loss.  She elected to proceed.  Using sterile technique, the area was first prepped with Chloroprep.  A 25 gauge, 1 1/2 inch needle was used to inject 40 mg triamcinolone and 1cc 1% lidocaine injected at the tendon sheath using the ultrasound. Ultrasound guidance was needed to ensure location of medication was placed correctly and for patient tolerance of procedure. Images were stored in the ultrasound machine Pt tolerated well. Dressed with a bandaid. Discussed s/s of common adverse effects.  Await 1-2 weeks to determine full effect of injection. If recurrent could reinject, try hand therapy, or see hand surgery.      Options for treatment and/or follow-up care were reviewed with the patient was actively involved in the decision making process. Patient verbalized understanding and was in agreement with the plan.    The patient was seen by and discussed with Dr. Mindy Duarte MD, CAQ, CCD    Monty Dai MD  Primary Care Sports Medicine Fellow  [facility]  [place]    Hand / Upper Extremity Injection/Arthrocentesis: L long A1    Date/Time: 2/23/2024 1:51 PM    Performed by: Mindy Duarte MD  Authorized by: Mindy Duarte MD    Indications:  Therapeutic  Needle Size:  25 G  Guidance: ultrasound    Approach:  Dorsal  Condition: trigger finger    Location:  Long finger    Site:  L long A1  Medications:  40 mg triamcinolone 40 MG/ML; 1 mL lidocaine (PF) 1 %  Outcome:  Tolerated well, no immediate complications  Procedure discussed: discussed risks, benefits, and alternatives    Consent Given by:  Patient  Timeout: timeout called immediately prior to procedure    Prep: patient was prepped and draped in usual sterile fashion            Attending Note:   I have directly supervised the MSK US guided injection.     Mindy Duarte MD, CAQ, CCD  [facility]  Sports Medicine and Bone Health

## 2024-02-23 NOTE — PROGRESS NOTES
Attending Note:   I have directly supervised the MSK US guided injection.     Mindy Duarte MD, CAQ, CCD  [facility]  Sports Medicine and Bone Health

## 2024-02-28 ENCOUNTER — TELEPHONE (OUTPATIENT)
Dept: ENDOCRINOLOGY | Facility: CLINIC | Age: 39
End: 2024-02-28
Payer: COMMERCIAL

## 2024-02-28 DIAGNOSIS — E10.9 TYPE 1 DIABETES MELLITUS WITHOUT COMPLICATION (H): ICD-10-CM

## 2024-02-28 DIAGNOSIS — E10.9 TYPE 1 DIABETES MELLITUS WITHOUT COMPLICATION (H): Primary | ICD-10-CM

## 2024-02-28 RX ORDER — ACYCLOVIR 400 MG/1
TABLET ORAL
Qty: 9 EACH | Refills: 5 | Status: SHIPPED | OUTPATIENT
Start: 2024-02-28

## 2024-02-28 RX ORDER — ACYCLOVIR 400 MG/1
TABLET ORAL
Qty: 1 EACH | Refills: 0 | OUTPATIENT
Start: 2024-02-28 | End: 2024-02-28

## 2024-02-28 RX ORDER — ACYCLOVIR 400 MG/1
TABLET ORAL
Qty: 1 EACH | Refills: 0 | Status: SHIPPED | OUTPATIENT
Start: 2024-02-28

## 2024-02-28 RX ORDER — ACYCLOVIR 400 MG/1
TABLET ORAL
Qty: 9 EACH | Refills: 5 | OUTPATIENT
Start: 2024-02-28 | End: 2024-02-28

## 2024-02-28 NOTE — TELEPHONE ENCOUNTER
[facility] Call Center    Phone Message    May a detailed message be left on voicemail: yes     Reason for Call: Other: Per pt would like to  ask for a upgrade to the DEXCOM G7 sensors, transmitter and reciever. Per pt would like to also to have a 90 day supply. Please send this to pharmacy : [facility] - [place], [place] - [address] . Thank you!    Action Taken: Message routed to:  [facility] ENDO    Travel Screening: Not Applicable

## 2024-03-13 ENCOUNTER — TELEPHONE (OUTPATIENT)
Dept: RHEUMATOLOGY | Facility: CLINIC | Age: 39
End: 2024-03-13
Payer: COMMERCIAL

## 2024-03-13 ENCOUNTER — TELEPHONE (OUTPATIENT)
Dept: DERMATOLOGY | Facility: CLINIC | Age: 39
End: 2024-03-13
Payer: COMMERCIAL

## 2024-03-13 SDOH — HEALTH STABILITY: PHYSICAL HEALTH: ON AVERAGE, HOW MANY DAYS PER WEEK DO YOU ENGAGE IN MODERATE TO STRENUOUS EXERCISE (LIKE A BRISK WALK)?: 1 DAY

## 2024-03-13 SDOH — HEALTH STABILITY: PHYSICAL HEALTH: ON AVERAGE, HOW MANY MINUTES DO YOU ENGAGE IN EXERCISE AT THIS LEVEL?: 30 MIN

## 2024-03-13 ASSESSMENT — SOCIAL DETERMINANTS OF HEALTH (SDOH): HOW OFTEN DO YOU GET TOGETHER WITH FRIENDS OR RELATIVES?: THREE TIMES A WEEK

## 2024-03-13 NOTE — TELEPHONE ENCOUNTER
[facility] Call Center    Phone Message    May a detailed message be left on voicemail: yes     Reason for Call: pt was scheduled for a 6 month follow-up Appt 03/15/24 and had to cancel as pt is unable to make it to Appt.   Pt is now rescheduled the soonest available Appt 01/10/25. This is no where near 6 month follow-up. Please review and call pt back if needed. Thanks     Action Taken: Message routed to:  [facility] Derm    Travel Screening: Not Applicable

## 2024-03-13 NOTE — TELEPHONE ENCOUNTER
Writer contacted the patient to assist with scheduling. The patient was scheduled for an appointment on 7/31/2024 with Dr. Molina.      Luciana Ivan LPN

## 2024-03-14 ENCOUNTER — OFFICE VISIT (OUTPATIENT)
Dept: FAMILY MEDICINE | Facility: CLINIC | Age: 39
End: 2024-03-14
Payer: COMMERCIAL

## 2024-03-14 VITALS
HEART RATE: 86 BPM | BODY MASS INDEX: 34.49 KG/M2 | SYSTOLIC BLOOD PRESSURE: 134 MMHG | HEIGHT: 65 IN | RESPIRATION RATE: 16 BRPM | OXYGEN SATURATION: 100 % | TEMPERATURE: 98.5 F | WEIGHT: 207 LBS | DIASTOLIC BLOOD PRESSURE: 93 MMHG

## 2024-03-14 DIAGNOSIS — E10.9 TYPE 1 DIABETES MELLITUS WITHOUT COMPLICATION (H): ICD-10-CM

## 2024-03-14 DIAGNOSIS — E13.9 DIABETES MELLITUS TYPE 1.5 (H): Primary | ICD-10-CM

## 2024-03-14 DIAGNOSIS — Z00.00 ROUTINE GENERAL MEDICAL EXAMINATION AT A HEALTH CARE FACILITY: Primary | ICD-10-CM

## 2024-03-14 DIAGNOSIS — R03.0 ELEVATED BLOOD PRESSURE READING WITHOUT DIAGNOSIS OF HYPERTENSION: ICD-10-CM

## 2024-03-14 DIAGNOSIS — R07.0 THROAT PAIN: ICD-10-CM

## 2024-03-14 DIAGNOSIS — M06.00 SERONEGATIVE RHEUMATOID ARTHRITIS (H): ICD-10-CM

## 2024-03-14 LAB — HBA1C MFR BLD: 6.4 % (ref 0–5.6)

## 2024-03-14 PROCEDURE — 80061 LIPID PANEL: CPT | Performed by: FAMILY MEDICINE

## 2024-03-14 PROCEDURE — 36415 COLL VENOUS BLD VENIPUNCTURE: CPT | Performed by: FAMILY MEDICINE

## 2024-03-14 PROCEDURE — 83036 HEMOGLOBIN GLYCOSYLATED A1C: CPT | Performed by: FAMILY MEDICINE

## 2024-03-14 PROCEDURE — 82570 ASSAY OF URINE CREATININE: CPT | Performed by: FAMILY MEDICINE

## 2024-03-14 PROCEDURE — 99395 PREV VISIT EST AGE 18-39: CPT | Performed by: FAMILY MEDICINE

## 2024-03-14 PROCEDURE — 84443 ASSAY THYROID STIM HORMONE: CPT | Performed by: FAMILY MEDICINE

## 2024-03-14 PROCEDURE — 80048 BASIC METABOLIC PNL TOTAL CA: CPT | Performed by: FAMILY MEDICINE

## 2024-03-14 PROCEDURE — 99213 OFFICE O/P EST LOW 20 MIN: CPT | Mod: 25 | Performed by: FAMILY MEDICINE

## 2024-03-14 PROCEDURE — 82043 UR ALBUMIN QUANTITATIVE: CPT | Performed by: FAMILY MEDICINE

## 2024-03-14 RX ORDER — FLUTICASONE PROPIONATE 50 MCG
1 SPRAY, SUSPENSION (ML) NASAL DAILY PRN
COMMUNITY
Start: 2024-03-14 | End: 2024-06-25

## 2024-03-14 NOTE — Clinical Note
DME home BP monitor ordered. Can you provide patient with the information for where to get/who to call. The pad with the list of places by the precepting desk had run out. -Jyoti Lazo MD

## 2024-03-14 NOTE — PROGRESS NOTES
Preventive Care Visit  [facility] ESA Lazo MD, Family Medicine  Mar 14, 2024      Assessment & Plan     Type 1 diabetes mellitus without complication (H)  Managed by Endocrine. A1c improved  - Hemoglobin A1c  - Basic metabolic panel  - TSH with free T4 reflex  - Albumin Random Urine Quantitative with Creat Ratio  - Lipid panel reflex to direct LDL Fasting    Seronegative rheumatoid arthritis (H)  Symptoms currently well controlled with Plaquenil  EDUARDA signed for pt's current rheumatologist. Pt wants to transfer care into [facility]  - Adult Rheumatology  Referral; Future    Throat pain  Intermittent use:  - fluticasone (FLONASE) 50 MCG/ACT nasal spray; Spray 1 spray into both nostrils daily as needed for rhinitis or allergies    Elevated blood pressure reading without diagnosis of hypertension  Reviewed last few years of clinic BPs, SBPs>130 and DBP>80 on multiple occassions. In clinic BP today would meet criteria for Stage 1 HTN. If goal was set as <140/90, DBP would not be at goal and antihypertensive medication management would be recommended. Pt will check home BPs and then send [employer] message in 2-4 weeks with results   - Home Blood Pressure Monitor Order for DME - ONLY FOR DME    Routine general medical examination at a health care facility              Counseling  Appropriate preventive services were discussed with this patient, including applicable screening as appropriate for fall prevention, nutrition, physical activity, Tobacco-use cessation, weight loss and cognition.  Checklist reviewing preventive services available has been given to the patient.  Reviewed patient's diet, addressing concerns and/or questions.   She is at risk for lack of exercise and has been provided with information to increase physical activity for the benefit of her well-being.       No follow-ups on file.    Db Farrell is a 38 year old, presenting for the following:  Physical  (Request referral for rheumatologist )        3/14/2024     4:38 PM   Additional Questions   Roomed by blanco   Accompanied by self         3/14/2024    Information    services provided? No        Health Care Directive  Patient does not have a Health Care Directive or Living Will: Discussed advance care planning with patient; information given to patient to review.    HPI      Seronegative rheumatoid arthritis - diagnosed by Rheumatologist at [facility] - Mariposa Middleton MD          3/13/2024   General Health   How would you rate your overall physical health? Good   Feel stress (tense, anxious, or unable to sleep) Not at all         3/13/2024   Nutrition   Three or more servings of calcium each day? Yes   Diet: Diabetic    Vegetarian/vegan   How many servings of fruit and vegetables per day? 4 or more   How many sweetened beverages each day? 0-1         3/13/2024   Exercise   Days per week of moderate/strenous exercise 1 day   Average minutes spent exercising at this level 30 min   (!) EXERCISE CONCERN      3/13/2024   Social Factors   Frequency of gathering with friends or relatives Three times a week   Worry food won't last until get money to buy more No   Food not last or not have enough money for food? No   Do you have housing?  Yes   Are you worried about losing your housing? No   Lack of transportation? No   Unable to get utilities (heat,electricity)? No         3/13/2024   Dental   Dentist two times every year? Yes              Today's PHQ-2 Score:       1/24/2024     3:28 PM   PHQ-2 ( 1999 [employer])   Q1: Little interest or pleasure in doing things 0   Q2: Feeling down, depressed or hopeless 0   PHQ-2 Score 0         3/13/2024   Substance Use   Alcohol more than 3/day or more than 7/wk Not Applicable   Do you use any other substances recreationally? No     Social History     Tobacco Use    Smoking status: Never     Passive exposure: Never    Smokeless tobacco: Never   Substance Use  "Topics    Alcohol use: No    Drug use: No          Mammogram Screening - Patient under 40 years of age: Routine Mammogram Screening not recommended.         3/13/2024   STI Screening   New sexual partner(s) since last STI/HIV test? No     History of abnormal Pap smear: YES - updated in Problem List and Health Maintenance accordingly        Latest Ref Rng & Units 1/14/2022     9:05 AM 12/29/2020     3:14 PM 12/29/2020     2:30 PM   PAP / HPV   PAP  Negative for Intraepithelial Lesion or Malignancy (NILM)      PAP (Historical)   NIL     HPV 16 DNA Negative Negative   Negative    HPV 18 DNA Negative Negative   Negative    Other HR HPV Negative Negative   Positive            3/13/2024   Contraception/Family Planning   Questions about contraception or family planning No        Reviewed and updated as needed this visit by Provider   Tobacco  Allergies  Meds  Problems  Med Hx  Surg Hx  Fam Hx                     Objective    Exam  BP (!) 134/93   Pulse 86   Temp 98.5  F (36.9  C) (Oral)   Resp 16   Ht 1.651 m (5' 5\")   Wt 93.9 kg (207 lb)   LMP 03/10/2024 (Exact Date)   SpO2 100%   BMI 34.45 kg/m     Estimated body mass index is 34.45 kg/m  as calculated from the following:    Height as of this encounter: 1.651 m (5' 5\").    Weight as of this encounter: 93.9 kg (207 lb).    Physical Exam  Vitals reviewed.   Constitutional:       General: She is not in acute distress.     Appearance: She is well-developed. She is not diaphoretic.   HENT:      Head: Normocephalic and atraumatic.   Cardiovascular:      Rate and Rhythm: Normal rate and regular rhythm.      Heart sounds: Normal heart sounds. No murmur heard.  Pulmonary:      Effort: Pulmonary effort is normal. No respiratory distress.      Breath sounds: Normal breath sounds. No wheezing.   Abdominal:      General: Bowel sounds are normal. There is no distension.      Palpations: Abdomen is soft.      Tenderness: There is no abdominal tenderness. "   Musculoskeletal:         General: Normal range of motion.   Skin:     General: Skin is warm and dry.      Findings: No erythema or rash.   Neurological:      Mental Status: She is alert.   Psychiatric:         Behavior: Behavior normal.         Thought Content: Thought content normal.         Judgment: Judgment normal.             Diabetic Foot Screen:  Any complaints of increased pain or numbness ? No  Is there a foot ulcer now or a history of foot ulcer? No  Does the foot have an abnormal shape? No  Are the nails thick, too long or ingrown? No  Are there any redness or open areas? No         Sensation Testing done at all points on the diagram with monofilament     Right Foot: Sensation Normal at all points  Left Foot: Sensation Normal at all points     Risk Category: 0- No loss of protective sensation  Performed by Jyoti Lazo MD    Signed Electronically by: Jyoti Lazo MD

## 2024-03-15 ENCOUNTER — MYC MEDICAL ADVICE (OUTPATIENT)
Dept: FAMILY MEDICINE | Facility: CLINIC | Age: 39
End: 2024-03-15

## 2024-03-15 LAB
ANION GAP SERPL CALCULATED.3IONS-SCNC: 11 MMOL/L (ref 7–15)
BUN SERPL-MCNC: 10.4 MG/DL (ref 6–20)
CALCIUM SERPL-MCNC: 9.5 MG/DL (ref 8.6–10)
CHLORIDE SERPL-SCNC: 103 MMOL/L (ref 98–107)
CHOLEST SERPL-MCNC: 181 MG/DL
CREAT SERPL-MCNC: 0.74 MG/DL (ref 0.51–0.95)
CREAT UR-MCNC: 37.3 MG/DL
DEPRECATED HCO3 PLAS-SCNC: 23 MMOL/L (ref 22–29)
EGFRCR SERPLBLD CKD-EPI 2021: >90 ML/MIN/1.73M2
FASTING STATUS PATIENT QL REPORTED: ABNORMAL
GLUCOSE SERPL-MCNC: 97 MG/DL (ref 70–99)
HDLC SERPL-MCNC: 55 MG/DL
LDLC SERPL CALC-MCNC: 102 MG/DL
MICROALBUMIN UR-MCNC: <12 MG/L
MICROALBUMIN/CREAT UR: NORMAL MG/G{CREAT}
NONHDLC SERPL-MCNC: 126 MG/DL
POTASSIUM SERPL-SCNC: 4.1 MMOL/L (ref 3.4–5.3)
SODIUM SERPL-SCNC: 137 MMOL/L (ref 135–145)
TRIGL SERPL-MCNC: 119 MG/DL
TSH SERPL DL<=0.005 MIU/L-ACNC: 1.42 UIU/ML (ref 0.3–4.2)

## 2024-03-15 NOTE — TELEPHONE ENCOUNTER
DME order for blood pressure cuff successfully sent to [facility] [phone].    Bill Herrera  Care Coordinator- [facility]  [phone]

## 2024-03-15 NOTE — PATIENT INSTRUCTIONS
High Blood Pressure  Check blood pressure after resting for 30 minutes at least 3 times per week. Send me a [employer] message with the numbers in 2-4 weeks.     Bud was seen today for physical.    Diagnoses and all orders for this visit:    Routine general medical examination at a health care facility    Type 1 diabetes mellitus without complication (H)  -     Basic metabolic panel  -     TSH with free T4 reflex  -     Albumin Random Urine Quantitative with Creat Ratio  -     Lipid panel reflex to direct LDL Fasting  -     Hemoglobin A1c    Seronegative rheumatoid arthritis (H)  -     Adult Rheumatology  Referral; Future    Elevated blood pressure reading without diagnosis of hypertension  -     Home Blood Pressure Monitor Order for DME - ONLY FOR DME    Preventive Care Advice   This is general advice given by our system to help you stay healthy. However, your care team may have specific advice just for you. Please talk to your care team about your preventive care needs.  Nutrition  Eat 5 or more servings of fruits and vegetables each day.  Try wheat bread, brown rice and whole grain pasta (instead of white bread, rice, and pasta).  Get enough calcium and vitamin D. Check the label on foods and aim for 100% of the RDA (recommended daily allowance).  Lifestyle  Exercise at least 150 minutes each week   (30 minutes a day, 5 days a week).  Do muscle strengthening activities 2 days a week. These help control your weight and prevent disease.  No smoking.  Wear sunscreen to prevent skin cancer.  Have a dental exam and cleaning every 6 months.  Yearly exams  See your health care team every year to talk about:  Any changes in your health.  Any medicines your care team has prescribed.  Preventive care, family planning, and ways to prevent chronic diseases.  Shots (vaccines)   HPV shots (up to age 26), if you've never had them before.  Hepatitis B shots (up to age 59), if you've never had them before.  COVID-19  shot: Get this shot when it's due.  Flu shot: Get a flu shot every year.  Tetanus shot: Get a tetanus shot every 10 years.  Pneumococcal, hepatitis A, and RSV shots: Ask your care team if you need these based on your risk.  Shingles shot (for age 50 and up).  General health tests  Diabetes screening:  Starting at age 35, Get screened for diabetes at least every 3 years.  If you are younger than age 35, ask your care team if you should be screened for diabetes.  Cholesterol test: At age 39, start having a cholesterol test every 5 years, or more often if advised.  Bone density scan (DEXA): At age 50, ask your care team if you should have this scan for osteoporosis (brittle bones).  Hepatitis C: Get tested at least once in your life.  STIs (sexually transmitted infections)  Before age 24: Ask your care team if you should be screened for STIs.  After age 24: Get screened for STIs if you're at risk. You are at risk for STIs (including HIV) if:  You are sexually active with more than one person.  You don't use condoms every time.  You or a partner was diagnosed with a sexually transmitted infection.  If you are at risk for HIV, ask about PrEP medicine to prevent HIV.  Get tested for HIV at least once in your life, whether you are at risk for HIV or not.  Cancer screening tests  Cervical cancer screening: If you have a cervix, begin getting regular cervical cancer screening tests at age 21. Most people who have regular screenings with normal results can stop after age 65. Talk about this with your provider.  Breast cancer scan (mammogram): If you've ever had breasts, begin having regular mammograms starting at age 40. This is a scan to check for breast cancer.  Colon cancer screening: It is important to start screening for colon cancer at age 45.  Have a colonoscopy test every 10 years (or more often if you're at risk) Or, ask your provider about stool tests like a FIT test every year or Cologuard test every 3 years.  To  learn more about your testing options, visit: https://www.[employer]/893971.pdf.  For help making a decision, visit: https://bit.ly/bx84127.  Prostate cancer screening test: If you have a prostate and are age 55 to 69, ask your provider if you would benefit from a yearly prostate cancer screening test.  Lung cancer screening: If you are a current or former smoker age 50 to 80, ask your care team if ongoing lung cancer screenings are right for you.  For informational purposes only. Not to replace the advice of your health care provider. Copyright   2023 [employer]. All rights reserved. Clinically reviewed by the [facility] Transitions Program. [employer] 069148 - REV 01/24.

## 2024-03-28 ENCOUNTER — OFFICE VISIT (OUTPATIENT)
Dept: OPHTHALMOLOGY | Facility: CLINIC | Age: 39
End: 2024-03-28
Payer: COMMERCIAL

## 2024-03-28 DIAGNOSIS — H52.229 MYOPIA WITH REGULAR ASTIGMATISM: ICD-10-CM

## 2024-03-28 DIAGNOSIS — Z79.4 TYPE 2 DIABETES MELLITUS WITHOUT COMPLICATION, WITH LONG-TERM CURRENT USE OF INSULIN (H): ICD-10-CM

## 2024-03-28 DIAGNOSIS — E11.9 TYPE 2 DIABETES MELLITUS WITHOUT COMPLICATION, WITH LONG-TERM CURRENT USE OF INSULIN (H): ICD-10-CM

## 2024-03-28 DIAGNOSIS — Z79.899 LONG-TERM USE OF PLAQUENIL: Primary | ICD-10-CM

## 2024-03-28 DIAGNOSIS — H52.10 MYOPIA WITH REGULAR ASTIGMATISM: ICD-10-CM

## 2024-03-28 PROCEDURE — 92004 COMPRE OPH EXAM NEW PT 1/>: CPT | Performed by: OPTOMETRIST

## 2024-03-28 PROCEDURE — 92015 DETERMINE REFRACTIVE STATE: CPT | Performed by: OPTOMETRIST

## 2024-03-28 PROCEDURE — 92082 INTERMEDIATE VISUAL FIELD XM: CPT | Performed by: OPTOMETRIST

## 2024-03-28 PROCEDURE — 92134 CPTRZ OPH DX IMG PST SGM RTA: CPT | Performed by: OPTOMETRIST

## 2024-03-28 ASSESSMENT — CONF VISUAL FIELD
OS_NORMAL: 1
OS_INFERIOR_NASAL_RESTRICTION: 0
OD_SUPERIOR_NASAL_RESTRICTION: 0
OS_SUPERIOR_TEMPORAL_RESTRICTION: 0
METHOD: COUNTING FINGERS
OD_INFERIOR_TEMPORAL_RESTRICTION: 0
OD_SUPERIOR_TEMPORAL_RESTRICTION: 0
OD_INFERIOR_NASAL_RESTRICTION: 0
OS_SUPERIOR_NASAL_RESTRICTION: 0
OS_INFERIOR_TEMPORAL_RESTRICTION: 0
OD_NORMAL: 1

## 2024-03-28 ASSESSMENT — REFRACTION_WEARINGRX
OD_SPHERE: -1.75
OS_CYLINDER: +0.50
OD_CYLINDER: +0.50
OS_AXIS: 072
OD_AXIS: 118
OS_SPHERE: -1.50
SPECS_TYPE: SVL

## 2024-03-28 ASSESSMENT — SLIT LAMP EXAM - LIDS
COMMENTS: NORMAL
COMMENTS: NORMAL

## 2024-03-28 ASSESSMENT — REFRACTION_MANIFEST
OD_SPHERE: -2.00
OS_CYLINDER: +0.50
OS_AXIS: 072
OD_AXIS: 013
OS_SPHERE: -1.50
OD_CYLINDER: +0.50

## 2024-03-28 ASSESSMENT — VISUAL ACUITY
OD_CC: J1+
METHOD: SNELLEN - LINEAR
OD_CC: 20/20
OS_CC: 20/20
OS_CC: J1+
CORRECTION_TYPE: GLASSES

## 2024-03-28 ASSESSMENT — TONOMETRY
IOP_METHOD: TONOPEN
OD_IOP_MMHG: 15
OS_IOP_MMHG: 18

## 2024-03-28 ASSESSMENT — CUP TO DISC RATIO
OS_RATIO: 0.10
OD_RATIO: 0.10

## 2024-03-28 ASSESSMENT — EXTERNAL EXAM - RIGHT EYE: OD_EXAM: NORMAL

## 2024-03-28 ASSESSMENT — EXTERNAL EXAM - LEFT EYE: OS_EXAM: NORMAL

## 2024-03-28 NOTE — NURSING NOTE
Chief Complaints and History of Present Illnesses   Patient presents with    COMPREHENSIVE EYE EXAM     Comprehensive exam/ Plaquenil - Baseline exam     Chief Complaint(s) and History of Present Illness(es)       COMPREHENSIVE EYE EXAM              Laterality: both eyes    Associated symptoms: Negative for dryness, eye pain and discharge    Treatments tried: no treatments    Pain scale: 0/10    Comments: Comprehensive exam/ Plaquenil - Baseline exam              Comments    Started Plaquenil in either June or July of 2023. This will be the first exam since starting medication.   Not having any vision issues with each eye.   Seeing well with glasses Rx of 1 year.     SARA Lazo COT 12:55 PM March 28, 2024                            
Chief Complaints and History of Present Illnesses   Patient presents with    COMPREHENSIVE EYE EXAM     Comprehensive exam/ Plaquenil - Baseline exam/ Diabetic - Dr Lazo     Chief Complaint(s) and History of Present Illness(es)       COMPREHENSIVE EYE EXAM              Laterality: both eyes    Associated symptoms: Negative for dryness, eye pain and discharge    Treatments tried: no treatments    Pain scale: 0/10    Comments: Comprehensive exam/ Plaquenil - Baseline exam/ Diabetic - Dr Lazo              Comments    Started Plaquenil in either June or July of 2023. This will be the first exam since starting medication.   Not having any vision issues with each eye.   Seeing well with glasses Rx of 1 year.     Lab Results       Component                Value               Date                       A1C                      6.4                 03/14/2024                 A1C                      6.6                 10/03/2023                 A1C                      6.1                 04/24/2023                 A1C                      6.2                 10/06/2022                 A1C                      6.5                 01/14/2022                 A1C                      5.5                 12/21/2020                 A1C                      5.3                 11/17/2020                 A1C                      6.1                 06/29/2020                 A1C                      7.4                 01/11/2019                 A1C                      6.3                 09/28/2018                SARA Lazo COT 12:55 PM March 28, 2024                            
warm

## 2024-03-29 NOTE — PROGRESS NOTES
A/P  1.) Plaquenil Use  -Baseline exam today, started medication June 2023  -10-2 VF, mac OCT and DFE all unremarkable today  -Reviewed possible effects of Plaquenil on the eyes and need for annual monitoring  -Per AAO guidelines needs dilated eye exam only until 2028, when she will need exam with full testing    2.) Type 2 DM without ophthalmic manifestation OU  -Last A1c 6.4, no known h/o diabetic retinopathy  -Reviewed effects of DM on the eyes and importance of good blood sugar control  -Monitor annually with dilation    3.) Myopia/Astigmatism OU  -Stable spec Rx, updated today    Monitor 1 year comprehensive dilated eye exam (no extra testing)    I have confirmed the patient's CC, HPI and reviewed Past Medical History, Past Surgical History, Social History, Family History, Problem List, Medication List and agree with Tech note.     Jo Ann Nuñez, OD FAAO [facility]SL[facility]

## 2024-04-04 ENCOUNTER — TRANSFERRED RECORDS (OUTPATIENT)
Dept: HEALTH INFORMATION MANAGEMENT | Facility: CLINIC | Age: 39
End: 2024-04-04
Payer: COMMERCIAL

## 2024-04-05 ENCOUNTER — TRANSFERRED RECORDS (OUTPATIENT)
Dept: HEALTH INFORMATION MANAGEMENT | Facility: CLINIC | Age: 39
End: 2024-04-05

## 2024-05-03 DIAGNOSIS — E13.9 LATENT AUTOIMMUNE DIABETES MELLITUS IN ADULTS (LADA) (H): ICD-10-CM

## 2024-05-03 DIAGNOSIS — R76.8 POSITIVE GAD ANTIBODY: ICD-10-CM

## 2024-05-03 DIAGNOSIS — E13.9 DIABETES MELLITUS TYPE 1.5 (H): Primary | ICD-10-CM

## 2024-05-03 DIAGNOSIS — E10.9 TYPE 1 DIABETES MELLITUS WITHOUT COMPLICATION (H): ICD-10-CM

## 2024-05-03 DIAGNOSIS — Z98.891 S/P CESAREAN SECTION: ICD-10-CM

## 2024-05-03 RX ORDER — PEN NEEDLE, DIABETIC 32GX 5/32"
NEEDLE, DISPOSABLE MISCELLANEOUS
Qty: 800 EACH | Refills: 3 | Status: SHIPPED | OUTPATIENT
Start: 2024-05-03

## 2024-05-03 NOTE — TELEPHONE ENCOUNTER
INSULIN ASPART FLEXPEN 100 SOPN      Last Written Prescription Date:  4/26/23  Last Fill Quantity: 45 ml ,   # refills: 3     Insulin Glargine-yfgn 100 UNIT/ML SOPN      Last Written Prescription Date:   4/26/23  Last Fill Quantity:45 ml  # refills: 3  Last Office Visit : 1/10/24  Future Office visit:  1/8/25    Routing refill request to provider for review/approval because:  Insulin and insulin pump supplies - refilled per Endocrine clinic.

## 2024-05-05 RX ORDER — INSULIN GLARGINE-YFGN 100 [IU]/ML
INJECTION, SOLUTION SUBCUTANEOUS
Qty: 45 ML | Refills: 3 | Status: SHIPPED | OUTPATIENT
Start: 2024-05-05

## 2024-05-05 RX ORDER — INSULIN ASPART 100 [IU]/ML
INJECTION, SOLUTION INTRAVENOUS; SUBCUTANEOUS
Qty: 45 ML | Refills: 3 | Status: SHIPPED | OUTPATIENT
Start: 2024-05-05

## 2024-06-25 ENCOUNTER — OFFICE VISIT (OUTPATIENT)
Dept: RHEUMATOLOGY | Facility: CLINIC | Age: 39
End: 2024-06-25
Attending: INTERNAL MEDICINE
Payer: COMMERCIAL

## 2024-06-25 ENCOUNTER — LAB (OUTPATIENT)
Dept: LAB | Facility: CLINIC | Age: 39
End: 2024-06-25
Payer: COMMERCIAL

## 2024-06-25 VITALS
WEIGHT: 201 LBS | BODY MASS INDEX: 33.45 KG/M2 | SYSTOLIC BLOOD PRESSURE: 126 MMHG | DIASTOLIC BLOOD PRESSURE: 86 MMHG | HEART RATE: 89 BPM | OXYGEN SATURATION: 98 %

## 2024-06-25 DIAGNOSIS — Z79.899 LONG-TERM USE OF HIGH-RISK MEDICATION: ICD-10-CM

## 2024-06-25 DIAGNOSIS — R79.82 ELEVATED C-REACTIVE PROTEIN (CRP): ICD-10-CM

## 2024-06-25 DIAGNOSIS — M25.50 POLYARTHRALGIA: Primary | ICD-10-CM

## 2024-06-25 DIAGNOSIS — M25.50 POLYARTHRALGIA: ICD-10-CM

## 2024-06-25 LAB
ALBUMIN UR-MCNC: NEGATIVE MG/DL
APPEARANCE UR: CLEAR
BACTERIA #/AREA URNS HPF: ABNORMAL /HPF
BILIRUB UR QL STRIP: NEGATIVE
COLOR UR AUTO: YELLOW
GLUCOSE UR STRIP-MCNC: NEGATIVE MG/DL
HGB UR QL STRIP: NEGATIVE
KETONES UR STRIP-MCNC: ABNORMAL MG/DL
LEUKOCYTE ESTERASE UR QL STRIP: NEGATIVE
Lab: NORMAL
Lab: NORMAL
NITRATE UR QL: NEGATIVE
PERFORMING LABORATORY: NORMAL
PERFORMING LABORATORY: NORMAL
PH UR STRIP: 6 [PH] (ref 5–7)
RBC #/AREA URNS AUTO: ABNORMAL /HPF
SP GR UR STRIP: 1.02 (ref 1–1.03)
SPECIMEN STATUS: NORMAL
SPECIMEN STATUS: NORMAL
SQUAMOUS #/AREA URNS AUTO: ABNORMAL /LPF
TEST NAME: NORMAL
TEST NAME: NORMAL
UROBILINOGEN UR STRIP-ACNC: 0.2 E.U./DL
WBC #/AREA URNS AUTO: ABNORMAL /HPF

## 2024-06-25 PROCEDURE — 86706 HEP B SURFACE ANTIBODY: CPT

## 2024-06-25 PROCEDURE — G2211 COMPLEX E/M VISIT ADD ON: HCPCS | Performed by: INTERNAL MEDICINE

## 2024-06-25 PROCEDURE — 83520 IMMUNOASSAY QUANT NOS NONAB: CPT

## 2024-06-25 PROCEDURE — 99000 SPECIMEN HANDLING OFFICE-LAB: CPT

## 2024-06-25 PROCEDURE — 99204 OFFICE O/P NEW MOD 45 MIN: CPT | Performed by: INTERNAL MEDICINE

## 2024-06-25 PROCEDURE — 86160 COMPLEMENT ANTIGEN: CPT

## 2024-06-25 PROCEDURE — 36415 COLL VENOUS BLD VENIPUNCTURE: CPT

## 2024-06-25 PROCEDURE — 86235 NUCLEAR ANTIGEN ANTIBODY: CPT

## 2024-06-25 PROCEDURE — 99213 OFFICE O/P EST LOW 20 MIN: CPT | Performed by: INTERNAL MEDICINE

## 2024-06-25 PROCEDURE — 86704 HEP B CORE ANTIBODY TOTAL: CPT

## 2024-06-25 PROCEDURE — 81001 URINALYSIS AUTO W/SCOPE: CPT

## 2024-06-25 PROCEDURE — 86481 TB AG RESPONSE T-CELL SUSP: CPT

## 2024-06-25 PROCEDURE — 83516 IMMUNOASSAY NONANTIBODY: CPT

## 2024-06-25 ASSESSMENT — PAIN SCALES - GENERAL: PAINLEVEL: NO PAIN (0)

## 2024-06-25 NOTE — LETTER
6/25/2024       RE: Denia Montes  [address]  [place] [address]     Dear Colleague,    Thank you for referring your patient, Denia Montes, to the [facility] RHEUMATOLOGY at [facility]. Please see a copy of my visit note below.                           Chief Complaint/Reason for Visit: polyarthralgia    HPI:    Denia martinez is a 38 year old female with past medical history listed below.  She was seen by Dr.Sumi Middleton at [facility] rheumatology. She was diagnosed with seronegative RA and was started on  mg daily. At that time, she had stiffness in her hands x 15 minutes without pain or swelling. She says with HCQ, her symptoms have gone away. She says she has noticed swelling of all her fingers in the am that goes away on its own. Sometimes she has numbness of hands in the AM. Occasional swelling of ankles. She has had low back pain on the right side for less than 3 months. It gets better when she changes her position when she sits. Does not wake her up from sleep. History neg for dactylitis, uveitis, bloody diarrhea, or psoriasis.  Her fingers go numb when she is gripping on to some things. Mom has type 1 DM. She works for a legal firm.     REVIEW OF SYSTEMS    General - neg for fatigue  HEENT - neg for dry eyes and dry mouth, neg for oral ulcers.   Cardiovascular - neg for chest pain or palpitations   Respiratory - neg for sob or cough  Gastrointestinal - neg for bloody diarrhea  Genitourinary - neg for blood in urine   Skin - neg for skin rashes, no malar rash or raynaud's  Neuro - pos for numbness of hands, neg for stroke or seizure  Hematologic - neg for history of blood clots, does not bruise easy  Psych - neg for anxiety or depression   OBS/GYN- no pregnancy plans for the next one year  Has one child - 3 year old boy ( His name is Unique)  Does not smoke, use alcohol or recreational drugs      Past Medical History:  "  Diagnosis Date    Cervical high risk HPV (human papillomavirus) test positive 2015, 16, 18, 19, 20    Hashimoto's thyroiditis     Hx of colposcopy with cervical biopsy 10/21/2016    [place] Bx: Neg NIL, ECC: Neg.    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), managed as type 1 (H)     Miscarriage      Past Surgical History:   Procedure Laterality Date     SECTION N/A 2021    Procedure:  SECTION;  Surgeon: Jessica Arellano MD;  Location:  L+D     Family History   Problem Relation Age of Onset    Hypertension Mother     Diabetes Mother     Hyperlipidemia Mother     Kidney Disease Mother     Thyroid Disease Mother     Hyperlipidemia Father     Hypertension Father     Kidney Disease Father     Skin Cancer Father     Skin Cancer Maternal Grandfather     Substance Abuse Brother      Social History     Socioeconomic History    Marital status: Single   Tobacco Use    Smoking status: Never     Passive exposure: Never    Smokeless tobacco: Never   Substance and Sexual Activity    Alcohol use: No    Drug use: No    Sexual activity: Yes     Partners: Male     Birth control/protection: None   Other Topics Concern    Parent/sibling w/ CABG, MI or angioplasty before 65F 55M? No   Social History Narrative    Social history 22:     Starting to get back into exercising again. Feels good.     Wears a helmet when biking. Wears a seatbelt when driving, etc. Feels safe at home. Has had 3 pregnancies; one delicery. Lives with herself, her partner, their son, and one housemate. Works for [employer]. Nickname \"Scan.\"      Social Determinants of Health     Financial Resource Strain: Low Risk  (3/13/2024)    Financial Resource Strain     Within the past 12 months, have you or your family members you live with been unable to get utilities (heat, electricity) when it was really needed?: No   Food Insecurity: Low Risk  (3/13/2024)    Food Insecurity     Within the " past 12 months, did you worry that your food would run out before you got money to buy more?: No     Within the past 12 months, did the food you bought just not last and you didn t have money to get more?: No   Transportation Needs: Low Risk  (3/13/2024)    Transportation Needs     Within the past 12 months, has lack of transportation kept you from medical appointments, getting your medicines, non-medical meetings or appointments, work, or from getting things that you need?: No   Physical Activity: Insufficiently Active (3/13/2024)    Exercise Vital Sign     Days of Exercise per Week: 1 day     Minutes of Exercise per Session: 30 min   Stress: No Stress Concern Present (3/13/2024)    [facility] - Occupational Stress Questionnaire     Feeling of Stress : Not at all   Social Connections: Unknown (3/13/2024)    Social Connection and Isolation Panel [NHANES]     Frequency of Social Gatherings with Friends and Family: Three times a week   Interpersonal Safety: Low Risk  (3/14/2024)    Interpersonal Safety     Do you feel physically and emotionally safe where you currently live?: Yes     Within the past 12 months, have you been hit, slapped, kicked or otherwise physically hurt by someone?: No     Within the past 12 months, have you been humiliated or emotionally abused in other ways by your partner or ex-partner?: No   Housing Stability: Low Risk  (3/13/2024)    Housing Stability     Do you have housing? : Yes     Are you worried about losing your housing?: No       Allergies   Allergen Reactions    Sulfa Antibiotics Hives       Current Outpatient Medications   Medication Sig Dispense Refill    ACE/ARB NOT PRESCRIBED, INTENTIONAL, Please choose reason not prescribed, below      BD RUSSELL U/F 32G X 4 MM insulin pen needle USE 8 PEN NEEDLES DAILY 800 each 3    Continuous Blood Gluc  (DEXCOM G6 ) RAEANN Use to read blood sugars as per 's instructions. 1 each 0     Continuous Blood Gluc  (DEXCOM G7 ) RAEANN Use to read blood sugars as per 's instructions. 1 each 0    Continuous Blood Gluc Sensor (DEXCOM G6 SENSOR) MISC Change every 10 days. 9 each 3    Continuous Blood Gluc Sensor (DEXCOM G6 SENSOR) MISC CHANGE EVERY 10 DAYS. 9 each 3    Continuous Blood Gluc Sensor (DEXCOM G7 SENSOR) MISC Change every 10 days. 9 each 5    Continuous Blood Gluc Transmit (DEXCOM G6 TRANSMITTER) MISC Change every 3 months. 1 each 3    Glucagon, rDNA, (GLUCAGON EMERGENCY) 1 MG KIT Inject 1 mg into the muscle daily as needed (Severe Hypoglycemia) 1 kit 3    hydroxychloroquine (PLAQUENIL) 200 MG tablet Take 400 mg by mouth daily      Insulin Aspart FlexPen 100 UNIT/ML SOPN INJECT UNDER THE SKIN 1 UNIT FOR EVERY 15 GRAMS OF CARBOHYDRATES WITH BREAKFAST AND DINNER AND 1:10 FOR LUNCH. CORRECTION SCALE 1 UNIT FOR EVERY 50 ABOVE 140 THREE TIMES PER DAY WITH MEALS AND AT BEDTIME. UP TO 30 UNITS DAILY. 45 mL 3    metFORMIN (GLUCOPHAGE) 500 MG tablet TAKE TWO TABLETS BY MOUTH TWICE A  tablet 1    Omega-3 Fatty Acids (FISH OIL) 500 MG CAPS       SEMGLEE, YFGN, 100 UNIT/ML SOPN INJECT 33 UNITS UNDER THE SKIN DAILY 45 mL 3    tretinoin (RETIN-A) 0.025 % external cream Start tretinoin 0.025% cream at bedtime: apply pea-sized amount to the entire face then follow with moisturizer; start using a few nights per week then gradually increase to goal of nightly use as tolerated 45 g 11     Current Facility-Administered Medications   Medication Dose Route Frequency Provider Last Rate Last Admin    lidocaine (PF) (XYLOCAINE) 1 % injection 1 mL  1 mL   Mindy Duarte MD   1 mL at 02/23/24 1351    triamcinolone (KENALOG-40) injection 40 mg  40 mg   Mindy Duarte MD   40 mg at 02/23/24 1351       PHYSICAL EXAM    /86 (BP Location: Left arm, Patient Position: Sitting, Cuff Size: Adult Large)   Pulse 89   Wt 91.2 kg (201 lb)   SpO2 98%   BMI 33.45  kg/m        General: Alert, No apparent distress   Psych: Affect euthymic  Eyes: Sclera noninjected  Ears, Nose, Throat, Mouth: Oral mucosa moist with normal salivary pool  Skin: No rashes noted  Neck: No lymphadenopathy  Cardiovascular: Regular rate and rhythm, Normal S1 and S2 and No murmurs, rubs or gallops  Respiratory: Clear to auscultation with no wheezing or crackles  Abdomen: Soft, nontender, nondistended with normoactive bowel sounds  Musculoskeletal: Hands:  Normal. No synovitis   Wrists:  Normal.  Elbows:  Normal.  Shoulders:  Normal.  Feet:  Normal.  Ankles:  Normal.  Knees:  Normal.    LABS   Reviewed as below.     March-April 2024 - reviewed scanned records from OSH  CBC - wbc, hb normal. Plt elevated at 452k.   Creatinine, LFTs normal  ESR,CRP normal  TSH normal    RF,CCP neg  Ds DNA neg   Lyme ab neg   CRP elevated at 12.30  DINORA pos 1:40 speckled  QTB neg      Latest Reference Range & Units 12/21/20 13:56 01/14/22 09:29   Hep B Surface Agn NR^Nonreactive  Nonreactive    Hepatitis C Antibody Nonreactive   Nonreactive   HIV Antigen Antibody Combo NR^Nonreactive     Nonreactive      XR hands - no degenerative changes noted    ASSESSMENT      1. Polyarthralgia    2. Elevated C-reactive protein (CRP)    3. Long-term use of high-risk medication        (M25.50) Polyarthralgia  (primary encounter diagnosis)  Comment: RF,CCP neg. Diagnosed with seronegative RA by by Dr.Sumi Middleton at [facility] rheumatology.Started on  mg daily. No synovitis on exam.   Plan:   Continue  mg daily.   Orders Placed This Encounter   Procedures    Hepatitis B core antibody    Hepatitis B Surface Antibody    Complement C3    Complement C4    Fan MARCIA Antibody IgG    RNP Antibody IgG    SSA Ro MARCIA Antibody IgG    SSB La MARCIA Antibody IgG    UA with microscopic    [facility]; FSERO; Seroneg rad 3 profile (Laboratory Miscellaneous Order)    EMG    Quantiferon TB Gold Plus       (R79.82) Elevated C-reactive  protein (CRP)  Comment: noted   Plan: monitor     (Z79.899) Long-term use of high-risk medication  Comment: Plaquenil  Plan: have explained potential side effects of Plaquenil including but not limited to retinal toxicity, need for annual retinal eye exams by an ophthalmologist, skin pigmentation, possible QTc prolongation and cause for cardiac arrhythmias especially with medication interactions, possible GI upset, possible muscle weakness.     I spent 42 minutes on the date of the encounter doing chart review, history and exam, documentation and orders per the note.    Follow up in 6 months.      ENRIQUETA GONZALEZ MD    Division of Rheumatic & Autoimmune Diseases  [facility]

## 2024-06-25 NOTE — PROGRESS NOTES
Chief Complaint/Reason for Visit: polyarthralgia    HPI:    Denia martinez is a 38 year old female with past medical history listed below.  [facility]. She was diagnosed with seronegative RA and was started on  mg daily. At that time, she had stiffness in her hands x 15 minutes without pain or swelling. She says with HCQ, her symptoms have gone away. She says she has noticed swelling of all her fingers in the am that goes away on its own. Sometimes she has numbness of hands in the AM. Occasional swelling of ankles. She has had low back pain on the right side for less than 3 months. It gets better when she changes her position when she sits. Does not wake her up from sleep. History neg for dactylitis, uveitis, bloody diarrhea, or psoriasis.  Her fingers go numb when she is gripping on to some things. Mom has type 1 DM. She works for a legal firm.     REVIEW OF SYSTEMS    General - neg for fatigue  HEENT - neg for dry eyes and dry mouth, neg for oral ulcers.   Cardiovascular - neg for chest pain or palpitations   Respiratory - neg for sob or cough  Gastrointestinal - neg for bloody diarrhea  Genitourinary - neg for blood in urine   Skin - neg for skin rashes, no malar rash or raynaud's  Neuro - pos for numbness of hands, neg for stroke or seizure  Hematologic - neg for history of blood clots, does not bruise easy  Psych - neg for anxiety or depression   OBS/GYN- no pregnancy plans for the next one year  Has one child - 3 year old boy ( His name is Unique)  Does not smoke, use alcohol or recreational drugs      Past Medical History:   Diagnosis Date    Cervical high risk HPV (human papillomavirus) test positive 08/2015 8/2015, 09/23/16, 12/14/18, 12/16/19, 12/29/20    Hashimoto's thyroiditis     Hx of colposcopy with cervical biopsy 10/21/2016    [place] Bx: Neg NIL, ECC: Neg.    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), managed as type  "1 (H)     Miscarriage      Past Surgical History:   Procedure Laterality Date     SECTION N/A 2021    Procedure:  SECTION;  Surgeon: Jessica Arellano MD;  Location:  L+D     Family History   Problem Relation Age of Onset    Hypertension Mother     Diabetes Mother     Hyperlipidemia Mother     Kidney Disease Mother     Thyroid Disease Mother     Hyperlipidemia Father     Hypertension Father     Kidney Disease Father     Skin Cancer Father     Skin Cancer Maternal Grandfather     Substance Abuse Brother      Social History     Socioeconomic History    Marital status: Single   Tobacco Use    Smoking status: Never     Passive exposure: Never    Smokeless tobacco: Never   Substance and Sexual Activity    Alcohol use: No    Drug use: No    Sexual activity: Yes     Partners: Male     Birth control/protection: None   Other Topics Concern    Parent/sibling w/ CABG, MI or angioplasty before 65F 55M? No   Social History Narrative    Social history 22:     Starting to get back into exercising again. Feels good.     Wears a helmet when biking. Wears a seatbelt when driving, etc. Feels safe at home. Has had 3 pregnancies; one delicery. Lives with herself, her partner, their son, and one housemate. Works for [employer]. Nickname \"[employer].\"      Social Determinants of Health     Financial Resource Strain: Low Risk  (3/13/2024)    Financial Resource Strain     Within the past 12 months, have you or your family members you live with been unable to get utilities (heat, electricity) when it was really needed?: No   Food Insecurity: Low Risk  (3/13/2024)    Food Insecurity     Within the past 12 months, did you worry that your food would run out before you got money to buy more?: No     Within the past 12 months, did the food you bought just not last and you didn t have money to get more?: No   Transportation Needs: Low Risk  (3/13/2024)    Transportation Needs     Within the past 12 months, " has lack of transportation kept you from medical appointments, getting your medicines, non-medical meetings or appointments, work, or from getting things that you need?: No   Physical Activity: Insufficiently Active (3/13/2024)    Exercise Vital Sign     Days of Exercise per Week: 1 day     Minutes of Exercise per Session: 30 min   Stress: No Stress Concern Present (3/13/2024)    [employer] [place] [employer] - Occupational Stress Questionnaire     Feeling of Stress : Not at all   Social Connections: Unknown (3/13/2024)    Social Connection and Isolation Panel [NHANES]     Frequency of Social Gatherings with Friends and Family: Three times a week   Interpersonal Safety: Low Risk  (3/14/2024)    Interpersonal Safety     Do you feel physically and emotionally safe where you currently live?: Yes     Within the past 12 months, have you been hit, slapped, kicked or otherwise physically hurt by someone?: No     Within the past 12 months, have you been humiliated or emotionally abused in other ways by your partner or ex-partner?: No   Housing Stability: Low Risk  (3/13/2024)    Housing Stability     Do you have housing? : Yes     Are you worried about losing your housing?: No       Allergies   Allergen Reactions    Sulfa Antibiotics Hives       Current Outpatient Medications   Medication Sig Dispense Refill    ACE/ARB NOT PRESCRIBED, INTENTIONAL, Please choose reason not prescribed, below      BD RUSSELL U/F 32G X 4 MM insulin pen needle USE 8 PEN NEEDLES DAILY 800 each 3    Continuous Blood Gluc  (DEXCOM G6 ) RAEANN Use to read blood sugars as per 's instructions. 1 each 0    Continuous Blood Gluc  (DEXCOM G7 ) RAEANN Use to read blood sugars as per 's instructions. 1 each 0    Continuous Blood Gluc Sensor (DEXCOM G6 SENSOR) MISC Change every 10 days. 9 each 3    Continuous Blood Gluc Sensor (DEXCOM G6 SENSOR) MISC CHANGE EVERY 10 DAYS. 9 each 3    Continuous  Blood Gluc Sensor (DEXCOM G7 SENSOR) MISC Change every 10 days. 9 each 5    Continuous Blood Gluc Transmit (DEXCOM G6 TRANSMITTER) MISC Change every 3 months. 1 each 3    Glucagon, rDNA, (GLUCAGON EMERGENCY) 1 MG KIT Inject 1 mg into the muscle daily as needed (Severe Hypoglycemia) 1 kit 3    hydroxychloroquine (PLAQUENIL) 200 MG tablet Take 400 mg by mouth daily      Insulin Aspart FlexPen 100 UNIT/ML SOPN INJECT UNDER THE SKIN 1 UNIT FOR EVERY 15 GRAMS OF CARBOHYDRATES WITH BREAKFAST AND DINNER AND 1:10 FOR LUNCH. CORRECTION SCALE 1 UNIT FOR EVERY 50 ABOVE 140 THREE TIMES PER DAY WITH MEALS AND AT BEDTIME. UP TO 30 UNITS DAILY. 45 mL 3    metFORMIN (GLUCOPHAGE) 500 MG tablet TAKE TWO TABLETS BY MOUTH TWICE A  tablet 1    Omega-3 Fatty Acids (FISH OIL) 500 MG CAPS       SEMGLEE, YFGN, 100 UNIT/ML SOPN INJECT 33 UNITS UNDER THE SKIN DAILY 45 mL 3    tretinoin (RETIN-A) 0.025 % external cream Start tretinoin 0.025% cream at bedtime: apply pea-sized amount to the entire face then follow with moisturizer; start using a few nights per week then gradually increase to goal of nightly use as tolerated 45 g 11     Current Facility-Administered Medications   Medication Dose Route Frequency Provider Last Rate Last Admin    lidocaine (PF) (XYLOCAINE) 1 % injection 1 mL  1 mL   Mindy Duarte MD   1 mL at 02/23/24 1351    triamcinolone (KENALOG-40) injection 40 mg  40 mg   Mindy Duarte MD   40 mg at 02/23/24 1351       PHYSICAL EXAM    /86 (BP Location: Left arm, Patient Position: Sitting, Cuff Size: Adult Large)   Pulse 89   Wt 91.2 kg (201 lb)   SpO2 98%   BMI 33.45 kg/m        General: Alert, No apparent distress   Psych: Affect euthymic  Eyes: Sclera noninjected  Ears, Nose, Throat, Mouth: Oral mucosa moist with normal salivary pool  Skin: No rashes noted  Neck: No lymphadenopathy  Cardiovascular: Regular rate and rhythm, Normal S1 and S2 and No murmurs, rubs or  gallops  Respiratory: Clear to auscultation with no wheezing or crackles  Abdomen: Soft, nontender, nondistended with normoactive bowel sounds  Musculoskeletal: Hands:  Normal. No synovitis   Wrists:  Normal.  Elbows:  Normal.  Shoulders:  Normal.  Feet:  Normal.  Ankles:  Normal.  Knees:  Normal.    LABS   Reviewed as below.     March-April 2024 - reviewed scanned records from OSH  CBC - wbc, hb normal. Plt elevated at 452k.   Creatinine, LFTs normal  ESR,CRP normal  TSH normal    RF,CCP neg  Ds DNA neg   Lyme ab neg   CRP elevated at 12.30  DINORA pos 1:40 speckled  QTB neg      Latest Reference Range & Units 12/21/20 13:56 01/14/22 09:29   Hep B Surface Agn NR^Nonreactive  Nonreactive    Hepatitis C Antibody Nonreactive   Nonreactive   HIV Antigen Antibody Combo NR^Nonreactive     Nonreactive      XR hands - no degenerative changes noted    ASSESSMENT      1. Polyarthralgia    2. Elevated C-reactive protein (CRP)    3. Long-term use of high-risk medication        (M25.50) Polyarthralgia  (primary encounter diagnosis)  Comment: RF,CCP neg. Diagnosed with seronegative RA by by Dr.Sumi Middleton at [facility] rheumatology.Started on  mg daily. No synovitis on exam.   Plan:   Continue  mg daily.   Orders Placed This Encounter   Procedures    Hepatitis B core antibody    Hepatitis B Surface Antibody    Complement C3    Complement C4    Fan MARCIA Antibody IgG    RNP Antibody IgG    SSA Ro MARCIA Antibody IgG    SSB La MARCIA Antibody IgG    UA with microscopic    [facility]; FSERO; Seroneg rad 3 profile (Laboratory Miscellaneous Order)    EMG    Quantiferon TB Gold Plus       (R79.82) Elevated C-reactive protein (CRP)  Comment: noted   Plan: monitor     (Z79.899) Long-term use of high-risk medication  Comment: Plaquenil  Plan: have explained potential side effects of Plaquenil including but not limited to retinal toxicity, need for annual retinal eye exams by an ophthalmologist, skin pigmentation, possible  QTc prolongation and cause for cardiac arrhythmias especially with medication interactions, possible GI upset, possible muscle weakness.     I spent 42 minutes on the date of the encounter doing chart review, history and exam, documentation and orders per the note.    Follow up in 6 months.      ENRIQUETA GONZALEZ MD    Division of Rheumatic & Autoimmune Diseases  [facility]

## 2024-06-25 NOTE — NURSING NOTE
Chief Complaint   Patient presents with    Consult     /86 (BP Location: Left arm, Patient Position: Sitting, Cuff Size: Adult Large)   Pulse 89   Wt 91.2 kg (201 lb)   SpO2 98%   BMI 33.45 kg/m

## 2024-06-26 ENCOUNTER — TELEPHONE (OUTPATIENT)
Dept: NEUROLOGY | Facility: CLINIC | Age: 39
End: 2024-06-26

## 2024-06-26 LAB
HBV CORE AB SERPL QL IA: NONREACTIVE
HBV SURFACE AB SERPL IA-ACNC: >1000 M[IU]/ML
HBV SURFACE AB SERPL IA-ACNC: REACTIVE M[IU]/ML

## 2024-06-27 LAB
C3 SERPL-MCNC: 112 MG/DL (ref 81–157)
C4 SERPL-MCNC: 27 MG/DL (ref 13–39)
ENA SM IGG SER IA-ACNC: <0.7 U/ML
ENA SM IGG SER IA-ACNC: NEGATIVE
ENA SS-A AB SER IA-ACNC: <0.5 U/ML
ENA SS-A AB SER IA-ACNC: NEGATIVE
ENA SS-B IGG SER IA-ACNC: <0.6 U/ML
ENA SS-B IGG SER IA-ACNC: NEGATIVE
GAMMA INTERFERON BACKGROUND BLD IA-ACNC: 0 IU/ML
M TB IFN-G BLD-IMP: NEGATIVE
M TB IFN-G CD4+ BCKGRND COR BLD-ACNC: 10 IU/ML
MITOGEN IGNF BCKGRD COR BLD-ACNC: 0.01 IU/ML
MITOGEN IGNF BCKGRD COR BLD-ACNC: 0.03 IU/ML
QUANTIFERON MITOGEN: 10 IU/ML
QUANTIFERON NIL TUBE: 0 IU/ML
QUANTIFERON TB1 TUBE: 0.01 IU/ML
QUANTIFERON TB2 TUBE: 0.03
U1 SNRNP IGG SER IA-ACNC: <1.1 U/ML
U1 SNRNP IGG SER IA-ACNC: NEGATIVE

## 2024-07-07 LAB — MAYO MISC RESULT: NORMAL

## 2024-07-08 ENCOUNTER — TELEPHONE (OUTPATIENT)
Dept: RHEUMATOLOGY | Facility: CLINIC | Age: 39
End: 2024-07-08
Payer: COMMERCIAL

## 2024-07-08 DIAGNOSIS — M25.50 POLYARTHRALGIA: Primary | ICD-10-CM

## 2024-07-08 DIAGNOSIS — R79.82 ELEVATED C-REACTIVE PROTEIN (CRP): ICD-10-CM

## 2024-07-08 RX ORDER — HYDROXYCHLOROQUINE SULFATE 200 MG/1
400 TABLET, FILM COATED ORAL DAILY
Qty: 180 TABLET | Refills: 0 | Status: SHIPPED | OUTPATIENT
Start: 2024-07-08

## 2024-07-08 NOTE — TELEPHONE ENCOUNTER
Last Written Prescription Date:  7/7/23  Last Fill Quantity: unknown,  # refills: unknown   Last office visit: Visit date not found ; last virtual visit: Visit date not found with prescribing provider:  previously filled by Dr Middleton -  [facility] Office Visit:  1/6/25 [place]    Last seen 6/25/24 Dr Almaguer.  Patient is asking you to take over this RX. Patient states she is taking 400mg/d.  Records from [facility] show patient was started on the medication 7/6/23.  Last eye exam 3/28/24.     Requested Prescriptions   Pending Prescriptions Disp Refills    hydroxychloroquine (PLAQUENIL) 200 MG tablet 180 tablet 1     Sig: Take 2 tablets (400 mg) by mouth daily       There is no refill protocol information for this order        Pam Benavidez RN

## 2024-07-08 NOTE — TELEPHONE ENCOUNTER
Health Call Center    Phone Message    May a detailed message be left on voicemail: yes     Reason for Call: Medication Refill Request    Has the patient contacted the pharmacy for the refill? Yes     Name of medication being requested:   hydroxychloroquine (PLAQUENIL) 200 MG tablet     Provider who prescribed the medication: n/a    Pharmacy:   [facility] - [place], [place] - [address]       Date medication is needed: 7/8/2024      Patient is wanting Dr. Almaguer to take over the prescription. Patient states she takes 400mg of the prescription and is wanting 3 month supply.        Action Taken: Message routed to:  [facility] Rheum    Travel Screening: Not Applicable     Date of Service:

## 2024-07-31 ENCOUNTER — OFFICE VISIT (OUTPATIENT)
Dept: DERMATOLOGY | Facility: CLINIC | Age: 39
End: 2024-07-31
Payer: COMMERCIAL

## 2024-07-31 DIAGNOSIS — L70.0 ACNE VULGARIS: ICD-10-CM

## 2024-07-31 DIAGNOSIS — D22.9 BENIGN NEVUS: Primary | ICD-10-CM

## 2024-07-31 PROCEDURE — 99214 OFFICE O/P EST MOD 30 MIN: CPT | Performed by: DERMATOLOGY

## 2024-07-31 RX ORDER — CLINDAMYCIN PHOSPHATE 10 UG/ML
LOTION TOPICAL 2 TIMES DAILY
Qty: 60 ML | Refills: 11 | Status: SHIPPED | OUTPATIENT
Start: 2024-07-31

## 2024-07-31 RX ORDER — TRETINOIN 0.25 MG/G
CREAM TOPICAL
Qty: 45 G | Refills: 11 | Status: SHIPPED | OUTPATIENT
Start: 2024-07-31

## 2024-07-31 ASSESSMENT — PAIN SCALES - GENERAL: PAINLEVEL: NO PAIN (0)

## 2024-07-31 NOTE — PROGRESS NOTES
[facility] Dermatology Note  Encounter Date: Jul 31, 2024  Office Visit     Dermatology Problem List:  # Suspect benign nevus, left nare, photo 9/9/23  # Dermatofibroma, L calf  # Acne vulgaris, mild  - tretinoin 0.025%, clindamycin 1% lotion     Soc hx: grew up in [place]. Moved to [place] for school many years ago.  ____________________________________________     Assessment & Plan:      # Benign nevus, left nare. Stable.   - reassuringly remains stable and no concerning features  - continue to monitor, notify for changes or symptoms     # Acne vulgaris, chronic, active, improved.  Some improvement on tretinoin, can continue. Started clindamycin PRN. Discussed spironolactone for future if needed.  - Can continue tretinoin 0.025% cream at bedtime  - Start clindamycin 1% lotion, apply  topically 2 times daily To face as needed PRN  - can also use antimicrobial washes such as hibiclens, azelaic acid, benzoyl peroxide  - future: consider spironolactone        Procedures Performed:   None.    Follow-up: Follow up in 1 year or earlier if new or changing lesion. Advised can also follow with PCP if doing well on topicals and they may be willing to refill with return to derm as needed for new issues     Staff and Scribe:     Scribe Disclosure:   SINA GABRIEL, am serving as a scribe; to document services personally performed by Tori Molina MD-based on data collection and the provider's statements to me.      Provider Disclosure:   The documentation recorded by the scribe accurately reflects the services I personally performed and the decisions made by me.    Tori Molina MD    Department of Dermatology  [facility]: Phone: [phone], Fax: [phone]  8/2/2024     ____________________________________________    CC: Derm Problem (Recheck spot on nose. Hasn't noticed any changes. )    HPI:  Ms. Loza  Jacqueline Montes is a(n) 38 year old female who presents today as a return patient for above. Last seen in dermatology by me on 9/8/23, at which time she was started on tretinoin for acne vulgaris.     No changes to spot on nose  Acne doing well, it's better, a little bit hormonal    Patient is otherwise feeling well, without additional skin concerns.    Labs Reviewed:  N/A    Physical Exam:  Vitals: There were no vitals taken for this visit.  SKIN: Total skin excluding the undergarment areas was performed. The exam included the head/face, neck, both arms, chest, back, abdomen, both legs, digits and/or nails.   - L nasal ala 4 mm brown papule with 2 adjacent similar papules measuring 2 mm each. Unchanged from prior.  - acneiform papule on R cheek.  - No other lesions of concern on areas examined.     Medications:  Current Outpatient Medications   Medication Sig Dispense Refill    ACE/ARB NOT PRESCRIBED, INTENTIONAL, Please choose reason not prescribed, below      BD RUSSELL U/F 32G X 4 MM insulin pen needle USE 8 PEN NEEDLES DAILY 800 each 3    Continuous Blood Gluc  (DEXCOM G6 ) RAEANN Use to read blood sugars as per 's instructions. 1 each 0    Continuous Blood Gluc  (DEXCOM G7 ) RAEANN Use to read blood sugars as per 's instructions. 1 each 0    Continuous Blood Gluc Sensor (DEXCOM G6 SENSOR) MISC Change every 10 days. 9 each 3    Continuous Blood Gluc Sensor (DEXCOM G6 SENSOR) MISC CHANGE EVERY 10 DAYS. 9 each 3    Continuous Blood Gluc Sensor (DEXCOM G7 SENSOR) MISC Change every 10 days. 9 each 5    Continuous Blood Gluc Transmit (DEXCOM G6 TRANSMITTER) MISC Change every 3 months. 1 each 3    Glucagon, rDNA, (GLUCAGON EMERGENCY) 1 MG KIT Inject 1 mg into the muscle daily as needed (Severe Hypoglycemia) 1 kit 3    hydroxychloroquine (PLAQUENIL) 200 MG tablet Take 2 tablets (400 mg) by mouth daily 180 tablet 0    Insulin Aspart FlexPen 100 UNIT/ML SOPN INJECT UNDER  THE SKIN 1 UNIT FOR EVERY 15 GRAMS OF CARBOHYDRATES WITH BREAKFAST AND DINNER AND 1:10 FOR LUNCH. CORRECTION SCALE 1 UNIT FOR EVERY 50 ABOVE 140 THREE TIMES PER DAY WITH MEALS AND AT BEDTIME. UP TO 30 UNITS DAILY. 45 mL 3    metFORMIN (GLUCOPHAGE) 500 MG tablet TAKE TWO TABLETS BY MOUTH TWICE A  tablet 1    Omega-3 Fatty Acids (FISH OIL) 500 MG CAPS       SEMGLEE, YFGN, 100 UNIT/ML SOPN INJECT 33 UNITS UNDER THE SKIN DAILY 45 mL 3    clindamycin (CLEOCIN T) 1 % external lotion Apply topically 2 times daily To face as needed 60 mL 11    tretinoin (RETIN-A) 0.025 % external cream Start tretinoin 0.025% cream at bedtime: apply pea-sized amount to the entire face then follow with moisturizer; start using a few nights per week then gradually increase to goal of nightly use as tolerated 45 g 11     Current Facility-Administered Medications   Medication Dose Route Frequency Provider Last Rate Last Admin    lidocaine (PF) (XYLOCAINE) 1 % injection 1 mL  1 mL   Mindy Duarte MD   1 mL at 02/23/24 1351    triamcinolone (KENALOG-40) injection 40 mg  40 mg   Mindy Duarte MD   40 mg at 02/23/24 1351      Past Medical History:   Patient Active Problem List   Diagnosis    History of kidney stones    Hyperlipidemia LDL goal <70    Cervical high risk HPV (human papillomavirus) test positive    Plantar warts    Diabetes mellitus type 1.5 (H)    Hashimoto's thyroiditis    Plantar fasciitis    Seronegative rheumatoid arthritis (H)     Past Medical History:   Diagnosis Date    Cervical high risk HPV (human papillomavirus) test positive 08/2015 8/2015, 09/23/16, 12/14/18, 12/16/19, 12/29/20    Hashimoto's thyroiditis     Hx of colposcopy with cervical biopsy 10/21/2016    [place] Bx: Neg NIL, ECC: Neg.    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 on close of this encounter.

## 2024-07-31 NOTE — LETTER
7/31/2024       RE: Denia Montes  [address]  [facility] [address]     Dear Colleague,    Thank you for referring your patient, Denia Montes, to the [facility] [place] at [facility]. Please see a copy of my visit note below.    [facility] Dermatology Note  Encounter Date: Jul 31, 2024  Office Visit     Dermatology Problem List:  # Suspect benign nevus, left nare, photo 9/9/23  # Dermatofibroma, L calf  # Acne vulgaris, mild  - tretinoin 0.025%, clindamycin 1% lotion     Soc hx: grew up in [place]. Moved to [place] for school many years ago.  ____________________________________________     Assessment & Plan:      # Benign nevus, left nare. Stable.   - reassuringly remains stable and no concerning features  - continue to monitor, notify for changes or symptoms     # Acne vulgaris, chronic, active, improved.  Some improvement on tretinoin, can continue. Started clindamycin PRN. Discussed spironolactone for future if needed.  - Can continue tretinoin 0.025% cream at bedtime  - Start clindamycin 1% lotion, apply  topically 2 times daily To face as needed PRN  - can also use antimicrobial washes such as hibiclens, azelaic acid, benzoyl peroxide  - future: consider spironolactone        Procedures Performed:   None.    Follow-up: Follow up in 1 year or earlier if new or changing lesion. Advised can also follow with PCP if doing well on topicals and they may be willing to refill with return to derm as needed for new issues     Staff and Scribe:     Scribe Disclosure:   I, SINA HU, am serving as a scribe; to document services personally performed by Tori Molina MD-based on data collection and the provider's statements to me.      Provider Disclosure:   The documentation recorded by the scribe accurately reflects the services I personally performed and the decisions made by me.    Tori Molina,  MD    Department of Dermatology  [facility]: Phone: [phone], Fax: [phone]  8/2/2024     ____________________________________________    CC: Derm Problem (Recheck spot on nose. Hasn't noticed any changes. )    HPI:  Ms. Denia Montes is a(n) 38 year old female who presents today as a return patient for above. Last seen in dermatology by me on 9/8/23, at which time she was started on tretinoin for acne vulgaris.     No changes to spot on nose  Acne doing well, it's better, a little bit hormonal    Patient is otherwise feeling well, without additional skin concerns.    Labs Reviewed:  N/A    Physical Exam:  Vitals: There were no vitals taken for this visit.  SKIN: Total skin excluding the undergarment areas was performed. The exam included the head/face, neck, both arms, chest, back, abdomen, both legs, digits and/or nails.   - L nasal ala 4 mm brown papule with 2 adjacent similar papules measuring 2 mm each. Unchanged from prior.  - acneiform papule on R cheek.  - No other lesions of concern on areas examined.     Medications:  Current Outpatient Medications   Medication Sig Dispense Refill     ACE/ARB NOT PRESCRIBED, INTENTIONAL, Please choose reason not prescribed, below       BD RUSSELL U/F 32G X 4 MM insulin pen needle USE 8 PEN NEEDLES DAILY 800 each 3     Continuous Blood Gluc  (DEXCOM G6 ) RAEANN Use to read blood sugars as per 's instructions. 1 each 0     Continuous Blood Gluc  (DEXCOM G7 ) RAEANN Use to read blood sugars as per 's instructions. 1 each 0     Continuous Blood Gluc Sensor (DEXCOM G6 SENSOR) MISC Change every 10 days. 9 each 3     Continuous Blood Gluc Sensor (DEXCOM G6 SENSOR) MISC CHANGE EVERY 10 DAYS. 9 each 3     Continuous Blood Gluc Sensor (DEXCOM G7 SENSOR) MISC Change every 10 days. 9 each 5     Continuous Blood Gluc Transmit  (DEXCOM G6 TRANSMITTER) MISC Change every 3 months. 1 each 3     Glucagon, rDNA, (GLUCAGON EMERGENCY) 1 MG KIT Inject 1 mg into the muscle daily as needed (Severe Hypoglycemia) 1 kit 3     hydroxychloroquine (PLAQUENIL) 200 MG tablet Take 2 tablets (400 mg) by mouth daily 180 tablet 0     Insulin Aspart FlexPen 100 UNIT/ML SOPN INJECT UNDER THE SKIN 1 UNIT FOR EVERY 15 GRAMS OF CARBOHYDRATES WITH BREAKFAST AND DINNER AND 1:10 FOR LUNCH. CORRECTION SCALE 1 UNIT FOR EVERY 50 ABOVE 140 THREE TIMES PER DAY WITH MEALS AND AT BEDTIME. UP TO 30 UNITS DAILY. 45 mL 3     metFORMIN (GLUCOPHAGE) 500 MG tablet TAKE TWO TABLETS BY MOUTH TWICE A  tablet 1     Omega-3 Fatty Acids (FISH OIL) 500 MG CAPS        SEMGLEE, YFGN, 100 UNIT/ML SOPN INJECT 33 UNITS UNDER THE SKIN DAILY 45 mL 3     clindamycin (CLEOCIN T) 1 % external lotion Apply topically 2 times daily To face as needed 60 mL 11     tretinoin (RETIN-A) 0.025 % external cream Start tretinoin 0.025% cream at bedtime: apply pea-sized amount to the entire face then follow with moisturizer; start using a few nights per week then gradually increase to goal of nightly use as tolerated 45 g 11     Current Facility-Administered Medications   Medication Dose Route Frequency Provider Last Rate Last Admin     lidocaine (PF) (XYLOCAINE) 1 % injection 1 mL  1 mL   Mindy Duarte MD   1 mL at 02/23/24 1351     triamcinolone (KENALOG-40) injection 40 mg  40 mg   Mindy Duarte MD   40 mg at 02/23/24 1351      Past Medical History:   Patient Active Problem List   Diagnosis     History of kidney stones     Hyperlipidemia LDL goal <70     Cervical high risk HPV (human papillomavirus) test positive     Plantar warts     Diabetes mellitus type 1.5 (H)     Hashimoto's thyroiditis     Plantar fasciitis     Seronegative rheumatoid arthritis (H)     Past Medical History:   Diagnosis Date     Cervical high risk HPV (human papillomavirus) test positive 08/2015     8/2015, 09/23/16, 12/14/18, 12/16/19, 12/29/20     Hashimoto's thyroiditis      Hx of colposcopy with cervical biopsy 10/21/2016    [place] Bx: Neg NIL, ECC: Neg.     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 on close of this encounter.      Again, thank you for allowing me to participate in the care of your patient.      Sincerely,    Tori Molina MD

## 2024-07-31 NOTE — NURSING NOTE
Dermatology Rooming Note    Denia Montes's goals for this visit include:   Chief Complaint   Patient presents with    Derm Problem     Recheck spot on nose. Hasn't noticed any changes.      Lorraine Mora RN

## 2024-09-15 ENCOUNTER — HEALTH MAINTENANCE LETTER (OUTPATIENT)
Age: 39
End: 2024-09-15

## 2024-09-27 ENCOUNTER — IMMUNIZATION (OUTPATIENT)
Dept: FAMILY MEDICINE | Facility: CLINIC | Age: 39
End: 2024-09-27
Payer: COMMERCIAL

## 2024-10-01 ENCOUNTER — OFFICE VISIT (OUTPATIENT)
Dept: NEUROLOGY | Facility: CLINIC | Age: 39
End: 2024-10-01
Payer: COMMERCIAL

## 2024-10-01 DIAGNOSIS — M25.50 POLYARTHRALGIA: ICD-10-CM

## 2024-10-01 DIAGNOSIS — R79.82 ELEVATED C-REACTIVE PROTEIN (CRP): ICD-10-CM

## 2024-10-01 DIAGNOSIS — Z79.899 LONG-TERM USE OF HIGH-RISK MEDICATION: ICD-10-CM

## 2024-10-01 DIAGNOSIS — R20.2 PARESTHESIA: Primary | ICD-10-CM

## 2024-10-01 PROCEDURE — 95886 MUSC TEST DONE W/N TEST COMP: CPT | Performed by: STUDENT IN AN ORGANIZED HEALTH CARE EDUCATION/TRAINING PROGRAM

## 2024-10-01 PROCEDURE — 95910 NRV CNDJ TEST 7-8 STUDIES: CPT | Performed by: STUDENT IN AN ORGANIZED HEALTH CARE EDUCATION/TRAINING PROGRAM

## 2024-10-01 NOTE — LETTER
10/1/2024       RE: Denia Montes  [address]  [facility] [address]     Dear Colleague,    Thank you for referring your patient, Denia Montes, to the [facility] EMG [facility] [place] at [facility]. Please see a copy of my visit note below.                        [facility]  Electrodiagnostic Laboratory                 Department of Neurology                                                                                                         Test Date:  10/1/2024    Patient: Bud Montes : 1985 Physician: Holger Quinones MD   Sex: Female AGE: 39 year Ref Phys: Tabitha Almaguer MD   ID#: [id number]   Technician: Unique Hernandez     History and Examination:  39-year-old female with seronegative rheumatoid arthritis presented with numbness and tingling of all fingers.  She is right-handed and symptoms are symmetric.  She denies any neck pain or radicular pain.  This study was performed to assess for peripheral neuropathy versus entrapment neuropathy of the upper limbs.    Techniques:  Motor and sensory conduction studies were done with surface recording electrodes.  Temperature was monitored and recorded throughout the study. Upper extremities were maintained at a temperature of 32 degrees Centigrade or higher.  EMG was done with a concentric needle electrode.     Results:  Nerve conduction studies showed prolonged peak latency and mild slowing of conduction velocities of bilateral median sensory nerve action potentials.  Right median distal motor latency was also borderline prolonged with preserved amplitude.  Bilateral ulnar sensory nerve and compound muscle action potentials were normal.  Median-ulnar palmar latency difference were prolonged on both sides, right slightly worse than left.    Needle EMG of the right upper limb was normal.    Interpretation:  This is an abnormal study.  There is electrophysiologic  evidence of bilateral median neuropathies at the wrist as can be seen in carpal tunnel syndrome.  The findings are electrophysiologically mild to moderate on the right and mild on the left.    Holger Quinones MD  Department of Neurology        Nerve Conduction Studies  Motor Sites      Latency Neg. Amp Neg. Amp Diff Segment Distance Velocity Neg. Dur Neg Area Diff Temperature Comment   Site (ms) Norm (mV) Norm (%)  cm m/s Norm (ms) (%) ( C)    Left Median (APB) Motor   Wrist 3.7  < 4.4 9.0  > 5.0  Wrist-APB 8   5.9  31.3    Elbow 7.6 - 9.1  > 5.0 1 Elbow-Wrist 20 51  > 48 5.8 2 31    Right Median (APB) Motor   Wrist 4.4  < 4.4 13.0  > 5.0  Wrist-APB 8   5.7  31.7    Elbow 8.7 - 12.3  > 5.0 -5 Elbow-Wrist 21.5 50  > 48 5.7 -5 31.7    Left Ulnar (ADM) Motor   Wrist 3.2  < 3.5 9.0  > 5.0  Wrist-ADM 8   5.6  31.1    Below Elbow 6.2 - 8.1 - -10 Below Elbow-Wrist 18.5 62  > 48 5.7 -7 31.1    Above Elbow 8.0 - 8.1 - 0 Above Elbow-Below Elbow 9.5 53  > 48 5.8 1 31.2    Right Ulnar (ADM) Motor   Wrist 2.7  < 3.5 8.1  > 5.0  Wrist-ADM 8   5.3  31.9    Below Elbow 5.9 - 7.6 - -6 Below Elbow-Wrist 18.5 58  > 48 5.6 -6 31.9    Above Elbow 7.4 - 7.6 - 0 Above Elbow-Below Elbow 9.5 63  > 48 5.7 4 31.9      F-Wave Sites      Min F-Lat Max-Min F-Lat Mean F-Lat   Site (ms) (ms) (ms)   Left Ulnar F-Wave   Wrist 24.4 3.7 26.0   Right Ulnar F-Wave   Wrist 24.5 2.4 26.1     Sensory Sites      Onset Lat Peak Lat Amp (O-P) Amp (P-P) Segment Distance Velocity Temperature Comment   Site ms (ms)  V Norm ( V)  cm m/s Norm ( C)    Left Median Sensory   Wrist-Dig II 3.1 3.8 59  > 10 81 Wrist-Dig II 14 45  > 48 31    Right Median Sensory   Wrist-Dig II 3.4 4.2 41  > 10 56 Wrist-Dig II 14 41  > 48 31.7    Left Median-Ulnar Palmar Sensory        Median   Palm-Wrist 1.80 2.4 113 - 141 Palm-Wrist 8 44 - 31.1         Ulnar   Palm-Wrist 1.28 1.73 29 - 38 Palm-Wrist 8 63 - 31.1    Right Median-Ulnar Palmar Sensory        Median   Palm-Wrist 2.2 2.8  88 - 110 Palm-Wrist 8 36 - 31.7         Ulnar   Palm-Wrist 1.43 1.90 24 - 32 Palm-Wrist 8 56 - 31.7    Left Ulnar Sensory   Wrist-Dig V 2.2 2.9 46  > 8 64 Wrist-Dig V 12.5 57  > 48 31.1    Right Ulnar Sensory   Wrist-Dig V 2.2 2.9 44  > 8 50 Wrist-Dig V 12.5 57  > 48 31.7      Inter-Nerve Comparisons     Nerve 1 Value 1 Nerve 2 Value 2 Parameter Result Normal   Sensory Sites   R Median Palm-Wrist 2.8 ms R Ulnar Palm-Wrist 1.90 ms Peak Lat Diff 0.90 ms <0.40   L Median Palm-Wrist 2.4 ms L Ulnar Palm-Wrist 1.73 ms Peak Lat Diff 0.67 ms <0.40       Electromyography     Side Muscle Ins Act Fibs/PSW Fasc HF Amp Dur Poly Recrt Int Pat   Right FDI Nml None Nml 0 Nml Nml 0 Nml Nml   Right Pronator Teres Nml None Nml 0 Nml Nml 0 Nml Nml   Right Triceps Nml None Nml 0 Nml Nml 0 Nml Nml   Right Deltoid Nml None Nml 0 Nml Nml 0 Nml Nml   Right APB Nml None Nml 0 Nml Nml 0 Nml Nml         NCS Waveforms:    Motor                Sensory                    F-Wave           Ultrasound Images:            Again, thank you for allowing me to participate in the care of your patient.      Sincerely,    Holger Quinones MD

## 2024-10-01 NOTE — PROGRESS NOTES
[facility]  Electrodiagnostic Laboratory                 Department of Neurology                                                                                                         Test Date:  10/1/2024    Patient: Bud Montes : 1985 Physician: Holger Quinones MD   Sex: Female AGE: 39 year Ref Phys: Tabitha Almaguer MD   ID#: [id number]   Technician: Unique Hernandez     History and Examination:  39-year-old female with seronegative rheumatoid arthritis presented with numbness and tingling of all fingers.  She is right-handed and symptoms are symmetric.  She denies any neck pain or radicular pain.  This study was performed to assess for peripheral neuropathy versus entrapment neuropathy of the upper limbs.    Techniques:  Motor and sensory conduction studies were done with surface recording electrodes.  Temperature was monitored and recorded throughout the study. Upper extremities were maintained at a temperature of 32 degrees Centigrade or higher.  EMG was done with a concentric needle electrode.     Results:  Nerve conduction studies showed prolonged peak latency and mild slowing of conduction velocities of bilateral median sensory nerve action potentials.  Right median distal motor latency was also borderline prolonged with preserved amplitude.  Bilateral ulnar sensory nerve and compound muscle action potentials were normal.  Median-ulnar palmar latency difference were prolonged on both sides, right slightly worse than left.    Needle EMG of the right upper limb was normal.    Interpretation:  This is an abnormal study.  There is electrophysiologic evidence of bilateral median neuropathies at the wrist as can be seen in carpal tunnel syndrome.  The findings are electrophysiologically mild to moderate on the right and mild on the left.    Holger Quinones MD  Department of Neurology        Nerve Conduction Studies  Motor Sites      Latency Neg. Amp Neg. Amp  Diff Segment Distance Velocity Neg. Dur Neg Area Diff Temperature Comment   Site (ms) Norm (mV) Norm (%)  cm m/s Norm (ms) (%) ( C)    Left Median (APB) Motor   Wrist 3.7  < 4.4 9.0  > 5.0  Wrist-APB 8   5.9  31.3    Elbow 7.6 - 9.1  > 5.0 1 Elbow-Wrist 20 51  > 48 5.8 2 31    Right Median (APB) Motor   Wrist 4.4  < 4.4 13.0  > 5.0  Wrist-APB 8   5.7  31.7    Elbow 8.7 - 12.3  > 5.0 -5 Elbow-Wrist 21.5 50  > 48 5.7 -5 31.7    Left Ulnar (ADM) Motor   Wrist 3.2  < 3.5 9.0  > 5.0  Wrist-ADM 8   5.6  31.1    Below Elbow 6.2 - 8.1 - -10 Below Elbow-Wrist 18.5 62  > 48 5.7 -7 31.1    Above Elbow 8.0 - 8.1 - 0 Above Elbow-Below Elbow 9.5 53  > 48 5.8 1 31.2    Right Ulnar (ADM) Motor   Wrist 2.7  < 3.5 8.1  > 5.0  Wrist-ADM 8   5.3  31.9    Below Elbow 5.9 - 7.6 - -6 Below Elbow-Wrist 18.5 58  > 48 5.6 -6 31.9    Above Elbow 7.4 - 7.6 - 0 Above Elbow-Below Elbow 9.5 63  > 48 5.7 4 31.9      F-Wave Sites      Min F-Lat Max-Min F-Lat Mean F-Lat   Site (ms) (ms) (ms)   Left Ulnar F-Wave   Wrist 24.4 3.7 26.0   Right Ulnar F-Wave   Wrist 24.5 2.4 26.1     Sensory Sites      Onset Lat Peak Lat Amp (O-P) Amp (P-P) Segment Distance Velocity Temperature Comment   Site ms (ms)  V Norm ( V)  cm m/s Norm ( C)    Left Median Sensory   Wrist-Dig II 3.1 3.8 59  > 10 81 Wrist-Dig II 14 45  > 48 31    Right Median Sensory   Wrist-Dig II 3.4 4.2 41  > 10 56 Wrist-Dig II 14 41  > 48 31.7    Left Median-Ulnar Palmar Sensory        Median   Palm-Wrist 1.80 2.4 113 - 141 Palm-Wrist 8 44 - 31.1         Ulnar   Palm-Wrist 1.28 1.73 29 - 38 Palm-Wrist 8 63 - 31.1    Right Median-Ulnar Palmar Sensory        Median   Palm-Wrist 2.2 2.8 88 - 110 Palm-Wrist 8 36 - 31.7         Ulnar   Palm-Wrist 1.43 1.90 24 - 32 Palm-Wrist 8 56 - 31.7    Left Ulnar Sensory   Wrist-Dig V 2.2 2.9 46  > 8 64 Wrist-Dig V 12.5 57  > 48 31.1    Right Ulnar Sensory   Wrist-Dig V 2.2 2.9 44  > 8 50 Wrist-Dig V 12.5 57  > 48 31.7      Inter-Nerve Comparisons     Nerve 1  Value 1 Nerve 2 Value 2 Parameter Result Normal   Sensory Sites   R Median Palm-Wrist 2.8 ms R Ulnar Palm-Wrist 1.90 ms Peak Lat Diff 0.90 ms <0.40   L Median Palm-Wrist 2.4 ms L Ulnar Palm-Wrist 1.73 ms Peak Lat Diff 0.67 ms <0.40       Electromyography     Side Muscle Ins Act Fibs/PSW Fasc HF Amp Dur Poly Recrt Int Pat   Right FDI Nml None Nml 0 Nml Nml 0 Nml Nml   Right Pronator Teres Nml None Nml 0 Nml Nml 0 Nml Nml   Right Triceps Nml None Nml 0 Nml Nml 0 Nml Nml   Right Deltoid Nml None Nml 0 Nml Nml 0 Nml Nml   Right APB Nml None Nml 0 Nml Nml 0 Nml Nml         NCS Waveforms:    Motor                Sensory                    F-Wave           Ultrasound Images:

## 2024-10-02 ENCOUNTER — TELEPHONE (OUTPATIENT)
Dept: RHEUMATOLOGY | Facility: CLINIC | Age: 39
End: 2024-10-02
Payer: COMMERCIAL

## 2024-10-02 DIAGNOSIS — G56.03 BILATERAL CARPAL TUNNEL SYNDROME: Primary | ICD-10-CM

## 2024-10-02 NOTE — TELEPHONE ENCOUNTER
Noted bilateral median neuropathy at the wrist. Referral placed to Orthopedics. Please let the patient know, thank you.

## 2024-10-03 ENCOUNTER — PATIENT OUTREACH (OUTPATIENT)
Dept: CARE COORDINATION | Facility: CLINIC | Age: 39
End: 2024-10-03
Payer: COMMERCIAL

## 2024-10-08 NOTE — TELEPHONE ENCOUNTER
DIAGNOSIS: Bilateral carpal tunnel syndrome    APPOINTMENT DATE: 10/25/2024   NOTES STATUS DETAILS   OFFICE NOTE from referring provider Internal (Rheum) 6/25/2024: Dr. Almaguer   OFFICE NOTE from other specialist Internal  2/23/2024: Dr. Duarte  4/14/2023: Dr. Aaron   OPERATIVE REPORT Internal 2/23/2024: Dr. Dai (L Middle Finger A1 Pulley)   EMG report Internal 10/1/2024: Dr. Quinones   MEDICATION LIST Internal    XRAYS (IMAGES & REPORTS) Internal 3/15/2023: B/L Hand

## 2024-10-23 NOTE — PROGRESS NOTES
[facility]  Sports Medicine Clinic  [facility]           SUBJECTIVE       Denia Montes is a 39 year old female presenting to clinic today bilateral hand numbness and tingling.  Patient has been seen for bilateral wrist stiffness in the morning stiffness, as well as swelling, by rheumatology.  She has been on hydroxychloroquine in the past for this as well.    Background:   Occupation: Legal services intake   Hand Dominance (If pertinent): Right hand     Injury (Y/N): No   Work Comp (Y/N): No   Date of injury: NA   Mechanism of Injury: No VICENTE     Duration of symptoms: 2-3 years    Intensity (1-10): No pain    Aggravating factors: Notices her symptoms when sleeping, biking, and braiding hair   Relieving Factors: Nothing    Prior Evaluation: PCP   Previous Surgery on the area (Y/N): No    Physical Therapy (Previous/Current/None): No     Physical Activity/Exercise (What, How Often): Biking and walking; 6 days week       PMH, Medications and Allergies were reviewed and updated as needed.    ROS:  As noted above otherwise negative.    Patient Active Problem List   Diagnosis    History of kidney stones    Hyperlipidemia LDL goal <70    Cervical high risk HPV (human papillomavirus) test positive    Plantar warts    Diabetes mellitus type 1.5 (H)    Hashimoto's thyroiditis    Plantar fasciitis    Seronegative rheumatoid arthritis (H)       Current Outpatient Medications   Medication Sig Dispense Refill    ACE/ARB NOT PRESCRIBED, INTENTIONAL, Please choose reason not prescribed, below      BD RUSSELL U/F 32G X 4 MM insulin pen needle USE 8 PEN NEEDLES DAILY 800 each 3    clindamycin (CLEOCIN T) 1 % external lotion Apply topically 2 times daily To face as needed 60 mL 11    Continuous Blood Gluc  (DEXCOM G6 ) RAEANN Use to read blood sugars as per 's instructions. 1 each 0    Continuous Blood Gluc  (DEXCOM G7 ) RAEANN Use to read blood sugars as per  's instructions. 1 each 0    Continuous Blood Gluc Sensor (DEXCOM G6 SENSOR) MISC Change every 10 days. 9 each 3    Continuous Blood Gluc Sensor (DEXCOM G6 SENSOR) MISC CHANGE EVERY 10 DAYS. 9 each 3    Continuous Blood Gluc Sensor (DEXCOM G7 SENSOR) MISC Change every 10 days. 9 each 5    Continuous Blood Gluc Transmit (DEXCOM G6 TRANSMITTER) MISC Change every 3 months. 1 each 3    Glucagon, rDNA, (GLUCAGON EMERGENCY) 1 MG KIT Inject 1 mg into the muscle daily as needed (Severe Hypoglycemia) 1 kit 3    Insulin Aspart FlexPen 100 UNIT/ML SOPN INJECT UNDER THE SKIN 1 UNIT FOR EVERY 15 GRAMS OF CARBOHYDRATES WITH BREAKFAST AND DINNER AND 1:10 FOR LUNCH. CORRECTION SCALE 1 UNIT FOR EVERY 50 ABOVE 140 THREE TIMES PER DAY WITH MEALS AND AT BEDTIME. UP TO 30 UNITS DAILY. 45 mL 3    metFORMIN (GLUCOPHAGE) 500 MG tablet TAKE TWO TABLETS BY MOUTH TWICE A  tablet 1    Omega-3 Fatty Acids (FISH OIL) 500 MG CAPS       SEMGLEE, YFGN, 100 UNIT/ML SOPN INJECT 33 UNITS UNDER THE SKIN DAILY 45 mL 3    tretinoin (RETIN-A) 0.025 % external cream Start tretinoin 0.025% cream at bedtime: apply pea-sized amount to the entire face then follow with moisturizer; start using a few nights per week then gradually increase to goal of nightly use as tolerated 45 g 11    hydroxychloroquine (PLAQUENIL) 200 MG tablet Take 2 tablets (400 mg) by mouth daily (Patient not taking: Reported on 10/25/2024) 180 tablet 0            OBJECTIVE:       Vitals: There were no vitals filed for this visit.  BMI: There is no height or weight on file to calculate BMI.    Gen:  Well nourished and in no acute distress  HEENT: Extraocular movement intact  Neck: Supple  Pulm:  Breathing Comfortably. No increased respiratory effort.  Psych: Euthymic. Appropriately answers questions    MSK: Bilateral wrist without evidence of asymmetry, edema, erythema, or ecchymosis.  No evidence of thenar or hyperthenar eminence atrophy.  Of the right wrist, there is  decreased range of motion in wrist flexion to roughly 45 degrees, with that of 80 to 85 degrees of the left wrist in flexion.  Extension, ulnar deviation, radial deviation are all intact.  Wrist strength is appropriate.   strength is appropriate.  Negative Tinel and Phalen at the wrist bilaterally.      Progress Notes  Holger Quinones MD (Physician)  Neurology                                                                                                                                                                                                                              [facility]  Electrodiagnostic Laboratory                                                                                                                               Department of Neurology                                                                                                           Test Date:  10/1/2024     Patient: Bud Montes : 1985 Physician: Holger Quinones MD   Sex: Female AGE: 39 year Ref Phys: Tabitha Almaguer MD   ID#: [id number]     Technician: Unique Hernandez      History and Examination:  39-year-old female with seronegative rheumatoid arthritis presented with numbness and tingling of all fingers.  She is right-handed and symptoms are symmetric.  She denies any neck pain or radicular pain.  This study was performed to assess for peripheral neuropathy versus entrapment neuropathy of the upper limbs.     Techniques:  Motor and sensory conduction studies were done with surface recording electrodes.  Temperature was monitored and recorded throughout the study. Upper extremities were maintained at a temperature of 32 degrees Centigrade or higher.  EMG was done with a concentric needle electrode.     Results:  Nerve conduction studies showed prolonged peak latency and mild slowing of conduction velocities of bilateral median sensory nerve action potentials.  Right median distal motor  latency was also borderline prolonged with preserved amplitude.  Bilateral ulnar sensory nerve and compound muscle action potentials were normal.  Median-ulnar palmar latency difference were prolonged on both sides, right slightly worse than left.     Needle EMG of the right upper limb was normal.     Interpretation:  This is an abnormal study.  There is electrophysiologic evidence of bilateral median neuropathies at the wrist as can be seen in carpal tunnel syndrome.  The findings are electrophysiologically mild to moderate on the right and mild on the left.                   ASSESSMENT and PLAN:     Bud was seen today for pain and pain.    Diagnoses and all orders for this visit:    Bilateral carpal tunnel syndrome  -     Orthopedic  Referral  -     Hand Therapy Referral; Future      Patient is a 39-year-old female with a past medical history of a questionable seronegative rheumatoid arthritis, previously treated for wrist stiffness and swelling with hydroxychloroquine, presenting with very mild intermittent index, middle, and ring finger paresthesias only when doing hair or riding the bike.  EMG was obtained that did show bilateral median neuropathies which were mild to moderate on the right and mild on the left.  Her clinical exam is rather noncontributory today and the patient and her history is not as bothered by this with the exception of the above times.    Plan:  Have had a long discussion with the patient in terms of options for management.  At this point, given the fact the patient has not done most of the noninvasive modalities, would recommend these as a first-line.  We have placed an order for hand therapy.  I am also provided the patient with bilateral nonthumb spica wrist braces, to be worn while doing hair, riding a bike, and at night while sleeping.  Tylenol as needed.  We will see the patient back in roughly 6 weeks, to assess her symptoms.  If she is still bothered or she has an  increase in pain, we can consider doing ultrasound-guided corticosteroid injections for the carpal tunnel.  At this point however, the patient's symptoms are rather mild to pursue that as an initial therapy.  I would like her to follow-up with her rheumatoid arthritis    Options for treatment and/or follow-up care were reviewed with the patient was actively involved in the decision making process. Patient verbalized understanding and was in agreement with the plan.    Lucien Fan DO  , Sports Medicine  Department of Family Medicine and [facility]

## 2024-10-25 ENCOUNTER — PRE VISIT (OUTPATIENT)
Dept: ORTHOPEDICS | Facility: CLINIC | Age: 39
End: 2024-10-25

## 2024-10-25 ENCOUNTER — OFFICE VISIT (OUTPATIENT)
Dept: ORTHOPEDICS | Facility: CLINIC | Age: 39
End: 2024-10-25
Attending: INTERNAL MEDICINE
Payer: COMMERCIAL

## 2024-10-25 DIAGNOSIS — G56.03 BILATERAL CARPAL TUNNEL SYNDROME: ICD-10-CM

## 2024-10-25 PROCEDURE — 99213 OFFICE O/P EST LOW 20 MIN: CPT | Performed by: STUDENT IN AN ORGANIZED HEALTH CARE EDUCATION/TRAINING PROGRAM

## 2024-10-25 NOTE — LETTER
10/25/2024      RE: Denia Montes  [address]  [facility] [place] [address]     Dear Colleague,    Thank you for referring your patient, Denia Montes, to the [facility] [place]. Please see a copy of my visit note below.    [facility]  Sports Medicine Clinic  Clinics and Surgery Center           SUBJECTIVE       Denia Montes is a 39 year old female presenting to clinic today bilateral hand numbness and tingling.  Patient has been seen for bilateral wrist stiffness in the morning stiffness, as well as swelling, by rheumatology.  She has been on hydroxychloroquine in the past for this as well.    Background:   Occupation: Legal services intake   Hand Dominance (If pertinent): Right hand     Injury (Y/N): No   Work Comp (Y/N): No   Date of injury: NA   Mechanism of Injury: No VICENTE     Duration of symptoms: 2-3 years    Intensity (1-10): No pain    Aggravating factors: Notices her symptoms when sleeping, biking, and braiding hair   Relieving Factors: Nothing    Prior Evaluation: PCP   Previous Surgery on the area (Y/N): No    Physical Therapy (Previous/Current/None): No     Physical Activity/Exercise (What, How Often): Biking and walking; 6 days week       PMH, Medications and Allergies were reviewed and updated as needed.    ROS:  As noted above otherwise negative.    Patient Active Problem List   Diagnosis     History of kidney stones     Hyperlipidemia LDL goal <70     Cervical high risk HPV (human papillomavirus) test positive     Plantar warts     Diabetes mellitus type 1.5 (H)     Hashimoto's thyroiditis     Plantar fasciitis     Seronegative rheumatoid arthritis (H)       Current Outpatient Medications   Medication Sig Dispense Refill     ACE/ARB NOT PRESCRIBED, INTENTIONAL, Please choose reason not prescribed, below       BD RUSSELL U/F 32G X 4 MM insulin pen needle USE 8 PEN NEEDLES DAILY 800 each 3     clindamycin (CLEOCIN T) 1 % external lotion  Apply topically 2 times daily To face as needed 60 mL 11     Continuous Blood Gluc  (DEXCOM G6 ) RAEANN Use to read blood sugars as per 's instructions. 1 each 0     Continuous Blood Gluc  (DEXCOM G7 ) RAEANN Use to read blood sugars as per 's instructions. 1 each 0     Continuous Blood Gluc Sensor (DEXCOM G6 SENSOR) MISC Change every 10 days. 9 each 3     Continuous Blood Gluc Sensor (DEXCOM G6 SENSOR) MISC CHANGE EVERY 10 DAYS. 9 each 3     Continuous Blood Gluc Sensor (DEXCOM G7 SENSOR) MISC Change every 10 days. 9 each 5     Continuous Blood Gluc Transmit (DEXCOM G6 TRANSMITTER) MISC Change every 3 months. 1 each 3     Glucagon, rDNA, (GLUCAGON EMERGENCY) 1 MG KIT Inject 1 mg into the muscle daily as needed (Severe Hypoglycemia) 1 kit 3     Insulin Aspart FlexPen 100 UNIT/ML SOPN INJECT UNDER THE SKIN 1 UNIT FOR EVERY 15 GRAMS OF CARBOHYDRATES WITH BREAKFAST AND DINNER AND 1:10 FOR LUNCH. CORRECTION SCALE 1 UNIT FOR EVERY 50 ABOVE 140 THREE TIMES PER DAY WITH MEALS AND AT BEDTIME. UP TO 30 UNITS DAILY. 45 mL 3     metFORMIN (GLUCOPHAGE) 500 MG tablet TAKE TWO TABLETS BY MOUTH TWICE A  tablet 1     Omega-3 Fatty Acids (FISH OIL) 500 MG CAPS        SEMGLEE, YFGN, 100 UNIT/ML SOPN INJECT 33 UNITS UNDER THE SKIN DAILY 45 mL 3     tretinoin (RETIN-A) 0.025 % external cream Start tretinoin 0.025% cream at bedtime: apply pea-sized amount to the entire face then follow with moisturizer; start using a few nights per week then gradually increase to goal of nightly use as tolerated 45 g 11     hydroxychloroquine (PLAQUENIL) 200 MG tablet Take 2 tablets (400 mg) by mouth daily (Patient not taking: Reported on 10/25/2024) 180 tablet 0            OBJECTIVE:       Vitals: There were no vitals filed for this visit.  BMI: There is no height or weight on file to calculate BMI.    Gen:  Well nourished and in no acute distress  HEENT: Extraocular movement intact  Neck:  Supple  Pulm:  Breathing Comfortably. No increased respiratory effort.  Psych: Euthymic. Appropriately answers questions    MSK: Bilateral wrist without evidence of asymmetry, edema, erythema, or ecchymosis.  No evidence of thenar or hyperthenar eminence atrophy.  Of the right wrist, there is decreased range of motion in wrist flexion to roughly 45 degrees, with that of 80 to 85 degrees of the left wrist in flexion.  Extension, ulnar deviation, radial deviation are all intact.  Wrist strength is appropriate.   strength is appropriate.  Negative Tinel and Phalen at the wrist bilaterally.      Progress Notes  Holger Quinones MD (Physician)   Neurology                                                                                                                                                                                                                              [facility]  Electrodiagnostic Laboratory                                                                                                                               Department of Neurology                                                                                                           Test Date:  10/1/2024     Patient: Bud Montes : 1985 Physician: Holger Quinones MD   Sex: Female AGE: 39 year Ref Phys: Tabitha Almaguer MD   ID#: [id number]     Technician: Unique Hernandez      History and Examination:  39-year-old female with seronegative rheumatoid arthritis presented with numbness and tingling of all fingers.  She is right-handed and symptoms are symmetric.  She denies any neck pain or radicular pain.  This study was performed to assess for peripheral neuropathy versus entrapment neuropathy of the upper limbs.     Techniques:  Motor and sensory conduction studies were done with surface recording electrodes.  Temperature was monitored and recorded throughout the study. Upper extremities were maintained at  a temperature of 32 degrees Centigrade or higher.  EMG was done with a concentric needle electrode.     Results:  Nerve conduction studies showed prolonged peak latency and mild slowing of conduction velocities of bilateral median sensory nerve action potentials.  Right median distal motor latency was also borderline prolonged with preserved amplitude.  Bilateral ulnar sensory nerve and compound muscle action potentials were normal.  Median-ulnar palmar latency difference were prolonged on both sides, right slightly worse than left.     Needle EMG of the right upper limb was normal.     Interpretation:  This is an abnormal study.  There is electrophysiologic evidence of bilateral median neuropathies at the wrist as can be seen in carpal tunnel syndrome.  The findings are electrophysiologically mild to moderate on the right and mild on the left.                   ASSESSMENT and PLAN:     Bud was seen today for pain and pain.    Diagnoses and all orders for this visit:    Bilateral carpal tunnel syndrome  -     Orthopedic  Referral  -     Hand Therapy Referral; Future      Patient is a 39-year-old female with a past medical history of a questionable seronegative rheumatoid arthritis, previously treated for wrist stiffness and swelling with hydroxychloroquine, presenting with very mild intermittent index, middle, and ring finger paresthesias only when doing hair or riding the bike.  EMG was obtained that did show bilateral median neuropathies which were mild to moderate on the right and mild on the left.  Her clinical exam is rather noncontributory today and the patient and her history is not as bothered by this with the exception of the above times.    Plan:  Have had a long discussion with the patient in terms of options for management.  At this point, given the fact the patient has not done most of the noninvasive modalities, would recommend these as a first-line.  We have placed an order for hand  therapy.  I am also provided the patient with bilateral nonthumb spica wrist braces, to be worn while doing hair, riding a bike, and at night while sleeping.  Tylenol as needed.  We will see the patient back in roughly 6 weeks, to assess her symptoms.  If she is still bothered or she has an increase in pain, we can consider doing ultrasound-guided corticosteroid injections for the carpal tunnel.  At this point however, the patient's symptoms are rather mild to pursue that as an initial therapy.  I would like her to follow-up with her rheumatoid arthritis    Options for treatment and/or follow-up care were reviewed with the patient was actively involved in the decision making process. Patient verbalized understanding and was in agreement with the plan.    Lucien Fan DO  , Sports Medicine  Department of Family Medicine and [facility]      Again, thank you for allowing me to participate in the care of your patient.      Sincerely,    Lucien Fan DO

## 2024-10-27 ENCOUNTER — TELEPHONE (OUTPATIENT)
Dept: RHEUMATOLOGY | Facility: CLINIC | Age: 39
End: 2024-10-27
Payer: COMMERCIAL

## 2024-10-27 DIAGNOSIS — G56.03 BILATERAL CARPAL TUNNEL SYNDROME: Primary | ICD-10-CM

## 2024-10-27 NOTE — TELEPHONE ENCOUNTER
Noted abnormal EMG - bilateral carpal tunnel syndrome - she can continue to follow up with sports medicine.

## 2024-11-04 DIAGNOSIS — Z98.891 S/P CESAREAN SECTION: ICD-10-CM

## 2024-11-04 DIAGNOSIS — E13.9 LATENT AUTOIMMUNE DIABETES MELLITUS IN ADULTS (LADA) (H): ICD-10-CM

## 2024-11-14 ENCOUNTER — OFFICE VISIT (OUTPATIENT)
Dept: FAMILY MEDICINE | Facility: CLINIC | Age: 39
End: 2024-11-14
Payer: COMMERCIAL

## 2024-11-14 ENCOUNTER — DOCUMENTATION ONLY (OUTPATIENT)
Dept: ORTHOPEDICS | Facility: CLINIC | Age: 39
End: 2024-11-14

## 2024-11-14 VITALS
HEART RATE: 94 BPM | HEIGHT: 65 IN | WEIGHT: 217.1 LBS | RESPIRATION RATE: 14 BRPM | OXYGEN SATURATION: 99 % | SYSTOLIC BLOOD PRESSURE: 133 MMHG | DIASTOLIC BLOOD PRESSURE: 86 MMHG | BODY MASS INDEX: 36.17 KG/M2 | TEMPERATURE: 98.2 F

## 2024-11-14 DIAGNOSIS — E66.812 CLASS 2 SEVERE OBESITY DUE TO EXCESS CALORIES WITH SERIOUS COMORBIDITY AND BODY MASS INDEX (BMI) OF 36.0 TO 36.9 IN ADULT (H): ICD-10-CM

## 2024-11-14 DIAGNOSIS — E66.01 CLASS 2 SEVERE OBESITY DUE TO EXCESS CALORIES WITH SERIOUS COMORBIDITY AND BODY MASS INDEX (BMI) OF 36.0 TO 36.9 IN ADULT (H): ICD-10-CM

## 2024-11-14 DIAGNOSIS — G56.03 CARPAL TUNNEL SYNDROME, BILATERAL: Primary | ICD-10-CM

## 2024-11-14 DIAGNOSIS — I10 STAGE 1 HYPERTENSION: Primary | ICD-10-CM

## 2024-11-14 DIAGNOSIS — E13.9 DIABETES MELLITUS TYPE 1.5 (H): ICD-10-CM

## 2024-11-14 RX ORDER — VALSARTAN 80 MG/1
80 TABLET ORAL DAILY
Qty: 90 TABLET | Refills: 3 | Status: SHIPPED | OUTPATIENT
Start: 2024-11-14

## 2024-11-14 NOTE — PROGRESS NOTES
"  Assessment & Plan     Stage 1 hypertension - new diagnosis  Given patient has diabetes with microalbuminuria and has realistically optimized lifestyle modifications, will plan to start an ARB for BP control and renal protection  Start:  - valsartan (DIOVAN) 80 MG tablet; Take 1 tablet (80 mg) by mouth daily.  Dispense: 90 tablet; Refill: 3  Lab-only visit in 1 month:  - Basic metabolic panel; Future    Diabetes mellitus type 1.5 (H) (Primary)  - Get Endocrinologist's labs in 1 month, they are \"standing orders\"    HPV-based cervical cancer screening due Jan 2025    Follow up plan  Return in about 4 weeks (around 12/12/2024) for in person visit or mychart message or video visit, with Dr. Lazo.    BMI  Estimated body mass index is 36.13 kg/m  as calculated from the following:    Height as of this encounter: 1.651 m (5' 5\").    Weight as of this encounter: 98.5 kg (217 lb 1.6 oz).     Return in about 4 weeks (around 12/12/2024) for in person visit or Fiestahhart message or video visit, with Dr. Lazo.    Db Farrell is a 39 year old, presenting for the following health issues:  Follow Up (BP) and Pain (Right hip pain )        11/14/2024     3:21 PM   Additional Questions   Roomed by Evens         11/14/2024    Information    services provided? No      HPI     Elevated BPs  Home BPs:  139/86 (today at home)  140/84  135/87  136/58  132/85  124/82  125/83 (June)    Was on the edge of HTN when at a lower weight and more active as well    Trying to increase exercise, difficult because works from home. Just started using stationary bike. Does not limit salt, but also doesn't eat much processed foods.     When first diagnosed with DM in 2017 declined ACE/ARB, but it was never revisited        Hip Pain and Leg Pain - improved.         Objective    /86   Pulse 94   Temp 98.2  F (36.8  C) (Temporal)   Resp 14   Ht 1.651 m (5' 5\")   Wt 98.5 kg (217 lb 1.6 oz)   LMP 10/26/2024 (Exact " Date)   SpO2 99%   Breastfeeding No   BMI 36.13 kg/m    Body mass index is 36.13 kg/m .  Physical Exam  Vitals reviewed.   Constitutional:       Appearance: Normal appearance. She is well-developed.   HENT:      Head: Normocephalic and atraumatic.   Eyes:      Conjunctiva/sclera: Conjunctivae normal.   Cardiovascular:      Rate and Rhythm: Normal rate.   Pulmonary:      Effort: Pulmonary effort is normal. No respiratory distress.   Skin:     General: Skin is warm and dry.      Coloration: Skin is not pale.      Findings: No erythema or rash.   Neurological:      Mental Status: She is alert.   Psychiatric:         Mood and Affect: Mood normal.         Behavior: Behavior normal.                    The longitudinal plan of care for the diagnosis(es)/condition(s) as documented were addressed during this visit. Due to the added complexity in care, I will continue to support Bud in the subsequent management and with ongoing continuity of care.      I spent a total of 30 minutes on the day of the visit.   Time spent by me today doing chart review, history and exam, documentation and further activities per the note    Signed Electronically by: Jyoti Lazo MD

## 2024-11-14 NOTE — PATIENT INSTRUCTIONS
"Patient Education   Here is the plan from today's visit    1. Diabetes mellitus type 1.5 (H) (Primary)  - Get Endocrinologist's labs in 1 month, they are \"standing orders\"    HPV-based cervical cancer screening due Jan 2025    4. Stage 1 hypertension  Check blood pressure weekly  Goal Blood Pressure <130/80  Start:  - valsartan (DIOVAN) 80 MG tablet; Take 1 tablet (80 mg) by mouth daily.  Dispense: 90 tablet; Refill: 3  Lab-only visit in 1 month:  - Basic metabolic panel; Future    Please call or return to clinic if your symptoms don't go away.    Follow up plan  Return in about 4 weeks (around 12/12/2024) for in person visit or One Hour Translation message or video visit, with Dr. Lazo.    Thank you for coming to [facility] today.  Lab Testing:  **If you had lab testing today and your results are reassuring or normal they will be mailed to you or sent through [employer] within 7 days.   **If the lab tests need quick action we will call you with the results.  **If you are having labs done on a different day, please call [phone] to schedule at [facility] or [phone] for other [facility] Outpatient Lab locations. Labs do not offer walk-in appointments.  The phone number we will call with results is # [phone] (home) . If this is not the best number please call our clinic and change the number.  Medication Refills:  If you need any refills please call your pharmacy and they will contact us.   If you need to  your refill at a new pharmacy, please contact the new pharmacy directly. The new pharmacy will help you get your medications transferred faster.   Scheduling:  If you have any concerns about today's visit or wish to schedule another appointment please call our office during normal business hours [phone] (8-5:00 M-F). If you can no longer make a scheduled visit, please cancel via [employer] or call us to cancel.   If a referral was made to an [facility] specialty provider and you do not " get a call from central scheduling, please refer to directions on your visit summary or call our office during normal business hours for assistance.   If a Mammogram was ordered for you at the Breast Center call [phone] to schedule or change your appointment.  If you had an XRay/CT/Ultrasound/MRI ordered the number is [phone] to schedule or change your radiology appointment.   [facility] has limited ultrasound appointments available on Wednesdays, if you would like your ultrasound at [facility], please call [phone] to schedule.   Medical Concerns:  If you have urgent medical concerns please call [phone] at any time of the day.    Jyoti Lazo MD

## 2024-11-15 ENCOUNTER — THERAPY VISIT (OUTPATIENT)
Dept: OCCUPATIONAL THERAPY | Facility: CLINIC | Age: 39
End: 2024-11-15
Payer: COMMERCIAL

## 2024-11-15 DIAGNOSIS — M25.531 PAIN IN BOTH WRISTS: Primary | ICD-10-CM

## 2024-11-15 DIAGNOSIS — G56.03 BILATERAL CARPAL TUNNEL SYNDROME: ICD-10-CM

## 2024-11-15 DIAGNOSIS — M25.532 PAIN IN BOTH WRISTS: Primary | ICD-10-CM

## 2024-11-15 PROCEDURE — 97760 ORTHOTIC MGMT&TRAING 1ST ENC: CPT | Mod: GO | Performed by: OCCUPATIONAL THERAPIST

## 2024-11-15 PROCEDURE — 97110 THERAPEUTIC EXERCISES: CPT | Mod: GO | Performed by: OCCUPATIONAL THERAPIST

## 2024-11-15 PROCEDURE — 97165 OT EVAL LOW COMPLEX 30 MIN: CPT | Mod: GO | Performed by: OCCUPATIONAL THERAPIST

## 2024-11-15 NOTE — PROGRESS NOTES
OCCUPATIONAL THERAPY EVALUATION  Type of Visit: Evaluation              Subjective        Presenting condition or subjective complaint:    Date of onset: 11/15/22    Relevant medical history:     Past Medical History:   Diagnosis Date    Cervical high risk HPV (human papillomavirus) test positive 2015, 16, 18, 19, 20    Hashimoto's thyroiditis     Hx of colposcopy with cervical biopsy 10/21/2016    Nyack Bx: Neg NIL, ECC: Neg.    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)     Pain assessment: Pain present     Objective   ADDITIONAL HISTORY:  Right hand dominant  Patient reports symptoms of pain, stiffness/loss of motion, weakness/loss of strength, edema, numbness, and tingling   Transportation: drives  Currently working in normal job without restrictions    Functional Outcome Measure:   Upper Extremity Functional Index Score:  SCORE:   Column Totals: /80: 78   (A lower score indicates greater disability.)       PAIN:  Pain Level at Rest: 0/10  Pain Level with Use: 1/10  Pain Location: hand, index finger, long finger, and ring finger  Pain Quality: Aching, Numb, and Tingling  Pain Frequency: intermittent  Pain is Worst: daytime  Pain is Exacerbated By: braiding hair, biking, computer work  Pain is Relieved By: hand shaking  Pain Progression: Unchanged    POSTURE: Rounded Forward Shoulders     EDEMA:  Mild  R dorsal hand      SENSATION: Decreased Median Nerve distribution per pt report     ROM:   Wrist,hand thumb WNL    OBSERVATIONS/APPEARANCE:  No noted atrophy of median innervated muscles      SPECIAL TESTS:   CTS Special Tests  Pain Report Left Right   Median Nerve Compression at Pronator +21 +21   Carpal Compression Test-Durkan Test (30 sec) +3 +3   Estrada Test for Lumbrical Incursion (fist x30 sec) - -   Tinel's at Carpal Tunnel + -   Phalen's Sign +10 +10     NEURAL TENSION TESTING: MNT: Median Neurodynamic Test (based on LATOSHA Perry's ULNT)   11/15/2024   0-5 Scale L:2 minimal tingling in MF  R: 5 slight tension felt by therapist at end of test   Position:   0/5: Arm across abdomen in coronal plane  1/5: Depress shoulder, ER to neutral ABD shoulder to 45 degrees  2/5: ER shoulder to end range, keep elbow at 90 degrees  3/5: Extend elbow to 0 degrees  4/5: Fully supinate forearm  5/5: Extend wrist, fingers and thumb  Notes:  (+) indicates beyond grade level but less than residential to next level  (-) indicates over residential to level  S1 onset/change of patient's symptoms  S2 definite stop point based on patient's discomfort level    STRENGTH:     Measured in pounds 11/15/2024 11/15/2024    Left Right   Trial 1 62 52     Lateral Pinch  Measured in pounds 11/15/2024 11/15/2024    Left Right   Trial 1 16 15     3 Point Pinch  Measured in pounds 11/15/2024 11/15/2024    Left Right   Trial 1 18 16       Median Nerve MMT (Scale of 0-5/5) 11/15/2024 11/15/2024    Left Right   Pronator Teres  5   FCR  5   FDS  5   FDP I, II  5   FPL  5   APB  5   Opponens Pollicis  5   FPB  5   Lumbricals I, II  5       PALPATION:   Median N not ttp at any spots    Assessment & Plan   CLINICAL IMPRESSIONS  Medical Diagnosis: B CTS    Treatment Diagnosis: B Wrist Pain    Impression/Assessment: Pt is a 39 year old female presenting to Occupational Therapy due to Bilateral Carpal Tunnel.  The following significant findings have been  identified: Impaired sensation and Pain.  These identified deficits interfere with their ability to perform self care tasks, recreational activities, and care of others as compared to previous level of function.     Clinical Decision Making (Complexity):  Assessment of Occupational Performance: 3-5 Performance Deficits  Occupational Performance Limitations: hygiene and grooming, care of others, home establishment and management, and work  Clinical Decision Making (Complexity): Low complexity    PLAN OF CARE  Treatment Interventions:  Modalities:  US, Iontophoresis, Paraffin, TENS, and E-Stim  Therapeutic Exercise:  AROM, AAROM, PROM, Tendon Gliding, Blocking, Reverse Blocking, Place and Hold, Contract Relax, Extensor Tracking, Isotonics, Isometrics, and Stabilization  Neuromuscular re-education:  Nerve Gliding, Coordination/Dexterity, Sensory re-education, Desensitization, Kinesthetic Training, Proprioceptive Training, Posture, Kinesiotaping, Isometrics, and Stabilization  Manual Techniques:  Coordination/Dexterity, Joint mobilization, Scar mobilization, Friction massage, Myofascial release, and Manual edema mobilization  Orthotic Fabrication:  Static, Static progressive, and Dynamic  Self Care:  Self Care Tasks, Ergonomic Considerations, and Work Tasks    Long Term Goals   OT Goal 1  Goal Identifier: Sx  Goal Description: Pt will braid hair for 10 minutes with a 50% decrease in sx reported  Rationale: In order to maximize safety and independence with performance of self-care activities  Goal Progress: Initial  Target Date: 01/15/25      Frequency of Treatment: 1x a week  Duration of Treatment: 8weeks     Recommended Referrals to Other Professionals:   Education Assessment: Learner/Method: Patient;Demonstration;Pictures/Video;No Barriers to Learning     Risks and benefits of evaluation/treatment have been explained.   Patient/Family/caregiver agrees with Plan of Care.     Evaluation Time:    OT Zuri, Low Complexity  Minutes (35400): 28       Signing Clinician: KAYLAH Gleason

## 2024-11-24 ENCOUNTER — HEALTH MAINTENANCE LETTER (OUTPATIENT)
Age: 39
End: 2024-11-24

## 2024-12-05 NOTE — PROGRESS NOTES
"PROCEDURE ENCOUNTER    [facility]  Lucien Fan, DO  2024     Name: Denia Montes  MRN: [id number]  Age: 39 year old  : 1985    Referring provider:   Diagnosis: R Wrist CTS    Procedure: Carpal Tunnel Injection - Ultrasound Guided  The patient was informed of the risks and the benefits of the procedure and a written consent was signed.  The patient s right wrist was prepped with chlorhexidine in sterile fashion.   40 mg of triamcinolone suspension was drawn up into a 3 mL syringe with 1.0 mL of 1% lidocaine.  Injection was performed using sterile technique.  Under ultrasound guidance a 1.5\" 22-gauge needle was used to enter the right wrist at the distal palmar crease medial to the median nerve.  Needle placement was visualized and documented with ultrasound.  Ultrasound visualization required to ensure injection material enters the perineural sheath and not a vessel or nerve itself.  Injection performed long axis to the probe.  Injection solution visualized surrounding the perineurium.  Images were permanently stored for the patient's record.  There were no complications. The patient tolerated the procedure well. There was negligible bleeding.   The patient was instructed to ice the wrist upon leaving clinic and refrain from overuse over the next 3 days.   The patient was instructed to call or go to the emergency room with any unusual pain, swelling, redness, or if otherwise concerned.      Lucien Fan D.O., [facility]  , Sports Medicine  Department of Family Medicine and [facility]    " (PF) 1 %  Outcome:  Tolerated well, no immediate complications  Procedure discussed: discussed risks, benefits, and alternatives    Consent Given by:  Patient  Timeout: timeout called immediately prior to procedure    Prep: patient was prepped and draped in usual sterile fashion

## 2024-12-06 ENCOUNTER — OFFICE VISIT (OUTPATIENT)
Dept: ORTHOPEDICS | Facility: CLINIC | Age: 39
End: 2024-12-06
Payer: COMMERCIAL

## 2024-12-06 DIAGNOSIS — G56.03 CARPAL TUNNEL SYNDROME, BILATERAL: Primary | ICD-10-CM

## 2024-12-06 NOTE — LETTER
"  2024      RE: Denia Montes  [address]  [facility] [address]     Dear Colleague,    Thank you for referring your patient, Denia Montes, to the [facility] SPORTS [facility]ICINE [facility]IN[facility] [place]. Please see a copy of my visit note below.    PROCEDURE ENCOUNTER    [facility]  Lucien COKERCaitlin Meng, DO  2024     Name: Denia Montes  MRN: [id number]  Age: 39 year old  : 1985    Referring provider:   Diagnosis: R Wrist CTS    Procedure: Carpal Tunnel Injection - Ultrasound Guided  The patient was informed of the risks and the benefits of the procedure and a written consent was signed.  The patient s right wrist was prepped with chlorhexidine in sterile fashion.   40 mg of triamcinolone suspension was drawn up into a 3 mL syringe with 1.0 mL of 1% lidocaine.  Injection was performed using sterile technique.  Under ultrasound guidance a 1.5\" 22-gauge needle was used to enter the right wrist at the distal palmar crease medial to the median nerve.  Needle placement was visualized and documented with ultrasound.  Ultrasound visualization required to ensure injection material enters the perineural sheath and not a vessel or nerve itself.  Injection performed long axis to the probe.  Injection solution visualized surrounding the perineurium.  Images were permanently stored for the patient's record.  There were no complications. The patient tolerated the procedure well. There was negligible bleeding.   The patient was instructed to ice the wrist upon leaving clinic and refrain from overuse over the next 3 days.   The patient was instructed to call or go to the emergency room with any unusual pain, swelling, redness, or if otherwise concerned.      Lucien Fan D.O., CAS[facility]  , Sports Medicine  Department of [facility] and [facility]      Hand / Upper Extremity Injection: R carpal tunnel    Date/Time: 2024 12:52 " PM    Performed by: Lucien Fan DO  Authorized by: Lucien Fan DO    Indications:  Pain  Needle Size:  25 G  Guidance: ultrasound    Approach:  Volar  Condition: carpal tunnel      Site:  R carpal tunnel  Medications:  40 mg triamcinolone 40 MG/ML; 1 mL lidocaine (PF) 1 %  Outcome:  Tolerated well, no immediate complications  Procedure discussed: discussed risks, benefits, and alternatives    Consent Given by:  Patient  Timeout: timeout called immediately prior to procedure    Prep: patient was prepped and draped in usual sterile fashion          Again, thank you for allowing me to participate in the care of your patient.      Sincerely,    Lucien Fan DO

## 2024-12-06 NOTE — NURSING NOTE
[address] [address]  Dept: [phone]  ______________________________________________________________________________    Patient: Denia Montes   : 1985   MRN: [id number]   2024    INVASIVE PROCEDURE SAFETY CHECKLIST    Date: 2024   Procedure:Right carpal tunnel injection US guidance  Patient Name: Denia Montes  MRN: [id number]  YOB: 1985    Action: Complete sections as appropriate. Any discrepancy results in a HARD COPY until resolved.     PRE PROCEDURE:  Patient ID verified with 2 identifiers (name and  or MRN): Yes  Procedure and site verified with patient/designee (when able): Yes  Accurate consent documentation in medical record: Yes  H&P (or appropriate assessment) documented in medical record: Yes  H&P must be up to 20 days prior to procedure and updates within 24 hours of procedure as applicable: NA  Relevant diagnostic and radiology test results appropriately labeled and displayed as applicable: Yes  Procedure site(s) marked with provider initials: NA    TIMEOUT:  Time-Out performed immediately prior to starting procedure, including verbal and active participation of all team members addressing the following:Yes  * Correct patient identify  * Confirmed that the correct side and site are marked  * An accurate procedure consent form  * Agreement on the procedure to be done  * Correct patient position  * Relevant images and results are properly labeled and appropriately displayed  * The need to administer antibiotics or fluids for irrigation purposes during the procedure as applicable   * Safety precautions based on patient history or medication use    DURING PROCEDURE: Verification of correct person, site, and procedures any time the responsibility for care of the patient is transferred to another member of the care team.       Prior to injection, verified patient identity using  patient's name and date of birth.  Due to injection administration, patient instructed to remain in clinic for 15 minutes  afterwards, and to report any adverse reaction to me immediately.    Carpal tunnel    Drug Amount Wasted:  Yes: 4.5 mg/ml   Vial/Syringe: Single dose vial  Expiration Date:  04/2028      Silvia Castillo ATC  December 6, 2024

## 2024-12-18 RX ADMIN — TRIAMCINOLONE ACETONIDE 40 MG: 40 INJECTION, SUSPENSION INTRA-ARTICULAR; INTRAMUSCULAR at 12:52

## 2024-12-18 RX ADMIN — LIDOCAINE HYDROCHLORIDE 1 ML: 10 INJECTION, SOLUTION EPIDURAL; INFILTRATION; INTRACAUDAL; PERINEURAL at 12:52

## 2024-12-18 NOTE — NURSING NOTE
[address] [address]  Dept: [phone]  ______________________________________________________________________________    Patient: Denia Montes   : 1985   MRN: [id number]   2024    INVASIVE PROCEDURE SAFETY CHECKLIST    Date: 2024   Procedure:Right carpal tunnel injection with US guidance  Patient Name: Denia Montes  MRN: [id number]  YOB: 1985    Action: Complete sections as appropriate. Any discrepancy results in a HARD COPY until resolved.     PRE PROCEDURE:  Patient ID verified with 2 identifiers (name and  or MRN): Yes  Procedure and site verified with patient/designee (when able): Yes  Accurate consent documentation in medical record: Yes  H&P (or appropriate assessment) documented in medical record: Yes  H&P must be up to 20 days prior to procedure and updates within 24 hours of procedure as applicable: NA  Relevant diagnostic and radiology test results appropriately labeled and displayed as applicable: Yes  Procedure site(s) marked with provider initials: NA    TIMEOUT:  Time-Out performed immediately prior to starting procedure, including verbal and active participation of all team members addressing the following:Yes  * Correct patient identify  * Confirmed that the correct side and site are marked  * An accurate procedure consent form  * Agreement on the procedure to be done  * Correct patient position  * Relevant images and results are properly labeled and appropriately displayed  * The need to administer antibiotics or fluids for irrigation purposes during the procedure as applicable   * Safety precautions based on patient history or medication use    DURING PROCEDURE: Verification of correct person, site, and procedures any time the responsibility for care of the patient is transferred to another member of the care team.       Prior to injection, verified patient identity  using patient's name and date of birth.  Due to injection administration, patient instructed to remain in clinic for 15 minutes  afterwards, and to report any adverse reaction to me immediately.    Right carpal tunnel    Drug Amount Wasted:  Yes: 4 mg/ml   Vial/Syringe: Single dose vial  Expiration Date:  04/2028      Silvia Castillo, RYAN  December 18, 2024

## 2024-12-19 RX ORDER — TRIAMCINOLONE ACETONIDE 40 MG/ML
40 INJECTION, SUSPENSION INTRA-ARTICULAR; INTRAMUSCULAR
Status: COMPLETED | OUTPATIENT
Start: 2024-12-18 | End: 2024-12-18

## 2024-12-19 RX ORDER — LIDOCAINE HYDROCHLORIDE 10 MG/ML
1 INJECTION, SOLUTION EPIDURAL; INFILTRATION; INTRACAUDAL; PERINEURAL
Status: COMPLETED | OUTPATIENT
Start: 2024-12-18 | End: 2024-12-18

## 2024-12-26 NOTE — PROGRESS NOTES
Outcome for 12/26/24 11:43 AM: QuickMobilet message sent  Gita Champagne MA  Outcome for 01/06/25 10:22 AM: Left Voicemail   Bobbilynn Grossaint, VF  Outcome for 01/06/25 3:57 PM: Data obtained via Dexcom website  Gita Champagne MA

## 2025-01-06 ENCOUNTER — TELEPHONE (OUTPATIENT)
Dept: ENDOCRINOLOGY | Facility: CLINIC | Age: 40
End: 2025-01-06
Payer: COMMERCIAL

## 2025-01-06 NOTE — TELEPHONE ENCOUNTER
Called patient and left voicemail. Patient has an appointment on  1/8/25 . Need patient to upload their Dexcom device to site for provider to review prior to their appointment.  Bobbi Grossaint, VF

## 2025-01-08 ENCOUNTER — VIRTUAL VISIT (OUTPATIENT)
Dept: ENDOCRINOLOGY | Facility: CLINIC | Age: 40
End: 2025-01-08
Payer: COMMERCIAL

## 2025-01-08 DIAGNOSIS — Z98.891 S/P CESAREAN SECTION: ICD-10-CM

## 2025-01-08 DIAGNOSIS — E66.812 CLASS 2 SEVERE OBESITY DUE TO EXCESS CALORIES WITH SERIOUS COMORBIDITY AND BODY MASS INDEX (BMI) OF 36.0 TO 36.9 IN ADULT (H): ICD-10-CM

## 2025-01-08 DIAGNOSIS — E10.9 TYPE 1 DIABETES MELLITUS WITHOUT COMPLICATION (H): ICD-10-CM

## 2025-01-08 DIAGNOSIS — E13.9 LATENT AUTOIMMUNE DIABETES MELLITUS IN ADULTS (LADA) (H): Primary | ICD-10-CM

## 2025-01-08 DIAGNOSIS — R76.8 POSITIVE GAD ANTIBODY: ICD-10-CM

## 2025-01-08 DIAGNOSIS — E66.01 CLASS 2 SEVERE OBESITY DUE TO EXCESS CALORIES WITH SERIOUS COMORBIDITY AND BODY MASS INDEX (BMI) OF 36.0 TO 36.9 IN ADULT (H): ICD-10-CM

## 2025-01-08 RX ORDER — INSULIN ASPART 100 [IU]/ML
1-15 INJECTION, SOLUTION INTRAVENOUS; SUBCUTANEOUS
Qty: 45 ML | Refills: 3 | Status: SHIPPED | OUTPATIENT
Start: 2025-01-08

## 2025-01-08 RX ORDER — INSULIN GLARGINE-YFGN 100 [IU]/ML
42 INJECTION, SOLUTION SUBCUTANEOUS EVERY 24 HOURS
Qty: 45 ML | Refills: 3 | Status: SHIPPED | OUTPATIENT
Start: 2025-01-08

## 2025-01-08 RX ORDER — PEN NEEDLE, DIABETIC 32GX 5/32"
NEEDLE, DISPOSABLE MISCELLANEOUS
Qty: 800 EACH | Refills: 3 | Status: SHIPPED | OUTPATIENT
Start: 2025-01-08

## 2025-01-08 NOTE — PATIENT INSTRUCTIONS
Emergency issues: Here are some concerns you should contact us about.  -Vomiting: more than twice.  Please check ketones.  If positive, go to ER. Monitor glucose hourly.   -High glucose (over 300 mg/dL twice in a row): Please check ketones.  If ketones are negative, take an insulin correction and recheck glucose in 1 hour.  If glucose is not coming down, please call the clinic. If ketones are moderate or large, drink lots of water, take an insulin correction 1.5 times your usual correction, and recheck ketones in 1 hour.  If ketones are still present (or you are vomiting), go to the ER.  -High glucose (over 300 mg/dL twice in a row) with a pump:  Twice in doubt, take it out.  Change your pump site. Check ketones.  If ketones are negative, take an insulin correction by syringe/pen and recheck glucose in 1 hour.  If glucose is not coming down, please call the clinic. If ketones are moderate or large, drink lots of water, take an insulin correction 1.5 times your usual correction by syringe/pen, and recheck ketones in 1 hour.  If ketones are still present (or you are vomiting), go to the ER.  -Hypoglycemia (low glucose):   If glucose is less than 70 mg/dL, treat with 15g carb (4 glucose tablets), recheck glucose in 15 minutes.  If low again, repeat.   If glucose is less than 54 mg/dL, treat with 30g carb, recheck glucose in 15 minutes.  If low again, repeat.  Keep glucagon in your home in case of severe hypoglycemia and train someone how to use this.    Emergency kit (please ensure you always have these with you):   Glucose tablets  Glucagon  Insulin  Syringes/needles   Extra infusion set (if on a pump)  Ketone strips    You should also administer short acting insulin subcutaneously to cover carbohydrates and per your correction scale prior to meals.     Contact us for help transitioning back to your pump when this is available.    Contact information:   If you have concerns, please send me a PeerApp message or call  the clinic at [phone].  For more urgent concerns, please call [phone] after hours/weekends and ask to speak with the endocrinologist on call.      Please let me know if you are having low blood sugars less than 70 or over 350 mg/dL.  Do not wait until your next appointment if this is happening.

## 2025-01-08 NOTE — PROGRESS NOTES
Endocrinology virtual Visit    Chief Complaint: RECHECK     Information obtained from:Patient      Assessment/Treatment Plan:      Type 1 diabetes   Obesity Estimated body mass index is 36.13 kg/m    CGM readings reviewed. Within target range > 75% of the time and no significant lows.     Plan  Consider adding GLP-1 agonist Mounjaro or Ozempic once breast-feeding is completed  Lantus continue the same.  Metformin 2000 mg daily  Continue mealtime insulin and correction dose as you are currently doing.  Complete the following labs.  Schedule an appointment if that has not been done this year.    Orders Placed This Encounter   Procedures    TSH with free T4 reflex    Lipid panel reflex to direct LDL Fasting    Albumin Random Urine Quantitative with Creat Ratio       Yuri Hansen MD  Staff Endocrinologist    Division of Endocrinology and Diabetes      Subjective:         HPI: Denia Montes is a 39 year old female with history of MAGDI/treated as Type 1 diabetes currently - (H) Positive ANNA antibody/C-peptide WNL here for a follow up.      Currently on:   Lantus 26-42 units daily -    Metformin 2000 mg daily   CHO carbohydrate coverage fluctuates based on need.  Correction 1 for every 50 above 140 with meals and above 200 at bedtime.                                 Allergies   Allergen Reactions    Sulfa Antibiotics Hives       Current Outpatient Medications   Medication Sig Dispense Refill    ACE/ARB NOT PRESCRIBED, INTENTIONAL, Please choose reason not prescribed, below      BD RUSSELL U/F 32G X 4 MM insulin pen needle USE 8 PEN NEEDLES DAILY 800 each 3    clindamycin (CLEOCIN T) 1 % external lotion Apply topically 2 times daily To face as needed 60 mL 11    Continuous Blood Gluc  (DEXCOM G6 ) RAEANN Use to read blood sugars as per 's instructions. 1 each 0    Continuous Blood Gluc  (DEXCOM G7 ) RAEANN Use to read blood sugars as per 's instructions. 1 each 0     Continuous Blood Gluc Sensor (DEXCOM G6 SENSOR) MISC Change every 10 days. 9 each 3    Continuous Blood Gluc Sensor (DEXCOM G6 SENSOR) MISC CHANGE EVERY 10 DAYS. 9 each 3    Continuous Blood Gluc Sensor (DEXCOM G7 SENSOR) MISC Change every 10 days. 9 each 5    Continuous Blood Gluc Transmit (DEXCOM G6 TRANSMITTER) MISC Change every 3 months. 1 each 3    Glucagon, rDNA, (GLUCAGON EMERGENCY) 1 MG KIT Inject 1 mg into the muscle daily as needed (Severe Hypoglycemia) 1 kit 3    hydroxychloroquine (PLAQUENIL) 200 MG tablet Take 2 tablets (400 mg) by mouth daily 180 tablet 0    Insulin Aspart FlexPen 100 UNIT/ML SOPN INJECT UNDER THE SKIN 1 UNIT FOR EVERY 15 GRAMS OF CARBOHYDRATES WITH BREAKFAST AND DINNER AND 1:10 FOR LUNCH. CORRECTION SCALE 1 UNIT FOR EVERY 50 ABOVE 140 THREE TIMES PER DAY WITH MEALS AND AT BEDTIME. UP TO 30 UNITS DAILY. 45 mL 3    metFORMIN (GLUCOPHAGE) 500 MG tablet TAKE TWO TABLETS BY MOUTH TWICE A  tablet 1    Omega-3 Fatty Acids (FISH OIL) 500 MG CAPS       SEMGLEE, YFGN, 100 UNIT/ML SOPN INJECT 33 UNITS UNDER THE SKIN DAILY 45 mL 3    tretinoin (RETIN-A) 0.025 % external cream Start tretinoin 0.025% cream at bedtime: apply pea-sized amount to the entire face then follow with moisturizer; start using a few nights per week then gradually increase to goal of nightly use as tolerated 45 g 11    valsartan (DIOVAN) 80 MG tablet Take 1 tablet (80 mg) by mouth daily. 90 tablet 3       Review of Systems   as per HPI above      Objective:     Wt Readings from Last 10 Encounters:   11/14/24 98.5 kg (217 lb 1.6 oz)   06/25/24 91.2 kg (201 lb)   03/14/24 93.9 kg (207 lb)   04/24/23 95.3 kg (210 lb 3.2 oz)   04/12/23 94.9 kg (209 lb 3.2 oz)   01/14/22 86.2 kg (190 lb)   09/09/21 83 kg (183 lb)   08/10/21 83.6 kg (184 lb 4.8 oz)   07/16/21 93.6 kg (206 lb 6.4 oz)   07/09/21 93 kg (205 lb)      GENERAL: Healthy, alert and no distress  EYES: Eyes grossly normal to inspection.    RESP: No audible wheeze,  cough, or visible cyanosis.    NEURO: Alert and oriented. mentation and speech and mood stable.    In House Labs:   Hemoglobin A1C   Date Value Ref Range Status   12/13/2024 6.5 (H) 0.0 - 5.6 % Final     Comment:     Normal <5.7%   Prediabetes 5.7-6.4%    Diabetes 6.5% or higher     Note: Adopted from ADA consensus guidelines.   03/14/2024 6.4 (H) 0.0 - 5.6 % Final     Comment:     Normal <5.7%   Prediabetes 5.7-6.4%    Diabetes 6.5% or higher     Note: Adopted from ADA consensus guidelines.   10/03/2023 6.6 (H) 0.0 - 5.6 % Final     Comment:     Normal <5.7%   Prediabetes 5.7-6.4%    Diabetes 6.5% or higher     Note: Adopted from ADA consensus guidelines.   12/21/2020 5.5 0 - 5.6 % Final     Comment:     Normal <5.7% Prediabetes 5.7-6.4%  Diabetes 6.5% or higher - adopted from ADA   consensus guidelines.     11/17/2020 5.3 0 - 5.6 % Final     Comment:     Normal <5.7% Prediabetes 5.7-6.4%  Diabetes 6.5% or higher - adopted from ADA   consensus guidelines.     06/29/2020 6.1 (H) 0 - 5.6 % Final     Comment:     Normal <5.7% Prediabetes 5.7-6.4%  Diabetes 6.5% or higher - adopted from ADA   consensus guidelines.       Hemoglobin A1C POCT   Date Value Ref Range Status   03/11/2020 6.9 (A) 4.3 - 6 % Final   09/11/2019 6.1 (A) 4.3 - 6 % Final   05/29/2019 6.1 (A) 4.3 - 6 % Final       TSH   Date Value Ref Range Status   03/14/2024 1.42 0.30 - 4.20 uIU/mL Final   10/03/2023 1.41 0.30 - 4.20 uIU/mL Final   03/10/2023 2.04 0.30 - 4.20 uIU/mL Final   10/06/2022 2.56 0.30 - 4.20 uIU/mL Final   01/14/2022 1.87 0.40 - 4.00 mU/L Final   09/24/2021 1.14 0.40 - 4.00 mU/L Final   03/22/2021 1.27 0.40 - 4.00 mU/L Final   01/15/2021 2.19 0.40 - 4.00 mU/L Final   12/21/2020 1.62 0.40 - 4.00 mU/L Final   12/21/2020 1.62 0.40 - 4.00 mU/L Final   11/17/2020 1.86 0.40 - 4.00 mU/L Final       Creatinine   Date Value Ref Range Status   12/13/2024 0.68 0.51 - 0.95 mg/dL Final   01/15/2021 0.56 0.52 - 1.04 mg/dL Final       Video-Visit  Details    Type of service:  Video Visit  Joined the call at 1/8/2025, 1:38:12 pm.  Left the call at 1/8/2025, 1:58:18 pm.  You were on the call for 20 minutes 6 seconds . Distant Location (provider location):  Off-site.   Platform used for Video Visit: Qitio  The longitudinal plan of care for the diagnosis(es)/condition(s) as documented were addressed during this visit. Due to the added complexity in care, I will continue to support Bud in the subsequent management and with ongoing continuity of care.

## 2025-01-08 NOTE — LETTER
1/8/2025       RE: Denia Montes  [address]  [facility] [address]     Dear Colleague,    Thank you for referring your patient, Denia Montes, to the [facility] ENDOCRINOLOGY CLINIC [place] at [facility]. Please see a copy of my visit note below.      Outcome for 12/26/24 11:43 AM: Appota message sent  Gita Champagne MA  Outcome for 01/06/25 10:22 AM: Left Voicemail   Bobbilynn Grossaint, VF  Outcome for 01/06/25 3:57 PM: Data obtained via Dexcom website  Gita Champagne MA                Endocrinology virtual Visit    Chief Complaint: RECHECK     Information obtained from:Patient      Assessment/Treatment Plan:      Type 1 diabetes   Obesity Estimated body mass index is 36.13 kg/m    CGM readings reviewed. Within target range > 75% of the time and no significant lows.     Plan  Consider adding GLP-1 agonist Mounjaro or Ozempic once breast-feeding is completed  Lantus continue the same.  Metformin 2000 mg daily  Continue mealtime insulin and correction dose as you are currently doing.  Complete the following labs.  Schedule an appointment if that has not been done this year.    Orders Placed This Encounter   Procedures     TSH with free T4 reflex     Lipid panel reflex to direct LDL Fasting     Albumin Random Urine Quantitative with Creat Ratio       Yuri Hansen MD  Staff Endocrinologist    Division of Endocrinology and Diabetes      Subjective:         HPI: Denia Montes is a 39 year old female with history of MAGDI/treated as Type 1 diabetes currently - (H) Positive ANNA antibody/C-peptide WNL here for a follow up.      Currently on:   Lantus 26-42 units daily -    Metformin 2000 mg daily   CHO carbohydrate coverage fluctuates based on need.  Correction 1 for every 50 above 140 with meals and above 200 at bedtime.                                 Allergies   Allergen Reactions     Sulfa Antibiotics Hives       Current  Outpatient Medications   Medication Sig Dispense Refill     ACE/ARB NOT PRESCRIBED, INTENTIONAL, Please choose reason not prescribed, below       BD RUSSELL U/F 32G X 4 MM insulin pen needle USE 8 PEN NEEDLES DAILY 800 each 3     clindamycin (CLEOCIN T) 1 % external lotion Apply topically 2 times daily To face as needed 60 mL 11     Continuous Blood Gluc  (DEXCOM G6 ) RAEANN Use to read blood sugars as per 's instructions. 1 each 0     Continuous Blood Gluc  (DEXCOM G7 ) RAEANN Use to read blood sugars as per 's instructions. 1 each 0     Continuous Blood Gluc Sensor (DEXCOM G6 SENSOR) MISC Change every 10 days. 9 each 3     Continuous Blood Gluc Sensor (DEXCOM G6 SENSOR) MISC CHANGE EVERY 10 DAYS. 9 each 3     Continuous Blood Gluc Sensor (DEXCOM G7 SENSOR) MISC Change every 10 days. 9 each 5     Continuous Blood Gluc Transmit (DEXCOM G6 TRANSMITTER) MISC Change every 3 months. 1 each 3     Glucagon, rDNA, (GLUCAGON EMERGENCY) 1 MG KIT Inject 1 mg into the muscle daily as needed (Severe Hypoglycemia) 1 kit 3     hydroxychloroquine (PLAQUENIL) 200 MG tablet Take 2 tablets (400 mg) by mouth daily 180 tablet 0     Insulin Aspart FlexPen 100 UNIT/ML SOPN INJECT UNDER THE SKIN 1 UNIT FOR EVERY 15 GRAMS OF CARBOHYDRATES WITH BREAKFAST AND DINNER AND 1:10 FOR LUNCH. CORRECTION SCALE 1 UNIT FOR EVERY 50 ABOVE 140 THREE TIMES PER DAY WITH MEALS AND AT BEDTIME. UP TO 30 UNITS DAILY. 45 mL 3     metFORMIN (GLUCOPHAGE) 500 MG tablet TAKE TWO TABLETS BY MOUTH TWICE A  tablet 1     Omega-3 Fatty Acids (FISH OIL) 500 MG CAPS        SEMGLEE, LAURENCEGN, 100 UNIT/ML SOPN INJECT 33 UNITS UNDER THE SKIN DAILY 45 mL 3     tretinoin (RETIN-A) 0.025 % external cream Start tretinoin 0.025% cream at bedtime: apply pea-sized amount to the entire face then follow with moisturizer; start using a few nights per week then gradually increase to goal of nightly use as tolerated 45 g 11      valsartan (DIOVAN) 80 MG tablet Take 1 tablet (80 mg) by mouth daily. 90 tablet 3       Review of Systems   as per HPI above      Objective:     Wt Readings from Last 10 Encounters:   11/14/24 98.5 kg (217 lb 1.6 oz)   06/25/24 91.2 kg (201 lb)   03/14/24 93.9 kg (207 lb)   04/24/23 95.3 kg (210 lb 3.2 oz)   04/12/23 94.9 kg (209 lb 3.2 oz)   01/14/22 86.2 kg (190 lb)   09/09/21 83 kg (183 lb)   08/10/21 83.6 kg (184 lb 4.8 oz)   07/16/21 93.6 kg (206 lb 6.4 oz)   07/09/21 93 kg (205 lb)      GENERAL: Healthy, alert and no distress  EYES: Eyes grossly normal to inspection.    RESP: No audible wheeze, cough, or visible cyanosis.    NEURO: Alert and oriented. mentation and speech and mood stable.    In House Labs:   Hemoglobin A1C   Date Value Ref Range Status   12/13/2024 6.5 (H) 0.0 - 5.6 % Final     Comment:     Normal <5.7%   Prediabetes 5.7-6.4%    Diabetes 6.5% or higher     Note: Adopted from ADA consensus guidelines.   03/14/2024 6.4 (H) 0.0 - 5.6 % Final     Comment:     Normal <5.7%   Prediabetes 5.7-6.4%    Diabetes 6.5% or higher     Note: Adopted from ADA consensus guidelines.   10/03/2023 6.6 (H) 0.0 - 5.6 % Final     Comment:     Normal <5.7%   Prediabetes 5.7-6.4%    Diabetes 6.5% or higher     Note: Adopted from ADA consensus guidelines.   12/21/2020 5.5 0 - 5.6 % Final     Comment:     Normal <5.7% Prediabetes 5.7-6.4%  Diabetes 6.5% or higher - adopted from ADA   consensus guidelines.     11/17/2020 5.3 0 - 5.6 % Final     Comment:     Normal <5.7% Prediabetes 5.7-6.4%  Diabetes 6.5% or higher - adopted from ADA   consensus guidelines.     06/29/2020 6.1 (H) 0 - 5.6 % Final     Comment:     Normal <5.7% Prediabetes 5.7-6.4%  Diabetes 6.5% or higher - adopted from ADA   consensus guidelines.       Hemoglobin A1C POCT   Date Value Ref Range Status   03/11/2020 6.9 (A) 4.3 - 6 % Final   09/11/2019 6.1 (A) 4.3 - 6 % Final   05/29/2019 6.1 (A) 4.3 - 6 % Final       TSH   Date Value Ref Range Status    03/14/2024 1.42 0.30 - 4.20 uIU/mL Final   10/03/2023 1.41 0.30 - 4.20 uIU/mL Final   03/10/2023 2.04 0.30 - 4.20 uIU/mL Final   10/06/2022 2.56 0.30 - 4.20 uIU/mL Final   01/14/2022 1.87 0.40 - 4.00 mU/L Final   09/24/2021 1.14 0.40 - 4.00 mU/L Final   03/22/2021 1.27 0.40 - 4.00 mU/L Final   01/15/2021 2.19 0.40 - 4.00 mU/L Final   12/21/2020 1.62 0.40 - 4.00 mU/L Final   12/21/2020 1.62 0.40 - 4.00 mU/L Final   11/17/2020 1.86 0.40 - 4.00 mU/L Final       Creatinine   Date Value Ref Range Status   12/13/2024 0.68 0.51 - 0.95 mg/dL Final   01/15/2021 0.56 0.52 - 1.04 mg/dL Final       Video-Visit Details    Type of service:  Video Visit  Joined the call at 1/8/2025, 1:38:12 pm.  Left the call at 1/8/2025, 1:58:18 pm.  You were on the call for 20 minutes 6 seconds . Distant Location (provider location):  Off-site.   Platform used for Video Visit: Luisito  The longitudinal plan of care for the diagnosis(es)/condition(s) as documented were addressed during this visit. Due to the added complexity in care, I will continue to support Bud in the subsequent management and with ongoing continuity of care.      Again, thank you for allowing me to participate in the care of your patient.      Sincerely,    Yuri Hansen MD

## 2025-01-08 NOTE — NURSING NOTE
Current patient location: [address] [facility] [address]    Is the patient currently in the state of [place]? YES    Visit mode:VIDEO    If the visit is dropped, the patient can be reconnected by:VIDEO VISIT: Text to cell phone:   Telephone Information:   Mobile [phone]       Will anyone else be joining the visit? NO  (If patient encounters technical issues they should call [phone]    Are changes needed to the allergy or medication list? No and Pt stated no med changes    Are refills needed on medications prescribed by this physician? NO    Rooming Documentation:  Not applicable    Reason for visit: RECHECK    Blanca DIA

## 2025-01-27 ENCOUNTER — OFFICE VISIT (OUTPATIENT)
Dept: RHEUMATOLOGY | Facility: CLINIC | Age: 40
End: 2025-01-27
Attending: INTERNAL MEDICINE
Payer: COMMERCIAL

## 2025-01-27 ENCOUNTER — LAB (OUTPATIENT)
Dept: LAB | Facility: CLINIC | Age: 40
End: 2025-01-27
Payer: COMMERCIAL

## 2025-01-27 VITALS
BODY MASS INDEX: 36.61 KG/M2 | HEART RATE: 75 BPM | DIASTOLIC BLOOD PRESSURE: 81 MMHG | OXYGEN SATURATION: 100 % | SYSTOLIC BLOOD PRESSURE: 128 MMHG | WEIGHT: 220 LBS

## 2025-01-27 DIAGNOSIS — Z79.899 LONG-TERM USE OF HIGH-RISK MEDICATION: ICD-10-CM

## 2025-01-27 DIAGNOSIS — Z87.39 HISTORY OF SERONEGATIVE INFLAMMATORY ARTHRITIS: ICD-10-CM

## 2025-01-27 DIAGNOSIS — E10.9 TYPE 1 DIABETES MELLITUS WITHOUT COMPLICATION (H): ICD-10-CM

## 2025-01-27 DIAGNOSIS — R79.82 ELEVATED C-REACTIVE PROTEIN (CRP): ICD-10-CM

## 2025-01-27 DIAGNOSIS — M25.50 POLYARTHRALGIA: ICD-10-CM

## 2025-01-27 LAB
BASOPHILS # BLD AUTO: 0.1 10E3/UL (ref 0–0.2)
BASOPHILS NFR BLD AUTO: 1 %
EOSINOPHIL # BLD AUTO: 0.1 10E3/UL (ref 0–0.7)
EOSINOPHIL NFR BLD AUTO: 1 %
ERYTHROCYTE [DISTWIDTH] IN BLOOD BY AUTOMATED COUNT: 14.5 % (ref 10–15)
HCT VFR BLD AUTO: 37.4 % (ref 35–47)
HGB BLD-MCNC: 11.7 G/DL (ref 11.7–15.7)
IMM GRANULOCYTES # BLD: 0 10E3/UL
IMM GRANULOCYTES NFR BLD: 0 %
LYMPHOCYTES # BLD AUTO: 2.9 10E3/UL (ref 0.8–5.3)
LYMPHOCYTES NFR BLD AUTO: 36 %
MCH RBC QN AUTO: 25 PG (ref 26.5–33)
MCHC RBC AUTO-ENTMCNC: 31.3 G/DL (ref 31.5–36.5)
MCV RBC AUTO: 80 FL (ref 78–100)
MONOCYTES # BLD AUTO: 0.5 10E3/UL (ref 0–1.3)
MONOCYTES NFR BLD AUTO: 6 %
NEUTROPHILS # BLD AUTO: 4.3 10E3/UL (ref 1.6–8.3)
NEUTROPHILS NFR BLD AUTO: 55 %
PLATELET # BLD AUTO: 438 10E3/UL (ref 150–450)
RBC # BLD AUTO: 4.68 10E6/UL (ref 3.8–5.2)
WBC # BLD AUTO: 7.9 10E3/UL (ref 4–11)

## 2025-01-27 PROCEDURE — 85025 COMPLETE CBC W/AUTO DIFF WBC: CPT

## 2025-01-27 PROCEDURE — 80061 LIPID PANEL: CPT

## 2025-01-27 PROCEDURE — 99213 OFFICE O/P EST LOW 20 MIN: CPT | Performed by: INTERNAL MEDICINE

## 2025-01-27 PROCEDURE — 84450 TRANSFERASE (AST) (SGOT): CPT

## 2025-01-27 PROCEDURE — G2211 COMPLEX E/M VISIT ADD ON: HCPCS | Performed by: INTERNAL MEDICINE

## 2025-01-27 PROCEDURE — 36415 COLL VENOUS BLD VENIPUNCTURE: CPT

## 2025-01-27 PROCEDURE — 82043 UR ALBUMIN QUANTITATIVE: CPT

## 2025-01-27 PROCEDURE — 99214 OFFICE O/P EST MOD 30 MIN: CPT | Performed by: INTERNAL MEDICINE

## 2025-01-27 PROCEDURE — 84443 ASSAY THYROID STIM HORMONE: CPT

## 2025-01-27 PROCEDURE — 84460 ALANINE AMINO (ALT) (SGPT): CPT

## 2025-01-27 PROCEDURE — 82570 ASSAY OF URINE CREATININE: CPT

## 2025-01-27 PROCEDURE — 86140 C-REACTIVE PROTEIN: CPT

## 2025-01-27 ASSESSMENT — PAIN SCALES - GENERAL: PAINLEVEL_OUTOF10: NO PAIN (0)

## 2025-01-27 NOTE — PROGRESS NOTES
Chief Complaint/Reason for Visit: polyarthralgia    HPI:    Denia Montes is a 38 year old female with past medical history listed below.  She has been off of plaquenil for months. She said she received injection for carpal tunnel syndrome and has felt better. She said she has felt like her left hand is swollen and stiff since being off of plaquenil, which improves as the day goes by.     REVIEW OF SYSTEMS    General - neg for fatigue  Cardiovascular - neg for chest pain or palpitations   Respiratory - neg for sob or cough  Skin - neg for skin rashes, no malar rash or raynaud's      Past Medical History:   Diagnosis Date    Cervical high risk HPV (human papillomavirus) test positive 2015, 16, 18, 19, 20    Hashimoto's thyroiditis     Hx of colposcopy with cervical biopsy 10/21/2016    [place] Bx: Neg NIL, ECC: Neg.    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), managed as type 1 (H)     Miscarriage      Past Surgical History:   Procedure Laterality Date     SECTION N/A 2021    Procedure:  SECTION;  Surgeon: Jessica Arellano MD;  Location:  L+D     Family History   Problem Relation Age of Onset    Hypertension Mother     Diabetes Mother     Hyperlipidemia Mother     Kidney Disease Mother     Thyroid Disease Mother     Hyperlipidemia Father     Hypertension Father     Kidney Disease Father     Skin Cancer Father     Skin Cancer Maternal Grandfather     Substance Abuse Brother      Social History     Socioeconomic History    Marital status: Single   Tobacco Use    Smoking status: Never     Passive exposure: Never    Smokeless tobacco: Never   Substance and Sexual Activity    Alcohol use: No    Drug use: No    Sexual activity: Yes     Partners: Male     Birth control/protection: None   Other Topics Concern    Parent/sibling w/ CABG, MI or angioplasty before 65F 55M? No   Social History Narrative    Social history 22:      "Starting to get back into exercising again. Feels good.     Wears a helmet when biking. Wears a seatbelt when driving, etc. Feels safe at home. Has had 3 pregnancies; one delicery. Lives with herself, her partner, their son, and one housemate. Works for [employer]. Nickname \"[employer].\"      Social Determinants of Health     Financial Resource Strain: Low Risk  (3/13/2024)    Financial Resource Strain     Within the past 12 months, have you or your family members you live with been unable to get utilities (heat, electricity) when it was really needed?: No   Food Insecurity: Low Risk  (3/13/2024)    Food Insecurity     Within the past 12 months, did you worry that your food would run out before you got money to buy more?: No     Within the past 12 months, did the food you bought just not last and you didn t have money to get more?: No   Transportation Needs: Low Risk  (3/13/2024)    Transportation Needs     Within the past 12 months, has lack of transportation kept you from medical appointments, getting your medicines, non-medical meetings or appointments, work, or from getting things that you need?: No   Physical Activity: Insufficiently Active (3/13/2024)    Exercise Vital Sign     Days of Exercise per Week: 1 day     Minutes of Exercise per Session: 30 min   Stress: No Stress Concern Present (3/13/2024)    [employer] [place] [employer] - Occupational Stress Questionnaire     Feeling of Stress : Not at all   Social Connections: Unknown (3/13/2024)    Social Connection and Isolation Panel [NHANES]     Frequency of Social Gatherings with Friends and Family: Three times a week   Interpersonal Safety: Low Risk  (3/14/2024)    Interpersonal Safety     Do you feel physically and emotionally safe where you currently live?: Yes     Within the past 12 months, have you been hit, slapped, kicked or otherwise physically hurt by someone?: No     Within the past 12 months, have you been humiliated or " emotionally abused in other ways by your partner or ex-partner?: No   Housing Stability: Low Risk  (3/13/2024)    Housing Stability     Do you have housing? : Yes     Are you worried about losing your housing?: No       Allergies   Allergen Reactions    Sulfa Antibiotics Hives       Current Outpatient Medications   Medication Sig Dispense Refill    ACE/ARB NOT PRESCRIBED, INTENTIONAL, Please choose reason not prescribed, below      clindamycin (CLEOCIN T) 1 % external lotion Apply topically 2 times daily To face as needed 60 mL 11    Continuous Blood Gluc  (DEXCOM G6 ) RAEANN Use to read blood sugars as per 's instructions. 1 each 0    Continuous Blood Gluc  (DEXCOM G7 ) RAEANN Use to read blood sugars as per 's instructions. 1 each 0    Continuous Blood Gluc Sensor (DEXCOM G6 SENSOR) MISC Change every 10 days. 9 each 3    Continuous Blood Gluc Sensor (DEXCOM G6 SENSOR) MISC CHANGE EVERY 10 DAYS. 9 each 3    Continuous Blood Gluc Sensor (DEXCOM G7 SENSOR) MISC Change every 10 days. 9 each 5    Continuous Blood Gluc Transmit (DEXCOM G6 TRANSMITTER) MISC Change every 3 months. 1 each 3    Glucagon (GVOKE HYPOPEN) 1 MG/0.2ML pen Inject the contents of 1 device under the skin into lower abdomen, outer thigh, or outer upper arm as needed for hypoglycemia. If no response after 15 minutes, additional 1 mg dose from a new device may be injected while waiting for emergency assistance. 0.4 mL 3    Insulin Aspart FlexPen 100 UNIT/ML SOPN Inject 1-15 Units subcutaneously 3 times daily (with meals). With meals and correction dose as directed. Uses up to 45 units daily. 45 mL 3    Insulin Glargine-yfgn (SEMGLEE, YFGN,) 100 UNIT/ML SOPN Inject 42 Units subcutaneously every 24 hours. 45 mL 3    insulin pen needle (BD RUSSELL U/F) 32G X 4 MM miscellaneous USE 8 PEN NEEDLES DAILY 800 each 3    metFORMIN (GLUCOPHAGE) 500 MG tablet Take 2 tablets (1,000 mg) by mouth 2 times daily (with  meals). 360 tablet 3    Omega-3 Fatty Acids (FISH OIL) 500 MG CAPS       tretinoin (RETIN-A) 0.025 % external cream Start tretinoin 0.025% cream at bedtime: apply pea-sized amount to the entire face then follow with moisturizer; start using a few nights per week then gradually increase to goal of nightly use as tolerated 45 g 11    valsartan (DIOVAN) 80 MG tablet Take 1 tablet (80 mg) by mouth daily. 90 tablet 3    hydroxychloroquine (PLAQUENIL) 200 MG tablet Take 2 tablets (400 mg) by mouth daily 180 tablet 0     Current Facility-Administered Medications   Medication Dose Route Frequency Provider Last Rate Last Admin    lidocaine (PF) (XYLOCAINE) 1 % injection 1 mL  1 mL   Mindy Duarte MD   1 mL at 02/23/24 1351    triamcinolone (KENALOG-40) injection 40 mg  40 mg   Mindy Duarte MD   40 mg at 02/23/24 1351       PHYSICAL EXAM    /81 (BP Location: Right arm, Patient Position: Sitting, Cuff Size: Adult Large)   Pulse 75   Wt 99.8 kg (220 lb)   SpO2 100%   BMI 36.61 kg/m        General: Alert, No apparent distress   Psych: Affect euthymic  Eyes: Sclera non injected  Skin: No rashes noted  Cardiovascular: Regular rate and rhythm, Normal S1 and S2 and No murmurs, rubs or gallops  Respiratory: Clear to auscultation with no wheezing or crackles  Musculoskeletal: Hands:  Normal. No synovitis   Wrists:  Normal.  Elbows:  Normal.  Shoulders:  Normal.  Feet:  Normal.  Ankles:  Normal.  Knees:  Normal.    LABS   Reviewed as below.     Dec 2024  BMP - normal creatinine    June 2024  Sero neg rad 3 profile neg     Oct 2023  CBC normal     RF,CCP neg  Ds DNA neg   Lyme ab neg   CRP elevated at 12.30  DINORA pos 1:40 speckled  QTB neg      Latest Reference Range & Units 12/21/20 13:56 01/14/22 09:29   Hep B Surface Agn NR^Nonreactive  Nonreactive    Hepatitis C Antibody Nonreactive   Nonreactive   HIV Antigen Antibody Combo NR^Nonreactive     Nonreactive      XR hands - no degenerative  changes noted    ASSESSMENT      1. Polyarthralgia    2. History of seronegative inflammatory arthritis    3. Elevated C-reactive protein (CRP)    4. Long-term use of high-risk medication          (M25.50) Polyarthralgia  (primary encounter diagnosis)  Comment: RF,CCP neg. Diagnosed with seronegative RA by by Dr[facility]Sumi Middleton at [facility].Used to be on HCQ and been off of it for months for now. No synovitis on exam. Patient expressed interest in holding off on Rx for now. Agree with monitoring and see how she does.   Plan:   Monitor off of Rx. Will check CRP. If elevated, will discuss restarting HCQ.   Orders Placed This Encounter   Procedures    AST    ALT    CRP inflammation    CBC with platelets differential       (R79.82) Elevated C-reactive protein (CRP)  Comment: noted   Plan: monitor     (Z79.899) Long-term use of high-risk medication  Comment: Plaquenil    Normal eye exam in 3/2024.     Plan: have explained potential side effects of Plaquenil including but not limited to retinal toxicity, need for annual retinal eye exams by an ophthalmologist, skin pigmentation, possible QTc prolongation and cause for cardiac arrhythmias especially with medication interactions, possible GI upset, possible muscle weakness.     Follow up in 3 months.    ENRIQUETA GONZALEZ MD    Division of Rheumatic & Autoimmune Diseases  [facility]

## 2025-01-27 NOTE — LETTER
2025       RE: Denia Montes  [address]  [facility] [address]     Dear Colleague,    Thank you for referring your patient, Denia Montes, to the [facility] RHEUMATOLOGY at [facility]. Please see a copy of my visit note below.                           Chief Complaint/Reason for Visit: polyarthralgia    HPI:    Deina Montes is a 38 year old female with past medical history listed below.  She has been off of plaquenil for months. She said she received injection for carpal tunnel syndrome and has felt better. She said she has felt like her left hand is swollen and stiff since being off of plaquenil, which improves as the day goes by.     REVIEW OF SYSTEMS    General - neg for fatigue  Cardiovascular - neg for chest pain or palpitations   Respiratory - neg for sob or cough  Skin - neg for skin rashes, no malar rash or raynaud's      Past Medical History:   Diagnosis Date     Cervical high risk HPV (human papillomavirus) test positive 2015, 16, 18, 19, 20     Hashimoto's thyroiditis      Hx of colposcopy with cervical biopsy 10/21/2016    [place] Bx: Neg NIL, ECC: Neg.     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), managed as type 1 (H)      Miscarriage      Past Surgical History:   Procedure Laterality Date      SECTION N/A 2021    Procedure:  SECTION;  Surgeon: Jessica Arellano MD;  Location:  L+D     Family History   Problem Relation Age of Onset     Hypertension Mother      Diabetes Mother      Hyperlipidemia Mother      Kidney Disease Mother      Thyroid Disease Mother      Hyperlipidemia Father      Hypertension Father      Kidney Disease Father      Skin Cancer Father      Skin Cancer Maternal Grandfather      Substance Abuse Brother      Social History     Socioeconomic History     Marital status: Single   Tobacco Use     Smoking status: Never     Passive exposure:  "Never     Smokeless tobacco: Never   Substance and Sexual Activity     Alcohol use: No     Drug use: No     Sexual activity: Yes     Partners: Male     Birth control/protection: None   Other Topics Concern     Parent/sibling w/ CABG, MI or angioplasty before 65F 55M? No   Social History Narrative    Social history 1/14/22:     Starting to get back into exercising again. Feels good.     Wears a helmet when biking. Wears a seatbelt when driving, etc. Feels safe at home. Has had 3 pregnancies; one delicery. Lives with herself, her partner, their son, and one housemate. Works for [employer]. Nickname \"[employer].\"      Social Determinants of Health     Financial Resource Strain: Low Risk  (3/13/2024)    Financial Resource Strain      Within the past 12 months, have you or your family members you live with been unable to get utilities (heat, electricity) when it was really needed?: No   Food Insecurity: Low Risk  (3/13/2024)    Food Insecurity      Within the past 12 months, did you worry that your food would run out before you got money to buy more?: No      Within the past 12 months, did the food you bought just not last and you didn t have money to get more?: No   Transportation Needs: Low Risk  (3/13/2024)    Transportation Needs      Within the past 12 months, has lack of transportation kept you from medical appointments, getting your medicines, non-medical meetings or appointments, work, or from getting things that you need?: No   Physical Activity: Insufficiently Active (3/13/2024)    Exercise Vital Sign      Days of Exercise per Week: 1 day      Minutes of Exercise per Session: 30 min   Stress: No Stress Concern Present (3/13/2024)    [employer] [place] [employer] - Occupational Stress Questionnaire      Feeling of Stress : Not at all   Social Connections: Unknown (3/13/2024)    Social Connection and Isolation Panel [NHANES]      Frequency of Social Gatherings with Friends and Family: Three " times a week   Interpersonal Safety: Low Risk  (3/14/2024)    Interpersonal Safety      Do you feel physically and emotionally safe where you currently live?: Yes      Within the past 12 months, have you been hit, slapped, kicked or otherwise physically hurt by someone?: No      Within the past 12 months, have you been humiliated or emotionally abused in other ways by your partner or ex-partner?: No   Housing Stability: Low Risk  (3/13/2024)    Housing Stability      Do you have housing? : Yes      Are you worried about losing your housing?: No       Allergies   Allergen Reactions     Sulfa Antibiotics Hives       Current Outpatient Medications   Medication Sig Dispense Refill     ACE/ARB NOT PRESCRIBED, INTENTIONAL, Please choose reason not prescribed, below       clindamycin (CLEOCIN T) 1 % external lotion Apply topically 2 times daily To face as needed 60 mL 11     Continuous Blood Gluc  (DEXCOM G6 ) RAEANN Use to read blood sugars as per 's instructions. 1 each 0     Continuous Blood Gluc  (DEXCOM G7 ) RAEANN Use to read blood sugars as per 's instructions. 1 each 0     Continuous Blood Gluc Sensor (DEXCOM G6 SENSOR) MISC Change every 10 days. 9 each 3     Continuous Blood Gluc Sensor (DEXCOM G6 SENSOR) MISC CHANGE EVERY 10 DAYS. 9 each 3     Continuous Blood Gluc Sensor (DEXCOM G7 SENSOR) MISC Change every 10 days. 9 each 5     Continuous Blood Gluc Transmit (DEXCOM G6 TRANSMITTER) MISC Change every 3 months. 1 each 3     Glucagon (GVOKE HYPOPEN) 1 MG/0.2ML pen Inject the contents of 1 device under the skin into lower abdomen, outer thigh, or outer upper arm as needed for hypoglycemia. If no response after 15 minutes, additional 1 mg dose from a new device may be injected while waiting for emergency assistance. 0.4 mL 3     Insulin Aspart FlexPen 100 UNIT/ML SOPN Inject 1-15 Units subcutaneously 3 times daily (with meals). With meals and correction dose as  directed. Uses up to 45 units daily. 45 mL 3     Insulin Glargine-yfgn (SEMGLEE, YFGN,) 100 UNIT/ML SOPN Inject 42 Units subcutaneously every 24 hours. 45 mL 3     insulin pen needle (BD RUSSELL U/F) 32G X 4 MM miscellaneous USE 8 PEN NEEDLES DAILY 800 each 3     metFORMIN (GLUCOPHAGE) 500 MG tablet Take 2 tablets (1,000 mg) by mouth 2 times daily (with meals). 360 tablet 3     Omega-3 Fatty Acids (FISH OIL) 500 MG CAPS        tretinoin (RETIN-A) 0.025 % external cream Start tretinoin 0.025% cream at bedtime: apply pea-sized amount to the entire face then follow with moisturizer; start using a few nights per week then gradually increase to goal of nightly use as tolerated 45 g 11     valsartan (DIOVAN) 80 MG tablet Take 1 tablet (80 mg) by mouth daily. 90 tablet 3     hydroxychloroquine (PLAQUENIL) 200 MG tablet Take 2 tablets (400 mg) by mouth daily 180 tablet 0     Current Facility-Administered Medications   Medication Dose Route Frequency Provider Last Rate Last Admin     lidocaine (PF) (XYLOCAINE) 1 % injection 1 mL  1 mL   Mindy Duarte MD   1 mL at 02/23/24 1351     triamcinolone (KENALOG-40) injection 40 mg  40 mg   Mindy Duarte MD   40 mg at 02/23/24 1351       PHYSICAL EXAM    /81 (BP Location: Right arm, Patient Position: Sitting, Cuff Size: Adult Large)   Pulse 75   Wt 99.8 kg (220 lb)   SpO2 100%   BMI 36.61 kg/m        General: Alert, No apparent distress   Psych: Affect euthymic  Eyes: Sclera non injected  Skin: No rashes noted  Cardiovascular: Regular rate and rhythm, Normal S1 and S2 and No murmurs, rubs or gallops  Respiratory: Clear to auscultation with no wheezing or crackles  Musculoskeletal: Hands:  Normal. No synovitis   Wrists:  Normal.  Elbows:  Normal.  Shoulders:  Normal.  Feet:  Normal.  Ankles:  Normal.  Knees:  Normal.    LABS   Reviewed as below.     Dec 2024  BMP - normal creatinine    June 2024  Sero neg rad 3 profile neg     Oct 2023  CBC  normal     RF,CCP neg  Ds DNA neg   Lyme ab neg   CRP elevated at 12.30  DINORA pos 1:40 speckled  QTB neg      Latest Reference Range & Units 12/21/20 13:56 01/14/22 09:29   Hep B Surface Agn NR^Nonreactive  Nonreactive    Hepatitis C Antibody Nonreactive   Nonreactive   HIV Antigen Antibody Combo NR^Nonreactive     Nonreactive      XR hands - no degenerative changes noted    ASSESSMENT      1. Polyarthralgia    2. History of seronegative inflammatory arthritis    3. Elevated C-reactive protein (CRP)    4. Long-term use of high-risk medication          (M25.50) Polyarthralgia  (primary encounter diagnosis)  Comment: RF,CCP neg. Diagnosed with seronegative RA by by Dr.Sumi Middleton at [facility].Used to be on HCQ and been off of it for months for now. No synovitis on exam. Patient expressed interest in holding off on Rx for now. Agree with monitoring and see how she does.   Plan:   Monitor off of Rx. Will check CRP. If elevated, will discuss restarting HCQ.   Orders Placed This Encounter   Procedures     AST     ALT     CRP inflammation     CBC with platelets differential       (R79.82) Elevated C-reactive protein (CRP)  Comment: noted   Plan: monitor     (Z79.899) Long-term use of high-risk medication  Comment: Plaquenil    Normal eye exam in 3/2024.     Plan: have explained potential side effects of Plaquenil including but not limited to retinal toxicity, need for annual retinal eye exams by an ophthalmologist, skin pigmentation, possible QTc prolongation and cause for cardiac arrhythmias especially with medication interactions, possible GI upset, possible muscle weakness.     Follow up in 3 months.    TABITHA ALMAGUER MD    Division of Rheumatic & Autoimmune Diseases  [facility]       Again, thank you for allowing me to participate in the care of your patient.      Sincerely,    Tabitha Almaguer MD

## 2025-01-27 NOTE — NURSING NOTE
Chief Complaint   Patient presents with    RECHECK     6 mo f/u     /81 (BP Location: Right arm, Patient Position: Sitting, Cuff Size: Adult Large)   Pulse 75   Wt 99.8 kg (220 lb)   SpO2 100%   BMI 36.61 kg/m

## 2025-01-28 LAB
ALT SERPL W P-5'-P-CCNC: 16 U/L (ref 0–50)
AST SERPL W P-5'-P-CCNC: 18 U/L (ref 0–45)
CHOLEST SERPL-MCNC: 196 MG/DL
CREAT UR-MCNC: 63.7 MG/DL
CRP SERPL-MCNC: 3.61 MG/L
FASTING STATUS PATIENT QL REPORTED: NO
HDLC SERPL-MCNC: 61 MG/DL
LDLC SERPL CALC-MCNC: 110 MG/DL
MICROALBUMIN UR-MCNC: <12 MG/L
MICROALBUMIN/CREAT UR: NORMAL MG/G{CREAT}
NONHDLC SERPL-MCNC: 135 MG/DL
TRIGL SERPL-MCNC: 125 MG/DL
TSH SERPL DL<=0.005 MIU/L-ACNC: 1.7 UIU/ML (ref 0.3–4.2)

## 2025-02-07 ENCOUNTER — OFFICE VISIT (OUTPATIENT)
Dept: FAMILY MEDICINE | Facility: CLINIC | Age: 40
End: 2025-02-07
Payer: COMMERCIAL

## 2025-02-07 VITALS
WEIGHT: 220 LBS | RESPIRATION RATE: 24 BRPM | OXYGEN SATURATION: 99 % | DIASTOLIC BLOOD PRESSURE: 82 MMHG | HEIGHT: 65 IN | HEART RATE: 88 BPM | SYSTOLIC BLOOD PRESSURE: 127 MMHG | TEMPERATURE: 96.9 F | BODY MASS INDEX: 36.65 KG/M2

## 2025-02-07 DIAGNOSIS — L30.9 PERIORBITAL DERMATITIS: Primary | ICD-10-CM

## 2025-02-07 NOTE — PROGRESS NOTES
Preceptor Attestation:    I discussed the patient with the resident and evaluated the patient in person. I have verified the content of the note, which accurately reflects my assessment of the patient and the plan of care.   Supervising Physician:  Jj John MD, MD.

## 2025-02-07 NOTE — PROGRESS NOTES
"  Assessment & Plan     Periorbital dermatitis  Patient presents with 2-3 weeks of periorbital rash that is slightly pruritic and painful. No new exposures to soaps, lotions, or detergents. No other rashes on her body and no other symptoms. Exam showed periorbital erythematous papules around outer edge of right eye and underneath left eye. Recommended using topical moisturizer (Aquaphor or CeraVe) twice daily for 2-4 weeks. If no improvement, discussed using tacrolimus and patient will reach out if she needs this.     Return if symptoms worsen or fail to improve.    Db Farrell is a 39 year old, presenting for the following health issues:  Derm Problem (Rash/bump around eyes about a week, a little irritated )        2/7/2025    11:22 AM   Additional Questions   Roomed by Ohn   Accompanied by Self         2/7/2025    Information    services provided? No     HPI     Rash around eyes - near corners and underneath  - started a few weeks ago  - fluctuates but never goes away  - very slightly itchy and painful, overall not painful but worse when touching it   - tried aquaphor/petroleum jelly and not using normal products  - no new products when this started  - not anywhere else on body   - no other sick symptoms -> fever, chills, body aches   - no blurry vision  - has glasses for driving but no contacts   - last week had very light spotting which is two weeks earlier than normal period timing   - no one at home with similar rashes  - no recent travel or excess sun exposure   - only known allergy is to sulfa abx       Review of Systems  Constitutional, HEENT, cardiovascular, pulmonary, gi and gu systems are negative, except as otherwise noted.      Objective    /82   Pulse 88   Temp 96.9  F (36.1  C) (Temporal)   Resp 24   Ht 1.651 m (5' 5\")   Wt 99.8 kg (220 lb)   LMP 01/13/2025   SpO2 99%   BMI 36.61 kg/m    Body mass index is 36.61 kg/m .  Physical Exam  Constitutional:       " Appearance: Normal appearance.   Eyes:      General: Lids are normal.      Conjunctiva/sclera: Conjunctivae normal.      Comments: Dermatitis on R corner of eye and underneath L eye.    Pulmonary:      Effort: Pulmonary effort is normal.   Neurological:      Mental Status: She is alert.   Psychiatric:         Behavior: Behavior is cooperative.           Zari Isbell, MS3  [employer]  February 7, 2025 11:49 AM     I was present with the medical student who participated in the service and in the documentation of this note. I have verified the history, physical exam, content of the note, and medical decision-making which accurately reflects my assessment of the patient and the plan of care.  Signed Electronically by: Rigo Cortez DO

## 2025-02-23 ENCOUNTER — MYC MEDICAL ADVICE (OUTPATIENT)
Dept: FAMILY MEDICINE | Facility: CLINIC | Age: 40
End: 2025-02-23
Payer: COMMERCIAL

## 2025-02-23 DIAGNOSIS — L30.9 PERIORBITAL DERMATITIS: Primary | ICD-10-CM

## 2025-02-25 RX ORDER — TACROLIMUS 1 MG/G
OINTMENT TOPICAL 2 TIMES DAILY
Qty: 30 G | Refills: 2 | Status: SHIPPED | OUTPATIENT
Start: 2025-02-25

## 2025-02-25 NOTE — TELEPHONE ENCOUNTER
Statement of Need for Tacrolimus 0.1% Ointment  The patient has been diagnosed with periorbital dermatitis and has undergone six weeks of twice-daily moisturizer treatment without any significant improvement. Topical corticosteroids are contraindicated for this patient due to the sensitive nature of the periorbital skin and the risk of adverse effects such as skin atrophy and increased intraocular pressure.    Tacrolimus 0.1% ointment is a topical calcineurin inhibitor that has been shown to be effective and safe for the treatment of dermatitis in sensitive areas, including the periorbital region. According to the American Academy of Dermatology guidelines, topical calcineurin inhibitors like tacrolimus are recommended as second-line therapy for atopic dermatitis in patients who have not responded adequately to other topical treatments or when those treatments are not advisable.    Clinical studies have demonstrated the efficacy of tacrolimus 0.1% ointment in treating eyelid dermatitis. Alyson et al. reported significant improvement in erythema, edema, scaling, and lichenification in patients with allergic contact eyelid dermatitis after 30 days of treatment with tacrolimus 0.1% ointment.[1] Additionally, Johnnie et al. found that tacrolimus 0.1% ointment was superior to fluticasone 0.005% ointment in improving facial atopic dermatitis, further supporting its use in sensitive skin areas.[2]    Given the patient's lack of response to moisturizers and the contraindication for topical steroids, tacrolimus 0.1% ointment is a medically necessary and evidence-based treatment option for managing their periorbital dermatitis.              References  [1] Alyson ABDALLA, Kristen GALVEZ, Teri V, et al. Tacrolimus Ointment 0.1% in the Treatment of Allergic Contact Eyelid  Dermatitis. Journal of the European Academy of Dermatology and Venereology : JEADV. 2009;23(4):382-7. doi:10.1111/j.3371-3642.2008.95813.x.  [2] Kaylen Ortiz  S, Lizzeth LINDO, et al. Superiority of Tacrolimus 0.1% Ointment Compared With Fluticasone 0.005% in Adults With Moderate to Severe Atopic Dermatitis of the Face: Results From a Randomized, Double-Blind Trial. The [place] Journal of Dermatology. 2009;161(2):427-34. doi:10.1111/j.5888-2314.2009.36796.x.

## 2025-03-19 ENCOUNTER — OFFICE VISIT (OUTPATIENT)
Dept: FAMILY MEDICINE | Facility: CLINIC | Age: 40
End: 2025-03-19
Payer: COMMERCIAL

## 2025-03-19 VITALS
HEIGHT: 65 IN | SYSTOLIC BLOOD PRESSURE: 126 MMHG | WEIGHT: 222.3 LBS | TEMPERATURE: 97.4 F | BODY MASS INDEX: 37.04 KG/M2 | DIASTOLIC BLOOD PRESSURE: 82 MMHG | RESPIRATION RATE: 16 BRPM | HEART RATE: 77 BPM | OXYGEN SATURATION: 100 %

## 2025-03-19 DIAGNOSIS — E13.9 DIABETES MELLITUS TYPE 1.5 (H): ICD-10-CM

## 2025-03-19 DIAGNOSIS — H00.012 HORDEOLUM EXTERNUM OF RIGHT LOWER EYELID: Primary | ICD-10-CM

## 2025-03-19 DIAGNOSIS — E66.01 CLASS 2 SEVERE OBESITY DUE TO EXCESS CALORIES WITH SERIOUS COMORBIDITY AND BODY MASS INDEX (BMI) OF 36.0 TO 36.9 IN ADULT (H): ICD-10-CM

## 2025-03-19 DIAGNOSIS — E66.812 CLASS 2 SEVERE OBESITY DUE TO EXCESS CALORIES WITH SERIOUS COMORBIDITY AND BODY MASS INDEX (BMI) OF 36.0 TO 36.9 IN ADULT (H): ICD-10-CM

## 2025-03-19 ASSESSMENT — PAIN SCALES - GENERAL: PAINLEVEL_OUTOF10: MILD PAIN (3)

## 2025-03-19 NOTE — PROGRESS NOTES
"  Assessment & Plan     Hordeolum externum of right lower eyelid  History and exam consistent with a sty. We recommended warm compress and gentle massage to the area. She should not use any creams near her eyelid but may use dilute baby shampoo to help clean it. We discussed signs to be reevaluated and that if it does not clear within a month she will need a ophthalmology referral.    Diabetes mellitus type 1.5 (H)  Class 2 severe obesity due to excess calories with serious comorbidity and body mass index (BMI) of 36.0 to 36.9 in adult (H)  These are being managed by endocrine. Plan is to start GLP-1 agonist such as Mounjaro once she is done breastfeeding. Last A1c was 6.5.          BMI  Estimated body mass index is 36.99 kg/m  as calculated from the following:    Height as of this encounter: 1.651 m (5' 5\").    Weight as of this encounter: 100.8 kg (222 lb 4.8 oz).             No follow-ups on file.    Db Farrell is a 39 year old, presenting for the following health issues:  Eye Problem (Swollen)      3/19/2025     3:46 PM   Additional Questions   Roomed by Stephan   Accompanied by Self         3/19/2025    Information    services provided? No     HPI      At the end of January/beginning of February, she developed red spots at the corner and under both eyes. She started tacrolimus cream on 2/24/25 (~3 weeks ago) which cleared the dermatitis. At one point, she stopped using it but the dermatitis returned so she restarted it which cleared the dermatitis again. 4 days ago, she developed what appeared to be a sty on her right lower eyelid marked by some redness and swelling. She stopped using the tacrolimus 2 days ago because the sty was getting a little worse. Yesterday, her eye lid swelled much more where she could not open it all the way and felt it was impacting her vision which has never happened before because of a sty. The swelling was still present this morning but has slowly " "decreased throughout the day.    Currently, she can feel that her right lower eyelid is swollen and feels that her right eye is a little irritated which is causing slight increased tearing and making her vision a little more blurry. The eyelid is a little itchy and has some pain when she blinks. In the mornings, it has had some discharge which is not yellow or green but looks like \"dry tears\". She is not having redness or pain in her right eye. She is able to move it fully in all directions without pain. There has not been any trauma to the area.    She had a URI ~1 month ago and still feels a little congested and has a little cough but is overall improving. She also tends to get allergies around this time of year. She works from home but her son is in  and brings illnesses home frequently. He is currently congested but has not had any eye problems including pink eye.    She is following with endo for her diabetes and weight management. She is planning to start Mounjaro once she is done breastfeeding.                    Objective    /82 (BP Location: Left arm, Patient Position: Sitting, Cuff Size: Adult Regular)   Pulse 77   Temp 97.4  F (36.3  C) (Temporal)   Resp 16   Ht 1.651 m (5' 5\")   Wt 100.8 kg (222 lb 4.8 oz)   LMP 02/24/2025   SpO2 100%   BMI 36.99 kg/m    Body mass index is 36.99 kg/m .  Physical Exam   GENERAL: alert and no distress  EYES: PERRL, EOMI, conjunctivae and sclerae normal. Hordeolum/sty on right lower eyelid  NECK: no adenopathy, no asymmetry, masses, or scars  SKIN: sparse acne on face but no dryness or signs of dermatitis on face  NEURO: Sensation intact bilaterally in all three distributions of trigeminal nerve.          Patient was seen and discussed with Dr. Milly Perez.    Jamie Fay, MS3    Preceptor Attestation:   I was present with the medical student who participated in the service and in the documentation of this note. I have verified the history and " personally performed the physical exam and medical decision making. I have verified the content of the note, which accurately reflects my assessment of the patient and the plan of care.   Supervising Physician:  Milly Perez MD.     Signed Electronically by: Milly Perez MD

## 2025-03-28 PROBLEM — M06.00 SERONEGATIVE RHEUMATOID ARTHRITIS (H): Status: RESOLVED | Noted: 2024-03-14 | Resolved: 2025-03-28

## 2025-04-03 ENCOUNTER — OFFICE VISIT (OUTPATIENT)
Dept: FAMILY MEDICINE | Facility: CLINIC | Age: 40
End: 2025-04-03
Payer: COMMERCIAL

## 2025-04-03 VITALS
SYSTOLIC BLOOD PRESSURE: 124 MMHG | HEIGHT: 65 IN | TEMPERATURE: 98.2 F | OXYGEN SATURATION: 99 % | HEART RATE: 83 BPM | DIASTOLIC BLOOD PRESSURE: 87 MMHG | BODY MASS INDEX: 37.06 KG/M2 | RESPIRATION RATE: 16 BRPM

## 2025-04-03 DIAGNOSIS — L30.9 PERIORBITAL DERMATITIS: Primary | ICD-10-CM

## 2025-04-03 DIAGNOSIS — H00.019 HORDEOLUM EXTERNUM, UNSPECIFIED LATERALITY: ICD-10-CM

## 2025-04-03 NOTE — PROGRESS NOTES
"  Assessment & Plan     Periorbital dermatitis  Hordeolum externum, bilateral  Denia has had a slightly pruritic and tender dermatitis-like rash around her eyes for the last 2 months. The rash has been improved by tacrolimus ointment but reappears if the ointment is discontinued. Over the past few weeks she has developed bilateral styes likely secondary to ointment use. Lower suspicion for blepharitis with lack of plugged glands or crusting on exam. Will place dermatology e-consult for advice on next treatment option.  - Dermatology e-consult      No follow-ups on file.    Subjective   Denia is a 39 year old with history of possible seronegative inflammatory arthritis, HLD, diabetes, hashimoto's, HTN, presenting for the following health issues:  Eye Problem (Stye on both eyes ) and Derm Problem (Ongoing rash concerns)      4/3/2025     2:40 PM   Additional Questions   Roomed by blanco   Accompanied by self         4/3/2025   Declines Weight   Did patient decline having their weight taken? Yes         4/3/2025    Information    services provided? No     HPI      - periorbital dermatitis visit 2/7 - on sides of eyes and below eyes. A little itchy, slightly painful (irritated/sensitive)  - started tacrolimus end of Feb - cleared rash, applied for a few days extra then stopped, then rash reappeared. Restarted ointment after a few days break  - stye R lower lid visit 3/19, very swollen  - has tried hot compresses, NSAIDs  - swelling with styes is painful  - not crusty in the morning  - no exposures to new pets, detergents, skin products  - vision ok, no pain with eye movements  - past several months gums and lymph nodes swollen - all this [place] before beriods started  - trigger finger flared back up  - no other joint pain or muscle aches or rashes  - no fevers, night sweats, weight loss,      Objective    /87   Pulse 83   Temp 98.2  F (36.8  C) (Temporal)   Resp 16   Ht 1.651 m (5' 5\")   LMP " 03/21/2025 (Approximate)   SpO2 99%   BMI 37.06 kg/m    Body mass index is 37.06 kg/m .  Physical Exam  Vitals reviewed.   Constitutional:       General: She is not in acute distress.     Appearance: Normal appearance. She is not ill-appearing.   HENT:      Head: Normocephalic and atraumatic.   Eyes:      General: Gaze aligned appropriately.         Right eye: Hordeolum present. No discharge.         Left eye: Hordeolum present.No discharge.      Conjunctiva/sclera: Conjunctivae normal.      Right eye: Right conjunctiva is not injected. No exudate.     Left eye: Left conjunctiva is not injected. No exudate.     Comments: Bilateral hordeolum with erythema and swelling localized to lower eyelids. No crusting seen.   Cardiovascular:      Rate and Rhythm: Normal rate.   Pulmonary:      Effort: Pulmonary effort is normal. No respiratory distress.   Skin:     Comments: Skin lateral and inferior to eyes with 1-2mm pink papules. Some are confluent. No associated swelling, erythema, or pus.   Neurological:      General: No focal deficit present.      Mental Status: She is alert.   Psychiatric:         Mood and Affect: Mood normal.                         Sarina Clements, MS3  [employer]    I was present with the medical student who participated in the service and in the documentation of this note. I have verified the history and personally performed the physical exam and medical decision making, and have verified the content of the note, which accurately reflects my assessment of the patient and the plan of care. I personally spent 30 min with in person and documenting.      Signed Electronically by: Negin Flores MD

## 2025-05-07 ENCOUNTER — MYC MEDICAL ADVICE (OUTPATIENT)
Dept: FAMILY MEDICINE | Facility: CLINIC | Age: 40
End: 2025-05-07
Payer: COMMERCIAL

## 2025-05-07 DIAGNOSIS — L30.9 PERIORBITAL DERMATITIS: Primary | ICD-10-CM

## 2025-05-12 PROBLEM — L30.9 PERIORBITAL DERMATITIS: Status: ACTIVE | Noted: 2025-05-12

## 2025-05-12 RX ORDER — DOXYCYCLINE 100 MG/1
100 TABLET ORAL 2 TIMES DAILY
Qty: 180 TABLET | Refills: 0 | Status: CANCELLED | OUTPATIENT
Start: 2025-05-12 | End: 2025-08-10

## 2025-05-13 ENCOUNTER — PATIENT OUTREACH (OUTPATIENT)
Dept: CARE COORDINATION | Facility: CLINIC | Age: 40
End: 2025-05-13
Payer: COMMERCIAL

## 2025-05-20 ENCOUNTER — TELEPHONE (OUTPATIENT)
Dept: FAMILY MEDICINE | Facility: CLINIC | Age: 40
End: 2025-05-20
Payer: COMMERCIAL

## 2025-05-20 NOTE — TELEPHONE ENCOUNTER
Prior Authorization Retail Medication Request    Medication/Dose: Erythromycin 500mg  Diagnosis and ICD code (if different than what is on RX):    New/renewal/insurance change PA/secondary ins. PA:  Previously Tried and Failed:    Rationale:      Insurance   Primary: [employer]  Insurance ID:  [id number]    Thank you,  Td Cotter, PharmD  [facility]

## 2025-05-20 NOTE — TELEPHONE ENCOUNTER
PRIOR AUTHORIZATION DENIED    Medication: ERYTHROMYCIN 500 MG PO TBEC  Insurance Company: [employer] [place] - Phone [phone] Fax [phone]  Denial Date: 5/20/2025  Denial Reason(s): NON FORMULARY - FORMULARY DRUGS NEED TO BE TRIED  Appeal Information: SEE ATTACHED  Patient Notified: NO

## 2025-05-20 NOTE — LETTER
2025    INSURER: Payor: KATE / Plan: [facility] [place] / Product Type: Indemnity /   Re: Prior Authorization Request  Patient: Denia Montes  Policy ID#:  [id number]  : 1985      To Whom it May Concern:    I am writing to formally request a prior authorization of coverage for my patient,  Denia Montes, for treatment using erythromycin.  I am requesting authorization for applicable provider professional and facility services associated with this therapy.    The patient has been diagnosed with periorbital dermatitis which has been refractory to topical therapies (tacrolimus and pimecrolimus). Dermatology has recommended treatment with oral tetracycline, however Denia is currently breastfeeding. Tetracycline is contraindicated in breastfeeding patients.  Oral erythromycin is the next best alternative to tetracycline.     I have included medical records pertaining to the patient s medical history, current condition and treatment plan.  In addition, the following billing codes will be used for therapy and follow-up: L30.9    I firmly believe that this therapy is clinically appropriate and that Denia Montes would benefit from resolution of her facial rash if allowed the opportunity to receive this treatment.  Please contact me at Dept: [phone] if you require additional information to ensure the prompt approval for coverage.    Please send your written decision to me at this address:  [address] [id number] [phone]  Dept: [phone]  Sincerely,        DO Keara Shelley

## 2025-05-20 NOTE — TELEPHONE ENCOUNTER
PA Initiation    Medication: ERYTHROMYCIN 500 MG PO TBEC  Insurance Company: [employer] [place] - Phone [phone] Fax [phone]  Pharmacy Filling the Rx: [facility] [place], [place] - [address]  Filling Pharmacy Phone: [phone]  Filling Pharmacy Fax:    Start Date: 5/20/2025

## 2025-05-22 NOTE — TELEPHONE ENCOUNTER
Medication Appeal Initiation    Medication: ERYTHROMYCIN 500 MG PO TBEC  Appeal Start Date:   5/22/2025  Insurance Company: [employer]  Insurance Phone: [phone]  Insurance Fax: [phone]  Comments:

## 2025-06-04 NOTE — TELEPHONE ENCOUNTER
MEDICATION APPEAL DENIED    Medication: ERYTHROMYCIN 500 MG PO TBEC  Insurance Company: [employer] [place]  Denial Date:  5/30/2025  Denial Reason(s): DENIAL UPHELD - CRITERIA NOT MET  Second Level Appeal Information: SEE ATTACHED  Patient Notified: NO  Central Prior Authorization Team ONLY: Second level appeals will be managed by the clinic staff and provider. Please contact the [facility] Prior Authorization Team if additional information about the denial is needed.    Called to follow up on appeal and was told it was denied and the original denial criteria was upheld.

## 2025-06-05 ENCOUNTER — MYC MEDICAL ADVICE (OUTPATIENT)
Dept: FAMILY MEDICINE | Facility: CLINIC | Age: 40
End: 2025-06-05
Payer: COMMERCIAL

## 2025-06-05 DIAGNOSIS — L30.9 PERIORBITAL DERMATITIS: Primary | ICD-10-CM

## 2025-06-09 ENCOUNTER — TELEPHONE (OUTPATIENT)
Dept: FAMILY MEDICINE | Facility: CLINIC | Age: 40
End: 2025-06-09

## 2025-06-09 DIAGNOSIS — L30.9 PERIORBITAL DERMATITIS: Primary | ICD-10-CM

## 2025-06-09 RX ORDER — ERYTHROMYCIN 500 MG/1
500 TABLET, DELAYED RELEASE ORAL 2 TIMES DAILY
Qty: 60 TABLET | Refills: 0 | Status: SHIPPED | OUTPATIENT
Start: 2025-06-09

## 2025-06-09 NOTE — TELEPHONE ENCOUNTER
[facility] phone call message- medication clarification/question:    Full Medication Name: Erythromycin Base 500 MG Oral Tablet Delayed Release (PAUL-TAB)        Question: Patient was given coupon for erythomycin tablets that are not the timed release.  Is there a reason for the timed release or can a new prescription be sent to the pharmacy for the medication that is less expensive and she can use the coupon with.  Patient paying out of pocket.    Pharmacy confirmed as    [id number] - [place] [id number] [facility] - [facility] [address] [place] [address] DRUG STORE #[id number] - [place], [place] - [address] AT [address] [address] [place] [facility] - [address]: Yes    OK to leave a message on voice mail? No    Please respond in mychart.    Primary language: English      needed? No    Call taken on June 9, 2025 at 1:08 PM by Veronique Francois

## 2025-06-10 RX ORDER — ERYTHROMYCIN 250 MG/1
250 TABLET, COATED ORAL 4 TIMES DAILY
Qty: 120 TABLET | Refills: 3 | Status: SHIPPED | OUTPATIENT
Start: 2025-06-10

## 2025-06-12 ENCOUNTER — OFFICE VISIT (OUTPATIENT)
Dept: DERMATOLOGY | Facility: CLINIC | Age: 40
End: 2025-06-12
Attending: FAMILY MEDICINE
Payer: COMMERCIAL

## 2025-06-12 DIAGNOSIS — L71.0 PERIORIFICIAL DERMATITIS: Primary | ICD-10-CM

## 2025-06-12 RX ORDER — ERYTHROMYCIN 500 MG/1
500 TABLET, COATED ORAL 2 TIMES DAILY
Qty: 60 TABLET | Refills: 1 | Status: SHIPPED | OUTPATIENT
Start: 2025-06-12

## 2025-06-12 RX ORDER — PIMECROLIMUS 10 MG/G
CREAM TOPICAL 2 TIMES DAILY
Qty: 60 G | Refills: 0 | Status: SHIPPED | OUTPATIENT
Start: 2025-06-12

## 2025-06-12 NOTE — LETTER
6/12/2025      Denia Montes  [address]  [place] [address]      Dear Colleague,    Thank you for referring your patient, Denia Montes, to the [facility]. Please see a copy of my visit note below.    [facility] Dermatology Note  Encounter Date: Jun 12, 2025  Office Visit     Reviewed patients past medical history and pertinent chart review prior to patients visit today.     Dermatology Problem List:  # Periorificial dermatitis  -Elidel cream, erythromycin  -Prior tx: tacrolimus ointment  # Suspect benign nevus, left nare, photo 9/9/23  # Dermatofibroma, L calf  # Acne vulgaris, mild  - tretinoin 0.025%, clindamycin 1% lotion     Soc hx: grew up in [place]. Moved to [place] for school many years ago.  ____________________________________________    Assessment & Plan:     # Perioral (periorifacial) dermatitis.   -We discussed the etiology of this condition. She is not using any topical steroids.  -Continue Pimecrolimus (elidel) 1% cream BID to affected areas. Refills provided.  -Recommend diligent use of facial moisturizers with SPF 30+ in morning, and cream at night such as Cetaphil cream, Cera Ve Cream or Vanicream.    -As her periorificial dermatitis has been refractory to tacrolimus ointment and Elidel cream, I have recommended treatment with oral antibiotics. She is unable to take oral doxycycline as she is currently breast feeding. We will start oral erythromycin 500 mg twice daily for a duration of 8 weeks. Side effects reviewed including GI upset. Recommended she take medication with food to reduce this. Order placed.     Follow up: in 2-3 months if no improvement.      All risks, benefits and alternatives were discussed with patient.  Patient is in agreement and understands the assessment and plan.  All questions were answered.  Alexus Moreno PA-C  [facility]  _______________________________________    CC: Derm Problem  (Perioral dermatitis follow up)     HPI:  Ms. Denia Montes is a(n) 39 year old female who presents today as a return patient for follow up of periorificial dermatitis.  Her last visit in dermatology on 4/4/2025 was for an E consult for evaluation of a rash consistent with periorificial dermatitis.  Elidel 1% cream was recommended twice daily.  If refractory, recommended consideration of an oral antibiotic, such as doxycycline. Patient has tried and failed treatment with both topical Elidel cream and tacrolimus ointment. She was unable to initiate doxycycline as she is breast feeding. Erythromycin has been prescribed as an alternate treatment but she has not yet started this medication as there was some problems with insurance. She is still currently breast feeding. Her skin continues to flares and she notes several red bumps, occasional pruritus, and mild intermittent flaking. This has been ongoing for several months now.    Patient is otherwise feeling well, without additional skin concerns.    Physical Exam:  SKIN: Focused examination of face and chest only was performed.  - cheeks, eyelids, and chin, erythematous monomorphic papules   - chest, very few scattered inflammatory papules consistent with acne    - No other lesions of concern on areas examined.     Medications:  Current Outpatient Medications   Medication Sig Dispense Refill     clindamycin (CLEOCIN T) 1 % external lotion Apply topically 2 times daily To face as needed 60 mL 11     Continuous Blood Gluc  (DEXCOM G6 ) RAEANN Use to read blood sugars as per 's instructions. 1 each 0     Continuous Blood Gluc  (DEXCOM G7 ) RAEANN Use to read blood sugars as per 's instructions. 1 each 0     Continuous Blood Gluc Sensor (DEXCOM G6 SENSOR) MISC Change every 10 days. 9 each 3     Continuous Blood Gluc Sensor (DEXCOM G6 SENSOR) MISC CHANGE EVERY 10 DAYS. 9 each 3     Continuous Blood Gluc Transmit  (DEXCOM G6 TRANSMITTER) MISC Change every 3 months. 1 each 3     Continuous Glucose Sensor (DEXCOM G7 SENSOR) MISC CHANGE EVERY 10 DAYS 9 each 4     erythromycin (E-MYCIN) 250 MG tablet Take 1 tablet (250 mg) by mouth 4 times daily. 120 tablet 3     erythromycin (E-MYCIN) 250 MG tablet Take 2 tablets (500 mg) by mouth 2 times daily. 120 tablet 2     erythromycin (PAUL-TAB) 500 MG EC tablet Take 1 tablet (500 mg) by mouth 2 times daily. 60 tablet 0     erythromycin base 500 MG TABS Take 1 tablet (500 mg) by mouth 2 times daily. 60 tablet 1     Glucagon (GVOKE HYPOPEN) 1 MG/0.2ML pen Inject the contents of 1 device under the skin into lower abdomen, outer thigh, or outer upper arm as needed for hypoglycemia. If no response after 15 minutes, additional 1 mg dose from a new device may be injected while waiting for emergency assistance. 0.4 mL 3     Insulin Aspart FlexPen 100 UNIT/ML SOPN Inject 1-15 Units subcutaneously 3 times daily (with meals). With meals and correction dose as directed. Uses up to 45 units daily. 45 mL 3     Insulin Glargine-yfgn (SEMGLEE, YFGN,) 100 UNIT/ML SOPN Inject 42 Units subcutaneously every 24 hours. 45 mL 3     insulin pen needle (BD RUSSELL U/F) 32G X 4 MM miscellaneous USE 8 PEN NEEDLES DAILY 800 each 3     metFORMIN (GLUCOPHAGE) 500 MG tablet Take 2 tablets (1,000 mg) by mouth 2 times daily (with meals). 360 tablet 3     Omega-3 Fatty Acids (FISH OIL) 500 MG CAPS        pimecrolimus (ELIDEL) 1 % external cream Apply topically 2 times daily. 60 g 0     tretinoin (RETIN-A) 0.025 % external cream Start tretinoin 0.025% cream at bedtime: apply pea-sized amount to the entire face then follow with moisturizer; start using a few nights per week then gradually increase to goal of nightly use as tolerated 45 g 11     valsartan (DIOVAN) 80 MG tablet Take 1 tablet (80 mg) by mouth daily. 90 tablet 3     hydroxychloroquine (PLAQUENIL) 200 MG tablet Take 2 tablets (400 mg) by mouth daily (Patient not  taking: Reported on 6/12/2025) 180 tablet 0     No current facility-administered medications for this visit.      Past Medical History:   Patient Active Problem List   Diagnosis     History of kidney stones     Hyperlipidemia LDL goal <70     Cervical high risk HPV (human papillomavirus) test positive     Plantar warts     Stage 1 hypertension     Diabetes mellitus type 1.5 (H)     Hashimoto's thyroiditis     Plantar fasciitis     Class 2 severe obesity due to excess calories with serious comorbidity in adult (H)     Pain in both wrists     Periorbital dermatitis     Past Medical History:   Diagnosis Date     Cervical high risk HPV (human papillomavirus) test positive 08/2015 8/2015, 09/23/16, 12/14/18, 12/16/19, 12/29/20     Hashimoto's thyroiditis      Hx of colposcopy with cervical biopsy 10/21/2016    [place] Bx: Neg NIL, ECC: Neg.     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     Again, thank you for allowing me to participate in the care of your patient.        Sincerely,        Swetha Moreno PA-C    Electronically signed

## 2025-06-12 NOTE — PROGRESS NOTES
[facility] Dermatology Note  Encounter Date: Jun 12, 2025  Office Visit     Reviewed patients past medical history and pertinent chart review prior to patients visit today.     Dermatology Problem List:  # Periorificial dermatitis  -Elidel cream, erythromycin  -Prior tx: tacrolimus ointment  # Suspect benign nevus, left nare, photo 9/9/23  # Dermatofibroma, L calf  # Acne vulgaris, mild  - tretinoin 0.025%, clindamycin 1% lotion     Soc hx: grew up in [place]. Moved to [place] for school many years ago.  ____________________________________________    Assessment & Plan:     # Perioral (periorifacial) dermatitis.   -We discussed the etiology of this condition. She is not using any topical steroids.  -Continue Pimecrolimus (elidel) 1% cream BID to affected areas. Refills provided.  -Recommend diligent use of facial moisturizers with SPF 30+ in morning, and cream at night such as Cetaphil cream, Cera Ve Cream or Vanicream.    -As her periorificial dermatitis has been refractory to tacrolimus ointment and Elidel cream, I have recommended treatment with oral antibiotics. She is unable to take oral doxycycline as she is currently breast feeding. We will start oral erythromycin 500 mg twice daily for a duration of 8 weeks. Side effects reviewed including GI upset. Recommended she take medication with food to reduce this. Order placed.     Follow up: in 2-3 months if no improvement.      All risks, benefits and alternatives were discussed with patient.  Patient is in agreement and understands the assessment and plan.  All questions were answered.  Alexus Moreno PA-C  [facility] Dermatology  _______________________________________    CC: Derm Problem (Perioral dermatitis follow up)     HPI:  Ms. Denia Montes is a(n) 39 year old female who presents today as a return patient for follow up of periorificial dermatitis.  Her last visit in dermatology on 4/4/2025 was for an E consult for evaluation of a  rash consistent with periorificial dermatitis.  Elidel 1% cream was recommended twice daily.  If refractory, recommended consideration of an oral antibiotic, such as doxycycline. Patient has tried and failed treatment with both topical Elidel cream and tacrolimus ointment. She was unable to initiate doxycycline as she is breast feeding. Erythromycin has been prescribed as an alternate treatment but she has not yet started this medication as there was some problems with insurance. She is still currently breast feeding. Her skin continues to flares and she notes several red bumps, occasional pruritus, and mild intermittent flaking. This has been ongoing for several months now.    Patient is otherwise feeling well, without additional skin concerns.    Physical Exam:  SKIN: Focused examination of face and chest only was performed.  - cheeks, eyelids, and chin, erythematous monomorphic papules   - chest, very few scattered inflammatory papules consistent with acne    - No other lesions of concern on areas examined.     Medications:  Current Outpatient Medications   Medication Sig Dispense Refill    clindamycin (CLEOCIN T) 1 % external lotion Apply topically 2 times daily To face as needed 60 mL 11    Continuous Blood Gluc  (DEXCOM G6 ) RAEANN Use to read blood sugars as per 's instructions. 1 each 0    Continuous Blood Gluc  (DEXCOM G7 ) RAEANN Use to read blood sugars as per 's instructions. 1 each 0    Continuous Blood Gluc Sensor (DEXCOM G6 SENSOR) MISC Change every 10 days. 9 each 3    Continuous Blood Gluc Sensor (DEXCOM G6 SENSOR) MISC CHANGE EVERY 10 DAYS. 9 each 3    Continuous Blood Gluc Transmit (DEXCOM G6 TRANSMITTER) MISC Change every 3 months. 1 each 3    Continuous Glucose Sensor (DEXCOM G7 SENSOR) MISC CHANGE EVERY 10 DAYS 9 each 4    erythromycin (E-MYCIN) 250 MG tablet Take 1 tablet (250 mg) by mouth 4 times daily. 120 tablet 3    erythromycin (E-MYCIN)  250 MG tablet Take 2 tablets (500 mg) by mouth 2 times daily. 120 tablet 2    erythromycin (PAUL-TAB) 500 MG EC tablet Take 1 tablet (500 mg) by mouth 2 times daily. 60 tablet 0    erythromycin base 500 MG TABS Take 1 tablet (500 mg) by mouth 2 times daily. 60 tablet 1    Glucagon (GVOKE HYPOPEN) 1 MG/0.2ML pen Inject the contents of 1 device under the skin into lower abdomen, outer thigh, or outer upper arm as needed for hypoglycemia. If no response after 15 minutes, additional 1 mg dose from a new device may be injected while waiting for emergency assistance. 0.4 mL 3    Insulin Aspart FlexPen 100 UNIT/ML SOPN Inject 1-15 Units subcutaneously 3 times daily (with meals). With meals and correction dose as directed. Uses up to 45 units daily. 45 mL 3    Insulin Glargine-yfgn (SEMGLEE, YFGN,) 100 UNIT/ML SOPN Inject 42 Units subcutaneously every 24 hours. 45 mL 3    insulin pen needle (BD RUSSELL U/F) 32G X 4 MM miscellaneous USE 8 PEN NEEDLES DAILY 800 each 3    metFORMIN (GLUCOPHAGE) 500 MG tablet Take 2 tablets (1,000 mg) by mouth 2 times daily (with meals). 360 tablet 3    Omega-3 Fatty Acids (FISH OIL) 500 MG CAPS       pimecrolimus (ELIDEL) 1 % external cream Apply topically 2 times daily. 60 g 0    tretinoin (RETIN-A) 0.025 % external cream Start tretinoin 0.025% cream at bedtime: apply pea-sized amount to the entire face then follow with moisturizer; start using a few nights per week then gradually increase to goal of nightly use as tolerated 45 g 11    valsartan (DIOVAN) 80 MG tablet Take 1 tablet (80 mg) by mouth daily. 90 tablet 3    hydroxychloroquine (PLAQUENIL) 200 MG tablet Take 2 tablets (400 mg) by mouth daily (Patient not taking: Reported on 6/12/2025) 180 tablet 0     No current facility-administered medications for this visit.      Past Medical History:   Patient Active Problem List   Diagnosis    History of kidney stones    Hyperlipidemia LDL goal <70    Cervical high risk HPV (human papillomavirus)  test positive    Plantar warts    Stage 1 hypertension    Diabetes mellitus type 1.5 (H)    Hashimoto's thyroiditis    Plantar fasciitis    Class 2 severe obesity due to excess calories with serious comorbidity in adult (H)    Pain in both wrists    Periorbital dermatitis     Past Medical History:   Diagnosis Date    Cervical high risk HPV (human papillomavirus) test positive 08/2015 8/2015, 09/23/16, 12/14/18, 12/16/19, 12/29/20    Hashimoto's thyroiditis     Hx of colposcopy with cervical biopsy 10/21/2016    [facility] Bx: Neg NIL, ECC: Neg.    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

## 2025-06-28 ENCOUNTER — HEALTH MAINTENANCE LETTER (OUTPATIENT)
Age: 40
End: 2025-06-28

## 2025-07-09 ENCOUNTER — ANCILLARY PROCEDURE (OUTPATIENT)
Dept: MAMMOGRAPHY | Facility: CLINIC | Age: 40
End: 2025-07-09
Attending: FAMILY MEDICINE
Payer: COMMERCIAL

## 2025-07-09 PROCEDURE — 77067 SCR MAMMO BI INCL CAD: CPT | Mod: TC | Performed by: RADIOLOGY

## 2025-07-09 PROCEDURE — 77063 BREAST TOMOSYNTHESIS BI: CPT | Mod: TC | Performed by: RADIOLOGY

## (undated) DEVICE — GLOVE PROTEXIS BLUE W/NEU-THERA 7.0  2D73EB70

## (undated) DEVICE — DRSG TELFA ISLAND 4X10"

## (undated) DEVICE — SU PLAIN 2-0 CT 27" 853H

## (undated) DEVICE — CATH TRAY FOLEY SURESTEP 16FR WDRAIN BAG STLK LATEX A300316A

## (undated) DEVICE — SU MONOCRYL 0 CT-1 36" Y346H

## (undated) DEVICE — SU VICRYL 4-0 KS 27" UND J662H

## (undated) DEVICE — STOCKING SLEEVE COMPRESSION CALF LG

## (undated) DEVICE — ESU GROUND PAD UNIVERSAL W/O CORD

## (undated) DEVICE — GLOVE ESTEEM POWDER FREE SMT 6.5  2D72PT65

## (undated) DEVICE — PREP CHLORAPREP 26ML TINTED ORANGE  260815

## (undated) DEVICE — PACK C-SECTION LF PL15OTA83B

## (undated) DEVICE — SOL WATER IRRIG 1000ML BOTTLE 07139-09

## (undated) DEVICE — SU VICRYL 0 CT-1 36" J346H

## (undated) DEVICE — DRSG STERI STRIP 1/4X3" R1541

## (undated) DEVICE — SOL NACL 0.9% IRRIG 1000ML BOTTLE 07138-09

## (undated) DEVICE — DRSG ABDOMINAL 07 1/2X8" 7197D

## (undated) DEVICE — STRAP KNEE/BODY 31143004

## (undated) RX ORDER — FENTANYL CITRATE 50 UG/ML
INJECTION, SOLUTION INTRAMUSCULAR; INTRAVENOUS
Status: DISPENSED
Start: 2021-07-19

## (undated) RX ORDER — TRIAMCINOLONE ACETONIDE 40 MG/ML
INJECTION, SUSPENSION INTRA-ARTICULAR; INTRAMUSCULAR
Status: DISPENSED
Start: 2024-02-23

## (undated) RX ORDER — OXYTOCIN/0.9 % SODIUM CHLORIDE 30/500 ML
PLASTIC BAG, INJECTION (ML) INTRAVENOUS
Status: DISPENSED
Start: 2021-07-19

## (undated) RX ORDER — LIDOCAINE HYDROCHLORIDE 10 MG/ML
INJECTION, SOLUTION EPIDURAL; INFILTRATION; INTRACAUDAL; PERINEURAL
Status: DISPENSED
Start: 2024-12-06

## (undated) RX ORDER — ACETAMINOPHEN 325 MG/1
TABLET ORAL
Status: DISPENSED
Start: 2021-07-19

## (undated) RX ORDER — LIDOCAINE HYDROCHLORIDE 10 MG/ML
INJECTION, SOLUTION EPIDURAL; INFILTRATION; INTRACAUDAL; PERINEURAL
Status: DISPENSED
Start: 2024-02-23

## (undated) RX ORDER — ONDANSETRON 2 MG/ML
INJECTION INTRAMUSCULAR; INTRAVENOUS
Status: DISPENSED
Start: 2021-07-19

## (undated) RX ORDER — TRIAMCINOLONE ACETONIDE 40 MG/ML
INJECTION, SUSPENSION INTRA-ARTICULAR; INTRAMUSCULAR
Status: DISPENSED
Start: 2024-12-06

## (undated) RX ORDER — MORPHINE SULFATE 1 MG/ML
INJECTION, SOLUTION EPIDURAL; INTRATHECAL; INTRAVENOUS
Status: DISPENSED
Start: 2021-07-19